# Patient Record
Sex: MALE | Race: WHITE | NOT HISPANIC OR LATINO | Employment: FULL TIME | URBAN - METROPOLITAN AREA
[De-identification: names, ages, dates, MRNs, and addresses within clinical notes are randomized per-mention and may not be internally consistent; named-entity substitution may affect disease eponyms.]

---

## 2018-06-13 ENCOUNTER — HOSPITAL ENCOUNTER (OUTPATIENT)
Dept: GENERAL RADIOLOGY | Facility: CLINIC | Age: 63
Discharge: HOME | End: 2018-06-13
Attending: FAMILY MEDICINE | Admitting: FAMILY MEDICINE
Payer: COMMERCIAL

## 2018-06-13 ENCOUNTER — TELEPHONE (OUTPATIENT)
Dept: FAMILY MEDICINE | Facility: CLINIC | Age: 63
End: 2018-06-13

## 2018-06-13 ENCOUNTER — OFFICE VISIT (OUTPATIENT)
Dept: FAMILY MEDICINE | Facility: CLINIC | Age: 63
End: 2018-06-13
Payer: COMMERCIAL

## 2018-06-13 DIAGNOSIS — R06.02 SHORTNESS OF BREATH: ICD-10-CM

## 2018-06-13 DIAGNOSIS — R06.02 SHORTNESS OF BREATH: Primary | ICD-10-CM

## 2018-06-13 PROCEDURE — 71046 X-RAY EXAM CHEST 2 VIEWS: CPT | Mod: TC

## 2018-06-13 PROCEDURE — 99214 OFFICE O/P EST MOD 30 MIN: CPT | Performed by: FAMILY MEDICINE

## 2018-06-13 RX ORDER — PREDNISONE 20 MG/1
20 TABLET ORAL DAILY
Qty: 7 TABLET | Refills: 0 | Status: SHIPPED | OUTPATIENT
Start: 2018-06-13 | End: 2018-07-31

## 2018-06-13 NOTE — LETTER
[address] [id number] [phone]        June 13, 2018    Regarding:  Keith Gonzalez  [address]  [facility] [address]              To Whom It May Concern;  Please excuse Jaison from work for the next 5-7 days due to a medical illness. If you have any questions or concerns please feel free to contact us at [phone].          Sincerely,        Chase East MD

## 2018-06-13 NOTE — PROGRESS NOTES
SUBJECTIVE:                                                      No chief complaint on file.           Keith is a 62 year old comes in with his wife today complaining of several weeks of worsening shortness of breath.  He has become quite limited in his day-to-day activities.  Short of breath with minimal exertion.  Wife is also recovering from something similar.  She he is a longtime smoker but recently quit.  No hemoptysis.  No cough.  No fever.  No chest pain.  History of DVT or PE.  Currently anticoagulated and at goal.  Per the patient he is checked outside our system.    ROS:  Constitutional, HEENT, cardiovascular, pulmonary, gi and gu systems are negative, except as otherwise noted.    OBJECTIVE:                                                    There were no vitals taken for this visit.  There is no height or weight on file to calculate BMI.    Thin.  Well-appearing.  Alert and oriented.  Heart regular.  Lungs are tight bilaterally with decreased air movement, but no wheezes or rales and distress    Diagnostic Test Results:  none      ASSESSMENT/PLAN:                                                        ICD-10-CM    1. Shortness of breath R06.02 XR Chest 2 Views     predniSONE (DELTASONE) 20 MG tablet     Ipratropium-Albuterol (COMBIVENT RESPIMAT)  MCG/ACT inhaler       Likely undiagnosed underlying exacerbation of COPD.  Congratulated him on quitting smoking.  Will check an x-ray and start him on prednisone and inhalers.  See how he does with that return a week.  If worsening come back sooner.    Chase East MD  [facility]

## 2018-06-13 NOTE — MR AVS SNAPSHOT
After Visit Summary   6/13/2018    Keith Gonzalez    MRN: [id number]           Patient Information     Date Of Birth          1955        Visit Information        Provider Department      6/13/2018 9:00 AM Chase East MD [facility]        Today's Diagnoses     Shortness of breath    -  1       Follow-ups after your visit        Your next 10 appointments already scheduled     Jun 20, 2018 10:00 AM CDT   SHORT with Chase East MD   [facility] ([facility])    [address] [place] [id number] [phone]              Future tests that were ordered for you today     Open Future Orders        Priority Expected Expires Ordered    XR Chest 2 Views Routine 6/13/2018 6/13/2019 6/13/2018            Who to contact     If you have questions or need follow up information about today's clinic visit or your schedule please contact [facility] directly at [phone].  Normal or non-critical lab and imaging results will be communicated to you by MyChart, letter or phone within 4 business days after the clinic has received the results. If you do not hear from us within 7 days, please contact the clinic through Opalityhart or phone. If you have a critical or abnormal lab result, we will notify you by phone as soon as possible.  Submit refill requests through [employer]fund or call your pharmacy and they will forward the refill request to us. Please allow 3 business days for your refill to be completed.          Additional Information About Your Visit        Care EveryWhere ID     This is your Care EveryWhere ID. This could be used by other organizations to access your [place] medical records  [id number]         Blood Pressure from Last 3 Encounters:   05/13/09 110/70   04/08/09 96/60   03/03/09 92/56    Weight from Last 3 Encounters:   05/13/09 136 lb (61.7 kg)   04/08/09 141 lb (64 kg)   03/03/09 138 lb (62.6 kg)                  Today's Medication Changes          These changes are accurate as of 6/13/18  3:57 PM.  If you have any questions, ask your nurse or doctor.               Start taking these medicines.        Dose/Directions    Ipratropium-Albuterol  MCG/ACT inhaler   Commonly known as:  COMBIVENT RESPIMAT   Used for:  Shortness of breath        Dose:  1 puff   Inhale 1 puff into the lungs 4 times daily Not to exceed 6 doses per day.   Quantity:  1 Inhaler   Refills:  1       predniSONE 20 MG tablet   Commonly known as:  DELTASONE   Used for:  Shortness of breath        Dose:  20 mg   Take 1 tablet (20 mg) by mouth daily   Quantity:  7 tablet   Refills:  0            Where to get your medicines      These medications were sent to [facility], [place] - [address] [facility]   [address] , [facility] [address]     Phone:  [phone]     Ipratropium-Albuterol  MCG/ACT inhaler    predniSONE 20 MG tablet                Primary Care Provider Fax #    Physician No Ref-Primary [phone]       No address on file        Equal Access to Services     [facility] : Hadii marie villanueva hadasho Soomaali, waaxda luqadaha, qaybta kaalmada adeegyakateryna, micheal mishra . So [facility] [phone].    ATENCIÓN: Si habla español, tiene a sierra disposición servicios gratuitos de asistencia lingüística. Llame al [phone].    We comply with applicable federal civil rights laws and [place] laws. We do not discriminate on the basis of race, color, national origin, age, disability, sex, sexual orientation, or gender identity.            Thank you!     Thank you for choosing [facility]  for your care. Our goal is always to provide you with excellent care. Hearing back from our patients is one way we can continue to improve our services. Please take a few minutes to complete the written survey that you may receive in the mail after your visit with us. Thank you!             Your Updated  Medication List - Protect others around you: Learn how to safely use, store and throw away your medicines at www.disposemymeds.org.          This list is accurate as of 6/13/18  3:57 PM.  Always use your most recent med list.                   Brand Name Dispense Instructions for use Diagnosis    Ipratropium-Albuterol  MCG/ACT inhaler    COMBIVENT RESPIMAT    1 Inhaler    Inhale 1 puff into the lungs 4 times daily Not to exceed 6 doses per day.    Shortness of breath       predniSONE 20 MG tablet    DELTASONE    7 tablet    Take 1 tablet (20 mg) by mouth daily    Shortness of breath       warfarin 5 MG tablet    COUMADIN    60 Tab    2 tablets (10 mg) daily or as directed    Long term (current) use of anticoagulants, Phlebitis and thrombophlebitis of other deep vessels of lower extremities

## 2018-06-13 NOTE — LETTER
[address] [id number] [phone]        June 13, 2018    Keith Gonzalez  [address]  [facility] [address]          Dear Keith,    LAB RESULTS:     The results of your recent Chest X-Ray were NORMAL no signs of pneumonia.  If you have any further questions or problems, please contact our office at [phone].        Sincerely,        Chase East MD

## 2018-06-13 NOTE — TELEPHONE ENCOUNTER
----- Message from Chase East MD sent at 6/13/2018  4:30 PM CDT -----  Please call the patient with the results. No sign of pneumonia     Chase East MD

## 2018-06-14 ENCOUNTER — TELEPHONE (OUTPATIENT)
Dept: FAMILY MEDICINE | Facility: CLINIC | Age: 63
End: 2018-06-14

## 2018-06-14 NOTE — TELEPHONE ENCOUNTER
Reason for Call:  Request for results:    Name of test or procedure: X-ray    Date of test of procedure: 06/13    Location of the test or procedure: [facility] to leave the result message on voice mail or with a family member? YES    Phone number Patient can be reached at:  Cell number on file:    Telephone Information:   Mobile [phone]       Additional comments: Please call with results    Call taken on 6/14/2018 at 11:39 AM by Belinda Menendez

## 2018-06-14 NOTE — TELEPHONE ENCOUNTER
Patient informed letter was sent yesterday but patient did not receive it yet. He has no further questions at this time.   Nasreen Brooks MA

## 2018-06-20 ENCOUNTER — OFFICE VISIT (OUTPATIENT)
Dept: FAMILY MEDICINE | Facility: CLINIC | Age: 63
End: 2018-06-20
Payer: COMMERCIAL

## 2018-06-20 VITALS
WEIGHT: 131.2 LBS | DIASTOLIC BLOOD PRESSURE: 72 MMHG | HEART RATE: 82 BPM | OXYGEN SATURATION: 93 % | SYSTOLIC BLOOD PRESSURE: 110 MMHG | TEMPERATURE: 98.3 F

## 2018-06-20 DIAGNOSIS — R06.02 SHORTNESS OF BREATH: ICD-10-CM

## 2018-06-20 DIAGNOSIS — J44.9 CHRONIC OBSTRUCTIVE PULMONARY DISEASE, UNSPECIFIED COPD TYPE (H): Primary | ICD-10-CM

## 2018-06-20 PROCEDURE — 99213 OFFICE O/P EST LOW 20 MIN: CPT | Performed by: FAMILY MEDICINE

## 2018-06-20 RX ORDER — BENZONATATE 200 MG/1
200 CAPSULE ORAL 2 TIMES DAILY PRN
Qty: 30 CAPSULE | Refills: 0 | Status: SHIPPED | OUTPATIENT
Start: 2018-06-20 | End: 2018-10-25

## 2018-06-20 ASSESSMENT — PAIN SCALES - GENERAL: PAINLEVEL: MILD PAIN (2)

## 2018-06-20 NOTE — LETTER
[address] [id number] [phone]        June 20, 2018    Regarding:  Keith Gonzalez  [address]  [facility] [address]              To Whom It May Concern;  He will return to work 6/25 no restrictions. He was out with COPD.           Sincerely,      Chase East MD

## 2018-06-20 NOTE — MR AVS SNAPSHOT
After Visit Summary   6/20/2018    Keith Gonzalez    MRN: [id number]           Patient Information     Date Of Birth          1955        Visit Information        Provider Department      6/20/2018 10:00 AM Chase East MD [facility]        Today's Diagnoses     Chronic obstructive pulmonary disease, unspecified COPD type (H)    -  1    Shortness of breath           Follow-ups after your visit        Your next 10 appointments already scheduled     Jul 25, 2018  9:40 AM CDT   SHORT with Chase East MD   [facility] ([facility])    [address] [place] [phone] [phone]              Who to contact     If you have questions or need follow up information about today's clinic visit or your schedule please contact [facility] directly at [phone].  Normal or non-critical lab and imaging results will be communicated to you by MyChart, letter or phone within 4 business days after the clinic has received the results. If you do not hear from us within 7 days, please contact the clinic through MyChart or phone. If you have a critical or abnormal lab result, we will notify you by phone as soon as possible.  Submit refill requests through GumGum or call your pharmacy and they will forward the refill request to us. Please allow 3 business days for your refill to be completed.          Additional Information About Your Visit        Care EveryWhere ID     This is your Care EveryWhere ID. This could be used by other organizations to access your [place] medical records  [id number]        Your Vitals Were     Pulse Temperature Pulse Oximetry             82 98.3  F (36.8  C) (Temporal) 93%          Blood Pressure from Last 3 Encounters:   06/20/18 110/72   05/13/09 110/70   04/08/09 96/60    Weight from Last 3 Encounters:   06/20/18 131 lb 3.2 oz (59.5 kg)   05/13/09 136 lb (61.7 kg)   04/08/09 141 lb (64  kg)              Today, you had the following     No orders found for display         Today's Medication Changes          These changes are accurate as of 6/20/18  2:33 PM.  If you have any questions, ask your nurse or doctor.               Start taking these medicines.        Dose/Directions    benzonatate 200 MG capsule   Commonly known as:  TESSALON   Used for:  Chronic obstructive pulmonary disease, unspecified COPD type (H)   Started by:  Chase East MD        Dose:  200 mg   Take 1 capsule (200 mg) by mouth 2 times daily as needed for cough   Quantity:  30 capsule   Refills:  0            Where to get your medicines      These medications were sent to [facility] [place] - [address] [facility]   [address] , [facility] [address]     Phone:  [phone]     benzonatate 200 MG capsule    Ipratropium-Albuterol  MCG/ACT inhaler                Primary Care Provider Fax #    Physician No Ref-Primary [phone]       No address on file        Equal Access to Services     [facility] : Hadii marie goncalveso Sobenjamin, waaxda luqadaha, qaybta kaalmada adeegyada, micheal mishra . So [facility] [phone].    ATENCIÓN: Si habla español, tiene a sierra disposición servicios gratuitos de asistencia lingüística. Llame al [phone].    We comply with applicable federal civil rights laws and [place] laws. We do not discriminate on the basis of race, color, national origin, age, disability, sex, sexual orientation, or gender identity.            Thank you!     Thank you for choosing [facility]  for your care. Our goal is always to provide you with excellent care. Hearing back from our patients is one way we can continue to improve our services. Please take a few minutes to complete the written survey that you may receive in the mail after your visit with us. Thank you!             Your Updated Medication List - Protect others around you: Learn how  to safely use, store and throw away your medicines at www.disposemymeds.org.          This list is accurate as of 6/20/18  2:33 PM.  Always use your most recent med list.                   Brand Name Dispense Instructions for use Diagnosis    benzonatate 200 MG capsule    TESSALON    30 capsule    Take 1 capsule (200 mg) by mouth 2 times daily as needed for cough    Chronic obstructive pulmonary disease, unspecified COPD type (H)       Ipratropium-Albuterol  MCG/ACT inhaler    COMBIVENT RESPIMAT    1 Inhaler    Inhale 1 puff into the lungs 4 times daily Not to exceed 6 doses per day.    Shortness of breath       predniSONE 20 MG tablet    DELTASONE    7 tablet    Take 1 tablet (20 mg) by mouth daily    Shortness of breath       warfarin 5 MG tablet    COUMADIN    60 Tab    2 tablets (10 mg) daily or as directed    Long term (current) use of anticoagulants, Phlebitis and thrombophlebitis of other deep vessels of lower extremities

## 2018-06-20 NOTE — NURSING NOTE
Health Maintenance Due   Topic Date Due     PHQ-2 Q1 YR  08/17/1968     TETANUS IMMUNIZATION (SYSTEM ASSIGNED)  08/17/1974     HIV SCREEN (SYSTEM ASSIGNED)  08/17/1974     HEPATITIS C SCREENING  08/17/1974     LIPID SCREEN Q5 YR MALE (SYSTEM ASSIGNED)  08/17/1991     COLON CANCER SCREEN (SYSTEM ASSIGNED)  08/17/2006     ADVANCE DIRECTIVE PLANNING Q5 YRS  08/17/2011     Health Maintenance reviewed at today's visit patient asked to schedule/complete:   Patient is aware.

## 2018-06-20 NOTE — PROGRESS NOTES
SUBJECTIVE:                                                      Chief Complaint   Patient presents with     RECHECK     SOB- states that he is tired, states that he didn't sleep well last night.  States that the cough comes and goes.  o2 was slightly low.  States that he pulled a muscle in the chest area so he is tender.             Keith is a 62 year old comes in for recheck.  Seen last week with COPD exacerbation.  No spirometry on file, but he has been a longtime smoker.  Still is off smokes.  Congratulated.  Cough is pretty bad at night, nonproductive but does keep him up.  Has been checking his oxygen at home when he runs about 90 or 92%    ROS:  Constitutional, HEENT, cardiovascular, pulmonary, gi and gu systems are negative, except as otherwise noted.    OBJECTIVE:                                                    /72 (BP Location: Right arm, Patient Position: Chair, Cuff Size: Adult Regular)  Pulse 82  Temp 98.3  F (36.8  C) (Temporal)  Wt 131 lb 3.2 oz (59.5 kg)  SpO2 93%  There is no height or weight on file to calculate BMI.    Thin, elderly.  No distress.  Lungs clear, but distant and faint.  Heart regular.    Diagnostic Test Results:  none      ASSESSMENT/PLAN:                                                        ICD-10-CM    1. Chronic obstructive pulmonary disease, unspecified COPD type (H) J44.9 benzonatate (TESSALON) 200 MG capsule   2. Shortness of breath R06.02 Ipratropium-Albuterol (COMBIVENT RESPIMAT)  MCG/ACT inhaler       Likely COPD.  Refill provided for Combivent.  Continue to use as needed.  Letter provided to return to work next week.  See him back in 1 month for spirometry    Chase East MD  [facility]

## 2018-07-31 ENCOUNTER — OFFICE VISIT (OUTPATIENT)
Dept: FAMILY MEDICINE | Facility: CLINIC | Age: 63
End: 2018-07-31
Payer: COMMERCIAL

## 2018-07-31 VITALS
OXYGEN SATURATION: 92 % | WEIGHT: 132.8 LBS | SYSTOLIC BLOOD PRESSURE: 124 MMHG | TEMPERATURE: 97.9 F | HEART RATE: 74 BPM | DIASTOLIC BLOOD PRESSURE: 74 MMHG

## 2018-07-31 DIAGNOSIS — R06.02 SOB (SHORTNESS OF BREATH): Primary | ICD-10-CM

## 2018-07-31 DIAGNOSIS — Z11.4 SCREENING FOR HIV (HUMAN IMMUNODEFICIENCY VIRUS): ICD-10-CM

## 2018-07-31 DIAGNOSIS — Z13.6 CARDIOVASCULAR SCREENING; LDL GOAL LESS THAN 100: ICD-10-CM

## 2018-07-31 DIAGNOSIS — J44.9 CHRONIC OBSTRUCTIVE PULMONARY DISEASE, UNSPECIFIED COPD TYPE (H): ICD-10-CM

## 2018-07-31 LAB
FEF 25/75: NORMAL
FEV-1: NORMAL
FEV1/FVC: NORMAL
FVC: NORMAL

## 2018-07-31 PROCEDURE — 99214 OFFICE O/P EST MOD 30 MIN: CPT | Mod: 25 | Performed by: FAMILY MEDICINE

## 2018-07-31 PROCEDURE — 94010 BREATHING CAPACITY TEST: CPT | Performed by: FAMILY MEDICINE

## 2018-07-31 RX ORDER — TIOTROPIUM BROMIDE 18 UG/1
CAPSULE ORAL; RESPIRATORY (INHALATION)
Qty: 90 CAPSULE | Refills: 1 | Status: SHIPPED | OUTPATIENT
Start: 2018-07-31 | End: 2018-10-25

## 2018-07-31 ASSESSMENT — PAIN SCALES - GENERAL: PAINLEVEL: NO PAIN (0)

## 2018-07-31 NOTE — NURSING NOTE
Health Maintenance Due   Topic Date Due     SPIROMETRY ONETIME  1955     COPD ACTION PLAN Q1 YR  1955     PNEUMOVAX 1X HI RISK PATIENT < 65 (NO IB MSG)  08/17/1958     PHQ-2 Q1 YR  08/17/1968     TETANUS IMMUNIZATION (SYSTEM ASSIGNED)  08/17/1974     HIV SCREEN (SYSTEM ASSIGNED)  08/17/1974     HEPATITIS C SCREENING  08/17/1974     LIPID SCREEN Q5 YR MALE (SYSTEM ASSIGNED)  08/17/1991     COLON CANCER SCREEN (SYSTEM ASSIGNED)  08/17/2006     ADVANCE DIRECTIVE PLANNING Q5 YRS  08/17/2011       Health Maintenance reviewed at today's visit patient asked to schedule/complete:   Patient is aware.    Marcy Lopez, CMA

## 2018-07-31 NOTE — MR AVS SNAPSHOT
After Visit Summary   7/31/2018    Keith Gonzalez    MRN: [id number]           Patient Information     Date Of Birth          1955        Visit Information        Provider Department      7/31/2018 9:20 AM Chase East MD [facility]        Today's Diagnoses     SOB (shortness of breath)    -  1    CARDIOVASCULAR SCREENING; LDL GOAL LESS THAN 100        Screening for HIV (human immunodeficiency virus)        Chronic obstructive pulmonary disease, unspecified COPD type (H)           Follow-ups after your visit        Your next 10 appointments already scheduled     Nov 01, 2018  9:00 AM CDT   SHORT with Chase East MD   [facility]    [address] [place] [id number] [phone]              Who to contact     If you have questions or need follow up information about today's clinic visit or your schedule please contact [facility] directly at [phone].  Normal or non-critical lab and imaging results will be communicated to you by MyChart, letter or phone within 4 business days after the clinic has received the results. If you do not hear from us within 7 days, please contact the clinic through MyChart or phone. If you have a critical or abnormal lab result, we will notify you by phone as soon as possible.  Submit refill requests through Seeb[employer] or call your pharmacy and they will forward the refill request to us. Please allow 3 business days for your refill to be completed.          Additional Information About Your Visit        Care EveryWhere ID     This is your Care EveryWhere ID. This could be used by other organizations to access your [place] medical records  [id number]        Your Vitals Were     Pulse Temperature Pulse Oximetry             74 97.9  F (36.6  C) (Temporal) 92%          Blood Pressure from Last 3 Encounters:   07/31/18 124/74   06/20/18 110/72   05/13/09 110/70     Weight from Last 3 Encounters:   07/31/18 132 lb 12.8 oz (60.2 kg)   06/20/18 131 lb 3.2 oz (59.5 kg)   05/13/09 136 lb (61.7 kg)              We Performed the Following     Spirometry, Breathing Capacity: Normal Order, Clinic Performed          Today's Medication Changes          These changes are accurate as of 7/31/18 11:59 PM.  If you have any questions, ask your nurse or doctor.               Start taking these medicines.        Dose/Directions    tiotropium 18 MCG capsule   Commonly known as:  SPIRIVA HANDIHALER   Used for:  Chronic obstructive pulmonary disease, unspecified COPD type (H)   Started by:  Chase East MD        Inhale contents of one capsule daily.   Quantity:  90 capsule   Refills:  1            Where to get your medicines      These medications were sent to [address]  [address], [facility] [address]     Phone:  [phone]     tiotropium 18 MCG capsule                Primary Care Provider Fax #    Physician No Ref-Primary [phone]       No address on file        Equal Access to Services     [facility]: Hadii marie villanueva hadasho Soomaali, waaxda luqadaha, qaybta kaalmada adeegyakateryna, micheal mishra . So [facility] [phone].    ATENCIÓN: Si habla español, tiene a sierra disposición servicios gratuitos de asistencia lingüística. Llame al [phone].    We comply with applicable federal civil rights laws and [place] laws. We do not discriminate on the basis of race, color, national origin, age, disability, sex, sexual orientation, or gender identity.            Thank you!     Thank you for choosing [facility]  for your care. Our goal is always to provide you with excellent care. Hearing back from our patients is one way we can continue to improve our services. Please take a few minutes to complete the written survey that you may receive in the mail after your visit with us. Thank you!             Your Updated  Medication List - Protect others around you: Learn how to safely use, store and throw away your medicines at www.disposemymeds.org.          This list is accurate as of 7/31/18 11:59 PM.  Always use your most recent med list.                   Brand Name Dispense Instructions for use Diagnosis    benzonatate 200 MG capsule    TESSALON    30 capsule    Take 1 capsule (200 mg) by mouth 2 times daily as needed for cough    Chronic obstructive pulmonary disease, unspecified COPD type (H)       Ipratropium-Albuterol  MCG/ACT inhaler    COMBIVENT RESPIMAT    1 Inhaler    Inhale 1 puff into the lungs 4 times daily Not to exceed 6 doses per day.    Shortness of breath       tiotropium 18 MCG capsule    SPIRIVA HANDIHALER    90 capsule    Inhale contents of one capsule daily.    Chronic obstructive pulmonary disease, unspecified COPD type (H)       warfarin 5 MG tablet    COUMADIN    60 Tab    2 tablets (10 mg) daily or as directed    Long term (current) use of anticoagulants, Phlebitis and thrombophlebitis of other deep vessels of lower extremities

## 2018-07-31 NOTE — PROGRESS NOTES
SUBJECTIVE:                                                      Chief Complaint   Patient presents with     Breathing Problem     recheck- states that when he excerts himself he gets short of breath.  walking up stairs fine. oxygen slightly low today.             Keith is a 62 year old who has COPD and follows up today in terms of his breathing.  We have been working on his smoking cessation.  He is free of smoking, I congratulated him.  He is on no inhalers which is tolerating well.  He feels good overall.  He has dyspnea with 1 flight of stairs, but not with level guarding ablation.  good      ROS:  Constitutional, HEENT, cardiovascular, pulmonary, gi and gu systems are negative, except as otherwise noted.    OBJECTIVE:                                                    /74  Pulse 74  Temp 97.9  F (36.6  C) (Temporal)  Wt 132 lb 12.8 oz (60.2 kg)  SpO2 92%  There is no height or weight on file to calculate BMI.    Very thin male who is in no distress.  Heart regular.  Lungs clear and unlabored.  Sternum is thin but well perfused no edema.    Diagnostic Test Results:  Spirometry done today see scanned image.  Basically moderate to severe COPD.     ASSESSMENT/PLAN:                                                        ICD-10-CM    1. SOB (shortness of breath) R06.02 Spirometry, Breathing Capacity: Normal Order, Clinic Performed   2. CARDIOVASCULAR SCREENING; LDL GOAL LESS THAN 100 Z13.6 Lipid panel reflex to direct LDL Fasting     CANCELED: Lipid panel reflex to direct LDL Non-fasting   3. Screening for HIV (human immunodeficiency virus) Z11.4 CANCELED: HIV Antigen Antibody Combo     CANCELED: **Hepatitis C Screen Reflex to RNA FUTURE anytime   4. Chronic obstructive pulmonary disease, unspecified COPD type (H) J44.9 tiotropium (SPIRIVA HANDIHALER) 18 MCG capsule       Increase his inhaler regimen to include a long-acting anticholinergic due to his very poor performance on the spirometry, and his  lack of endurance.  Continue a short acting as needed.  Continue smoking cessation.  See him back in a couple of months or sooner as needed    Chase East MD  [facility]

## 2018-08-02 DIAGNOSIS — J44.9 CHRONIC OBSTRUCTIVE PULMONARY DISEASE, UNSPECIFIED COPD TYPE (H): Primary | ICD-10-CM

## 2018-08-06 DIAGNOSIS — Z13.6 CARDIOVASCULAR SCREENING; LDL GOAL LESS THAN 100: ICD-10-CM

## 2018-08-06 LAB
CHOLEST SERPL-MCNC: 195 MG/DL
HDLC SERPL-MCNC: 69 MG/DL
LDLC SERPL CALC-MCNC: 115 MG/DL
NONHDLC SERPL-MCNC: 126 MG/DL
TRIGL SERPL-MCNC: 57 MG/DL

## 2018-08-06 PROCEDURE — 36415 COLL VENOUS BLD VENIPUNCTURE: CPT | Performed by: FAMILY MEDICINE

## 2018-08-06 PROCEDURE — 80061 LIPID PANEL: CPT | Performed by: FAMILY MEDICINE

## 2018-08-07 ENCOUNTER — TELEPHONE (OUTPATIENT)
Dept: FAMILY MEDICINE | Facility: CLINIC | Age: 63
End: 2018-08-07

## 2018-08-07 NOTE — TELEPHONE ENCOUNTER
----- Message from Chase East MD sent at 8/7/2018  8:45 AM CDT -----  Please send letter with results.  Thanks. Cholesterol panel looks great

## 2018-08-07 NOTE — LETTER
August 7, 2018      Keith Gonzalez  [address]  [address]  [facility] [address]        Dear ,    We are writing to inform you of your test results.    Your test results fall within the expected range(s) or remain unchanged from previous results.  Please continue with current treatment plan.    Resulted Orders   Lipid panel reflex to direct LDL Fasting   Result Value Ref Range    Cholesterol 195 <200 mg/dL    Triglycerides 57 <150 mg/dL      Comment:      Fasting specimen    HDL Cholesterol 69 >39 mg/dL    LDL Cholesterol Calculated 115 (H) <100 mg/dL      Comment:      Above desirable:  100-129 mg/dl  Borderline High:  130-159 mg/dL  High:             160-189 mg/dL  Very high:       >189 mg/dl      Non HDL Cholesterol 126 <130 mg/dL       If you have any questions or concerns, please call the clinic at the number listed above.       Sincerely,        Chase East MD

## 2018-08-09 ENCOUNTER — TELEPHONE (OUTPATIENT)
Dept: FAMILY MEDICINE | Facility: CLINIC | Age: 63
End: 2018-08-09

## 2018-08-09 NOTE — TELEPHONE ENCOUNTER
Panel Management Review      Patient has the following on his problem list:    COPD  Diagnosis date: 06/20/2018   Is this diagnosis new within the last year?   YES   Was spirometry completed?  YES      Composite cancer screening  Chart review shows that this patient is due/due soon for the following Fecal Colorectal (FIT)  Summary:    Patient is due/failing the following:   FIT    Action needed:   Patient needs office visit for annual physical.    Type of outreach:    Sent letter.    Questions for provider review:    None                                                                                                                                    Marcy Lopez CMA      Chart routed to Care Team .

## 2018-08-09 NOTE — LETTER
[address] [id number] [phone]        August 9, 2018    Keith Gonzalez  [address]  [address]  [facility] [address]          Dear Keith,    We were looking through you chart and noticed that you haven't been seen in a while, we would like to schedule an office visit for an annual physical when you have time. Please give the clinic a call at [phone] and schedule at your earliest convienence.  Thank you so much, and we look forward to seeing you soon.       From the Office of Chase East MD

## 2018-10-25 ENCOUNTER — OFFICE VISIT (OUTPATIENT)
Dept: FAMILY MEDICINE | Facility: CLINIC | Age: 63
End: 2018-10-25
Payer: COMMERCIAL

## 2018-10-25 VITALS
BODY MASS INDEX: 20.29 KG/M2 | OXYGEN SATURATION: 93 % | TEMPERATURE: 97.8 F | HEART RATE: 72 BPM | DIASTOLIC BLOOD PRESSURE: 72 MMHG | RESPIRATION RATE: 18 BRPM | WEIGHT: 137 LBS | SYSTOLIC BLOOD PRESSURE: 116 MMHG | HEIGHT: 69 IN

## 2018-10-25 DIAGNOSIS — R06.02 SHORTNESS OF BREATH: ICD-10-CM

## 2018-10-25 DIAGNOSIS — J44.9 CHRONIC OBSTRUCTIVE PULMONARY DISEASE, UNSPECIFIED COPD TYPE (H): ICD-10-CM

## 2018-10-25 PROCEDURE — 99213 OFFICE O/P EST LOW 20 MIN: CPT | Performed by: FAMILY MEDICINE

## 2018-10-25 RX ORDER — TIOTROPIUM BROMIDE 18 UG/1
CAPSULE ORAL; RESPIRATORY (INHALATION)
Qty: 90 CAPSULE | Refills: 1 | Status: SHIPPED | OUTPATIENT
Start: 2018-10-25 | End: 2019-04-23

## 2018-10-25 ASSESSMENT — PAIN SCALES - GENERAL: PAINLEVEL: NO PAIN (0)

## 2018-10-25 NOTE — MR AVS SNAPSHOT
"              After Visit Summary   10/25/2018    Keith Gonzalez    MRN: [id number]           Patient Information     Date Of Birth          1955        Visit Information        Provider Department      10/25/2018 9:00 AM Chase East MD [facility]        Today's Diagnoses     Shortness of breath        Chronic obstructive pulmonary disease, unspecified COPD type (H)           Follow-ups after your visit        Who to contact     If you have questions or need follow up information about today's clinic visit or your schedule please contact [facility] directly at [phone].  Normal or non-critical lab and imaging results will be communicated to you by ApeniMEDhart, letter or phone within 4 business days after the clinic has received the results. If you do not hear from us within 7 days, please contact the clinic through ApeniMEDhart or phone. If you have a critical or abnormal lab result, we will notify you by phone as soon as possible.  Submit refill requests through Feathr or call your pharmacy and they will forward the refill request to us. Please allow 3 business days for your refill to be completed.          Additional Information About Your Visit        MyChart Information     Feathr lets you send messages to your doctor, view your test results, renew your prescriptions, schedule appointments and more. To sign up, go to [email or web address] . Click on \"Log in\" on the left side of the screen, which will take you to the Welcome page. Then click on \"Sign up Now\" on the right side of the page.     You will be asked to enter the access code listed below, as well as some personal information. Please follow the directions to create your username and password.     Your access code is: [email or web address]  Expires: 2019  3:11 PM     Your access code will  in 90 days. If you need help or a new code, please call your [facility] or [phone].        Care EveryWhere " "ID     This is your Care EveryWhere ID. This could be used by other organizations to access your [facility] medical records  [id number]        Your Vitals Were     Pulse Temperature Respirations Height Pulse Oximetry BMI (Body Mass Index)    72 97.8  F (36.6  C) (Temporal) 18 5' 8.6\" (1.742 m) 93% 20.47 kg/m2       Blood Pressure from Last 3 Encounters:   10/25/18 116/72   07/31/18 124/74   06/20/18 110/72    Weight from Last 3 Encounters:   10/25/18 137 lb (62.1 kg)   07/31/18 132 lb 12.8 oz (60.2 kg)   06/20/18 131 lb 3.2 oz (59.5 kg)              Today, you had the following     No orders found for display         Where to get your medicines      These medications were sent to [facility], [place] - [address]   [address] , [facility] [address]     Phone:  [phone]     Ipratropium-Albuterol  MCG/ACT inhaler    tiotropium 18 MCG capsule          Primary Care Provider Fax #    Physician No Ref-Primary [phone]       No address on file        Equal Access to Services     DARI VILLALOBOS AH: Hadii marie goncalveso Sobenjamin, waaxda luqadaha, qaybta kaalmada adeegyada, micheal klein. So [facility] [phone].    ATENCIÓN: Si habla español, tiene a sierra disposición servicios gratuitos de asistencia lingüística. Ari al [phone].    We comply with applicable federal civil rights laws and [place] laws. We do not discriminate on the basis of race, color, national origin, age, disability, sex, sexual orientation, or gender identity.            Thank you!     Thank you for choosing [facility]  for your care. Our goal is always to provide you with excellent care. Hearing back from our patients is one way we can continue to improve our services. Please take a few minutes to complete the written survey that you may receive in the mail after your visit with us. Thank you!             Your Updated Medication List - Protect others around you: Learn " how to safely use, store and throw away your medicines at www.disposemymeds.org.          This list is accurate as of 10/25/18 11:59 PM.  Always use your most recent med list.                   Brand Name Dispense Instructions for use Diagnosis    Ipratropium-Albuterol  MCG/ACT inhaler    COMBIVENT RESPIMAT    1 Inhaler    Inhale 1 puff into the lungs 4 times daily Not to exceed 6 doses per day.    Shortness of breath       tiotropium 18 MCG capsule    SPIRIVA HANDIHALER    90 capsule    Inhale contents of one capsule daily.    Chronic obstructive pulmonary disease, unspecified COPD type (H)

## 2018-10-25 NOTE — NURSING NOTE
Pt was informed that he is due for a colonoscopy or FIT Test , [facility]  Soniya Harrison MA

## 2018-10-25 NOTE — PROGRESS NOTES
"  SUBJECTIVE:                                                      Chief Complaint   Patient presents with     Breathing Problem     recheck SOB         Recheck SOB     Keith is a 63 year old with COPD who comes in for recheck.  He remains off smokes.  Breathing is stable.  Compliant with his inhalers.  Feeling well.    ROS:  Constitutional, HEENT, cardiovascular, pulmonary, gi and gu systems are negative, except as otherwise noted.    OBJECTIVE:                                                    Pulse 72  Temp 97.8  F (36.6  C) (Temporal)  Resp 18  Ht 5' 8.6\" (1.742 m)  Wt 137 lb (62.1 kg)  SpO2 93%  BMI 20.47 kg/m2  Body mass index is 20.47 kg/(m^2).    GENERAL: healthy, alert and no distress  NECK: no adenopathy, no asymmetry, masses, or scars and thyroid normal to palpation  RESP: lungs clear to auscultation - no rales, rhonchi or wheezes  CV: regular rate and rhythm, normal S1 S2, no S3 or S4, no murmur, click or rub, no peripheral edema and peripheral pulses strong  ABDOMEN: soft, nontender, no hepatosplenomegaly, no masses and bowel sounds normal  MS: no gross musculoskeletal defects noted, no edema    Diagnostic Test Results:  none      ASSESSMENT/PLAN:                                                        ICD-10-CM    1. Shortness of breath R06.02    2. Chronic obstructive pulmonary disease, unspecified COPD type (H) J44.9        Stable.  Continue inhalers.  See him back in 6 months        Chase East MD  [facility]     "

## 2018-10-25 NOTE — NURSING NOTE
"Chief Complaint   Patient presents with     Breathing Problem     recheck SOB        Initial /72  Pulse 72  Temp 97.8  F (36.6  C) (Temporal)  Resp 18  Ht 5' 8.6\" (1.742 m)  Wt 137 lb (62.1 kg)  SpO2 93%  BMI 20.47 kg/m2 Estimated body mass index is 20.47 kg/(m^2) as calculated from the following:    Height as of this encounter: 5' 8.6\" (1.742 m).    Weight as of this encounter: 137 lb (62.1 kg).  BP completed using cuff size: candida Harrison MA      "

## 2019-01-02 ENCOUNTER — OFFICE VISIT (OUTPATIENT)
Dept: FAMILY MEDICINE | Facility: CLINIC | Age: 64
End: 2019-01-02
Payer: COMMERCIAL

## 2019-01-02 DIAGNOSIS — J44.9 CHRONIC OBSTRUCTIVE PULMONARY DISEASE, UNSPECIFIED COPD TYPE (H): Primary | ICD-10-CM

## 2019-01-02 PROCEDURE — 99213 OFFICE O/P EST LOW 20 MIN: CPT | Performed by: FAMILY MEDICINE

## 2019-01-02 ASSESSMENT — MIFFLIN-ST. JEOR: SCORE: 1416.57

## 2019-01-02 ASSESSMENT — PAIN SCALES - GENERAL: PAINLEVEL: NO PAIN (0)

## 2019-01-02 NOTE — PROGRESS NOTES
"  SUBJECTIVE:                                                      Chief Complaint   Patient presents with     COPD        Recheck COPD     Amount of exercise or physical activity: None    Problems taking medications regularly: No    Medication side effects: none    Diet: regular (no restrictions)        Keith is a 63 year old doing well.  Still smoke-free.  No complaints.  No limitations to his activities of daily living.  Still working and doing well with that.  No complaints.    ROS:  Constitutional, HEENT, cardiovascular, pulmonary, gi and gu systems are negative, except as otherwise noted.    OBJECTIVE:                                                    BP (P) 116/72   Pulse (P) 89   Temp (P) 96.8  F (36  C) (Temporal)   Resp (P) 16   Ht (P) 1.76 m (5' 9.3\")   Wt (P) 62.1 kg (137 lb)   SpO2 (P) 94%   BMI (P) 20.06 kg/m    Body mass index is 20.06 kg/m  (pended).    GENERAL: healthy, alert and no distress  NECK: no adenopathy, no asymmetry, masses, or scars and thyroid normal to palpation  RESP: lungs clear to auscultation - no rales, rhonchi or wheezes  CV: regular rate and rhythm, normal S1 S2, no S3 or S4, no murmur, click or rub, no peripheral edema and peripheral pulses strong  ABDOMEN: soft, nontender, no hepatosplenomegaly, no masses and bowel sounds normal  MS: no gross musculoskeletal defects noted, no edema         ASSESSMENT/PLAN:                                                        ICD-10-CM    1. Chronic obstructive pulmonary disease, unspecified COPD type (H) J44.9        Stable.  See him back in 6 months.  Continue same inhalers        Chase East MD  [facility]     "

## 2019-01-17 ENCOUNTER — TELEPHONE (OUTPATIENT)
Dept: FAMILY MEDICINE | Facility: CLINIC | Age: 64
End: 2019-01-17

## 2019-01-17 NOTE — TELEPHONE ENCOUNTER
Panel Management Review      Patient has the following on his problem list:    COPD  Diagnosis date: 6/20/2018   Is this diagnosis new within the last year?   NO   Was spirometry completed?  YES      Composite cancer screening  Chart review shows that this patient is due/due soon for the following Colonoscopy and Fecal Colorectal (FIT)  Summary:    Patient is due/failing the following:   COLONOSCOPY and FIT    Action needed:   Patient needs referral/order: fit test and colonoscopy     Type of outreach:    Phone, spoke to patient.  [facility]    Questions for provider review:    None                                                                                                                                    Soniya Harrison MA        Chart routed to Care Team .

## 2019-03-28 ENCOUNTER — TELEPHONE (OUTPATIENT)
Dept: FAMILY MEDICINE | Facility: CLINIC | Age: 64
End: 2019-03-28

## 2019-03-28 NOTE — TELEPHONE ENCOUNTER
Panel Management Review      Patient has the following on his problem list:    COPD  Diagnosis date: 1/2/2019   Is this diagnosis new within the last year?   NO   Was spirometry completed?  YES      Composite cancer screening  Chart review shows that this patient is due/due soon for the following Colonoscopy and Fecal Colorectal (FIT)  Summary:    Patient is due/failing the following:   COLONOSCOPY and FIT    Action needed:   Patient needs referral/order: fit test or colonoscopy     Type of outreach:    Phone, spoke to patient.  [facility].     Questions for provider review:    None                                                                                                                                    Soniya Harrison MA        Chart routed to Care Team .

## 2019-04-23 DIAGNOSIS — J44.9 CHRONIC OBSTRUCTIVE PULMONARY DISEASE, UNSPECIFIED COPD TYPE (H): ICD-10-CM

## 2019-04-24 RX ORDER — TIOTROPIUM BROMIDE 18 UG/1
CAPSULE ORAL; RESPIRATORY (INHALATION)
Qty: 90 CAPSULE | Refills: 1 | Status: SHIPPED | OUTPATIENT
Start: 2019-04-24 | End: 2019-10-31

## 2019-04-24 NOTE — TELEPHONE ENCOUNTER
"Spiriva  Last Written Prescription Date:  10/25/2018  Last Fill Quantity: 90,  # refills: 1   Last office visit: 1/2/2019 with prescribing provider:  Kita   Future Office Visit:   Next 5 appointments (look out 90 days)    Jun 20, 2019 11:00 AM CDT  Office Visit with Chase East MD  [facility] [address] [place] [id number] [phone]         Requested Prescriptions   Pending Prescriptions Disp Refills     tiotropium (SPIRIVA HANDIHALER) 18 MCG inhaled capsule [Pharmacy Med Name: SPIRIVA HANDIHALER 18MCG CAPS] 90 capsule 1     Sig: USING THE HANDIHALER, INHALE THE CONTENTS OF ONE CAPSULE BY MOUTH DAILY       Asthma Maintenance Inhalers - Anticholinergics Passed - 4/23/2019  1:20 PM        Passed - Patient is age 12 years or older        Passed - Recent (12 mo) or future (30 days) visit within the authorizing provider's specialty     Patient had office visit in the last 12 months or has a visit in the next 30 days with authorizing provider or within the authorizing provider's specialty.  See \"Patient Info\" tab in inbasket, or \"Choose Columns\" in Meds & Orders section of the refill encounter.          Passed - Medication is active on med list      Kya García RN   "

## 2019-06-20 ENCOUNTER — OFFICE VISIT (OUTPATIENT)
Dept: FAMILY MEDICINE | Facility: CLINIC | Age: 64
End: 2019-06-20
Payer: COMMERCIAL

## 2019-06-20 VITALS
RESPIRATION RATE: 18 BRPM | HEART RATE: 69 BPM | SYSTOLIC BLOOD PRESSURE: 116 MMHG | OXYGEN SATURATION: 98 % | TEMPERATURE: 98.7 F | BODY MASS INDEX: 20.59 KG/M2 | DIASTOLIC BLOOD PRESSURE: 70 MMHG | WEIGHT: 139 LBS | HEIGHT: 69 IN

## 2019-06-20 DIAGNOSIS — J44.9 CHRONIC OBSTRUCTIVE PULMONARY DISEASE, UNSPECIFIED COPD TYPE (H): Primary | ICD-10-CM

## 2019-06-20 PROCEDURE — 99213 OFFICE O/P EST LOW 20 MIN: CPT | Performed by: FAMILY MEDICINE

## 2019-06-20 ASSESSMENT — PAIN SCALES - GENERAL: PAINLEVEL: NO PAIN (0)

## 2019-06-20 ASSESSMENT — MIFFLIN-ST. JEOR: SCORE: 1420.88

## 2019-06-20 NOTE — PROGRESS NOTES
"Subjective     Keith is a 63 year old male who comes to clinic to discuss:    COPD Follow-Up    Overall, how are your COPD symptoms since your last clinic visit?  No change    How much fatigue or shortness of breath do you have when you are walking?  Same as usual    How much shortness of breath do you have when you are resting?  Same as usual    How often do you cough? Rarely    Have you noticed any change in your sputum/phlegm?  No    Have you experienced a recent fever? No    Please describe how far you can walk without stopping to rest:  2-5 blocks    How many flights of stairs are you able to walk up without stopping?  2-3    Have you had any Emergency Room Visits, Urgent Care Visits, or Hospital Admissions because of your COPD since your last office visit?  No    History   Smoking Status     Current Some Day Smoker     Packs/day: 1.00     Types: Cigarettes   Smokeless Tobacco     Never Used     No results found for: FEV1, BTH0UVR    Amount of exercise or physical activity: 1 day/week for an average of 15-30 minutes    Problems taking medications regularly: No    Medication side effects: none    Diet: regular (no restrictions)      Back to smoking occasionally      Review of Systems   ROS COMP: Constitutional, HEENT, cardiovascular, pulmonary, gi and gu systems are negative, except as otherwise noted.      Objective    /70   Pulse 69   Temp 98.7  F (37.1  C) (Temporal)   Resp 18   Ht 1.753 m (5' 9\")   Wt 63 kg (139 lb)   SpO2 98%   BMI 20.53 kg/m       Wt Readings from Last 2 Encounters:   06/20/19 63 kg (139 lb)   01/02/19 (P) 62.1 kg (137 lb)       Physical Exam   GENERAL: healthy, alert and no distress  NECK: no adenopathy, no asymmetry, masses, or scars and thyroid normal to palpation  RESP: lungs clear to auscultation - no rales, rhonchi or wheezes  CV: regular rate and rhythm, normal S1 S2, no S3 or S4, no murmur, click or rub, no peripheral edema and peripheral pulses strong  ABDOMEN: " soft, nontender, no hepatosplenomegaly, no masses and bowel sounds normal  MS: no gross musculoskeletal defects noted, no edema    Diagnostic Test Results:  Labs reviewed in Epic        Assessment & Plan       ICD-10-CM    1. Chronic obstructive pulmonary disease, unspecified COPD type (H) J44.9    Stable.  No changes.  See him back in 6 months      Chase East MD  [facility]

## 2019-10-31 DIAGNOSIS — R06.02 SHORTNESS OF BREATH: ICD-10-CM

## 2019-10-31 DIAGNOSIS — J44.9 CHRONIC OBSTRUCTIVE PULMONARY DISEASE, UNSPECIFIED COPD TYPE (H): ICD-10-CM

## 2019-10-31 RX ORDER — TIOTROPIUM BROMIDE 18 UG/1
CAPSULE ORAL; RESPIRATORY (INHALATION)
Qty: 90 CAPSULE | Refills: 1 | Status: SHIPPED | OUTPATIENT
Start: 2019-10-31 | End: 2020-03-03

## 2019-10-31 RX ORDER — IPRATROPIUM BROMIDE AND ALBUTEROL 20; 100 UG/1; UG/1
SPRAY, METERED RESPIRATORY (INHALATION)
Qty: 4 G | Refills: 1 | Status: SHIPPED | OUTPATIENT
Start: 2019-10-31 | End: 2020-05-29

## 2019-10-31 NOTE — TELEPHONE ENCOUNTER
"Combivent  Last Written Prescription Date:  10/25/2018  Last Fill Quantity: 1,  # refills: 1   Last office visit: 6/20/2019 with prescribing provider:  Kita   Prescription approved per [facility] Refill Protocol.    Spiriva  Last Written Prescription Date:  04/24/2019  Last Fill Quantity: 90,  # refills: 1   Last office visit: 6/20/2019 with prescribing provider:  Kita   Prescription approved per [facility] Refill Protocol.    Future Office Visit:  None    Requested Prescriptions   Pending Prescriptions Disp Refills     tiotropium (SPIRIVA HANDIHALER) 18 MCG inhaled capsule [Pharmacy Med Name: SPIRIVA HANDIHALER 18MCG CAPS] 90 capsule 1     Sig: USING THE HANDIHALER, INHALE THE CONTENTS OF ONE CAPSULE BY MOUTH DAILY       Asthma Maintenance Inhalers - Anticholinergics Passed - 10/31/2019  1:22 PM        Passed - Patient is age 12 years or older        Passed - Recent (12 mo) or future (30 days) visit within the authorizing provider's specialty     Patient has had an office visit with the authorizing provider or a provider within the authorizing providers department within the previous 12 mos or has a future within next 30 days. See \"Patient Info\" tab in [facility], or \"Choose Columns\" in Meds & Orders section of the refill encounter.          Passed - Medication is active on med list        COMBIVENT RESPIMAT  MCG/ACT inhaler [Pharmacy Med Name: COMBIVENT RESPIMAT  AERS] 4 g 1     Sig: INHALE ONE PUFF INTO THE LUNGS FOUR TIMES A DAY (NOT TO EXCEED 6 DOSES PER DAY)       Asthma Maintenance Inhalers - Anticholinergics Passed - 10/31/2019  1:22 PM        Passed - Patient is age 12 years or older        Passed - Recent (12 mo) or future (30 days) visit within the authorizing provider's specialty     Patient has had an office visit with the authorizing provider or a provider within the authorizing providers department within the previous 12 mos or has a future within next 30 days. See \"Patient Info\" tab in [facility], " "or \"Choose Columns\" in Meds & Orders section of the refill encounter.          Passed - Medication is active on med list      Kya Torres RN   "

## 2020-02-26 ENCOUNTER — TELEPHONE (OUTPATIENT)
Dept: FAMILY MEDICINE | Facility: CLINIC | Age: 65
End: 2020-02-26

## 2020-02-26 NOTE — TELEPHONE ENCOUNTER
Prior Authorization Retail Medication Request    Medication/Dose: spiriva  ICD code (if different than what is on RX):     Previously Tried and Failed:     Rationale:       Insurance Name:  [facility]  Insurance ID:  [id number]      Pharmacy Information (if different than what is on RX)  Name:     Phone:

## 2020-02-28 NOTE — TELEPHONE ENCOUNTER
Central Prior Authorization Team   Phone: [phone]      [place] Initiation    Medication: spiriva-Initiated  Insurance Company: [employer] - Phone [phone] Fax [phone]  Pharmacy Filling the Rx: [address]   Filling Pharmacy Phone: [phone]  Filling Pharmacy Fax:    Start Date: 2/28/2020

## 2020-03-01 DIAGNOSIS — J44.9 CHRONIC OBSTRUCTIVE PULMONARY DISEASE, UNSPECIFIED COPD TYPE (H): Primary | ICD-10-CM

## 2020-03-01 NOTE — TELEPHONE ENCOUNTER
Spiriva was over $200, Incruse will be $40.  Will you change to Incruse please?    Thank you,  Johanne Blanton [facility]  [phone]

## 2020-03-02 NOTE — TELEPHONE ENCOUNTER
Prior Authorization Approval    Authorization Effective Date: 1/29/2020  Authorization Expiration Date: 2/27/2021  Medication: spiriva-APPROVED  Approved Dose/Quantity:   Reference #:     Insurance Company: [employer] - Phone [phone] Fax [phone]  Expected CoPay:       CoPay Card Available:      Foundation Assistance Needed:    Which Pharmacy is filling the prescription (Not needed for infusion/clinic administered): [facility] [address]   Pharmacy Notified: Yes  Patient Notified: No    Looks like copay is expensive and pharmacy sent a request for a different medication.  Please see telephone encounter from 3/1/2020.

## 2020-05-29 DIAGNOSIS — R06.02 SHORTNESS OF BREATH: ICD-10-CM

## 2020-05-29 RX ORDER — IPRATROPIUM BROMIDE AND ALBUTEROL 20; 100 UG/1; UG/1
SPRAY, METERED RESPIRATORY (INHALATION)
Qty: 4 G | Refills: 1 | Status: SHIPPED | OUTPATIENT
Start: 2020-05-29 | End: 2020-07-14

## 2020-05-29 NOTE — TELEPHONE ENCOUNTER
"Combivent  Last Written Prescription Date:  10/31/2019  Last Fill Quantity: 4,  # refills: 1   Last office visit: 6/20/2019 with prescribing provider:  Kita   Future Office Visit:  None  Prescription approved per [facility] Refill Protocol.    Requested Prescriptions   Pending Prescriptions Disp Refills     COMBIVENT RESPIMAT  MCG/ACT inhaler [Pharmacy Med Name: COMBIVENT RESPIMAT  AERS] 4 g 1     Sig: INHALE ONE PUFF INTO THE LUNGS FOUR TIMES A DAY (NOT TO EXCEED 6 DOSES PER DAY)       Asthma Maintenance Inhalers - Anticholinergics Passed - 5/29/2020 11:10 AM        Passed - Patient is age 12 years or older        Passed - Recent (12 mo) or future (30 days) visit within the authorizing provider's specialty     Patient has had an office visit with the authorizing provider or a provider within the authorizing providers department within the previous 12 mos or has a future within next 30 days. See \"Patient Info\" tab in inbasket, or \"Choose Columns\" in Meds & Orders section of the refill encounter.          Passed - Medication is active on med list       Short-Acting Beta Agonist Inhalers Protocol  Passed - 5/29/2020 11:10 AM        Passed - Patient is age 12 or older        Passed - Recent (12 mo) or future (30 days) visit within the authorizing provider's specialty     Patient has had an office visit with the authorizing provider or a provider within the authorizing providers department within the previous 12 mos or has a future within next 30 days. See \"Patient Info\" tab in inbasket, or \"Choose Columns\" in Meds & Orders section of the refill encounter.          Passed - Medication is active on med list       Asthma Nebs Protocol Passed - 5/29/2020 11:10 AM        Passed - Patient is age 4 years or older        Passed - Recent (12 mo) or future (30 days) visit within the authorizing provider's specialty     Patient has had an office visit with the authorizing provider or a provider within the authorizing " "providers department within the previous 12 mos or has a future within next 30 days. See \"Patient Info\" tab in inbasket, or \"Choose Columns\" in Meds & Orders section of the refill encounter.          Passed - Medication is active on med list         Kya García RN   "

## 2020-06-29 DIAGNOSIS — J44.9 CHRONIC OBSTRUCTIVE PULMONARY DISEASE, UNSPECIFIED COPD TYPE (H): ICD-10-CM

## 2020-06-29 NOTE — TELEPHONE ENCOUNTER
"Routing to PCP for refill if appropriate.   Routing to schedulers for video visit with PCP for medication management.  Can also schedule for yearly physical for next available.      Incruse Ellipta  Last Written Prescription Date:  03/03/2020  Last Fill Quantity: 7,  # refills: 3   Last office visit: 6/20/2019 with prescribing provider:  Kita   Future Office Visit:  None  Routing refill request to provider for review/approval because:  Patient needs to be seen because it has been more than 1 year since last office visit.    Requested Prescriptions   Pending Prescriptions Disp Refills     INCRUSE ELLIPTA 62.5 MCG/INH Inhaler [Pharmacy Med Name: INCRUSE ELLIPTA 62.5MCG/INH AEPB] 30 each 3     Sig: INHALE ONE PUFF BY MOUTH ONCE DAILY       Asthma Maintenance Inhalers - Anticholinergics Failed - 6/29/2020 11:37 AM        Failed - Recent (12 mo) or future (30 days) visit within the authorizing provider's specialty     Patient has had an office visit with the authorizing provider or a provider within the authorizing providers department within the previous 12 mos or has a future within next 30 days. See \"Patient Info\" tab in inbasket, or \"Choose Columns\" in Meds & Orders section of the refill encounter.          Passed - Patient is age 12 years or older        Passed - Medication is active on med list         Kya García RN   "

## 2020-06-30 NOTE — TELEPHONE ENCOUNTER
Called Jaison and relayed message.  An appointment is made for him with Dr East 07/14.  Jaison states he has enough medication for another month.

## 2020-06-30 NOTE — TELEPHONE ENCOUNTER
Medication denied, patient has upcoming appointment and has enough medications until appointment.

## 2020-07-14 ENCOUNTER — VIRTUAL VISIT (OUTPATIENT)
Dept: FAMILY MEDICINE | Facility: CLINIC | Age: 65
End: 2020-07-14
Payer: COMMERCIAL

## 2020-07-14 DIAGNOSIS — J44.9 CHRONIC OBSTRUCTIVE PULMONARY DISEASE, UNSPECIFIED COPD TYPE (H): ICD-10-CM

## 2020-07-14 PROCEDURE — 99213 OFFICE O/P EST LOW 20 MIN: CPT | Mod: TEL | Performed by: FAMILY MEDICINE

## 2020-07-14 RX ORDER — IPRATROPIUM BROMIDE AND ALBUTEROL 20; 100 UG/1; UG/1
SPRAY, METERED RESPIRATORY (INHALATION)
Qty: 4 G | Refills: 4 | Status: SHIPPED | OUTPATIENT
Start: 2020-07-14 | End: 2021-05-13

## 2020-07-14 NOTE — PROGRESS NOTES
"Keith Gonzalez is a 64 year old male who is being evaluated via a billable telephone visit.      The patient has been notified of following:     \"This telephone visit will be conducted via a call between you and your physician/provider. We have found that certain health care needs can be provided without the need for a physical exam.  This service lets us provide the care you need with a short phone conversation.  If a prescription is necessary we can send it directly to your pharmacy.  If lab work is needed we can place an order for that and you can then stop by our lab to have the test done at a later time.    Telephone visits are billed at different rates depending on your insurance coverage. During this emergency period, for some insurers they may be billed the same as an in-person visit.  Please reach out to your insurance provider with any questions.    If during the course of the call the physician/provider feels a telephone visit is not appropriate, you will not be charged for this service.\"    Patient has given verbal consent for Telephone visit?  Yes    What phone number would you like to be contacted at?     How would you like to obtain your AVS? Mail a copy          Keith Gonzalez complains of    Chief Complaint   Patient presents with     RECHECK       I have reviewed and updated the patient's Past Medical History, Social History, Family History and Medication List.    ALLERGIES  No known drug allergies    Soniya Harrison (MA signature)    Additional provider notes: Recheck Meds   Doing well, no conmplaints, using inhalers, not smoking, can get up single flight of stairs with minimal issues  Ac went out when it was a 100, did fine    Assessment/Plan:    ICD-10-CM    1. Chronic obstructive pulmonary disease, unspecified COPD type (H)  J44.9 ipratropium-albuterol (COMBIVENT RESPIMAT)  MCG/ACT inhaler     umeclidinium (INCRUSE ELLIPTA) 62.5 MCG/INH inhaler       Stable. Refilled. rtc 6 mo    I have " reviewed the note as documented above.  This accurately captures the substance of my conversation with the patient.    Phone call contact time 12m    Tena ESPOSITO

## 2020-11-12 ENCOUNTER — VIRTUAL VISIT (OUTPATIENT)
Dept: URGENT CARE | Facility: CLINIC | Age: 65
End: 2020-11-12
Payer: COMMERCIAL

## 2020-11-12 DIAGNOSIS — Z20.822 EXPOSURE TO COVID-19 VIRUS: Primary | ICD-10-CM

## 2020-11-12 PROCEDURE — 99213 OFFICE O/P EST LOW 20 MIN: CPT | Mod: TEL | Performed by: FAMILY MEDICINE

## 2020-11-12 NOTE — PROGRESS NOTES
"The patient has been notified of following:     \"This telephone or video visit will be conducted via a call between you and your physician/provider. We have found that certain health care needs can be provided without the need for a physical exam.  This service lets us provide the care you need with a short phone conversation.  If a prescription is necessary we can send it directly to your pharmacy.  If lab work is needed we can place an order for that and you can then stop by our lab to have the test done at a later time.    Telephone or video visits are billed at different rates depending on your insurance coverage. During this emergency period, for some insurers they may be billed the same as an in-person visit.  Please reach out to your insurance provider with any questions.    Patient has given verbal consent for Telephone or video visit?  Yes  Subjective   HPI    Exposure to covid    Wife fell the other night - was taken by ambulance to [facility] - they did a covid test and was found to be positive  She is in critical care     Patient has no symptoms and is planning to self-isolate         Patient Active Problem List   Diagnosis     Embolism and thrombosis (H)     Chronic obstructive pulmonary disease (H)     No past surgical history on file.    Social History     Tobacco Use     Smoking status: Current Some Day Smoker     Packs/day: 1.00     Types: Cigarettes     Smokeless tobacco: Never Used   Substance Use Topics     Alcohol use: No     No family history on file.        Reviewed and updated as needed this visit by Provider   Allergies               Review of Systems   Constitutional, HEENT, cardiovascular, pulmonary, GI, , musculoskeletal, neuro, skin, endocrine and psych systems are negative, except as otherwise noted.       Objective   Reported vitals:  There were no vitals taken for this visit.   healthy, alert and no distress  PSYCH: Alert and oriented times 3; coherent speech, normal   rate " and volume, able to articulate logical thoughts, able   to abstract reason, no tangential thoughts, no hallucinations   or delusions  His affect is normal  RESP: No cough, no audible wheezing, able to talk in full sentences  Additional exam:  none  Remainder of exam unable to be completed due to telephone visits    Diagnostic Test Results:  Labs reviewed in Epic  none         Assessment/Plan:      ICD-10-CM    1. Exposure to COVID-19 virus  Z20.828 Asymptomatic COVID-19 Virus (Coronavirus) by PCR     If you know you have had close contact with someone who tested positive, you should be quarantined for 14 days after this exposure. You should stay in quarantine for the14 days even if the covid test is negative, the optimal time to test after exposure is 5-7 days from the exposure  Quarantine means   What should I do?  For safety, it's very important to follow these rules. Do this for 14 days after the date you were last exposed to the virus..  Stay home and away from others. Don't go to school or anywhere else. Generally quarantine means staying home for work but there are some exceptions to this. Please contact your workplace.   No hugging, kissing or shaking hands.  Don't let anyone visit.  Cover your mouth and nose with a mask, tissue or washcloth to avoid spreading germs.  Wash your hands and face often. Use soap and water.  What are the symptoms of COVID-19?  The most common symptoms are cough, fever and trouble breathing. Less common symptoms include headache, body aches, fatigue (feeling very tired), chills, sore throat, stuffy or runny nose, diarrhea (loose poop), loss of taste or smell, belly pain, and nausea or vomiting (feeling sick to your stomach or throwing up).  After 14 days, if you have still don't have symptoms, you likely don't have this virus.  If you develop symptoms, follow these guidelines.  If you're normally healthy: Please start another [facility] visit to report your symptoms. Go to  [email or web address].  If you have a serious health problem (like cancer, heart failure, an organ transplant or kidney disease): Call your specialty clinic. Let them know that you might have COVID-19.  2. When it's time for your COVID test:  Stay at least 6 feet away from others. (If someone will drive you to your test, stay in the backseat, as far away from the  as you can.)  Cover your mouth and nose with a mask, tissue or washcloth.  Go straight to the testing site. Don't make any stops on the way there or back.  Please note  Caregivers in these groups are at risk for severe illness due to COVID-19:  o People 65 years and older  o People who live in a nursing home or long-term care facility  o People with chronic disease (lung, heart, cancer, diabetes, kidney, liver, immunologic)  o People who have a weakened immune system, including those who:  Are in cancer treatment  Take medicine that weakens the immune system, such as corticosteroids  Had a bone marrow or organ transplant  Have an immune deficiency  Have poorly controlled HIV or AIDS  Are obese (body mass index of 40 or higher)  Smoke regularly  Where can I get more information?  [facility] - About COVID-19: [email or web address]  CDC - What to Do If You're Sick: www.cdc.gov/coronavirus/2019-ncov/about/steps-when-sick.html  CDC - Ending Home Isolation: www.cdc.gov/coronavirus/2019-ncov/hcp/disposition-in-home-patients.html  CDC - Caring for Someone: www.cdc.gov/coronavirus/2019-ncov/if-you-are-sick/care-for-someone.html  [facility] - Interim Guidance for Hospital Discharge to Home: www.health.[facility].[place].us/diseases/coronavirus/hcp/hospdischarge.pdf  [facility] clinical trials (COVID-19 research studies): clinicalaffairs.[facility]/n-clinical-trials  Below are the COVID-19 hotlines at the [place] Department of Health ([facility]). Interpreters are available.  For health questions: Call 263-974-8416 or 1-203.780.2143 (7 a.m. to 7 p.m.)   For  questions about schools and childcare: Call 091-933-0988 or 1-651.565.5513 (7 a.m. to 7 p.m.)      call duration:  8:26  minutes  Video: bentley Stein MD

## 2020-11-14 ENCOUNTER — OFFICE VISIT (OUTPATIENT)
Dept: LAB | Facility: CLINIC | Age: 65
End: 2020-11-14
Attending: FAMILY MEDICINE
Payer: COMMERCIAL

## 2020-11-14 DIAGNOSIS — Z20.822 EXPOSURE TO COVID-19 VIRUS: ICD-10-CM

## 2020-11-14 PROCEDURE — U0003 INFECTIOUS AGENT DETECTION BY NUCLEIC ACID (DNA OR RNA); SEVERE ACUTE RESPIRATORY SYNDROME CORONAVIRUS 2 (SARS-COV-2) (CORONAVIRUS DISEASE [COVID-19]), AMPLIFIED PROBE TECHNIQUE, MAKING USE OF HIGH THROUGHPUT TECHNOLOGIES AS DESCRIBED BY CMS-2020-01-R: HCPCS | Performed by: FAMILY MEDICINE

## 2020-11-15 LAB
SARS-COV-2 RNA SPEC QL NAA+PROBE: ABNORMAL
SPECIMEN SOURCE: ABNORMAL

## 2020-11-16 ENCOUNTER — TELEPHONE (OUTPATIENT)
Dept: FAMILY MEDICINE | Facility: CLINIC | Age: 65
End: 2020-11-16

## 2020-11-16 DIAGNOSIS — J44.9 CHRONIC OBSTRUCTIVE PULMONARY DISEASE, UNSPECIFIED COPD TYPE (H): ICD-10-CM

## 2020-11-16 NOTE — TELEPHONE ENCOUNTER
Please call him, he has question on returning to work after A POSITIVE COVID TEST LAST Saturday...[phone]

## 2020-11-16 NOTE — TELEPHONE ENCOUNTER
Spoke with patient and information was answered. Patient needed his lab results mailed to him. He was unable to print off his mychart. Lab test has been printed and mailed to patient.   Nasreen Brooks MA

## 2020-11-16 NOTE — TELEPHONE ENCOUNTER
Please see telephone encounter for note below. Routing this encounter to RN for the refill request part of it.     Nasreen Brooks MA

## 2020-11-16 NOTE — TELEPHONE ENCOUNTER
Madison Lamas 1 hour ago (3:35 PM)        Please call him, he has question on returning to work after A POSITIVE COVID TEST LAST Saturday...[phone]

## 2020-11-18 NOTE — TELEPHONE ENCOUNTER
Prescription approved per [facility] Refill Protocol.    Kya García RN

## 2021-01-09 ENCOUNTER — HEALTH MAINTENANCE LETTER (OUTPATIENT)
Age: 66
End: 2021-01-09

## 2021-04-13 DIAGNOSIS — J44.9 CHRONIC OBSTRUCTIVE PULMONARY DISEASE, UNSPECIFIED COPD TYPE (H): ICD-10-CM

## 2021-04-14 NOTE — TELEPHONE ENCOUNTER
"  Requested Prescriptions   Pending Prescriptions Disp Refills     INCRUSE ELLIPTA 62.5 MCG/INH Inhaler [Pharmacy Med Name: INCRUSE ELLIPTA 62.5MCG/INH AEPB] 30 each 3     Sig: INHALE ONE PUFF INTO THE LUNGS DAILY   7/14/2020    Asthma Maintenance Inhalers - Anticholinergics Passed - 4/13/2021  9:45 AM        Passed - Patient is age 12 years or older        Passed - Recent (12 mo) or future (30 days) visit within the authorizing provider's specialty     Patient has had an office visit with the authorizing provider or a provider within the authorizing providers department within the previous 12 mos or has a future within next 30 days. See \"Patient Info\" tab in inbasket, or \"Choose Columns\" in Meds & Orders section of the refill encounter.              Passed - Medication is active on med list         Prescription approved per [facility] Refill Protocol.  Nidia Humphrey RN    "

## 2021-05-13 DIAGNOSIS — J44.9 CHRONIC OBSTRUCTIVE PULMONARY DISEASE, UNSPECIFIED COPD TYPE (H): ICD-10-CM

## 2021-05-13 RX ORDER — IPRATROPIUM BROMIDE AND ALBUTEROL 20; 100 UG/1; UG/1
SPRAY, METERED RESPIRATORY (INHALATION)
Qty: 4 G | Refills: 1 | Status: SHIPPED | OUTPATIENT
Start: 2021-05-13 | End: 2021-08-11

## 2021-08-07 DIAGNOSIS — J44.9 CHRONIC OBSTRUCTIVE PULMONARY DISEASE, UNSPECIFIED COPD TYPE (H): ICD-10-CM

## 2021-08-09 NOTE — TELEPHONE ENCOUNTER
Routing refill request to provider for review/approval because:  Patient needs to be seen because it has been more than 1 year since last office visit.    Kya García RN

## 2021-08-11 RX ORDER — IPRATROPIUM BROMIDE AND ALBUTEROL 20; 100 UG/1; UG/1
SPRAY, METERED RESPIRATORY (INHALATION)
Qty: 4 G | Refills: 3 | Status: SHIPPED | OUTPATIENT
Start: 2021-08-11 | End: 2021-12-10

## 2021-09-17 ENCOUNTER — OFFICE VISIT (OUTPATIENT)
Dept: FAMILY MEDICINE | Facility: CLINIC | Age: 66
End: 2021-09-17
Payer: COMMERCIAL

## 2021-09-17 VITALS
HEART RATE: 77 BPM | OXYGEN SATURATION: 95 % | TEMPERATURE: 97.5 F | RESPIRATION RATE: 20 BRPM | WEIGHT: 129 LBS | BODY MASS INDEX: 19.05 KG/M2 | SYSTOLIC BLOOD PRESSURE: 138 MMHG | DIASTOLIC BLOOD PRESSURE: 76 MMHG

## 2021-09-17 DIAGNOSIS — M54.50 ACUTE BILATERAL LOW BACK PAIN WITHOUT SCIATICA: Primary | ICD-10-CM

## 2021-09-17 DIAGNOSIS — K40.90 DIRECT INGUINAL HERNIA: ICD-10-CM

## 2021-09-17 PROCEDURE — 99213 OFFICE O/P EST LOW 20 MIN: CPT | Performed by: PHYSICIAN ASSISTANT

## 2021-09-17 RX ORDER — METHOCARBAMOL 750 MG/1
750 TABLET, FILM COATED ORAL 4 TIMES DAILY PRN
Qty: 40 TABLET | Refills: 0 | Status: SHIPPED | OUTPATIENT
Start: 2021-09-17 | End: 2021-09-27

## 2021-09-17 RX ORDER — PREDNISONE 20 MG/1
TABLET ORAL
Qty: 20 TABLET | Refills: 0 | Status: SHIPPED | OUTPATIENT
Start: 2021-09-17 | End: 2021-09-27

## 2021-09-17 ASSESSMENT — PAIN SCALES - GENERAL: PAINLEVEL: SEVERE PAIN (6)

## 2021-09-17 NOTE — PATIENT INSTRUCTIONS
1. We discussed use of famotidine for indigestion   Acceptable/safe doses can include 20mg twice daily     2. Discussed treatments for back pain including imaging, physical therapy and injections   - Exam today did not find any alarming findings   - Will have you start prednisone to reduce inflammation and robaxin to help with muscles and pain.       Patient Education     Lifestyle Changes for Controlling GERD  When you have GERD, stomach acid feels as if it s backing up toward your mouth. Making lifestyle changes can often improve your symptoms. This is true if you take medicine to control your GERD or not. Talk with your healthcare provider about the following suggestions. They may help you get relief from your symptoms.  Raise your head    Reflux is more likely to happen when you re lying down flat. That's because stomach fluid can flow backward more easily. Raising the head of your bed 4 to 6 inches can help. To do this:    Slide blocks or books under the legs at the head of your bed. Or put a wedge under the mattress. Many [employer] stores can make a wedge for you. The wedge should go from your waist to the top of your head.    Don t just prop your head up on a few pillows. This increases pressure on your stomach. It can make GERD worse.  Watch your eating habits  Certain foods may increase the acid in your stomach. Or they may relax the lower esophageal sphincter. This makes GERD more likely. It s best to avoid the following if they cause you symptoms:    Coffee, tea, and carbonated drinks (with and without caffeine)    Fatty, fried, or spicy food    Mint, chocolate, onions, tomatoes, and citrus    Peppermint    Any other foods that seem to irritate your stomach or cause you pain  Relieve the pressure  Tips include the following:    Eat smaller meals, even if you have to eat more often.    Don t lie down right after you eat. Wait a few hours for your stomach to empty.    Don't wear tight belts or tight-fitting  clothes.    Lose any extra weight.  Tobacco and alcohol  Don't smoke tobacco or drink alcohol. They can make GERD symptoms worse.  [employer] last reviewed this educational content on 6/1/2019 2000-2021 [employer]. All rights reserved. This information is not intended as a substitute for professional medical care. Always follow your healthcare professional's instructions.           Patient Education     Back Care Tips     Caring for your back  These are things you can do to prevent a recurrence of acute back pain and to reduce symptoms from chronic back pain:    Stay at a healthy weight. If you are overweight, losing weight will help most types of back pain.    Exercise is an important part of recovery from most types of back pain. The muscles behind and in front of the spine support the back. This means strengthening both the back muscles and the abdominal muscles will provide better support for your spine.     Swimming and brisk walking are good overall exercises to improve your fitness level.    Practice safe lifting methods (see below).    Practice good posture when sitting, standing, and walking. Don't sit for a long time. This puts more stress on the lower back than standing or walking.    Wear quality shoes with good arch support. Foot and ankle alignment can affect back symptoms. Don't wear high heels.    Therapeutic massage can help relax the back muscles without stretching them.    During the first 24 to 72 hours after an acute injury or flare-up of chronic back pain, put an ice pack on the painful area for 20 minutes and then remove it for 20 minutes. Do thisover a period of 60 to 90 minutes, or several times a day. As a safety precaution, don't use a heating pad at bedtime. Sleeping on a heating pad can lead to skin burns or tissue damage.    You can alternate using ice and heat.  Medicines  Talk with your healthcare provider before using medicines, especially if you have other health  problems or are taking other medicines.    You may use over-the-counter medicines, such as acetaminophen, ibuprofen, or naprosyn to control pain, unless your healthcare provider prescribed other pain medicine. Talk with your healthcare provider before taking any medicines if you have a chronic condition such as diabetes, liver or kidney disease, stomach ulcers, or digestive bleeding, or are taking blood thinners.    Be careful if you are given prescription pain medicines, opioids, or medicine for muscle spasm. They can cause drowsiness, and affect your coordination, reflexes, and judgment. Don't drive or operate heavy machinery while taking these types of medicines. Take prescription pain medicine only as prescribed by your healthcare provider.  Lumbar stretch  This simple stretch will help relax muscle spasm and keep your back more limber. If exercise makes your back pain worse, don t do it.    Lie on your back with your knees bent and both feet on the ground.    Slowly raise your left knee to your chest as you flatten your lower back against the floor. Hold for 5 seconds.    Relax and repeat the exercise with your right knee.    Do 10 of these exercises for each leg.  Safe lifting method    Don t bend over at the waist to lift an object off the floor.  Instead, bend your knees and hips in a squat.     Keep your back and head upright    Hold the object close to your body, directly in front of you.    Straighten your legs to lift the object.     Lower the object to the floor in the reverse fashion.    If you must slide something across the floor, push it.    Posture tips  Sitting  Sit in chairs with straight backs or low-back support. Keep your knees lower than your hips, with your feet flat on the floor.  When driving, sit up straight. Adjust the seat forward so you are not leaning toward the steering wheel.  A small pillow or rolled towel behind your lower back may help if you are driving long  distances.   Standing  When standing for long periods, shift most of your weight to one leg at a time. Switch legs every few minutes.   Sleeping  The best way to sleep is on your side with your knees bent. Put a low pillow under your head to support your neck in a neutral spine position. Don't use thick pillows that bend your neck to one side. Put a pillow between your legs to further relax your lower back. If you sleep on your back, put pillows under your knees to support your legs in a slightly flexed position. Use a firm mattress. If your mattress sags, replace it, or use a 1/2-inch plywood board under the mattress to add support.  Follow-up care  Follow up with your healthcare provider, or as advised.  If X-rays, a CT scan or an MRI scan were taken, they may be reviewed by a radiologist. You will be told of any new findings that may affect your care.  Call 911  Call 911 if any of the following occur:    Trouble breathing    Confusion    Very drowsy    Fainting or loss of consciousness    Rapid or very slow heart rate    Loss of  bowel or bladder control  When to seek medical advice  Call your healthcare provider right away if any of the following occur:    Pain becomes worse or spreads to your arms or legs    Weakness or numbness in one or both arms or legs    Numbness in the groin area  [employer] last reviewed this educational content on 11/1/2019 2000-2021 [employer]. All rights reserved. This information is not intended as a substitute for professional medical care. Always follow your healthcare professional's instructions.

## 2021-09-17 NOTE — LETTER
September 17, 2021      Keith Gonzalez  [address]  [address]  [facility] [place] [facility] [address]        To Whom It May Concern,       Keith was seen in clinic today, September 17, 2021, and is going to begin treatments for back pain. I would like him to remain out of work until Monday Sept 20th, 2021 so that he can have an opportunity for the treatments to take effect.       Sincerely,        Don Sanchez PA-C

## 2021-09-17 NOTE — PROGRESS NOTES
Assessment & Plan     Acute bilateral low back pain without sciatica  No triggering activity or injury to cause pains. Gradually worsening over the past several weeks. Attributes the pain to having to perform long walks across his place of work. There is not any previous imaging. Exam was grossly normal without alarm findings. Discussed with patient use of conservative treatments including anti-inflammatory medication and muscle relaxant. If patient does not improve as expected will refer him to physical therapy.   - predniSONE (DELTASONE) 20 MG tablet; Take 3 tabs by mouth daily x 3 days, then 2 tabs daily x 3 days, then 1 tab daily x 3 days, then 1/2 tab daily x 3 days. (Take with food)  - methocarbamol (ROBAXIN) 750 MG tablet; Take 1 tablet (750 mg) by mouth 4 times daily as needed for muscle spasms    Direct inguinal hernia  Patient was found to have large left inguinal hernia which has been causing his discomfort. I did advise the patient to have this corrected. Patient said that he could not afford the time off. He was agreeable to meeting with the general surgeon.   - Adult General Surg Referral; Future             Tobacco Cessation:   reports that he has been smoking cigarettes. He has been smoking about 1.00 pack per day. He has never used smokeless tobacco.      Return in about 6 months (around 3/17/2022) for Return for scheduled annual checkup with PCP.    FUAD Wasserman [facility] DARIEL Blackwood is a 66 year old who presents for the following health issues     HPI     Back Pain  Onset/Duration: 3 weeks now  Description:   Location of pain: low back both and neck left  Character of pain: sharp and dull ache  Pain radiation: radiates into the right buttocks and radiates into the left buttocks  New numbness or weakness in legs, not attributed to pain:no  Intensity: Currently 6/10  Progression of Symptoms: worsening  History:   Specific cause: none  Pain interferes  with job: YES  History of back problems: recurrent self limited episodes of low back pain in the past 1 time  Any previous MRI or X-rays: None  Sees a specialist for back pain: No  Alleviating factors:   Improved by: NSAIDs    Precipitating factors:  Worsened by: Lifting, Bending, Standing, Sitting, Lying Flat and Walking  Therapies tried and outcome: NSAIDs and rest    Accompanying Signs & Symptoms:  Risk of Fracture: None  Risk of Cauda Equina: None  Risk of Infection: None  Risk of Cancer: None  Risk of Ankylosing Spondylitis: Onset at age <35, male, AND morning back stiffness  no     Patient is a 66 year old male who presents today with complaint of low back pain for the past 3 weeks. 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 He has been using advil to manage the pain, but does not feel that it fully takes the pain away. He has also been relying on topical patches. Denies numbness, weakness, radicular pains, changes to bladder or bowels.     Review of Systems   Constitutional, HEENT, cardiovascular, pulmonary, gi and gu systems are negative, except as otherwise noted.      Objective    Pulse 77   Temp 97.5  F (36.4  C) (Temporal)   Resp 20   Wt 58.5 kg (129 lb)   SpO2 95%   BMI 19.05 kg/m    Body mass index is 19.05 kg/m .  Physical Exam   GENERAL: healthy, alert and no distress  RESP: lungs clear to auscultation - no rales, rhonchi or wheezes  CV: regular rate and rhythm, normal S1 S2, no S3 or S4, no murmur, click or rub, no peripheral edema and peripheral pulses strong  ABDOMEN: soft, nontender, no hepatosplenomegaly, no masses and bowel sounds normal, moderate sized left direction inguinal hernia.   MS: no gross musculoskeletal defects noted, no edema  BACK: no CVA tenderness, no paralumbar tenderness  Comprehensive back pain exam:  No tenderness, Range of motion not limited by pain, Lower extremity strength  functional and equal on both sides, Lower extremity reflexes within normal limits bilaterally, Lower extremity sensation normal and equal on both sides and Straight leg raise negative bilaterally

## 2021-09-27 ENCOUNTER — OFFICE VISIT (OUTPATIENT)
Dept: FAMILY MEDICINE | Facility: CLINIC | Age: 66
End: 2021-09-27
Payer: COMMERCIAL

## 2021-09-27 ENCOUNTER — HOSPITAL ENCOUNTER (OUTPATIENT)
Dept: GENERAL RADIOLOGY | Facility: CLINIC | Age: 66
Discharge: HOME | End: 2021-09-27
Attending: NURSE PRACTITIONER | Admitting: NURSE PRACTITIONER
Payer: COMMERCIAL

## 2021-09-27 VITALS
OXYGEN SATURATION: 99 % | HEART RATE: 86 BPM | TEMPERATURE: 98 F | RESPIRATION RATE: 18 BRPM | BODY MASS INDEX: 19.25 KG/M2 | WEIGHT: 127 LBS | DIASTOLIC BLOOD PRESSURE: 70 MMHG | HEIGHT: 68 IN | SYSTOLIC BLOOD PRESSURE: 130 MMHG

## 2021-09-27 DIAGNOSIS — J44.9 CHRONIC OBSTRUCTIVE PULMONARY DISEASE, UNSPECIFIED COPD TYPE (H): ICD-10-CM

## 2021-09-27 DIAGNOSIS — M54.50 LUMBAR PAIN: Primary | ICD-10-CM

## 2021-09-27 DIAGNOSIS — M41.9 SCOLIOSIS OF THORACOLUMBAR SPINE, UNSPECIFIED SCOLIOSIS TYPE: ICD-10-CM

## 2021-09-27 DIAGNOSIS — M53.3 SI (SACROILIAC) JOINT DYSFUNCTION: ICD-10-CM

## 2021-09-27 PROCEDURE — 99214 OFFICE O/P EST MOD 30 MIN: CPT | Performed by: NURSE PRACTITIONER

## 2021-09-27 PROCEDURE — 72100 X-RAY EXAM L-S SPINE 2/3 VWS: CPT

## 2021-09-27 ASSESSMENT — MIFFLIN-ST. JEOR: SCORE: 1335.57

## 2021-09-27 ASSESSMENT — PAIN SCALES - GENERAL: PAINLEVEL: MODERATE PAIN (5)

## 2021-09-27 NOTE — PROGRESS NOTES
Assessment & Plan     Lumbar pain    - XR Lumbar Spine 2/3 Views  - Physical Therapy Referral; Future  - Chiropractic Referral; Future    SI (sacroiliac) joint dysfunction    - XR Lumbar Spine 2/3 Views  - Physical Therapy Referral; Future  - Chiropractic Referral; Future    Scoliosis of thoracolumbar spine, unspecified scoliosis type    - Physical Therapy Referral; Future  - Chiropractic Referral; Future    Chronic obstructive pulmonary disease, unspecified COPD type (H)    - COPD ACTION PLAN    Recommend x-ray today to evaluate disc space vertebrae of the lumbar spine.  Also will have patient follow-up with physical therapy and recommend chiropractic for further evaluation.  Discussed plan of care with patient recommending physical therapy at this point in time.  He will continue to take ibuprofen and Tylenol as needed for pain.  He does take this with food.  He works with a lot of equipment do not recommend anything that may cause him fatigue.  Oral glucocorticoid was not helpful.  Discussed his severe scoliosis.  Likely contributing to pain as well as his work environment where he is bending over for long hours causing excess strain on the lumbar spine.  We also discussed orthopedic spine specialty in relationship to severity of scoliosis and management of pain in the lumbar area may be beneficial to her have an assessment to see if he would qualify for a steroid injection.  He declines this at this time.  Therefore recommend conservative therapy with in addition of chiropractic and physical therapy care.  Continue use of ice and heat as needed as well.  Recommend follow-up in 4 to 6 weeks.             Tobacco Cessation:   reports that he has been smoking cigarettes. He has been smoking about 1.00 pack per day. He has never used smokeless tobacco.  Tobacco Cessation Action Plan: Information offered: Patient not interested at this time    Patient Instructions   I will call you with your xray please follow up  with physical therapy.                      Acute Back/Neck Pain: Self Care at Home Instructions  Conservative Treatment    Remaining active supports a quicker recovery, keep moving. (ex. Walking, increase time & speed as tolerated).    Bed rest is not recommended. Minimize sitting. Do not remain in one position for extended periods of time.    Maintain routine activity with attention to correct posture; stop aggravating activities.    Over-the-counter pain medications for short term symptom control. (See below)    Muscle relaxants are sometimes helpful for few days, but may cause drowsiness. (See below)    Cold packs or heat based upon your preference.    Most people improve within 2 weeks; most have significant improvement within 4 weeks.    If symptoms worsen or you experience numbness, contact your provider immediately.    Medications for Pain Relief  Recommended over-the-counter non-steroidal anti-inflammatories:     Ibuprofen (Advil, Motrin) 800 mg. three times a day as needed for pain      OR   Naproxen Sodium (Aleve) 220-440 mg. two times per day as needed for pain          HAILY Hollingsworth [facility]    Asiya Blackwood is a 66 year old who presents for the following health issues     HPI     Followup on low back pain.       Patient with a history of back pain.  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  He does walk back and forth on concrete floors at work as well.  He appears to have pretty severe scoliosis of the thoracic lumbar spine.    Review of Systems   Constitutional, HEENT, cardiovascular, pulmonary, GI, , musculoskeletal, neuro, skin, endocrine and psych systems are  "negative, except as otherwise noted.      Objective    /70   Pulse 86   Temp 98  F (36.7  C) (Temporal)   Resp 18   Ht 1.727 m (5' 8\")   Wt 57.6 kg (127 lb)   SpO2 99%   BMI 19.31 kg/m    Body mass index is 19.31 kg/m .  Physical Exam   GENERAL: healthy, alert and no distress  NECK: no adenopathy, no asymmetry, masses, or scars and thyroid normal to palpation  RESP: lungs clear to auscultation - no rales, rhonchi or wheezes  CV: regular rate and rhythm, normal S1 S2, no S3 or S4, no murmur, click or rub, no peripheral edema and peripheral pulses strong  MS: Bilateral hip Exam: Palpation non-tender:    Range of Motion:  Full ROM, both hips  Strength:  full strength  Special tests:  no crepitation/snapping over central inguinal region  Lumber/Thoracic Spine Exam: Tender:  lumbar spinous processes, left para lumbar muscles, right para lumbar muscles, left SI joint, right SI joint  Range of Motion:  lumbar flexion  full, lumbar extension  decreased, painful, left lateral lumbar bending  decreased, painful, right lateral lumbar bending  decreased, painful, left lateral lumbar rotation  full, right lateral lumbar rotation  full  Strength:  gastrocsoleus   5/5, hamstrings  5/5, quadriceps  5/5  Special tests:  positive SI joint compression  SKIN: no suspicious lesions or rashes  NEURO: Normal strength and tone, mentation intact and speech normal  PSYCH: mentation appears normal, affect normal/bright                "

## 2021-09-27 NOTE — PATIENT INSTRUCTIONS
I will call you with your xray please follow up with physical therapy.                      Acute Back/Neck Pain: Self Care at Home Instructions  Conservative Treatment    Remaining active supports a quicker recovery, keep moving. (ex. Walking, increase time & speed as tolerated).    Bed rest is not recommended. Minimize sitting. Do not remain in one position for extended periods of time.    Maintain routine activity with attention to correct posture; stop aggravating activities.    Over-the-counter pain medications for short term symptom control. (See below)    Muscle relaxants are sometimes helpful for few days, but may cause drowsiness. (See below)    Cold packs or heat based upon your preference.    Most people improve within 2 weeks; most have significant improvement within 4 weeks.    If symptoms worsen or you experience numbness, contact your provider immediately.    Medications for Pain Relief  Recommended over-the-counter non-steroidal anti-inflammatories:     Ibuprofen (Advil, Motrin) 800 mg. three times a day as needed for pain      OR   Naproxen Sodium (Aleve) 220-440 mg. two times per day as needed for pain

## 2021-09-28 ENCOUNTER — OFFICE VISIT (OUTPATIENT)
Dept: SURGERY | Facility: CLINIC | Age: 66
End: 2021-09-28
Payer: COMMERCIAL

## 2021-09-28 VITALS
HEIGHT: 68 IN | TEMPERATURE: 97.3 F | SYSTOLIC BLOOD PRESSURE: 132 MMHG | WEIGHT: 126.5 LBS | BODY MASS INDEX: 19.17 KG/M2 | DIASTOLIC BLOOD PRESSURE: 76 MMHG

## 2021-09-28 DIAGNOSIS — K40.90 DIRECT INGUINAL HERNIA OF LEFT SIDE: Primary | ICD-10-CM

## 2021-09-28 DIAGNOSIS — Z85.47 HISTORY OF TESTICULAR CANCER: ICD-10-CM

## 2021-09-28 DIAGNOSIS — I74.9 EMBOLISM AND THROMBOSIS (H): ICD-10-CM

## 2021-09-28 DIAGNOSIS — F17.200 SMOKER: ICD-10-CM

## 2021-09-28 PROCEDURE — 99204 OFFICE O/P NEW MOD 45 MIN: CPT | Performed by: SURGERY

## 2021-09-28 ASSESSMENT — MIFFLIN-ST. JEOR: SCORE: 1333.3

## 2021-09-28 NOTE — PROGRESS NOTES
Assessment & Plan   Problem List Items Addressed This Visit        Circulatory    Embolism and thrombosis (H)      Other Visit Diagnoses     Direct inguinal hernia of left side    -  Primary    History of testicular cancer        s/p orchiectomy 20+ years ago; had radiation    Smoker               The patient was thoroughly counseled regarding Direct inguinal hernia of left side [K40.90].     The patient was informed that the proposed procedure or medical intervention involves reduction and repair with or without mesh and does offer a very good likelihood of symptom relief. We also discussed the options of repairing the hernia laparoscopically, robotically, versus open. Patient opted for robotic repair.      Patient is not ready to schedule his surgery as of yet due to his work schedule.  He will notify me once he is ready and I will put in the order to schedule his surgery.    The patient was made aware of the risks of the procedure, including but not limited to:  nerve entrapment or injury, persistence of pain (10%), injury to the bowel/bladder, infertility, ischemic orchitis (rare), Injury to the vas deferens (rare), hematoma, mesh migration, mesh infection, cardiac or pulmonary complication and anesthesia related complications also that difficulties may be encountered during recovery to include: wound infection, recurrence (5-10%), seroma, hematoma and chronic pain.     In the course of the evaluation we did discuss other therapeutic options with the patient, including continued watchful waiting. The risks and benefits of these options were also discussed which include but are not limited to: incarceration and/or strangulation..     Also discussed were possible problems or difficulties the patient may encounter if treatment was not pursued at this time.     The patient was [facility] responsible for the procedure. Assistance during the procedure and during  hospitalization may also be provided by other physicians, nurses and technicians.     Patient is a smoker - we discussed post op risks related to smoking - including increased recurrence risk, increased mesh infection, wound infection, etc.  Pt understands and willing to undergo the above procedure.     The patient will be provided additional education resources by the support staff. If there are ever any questions regarding their diagnosis or the procedure, the patient is encouraged to ask.     All of the patient s or their legal representative s questions have been answered to their satisfaction and they have indicated a clear understanding of this discussion.   Keith expressed understanding of risks, benefits and alternatives and wished to proceed.     All findings, test results, and diagnosis were discussed with the patient. Keith  participated in the decision making process and agreed with the plan of care. Questions were sought and answered.     60 minutes spent on the date of the encounter doing chart review, history and exam, documentation and further activities per the note         No follow-ups on file.    RodrigoAnnemarie Whitehead MD  [facility] DARIEL Blackwood is a 66 year old who presents for the following health issues:    Inguinal hernia - been there for the past few years.  There is bulge.  Not increased in size.  No particular event that he can remembered with this hernia.  No painful; no discomfort.  It's just there.  There is some burning sensation after a long day of work.  Patient does endorses GI upset however no bloating, signs of obstruction, having normal bowel movement, no signs of urinary issues.  Patient works at the local [employer] company, he does lift heavy things and he is still working.  Told him that he will likely need to be on restriction when he returns to work approximately 10 to 14 days after the date of surgery and that he cannot lift more than 15 pounds for  "a good month from the date of surgery.    Hx of blood clots - in the bilateral legs - left in below knee and right one is above the knee. No longer taking any blood thinner.  No IVC filter.  No hx of MI; no CVA.  Does not remember whether or not he has been to a hematologist.    History of testicular cancer on the right side  20+ years ago.  Had radiation on that side also.   S/p orchiectomy.  No other abdominal surgery.         Review of Systems   Constitutional, HEENT, cardiovascular, pulmonary, GI, , musculoskeletal, neuro, skin, endocrine and psych systems are negative, except as otherwise noted.      Objective    /76   Temp 97.3  F (36.3  C) (Temporal)   Ht 1.727 m (5' 8\")   Wt 57.4 kg (126 lb 8 oz)   BMI 19.23 kg/m    Body mass index is 19.23 kg/m .  Physical Exam  Vitals reviewed.   Eyes:      Conjunctiva/sclera: Conjunctivae normal.   Cardiovascular:      Pulses: Normal pulses.   Pulmonary:      Effort: Pulmonary effort is normal.   Abdominal:      Palpations: Abdomen is soft.       Musculoskeletal:      Cervical back: Normal range of motion.   Neurological:      Mental Status: He is alert.            "

## 2021-09-28 NOTE — LETTER
9/28/2021         RE: Keith Gonzalez  [address]  [address]  [place] [address]        Dear Colleague,    Thank you for referring your patient, Keith Gonzalez, to the [facility]. Please see a copy of my visit note below.        Assessment & Plan   Problem List Items Addressed This Visit        Circulatory    Embolism and thrombosis (H)      Other Visit Diagnoses     Direct inguinal hernia of left side    -  Primary    History of testicular cancer        s/p orchiectomy 20+ years ago; had radiation    Smoker               The patient was thoroughly counseled regarding Direct inguinal hernia of left side [K40.90].     The patient was informed that the proposed procedure or medical intervention involves reduction and repair with or without mesh and does offer a very good likelihood of symptom relief. We also discussed the options of repairing the hernia laparoscopically, robotically, versus open. Patient opted for robotic repair.      Patient is not ready to schedule his surgery as of yet due to his work schedule.  He will notify me once he is ready and I will put in the order to schedule his surgery.    The patient was made aware of the risks of the procedure, including but not limited to:  nerve entrapment or injury, persistence of pain (10%), injury to the bowel/bladder, infertility, ischemic orchitis (rare), Injury to the vas deferens (rare), hematoma, mesh migration, mesh infection, cardiac or pulmonary complication and anesthesia related complications also that difficulties may be encountered during recovery to include: wound infection, recurrence (5-10%), seroma, hematoma and chronic pain.     In the course of the evaluation we did discuss other therapeutic options with the patient, including continued watchful waiting. The risks and benefits of these options were also discussed which include but are not limited to: incarceration and/or strangulation..     Also discussed were  possible problems or difficulties the patient may encounter if treatment was not pursued at this time.     The patient was informed that Gladys Whitehead MD will be primarily responsible for the procedure. Assistance during the procedure and during hospitalization may also be provided by other physicians, nurses and technicians.     Patient is a smoker - we discussed post op risks related to smoking - including increased recurrence risk, increased mesh infection, wound infection, etc.  Pt understands and willing to undergo the above procedure.     The patient will be provided additional education resources by the support staff. If there are ever any questions regarding their diagnosis or the procedure, the patient is encouraged to ask.     All of the patient s or their legal representative s questions have been answered to their satisfaction and they have indicated a clear understanding of this discussion.   eKith expressed understanding of risks, benefits and alternatives and wished to proceed.     All findings, test results, and diagnosis were discussed with the patient. Keith  participated in the decision making process and agreed with the plan of care. Questions were sought and answered.     60 minutes spent on the date of the encounter doing chart review, history and exam, documentation and further activities per the note         No follow-ups on file.    Gladys Whitehead MD  [facility]    Asiya Blackwood is a 66 year old who presents for the following health issues:    Inguinal hernia - been there for the past few years.  There is bulge.  Not increased in size.  No particular event that he can remembered with this hernia.  No painful; no discomfort.  It's just there.  There is some burning sensation after a long day of work.  Patient does endorses GI upset however no bloating, signs of obstruction, having normal bowel movement, no signs of urinary issues.  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  Told him that he will likely need to be on restriction when he returns to work approximately 10 to 14 days after the date of surgery and that he cannot lift more than 15 pounds for a good month from the date of surgery.    Hx of blood clots - in the bilateral legs - left in below knee and right one is above the knee. No longer taking any blood thinner.  No IVC filter.  No hx of MI; no CVA.  Does not remember whether or not he has been to a hematologist.    History of testicular cancer on the right side  20+ years ago.  Had radiation on that side also.   S/p orchiectomy.  No other abdominal surgery.         Review of Systems   Constitutional, HEENT, cardiovascular, pulmonary, GI, , musculoskeletal, neuro, skin, endocrine and psych systems are negative, except as otherwise noted.      Objective    /76   Temp 97.3  F (36.3  C) (Temporal)   Ht 1.727 m (5' 8\")   Wt 57.4 kg (126 lb 8 oz)   BMI 19.23 kg/m    Body mass index is 19.23 kg/m .  Physical Exam  Vitals reviewed.   Eyes:      Conjunctiva/sclera: Conjunctivae normal.   Cardiovascular:      Pulses: Normal pulses.   Pulmonary:      Effort: Pulmonary effort is normal.   Abdominal:      Palpations: Abdomen is soft.       Musculoskeletal:      Cervical back: Normal range of motion.   Neurological:      Mental Status: He is alert.                Again, thank you for allowing me to participate in the care of your patient.        Sincerely,        RodrigoAnnemarie Whitehead MD    "

## 2021-09-29 NOTE — RESULT ENCOUNTER NOTE
Please advise Keith Gonzalez,  1955, that his x-ray results per radiology read indicate curvature of the spine as we discussed in clinic termed scoliosis, there is some wearing of the L4-L5 and L5-S1 area.  There is some area of atherosclerosis with questionable dilation this would be better reviewed with an ultrasound of the the aorta to look for an aortic aneurysm.  Radiology is recommending further evaluation with MRI   This could be done through orthopedic spine specialty.  Recommend continuing with plan of care discussed in clinic with physical therapy.  And if symptoms not improving follow-up in clinic.  [phone] (home)   Thank you  Prachi Fink CNP

## 2021-09-30 ENCOUNTER — TELEPHONE (OUTPATIENT)
Dept: SURGERY | Facility: CLINIC | Age: 66
End: 2021-09-30

## 2021-09-30 NOTE — TELEPHONE ENCOUNTER
Pt. Needs to sign form. Writer called pt. To see if he could come and sign the form.     Pt. States that he will be here within the next hour. Have Pt. Fill out the hi lighted portions.     When done will fax to the number on the card: [phone]    tSefani Bonilla on 9/30/2021 at 11:58 AM

## 2021-09-30 NOTE — TELEPHONE ENCOUNTER
Called Pt. To let him know that we will not be able to provide dates as the surgery is not yet scheduled.     Pt. Asked that we fill out the form to the best of our capability without dates to give his employer a heads up on what is going on. Pt. Stated that when he hears from his employer he will call and have the order placed for surgery.     Pt. Was given the number for billing to see what the estimated cost of this procedure.    Stefani Bonilla on 9/30/2021 at 11:50 AM

## 2021-09-30 NOTE — TELEPHONE ENCOUNTER
Reason for Call:  Form, our goal is to have forms completed with 72 hours, however, some forms may require a visit or additional information.    Type of letter, form or note:  FMLA    Who is the form from?: Patient    Where did the form come from: Patient or family brought in       What clinic location was the form placed at?: [facility]    Where the form was placed: 1st floor  Box/Folder    What number is listed as a contact on the form?: please complete form and fax to fax on form [phone]       Additional comments: Call pt with any questions    Call taken on 9/30/2021 at 10:17 AM by Nettie Aparicio

## 2021-09-30 NOTE — TELEPHONE ENCOUNTER
"Patient's form was filled out, reviewed, and signed by provider.      Form was faxed to the designated fax # [phone]    A copy was sent to scanning.     A copy was placed in 2nd floor RN \"faxed\" file/drawer.      Patient was informed.       Stefani Bonilla on 9/30/2021 at 2:17 PM    " [Time Spent: ___ minutes] : I have spent [unfilled] minutes of time on the encounter.

## 2021-10-01 ENCOUNTER — TELEPHONE (OUTPATIENT)
Dept: FAMILY MEDICINE | Facility: CLINIC | Age: 66
End: 2021-10-01

## 2021-10-05 ENCOUNTER — TELEPHONE (OUTPATIENT)
Dept: SURGERY | Facility: CLINIC | Age: 66
End: 2021-10-05

## 2021-10-05 DIAGNOSIS — K40.90 LEFT INGUINAL HERNIA: Primary | ICD-10-CM

## 2021-10-05 DIAGNOSIS — I74.9 EMBOLISM AND THROMBOSIS (H): ICD-10-CM

## 2021-10-05 DIAGNOSIS — Z11.59 ENCOUNTER FOR SCREENING FOR OTHER VIRAL DISEASES: ICD-10-CM

## 2021-10-05 NOTE — TELEPHONE ENCOUNTER
Type of surgery: Robotic assisted left inguinal hernia repair, possible bilateral, possible open    Location of surgery: [facility]  Date and time of surgery: 10/28  Surgeon: Charley  Pre-Op Appt Date: 10/25  Post-Op Appt Date: 11/12   Packet sent out: Yes  Pre-cert/Authorization completed:  Not Applicable  Date: NA

## 2021-10-05 NOTE — TELEPHONE ENCOUNTER
Reason for Call:  Form, our goal is to have forms completed with 72 hours, however, some forms may require a visit or additional information.    Type of letter, form or note:  disability    Who is the form from?: Insurance comp    Where did the form come from: Patient or family brought in       What clinic location was the form placed at?: [facility]    Where the form was placed: Dr. Whitehead Box/Folder    What number is listed as a contact on the form?: [phone] pushpa patient to call us with fax #        Additional comments: none      Call taken on 10/5/2021 at 10:18 AM by Soniya Foote         Principal Discharge DX:	Bradycardia

## 2021-10-06 ENCOUNTER — OFFICE VISIT (OUTPATIENT)
Dept: FAMILY MEDICINE | Facility: CLINIC | Age: 66
End: 2021-10-06
Payer: COMMERCIAL

## 2021-10-06 VITALS
HEART RATE: 87 BPM | SYSTOLIC BLOOD PRESSURE: 110 MMHG | WEIGHT: 129 LBS | RESPIRATION RATE: 22 BRPM | OXYGEN SATURATION: 97 % | TEMPERATURE: 97.4 F | BODY MASS INDEX: 19.61 KG/M2 | DIASTOLIC BLOOD PRESSURE: 68 MMHG

## 2021-10-06 DIAGNOSIS — I70.0 AORTIC ATHEROSCLEROSIS (H): ICD-10-CM

## 2021-10-06 DIAGNOSIS — Z72.0 TOBACCO ABUSE DISORDER: ICD-10-CM

## 2021-10-06 DIAGNOSIS — M53.3 SI (SACROILIAC) JOINT DYSFUNCTION: ICD-10-CM

## 2021-10-06 DIAGNOSIS — M54.50 LUMBAR PAIN: Primary | ICD-10-CM

## 2021-10-06 PROCEDURE — 99214 OFFICE O/P EST MOD 30 MIN: CPT | Performed by: NURSE PRACTITIONER

## 2021-10-06 ASSESSMENT — PAIN SCALES - GENERAL: PAINLEVEL: MODERATE PAIN (4)

## 2021-10-06 NOTE — PATIENT INSTRUCTIONS
Please follow up with physical therapy and chiropractor. Orthopedic specialty will call you to schedule appointment with spine specialist.     For aortic aneurysm screening ultra sound

## 2021-10-06 NOTE — TELEPHONE ENCOUNTER
"Patient's form was filled out, reviewed, and signed by provider.      Form was faxed to the designated fax # [phone]    A copy was sent to scanning.     A copy was placed in 2nd floor RN \"faxed\" file/drawer.      Patient was informed.       Stefani Bonilla on 10/6/2021 at 2:17 PM    "

## 2021-10-06 NOTE — PROGRESS NOTES
Assessment & Plan     No diagnosis found.      Diagnosis Comments   1. Lumbar pain  Orthopedic  Referral    2. SI (sacroiliac) joint dysfunction  Orthopedic  Referral    3. Tobacco abuse disorder  US Abdominal Aorta Imaging    4. Aortic atherosclerosis (H)  US Abdominal Aorta Imaging      Patient Instructions   Please follow up with physical therapy and chiropractor. Orthopedic specialty will call you to schedule appointment with spine specialist.     For aortic aneurysm screening ultra sound   Letter given for work with restrictions patient will follow up in 6 weeks.    HAILY Hollingsworth [facility]    Asiya Parker is a 66 year old male who presents to clinic today for the following health issues     HPI       Needs letter for work      Review of Systems   Constitutional, HEENT, cardiovascular, pulmonary, GI, , musculoskeletal, neuro, skin, endocrine and psych systems are negative, except as otherwise noted.      Objective    /68   Pulse 87   Temp 97.4  F (36.3  C) (Temporal)   Resp 22   Wt 58.5 kg (129 lb)   SpO2 97%   BMI 19.61 kg/m     Physical Exam   GENERAL: healthy, alert and no distress  NECK: no adenopathy, no asymmetry, masses, or scars and thyroid normal to palpation  RESP: lungs clear to auscultation - no rales, rhonchi or wheezes  CV: regular rate and rhythm, normal S1 S2, no S3 or S4, no murmur, click or rub, no peripheral edema and peripheral pulses strong  MS: Bilateral hip Exam: Palpation non-tender:    Range of Motion:  Full ROM, both hips  Strength:  full strength  Special tests:  no crepitation/snapping over central inguinal region  Lumber/Thoracic Spine Exam: Tender:  lumbar spinous processes, left para lumbar muscles, right para lumbar muscles, left SI joint, right SI joint  Range of Motion:  lumbar flexion  full, lumbar extension  decreased, painful, left lateral lumbar bending  decreased, painful, right lateral lumbar  bending  decreased, painful, left lateral lumbar rotation  full, right lateral lumbar rotation  full  Strength:  gastrocsoleus   5/5, hamstrings  5/5, quadriceps  5/5  Special tests:  positive SI joint compression  SKIN: no suspicious lesions or rashes  NEURO: Normal strength and tone, mentation intact and speech normal  PSYCH: mentation appears normal, affect normal/bright

## 2021-10-06 NOTE — LETTER
[place] [id number] [facility] [address]  Phone: [phone]  Fax: [phone]    October 6, 2021        Keith Gonzalez  [address]  [facility] [address]          To whom it may concern:    RE: Keith Gonzalez    Patient may return to work with the following restriction; no bending forward repetitively, no twisting, carrying > 5 pounds or walking greater 75 ft.     Until cleared by provider.     Please contact me for questions or concerns.      Sincerely,        HAILY Hollingsworth CNP

## 2021-10-06 NOTE — TELEPHONE ENCOUNTER
Forms filled to writers best capability.     Form is waiting for providers signature.     Fax to [phone] when completed.    Stefani Bonilla on 10/6/2021 at 2:07 PM

## 2021-10-11 ENCOUNTER — TELEPHONE (OUTPATIENT)
Dept: FAMILY MEDICINE | Facility: CLINIC | Age: 66
End: 2021-10-11

## 2021-10-11 ENCOUNTER — HOSPITAL ENCOUNTER (OUTPATIENT)
Dept: PHYSICAL THERAPY | Facility: CLINIC | Age: 66
Setting detail: THERAPIES SERIES
End: 2021-10-11
Attending: NURSE PRACTITIONER
Payer: COMMERCIAL

## 2021-10-11 DIAGNOSIS — M54.50 LUMBAR PAIN: ICD-10-CM

## 2021-10-11 DIAGNOSIS — M41.9 SCOLIOSIS OF THORACOLUMBAR SPINE, UNSPECIFIED SCOLIOSIS TYPE: ICD-10-CM

## 2021-10-11 DIAGNOSIS — M53.3 SI (SACROILIAC) JOINT DYSFUNCTION: ICD-10-CM

## 2021-10-11 PROCEDURE — 97112 NEUROMUSCULAR REEDUCATION: CPT | Mod: GP | Performed by: PHYSICAL THERAPIST

## 2021-10-11 PROCEDURE — 97161 PT EVAL LOW COMPLEX 20 MIN: CPT | Mod: GP | Performed by: PHYSICAL THERAPIST

## 2021-10-11 NOTE — PROGRESS NOTES
"   10/11/21 0929   General Information   Type of Visit Initial OP Ortho PT Evaluation   Start of Care Date 10/11/21   Referring Physician Shannon JOHANSEN CNP   Patient/Family Goals Statement Get rid of back pain and do what he would normally do - work, drive to [place] to visi family (2-3 hours one way and back same), trips to [facility]   Orders Evaluate and Treat   Date of Order 09/27/21   Certification Required? No  (medica Choice)   Medical Diagnosis Lumbar pain M54.50, SI joint dysfunction M53.3, scoliosis of thoracolumbar spine, M41.9   Surgical/Medical history reviewed Yes   Precautions/Limitations other (see comments)  (see below for medical and work restrictions)   Weight-Bearing Status - LUE full weight-bearing   Weight-Bearing Status - RUE full weight-bearing   Weight-Bearing Status - LLE full weight-bearing   Weight-Bearing Status - RLE full weight-bearing   General Information Comments History: history of embolus and DVT of legs, COPD, SIJ dysfunction, TL scoiliosis, L inguinal Hernia with pending surgery 10-,        Present No   Body Part(s)   Body Part(s) Lumbar Spine/SI   Presentation and Etiology   Pertinent history of current problem (include personal factors and/or comorbidities that impact the POC) 65 yo male with low back pain. ONSET: Labor day (approximately) and saw Don PONCE for this on 9-. TREATMENT:  Attemtped one of the exercises he received from LUCILLE PONCE and quit after attempting bilateral knees to chest due to pain, over the counter medication including Ibuprofen, Tylenol; icy hot patches, lidocaine patches, NSAIDs, rest, Advil topical patches, ice/heat packs had been recommended but not tried. WORK RESTRICTIONS: usually works 40+ hours per week but not scheduled for the weekends at this time, 75 feet walking, no bending (not clear on this), lifting 5# or less. DIET: coffee in the morning, \"good meal\" around 11 am, sandwich at work, " chicken nugget/quick meal after work (3 pm to 11pm) - is taking medication for indigestions with everything he eats. RED FLAGS: negative for cough, sneeze, foot drop, saddle symptoms. SLEEP: normal, but can lay on R better than L side. He has tried pillows but does not feel this worked   Impairments A. Pain   Functional Limitations perform activities of daily living;perform required work activities   Symptom Location SIJs, across back, hip joints bilaterally (points to groin areas), up thoracic spine varies - sides. Feeks like a fist pushing into side of back, neck stiffness (old compression fracture C1, well healed- not sure of cause). Walking can cause quad symptoms. No symptoms reported past knees.    How/Where did it occur From insidious onset   Onset date of current episode/exacerbation 09/03/21  (approx Labor Day)   Chronicity Chronic   Pain rating (0-10 point scale) Other   Pain rating comment Now: 3/10, 3/10 increases to 10/10   Pain quality H. Other  (see above)   Frequency of pain/symptoms A. Constant   Pain/symptoms are:   (Activity related)   Pain/symptoms exacerbated by A. Sitting;B. Walking;C. Lifting;D. Carrying;J. ADL;L. Work tasks;M. Other   Pain exacerbation comment Standing, steps    Pain/symptoms eased by H. Cold;I. OTC medication(s)   Progression of symptoms since onset: Worsened   Current / Previous Interventions   Diagnostic Tests: X-ray  (9-)   X-ray Results Results   X-ray results L3 broad based curve, DDD L4-5 and L5-S1, L1 anterior wedge, limited SIJ view and questionable aortic atherosclerosis but not sure  - see EPIC report for details.    Prior Level of Function   Functional Level Prior Comment Able to work 40+ hours per week.    Current Level of Function   Current Community Support Other  (lives alone)   Patient role/employment history A. Employed   Employment Comments [employer]: job involves standing, lifting up to 40# based on the product, repeated forward bending.  Currently on restrictions and sitting<->standing and not working weekends, works on concrete floors   Living environment [facility]   Home/community accessibility Rents a home, 20 steps with 1 rail to his residence from side walk,    Current equipment-Gait/Locomotion None   Fall Risk Screen   Fall screen completed by PT   Have you fallen 2 or more times in the past year? No   Have you fallen and had an injury in the past year? No   Is patient a fall risk? No   Abuse Screen (yes response referral indicated)   Feels Unsafe at Home or Work/School no   Feels Threatened by Someone no   Does Anyone Try to Keep You From Having Contact with Others or Doing Things Outside Your Home? no   Physical Signs of Abuse Present no   System Outcome Measures   Outcome Measures Low Back Pain (see Oswestry and Farhan)   Lumbar Spine/SI Objective Findings   Observation sitting in chair with L shoulder shift due to scoliosis.    Integumentary 1/4 inch width to 5 inch-6 inch read dry area from approx L5  along spine to L side of vertebrae as if he sat against the chair and had an irritation. He thinks it might be from the patches. He is asked to observe this in mirror for any changes. PT outlined area with cleaned finger  to clarify the area.    Posture forwad head and shoulders with upper thoracic spine rotated to the L and R pelvic rotation.    Gait/Locomotion forward trunk, wider base of support and trunk to L   Flexion ROM able to touch floor with fingers without symptoms and deviation toward center.    Extension ROM discomfort in R low back, x 40%   Right Side Bending ROM Moderate decrease with symptoms L    Left Side Bending ROM symptoms noted in R low back   Pelvic Screen Positive R gaeslens, R distraction, R FABERs, and negative on the L. Tender over the R SIJ and not the L.    Hip Screen Significant tightness of hip rotators bilaterally with pain in hips.    Lumbar/Hip/Knee/Foot Strength Comments Very weak gluteal muscles with  "poor bulk   SLR Positive in SIJ R and negative L   Palpation Tender over the R SIJ, no change with sacral spring test, tightness along the R lower lumbar paraspinal muscles, significant decrease in gluteal bulk  - \"I don't have butt muscles\"   Planned Therapy Interventions   Planned Therapy Interventions manual therapy;neuromuscular re-education;strengthening;ROM;stretching   Planned Therapy Interventions Comment will be mindful of L inguinal hernia with activities and asked him to stop activities that increase symptoms   Planned Modality Interventions   Planned Modality Interventions Comments as needed   Clinical Impression   Criteria for Skilled Therapeutic Interventions Met yes, treatment indicated   PT Diagnosis Scoliosis thoracolumbar L3   Influenced by the following impairments severe scoliosis, work type (flexion with 40+ hours per week), loss of spouse within the last yr, L inguinal hernia with repair scheduled   Functional limitations due to impairments walking, lifting, standing, carrying, sitting, steps, work tasks   Clinical Presentation Stable/Uncomplicated   Clinical Presentation Rationale based on clinical judgement   Clinical Decision Making (Complexity) Low complexity   Therapy Frequency 1 time/week   Predicted Duration of Therapy Intervention (days/wks) x 2-3 weeks and then has surgery so will hold until ready to resume his PT activities per surgeon   Risk & Benefits of therapy have been explained Yes   Patient, Family & other staff in agreement with plan of care Yes   Clinical Impression Comments Jaison has tightness along the L side of the spine and overstretch along the R. He has siginifcant scoliosis but is able to move into a more upright position. Suspect the hernia on L is influencing posture and positioning in the upright position comfortably. We agreed to PT 1 x per week with home program before hernia surgery. He does not feel able to afford chiropractic care at this time but will look into. "    Education Assessment   Preferred Learning Style Listening;Reading;Demonstration;Pictures/video   Barriers to Learning No barriers   ORTHO GOALS   PT Ortho Eval Goals 1;2   Ortho Goal 1   Goal Identifier Positioning and posture   Goal Description Jaison will be able to use different positions and posture to calm symptoms and protect low back with home and work activities.    Target Date 11/29/21   Ortho Goal 2   Goal Identifier Home program   Goal Description Jaison will be able to complete a home program to improve gluteal strength allowing him to use pelvic positioning to bend forward x 20 reps with 2-3# weights to start demonstrating calming of symptoms.    Target Date 12/31/21  (based on surgeon recommendations post hernia repair. )   Total Evaluation Time   PT Eval, Low Complexity Minutes ([id number]) 96

## 2021-10-11 NOTE — TELEPHONE ENCOUNTER
Patient wanted to let you know that his employer states that they don't have a position for Jaison for restrictions. So was advised to do a short term disability claim so he wanted to let you know that [place] short term disability form will be faxing you this form.    Questions contact Jaison

## 2021-10-15 NOTE — TELEPHONE ENCOUNTER
Reason for Call:  Form, our goal is to have forms completed with 72 hours, however, some forms may require a visit or additional information.    Type of letter, form or note:  disability    Who is the form from?: Insurance comp    Where did the form come from: form was faxed in    What clinic location was the form placed at?: [facility]     Where the form was placed: Given to physician    What number is listed as a contact on the form?:        Additional comments: Fax completed form back to [phone]    Call taken on 10/15/2021 at 4:08 PM by Aurora Beebe LPN

## 2021-10-18 ENCOUNTER — HOSPITAL ENCOUNTER (OUTPATIENT)
Dept: PHYSICAL THERAPY | Facility: CLINIC | Age: 66
Setting detail: THERAPIES SERIES
End: 2021-10-18
Attending: NURSE PRACTITIONER
Payer: COMMERCIAL

## 2021-10-18 PROCEDURE — 97112 NEUROMUSCULAR REEDUCATION: CPT | Mod: GP | Performed by: PHYSICAL THERAPIST

## 2021-10-21 NOTE — TELEPHONE ENCOUNTER
Patient calling to follow up on his forms that had been faxed. Informed the provider has documented that she completed them and faxed them. Aurora Beebe LPN

## 2021-10-23 ENCOUNTER — HEALTH MAINTENANCE LETTER (OUTPATIENT)
Age: 66
End: 2021-10-23

## 2021-10-25 ENCOUNTER — OFFICE VISIT (OUTPATIENT)
Dept: INTERNAL MEDICINE | Facility: CLINIC | Age: 66
End: 2021-10-25
Payer: COMMERCIAL

## 2021-10-25 ENCOUNTER — LAB (OUTPATIENT)
Dept: LAB | Facility: CLINIC | Age: 66
End: 2021-10-25
Payer: COMMERCIAL

## 2021-10-25 VITALS
DIASTOLIC BLOOD PRESSURE: 62 MMHG | TEMPERATURE: 97.6 F | HEART RATE: 93 BPM | OXYGEN SATURATION: 97 % | WEIGHT: 126.5 LBS | RESPIRATION RATE: 18 BRPM | SYSTOLIC BLOOD PRESSURE: 98 MMHG | BODY MASS INDEX: 19.23 KG/M2

## 2021-10-25 DIAGNOSIS — Z86.718 HISTORY OF THROMBOEMBOLISM OF VEIN: ICD-10-CM

## 2021-10-25 DIAGNOSIS — Z11.59 ENCOUNTER FOR SCREENING FOR OTHER VIRAL DISEASES: ICD-10-CM

## 2021-10-25 DIAGNOSIS — J43.1 PANLOBULAR EMPHYSEMA (H): ICD-10-CM

## 2021-10-25 DIAGNOSIS — K40.20 NON-RECURRENT BILATERAL INGUINAL HERNIA WITHOUT OBSTRUCTION OR GANGRENE: ICD-10-CM

## 2021-10-25 DIAGNOSIS — Z01.818 PREOP GENERAL PHYSICAL EXAM: Primary | ICD-10-CM

## 2021-10-25 LAB
ALBUMIN SERPL-MCNC: 3.8 G/DL (ref 3.4–5)
ALP SERPL-CCNC: 98 U/L (ref 40–150)
ALT SERPL W P-5'-P-CCNC: 28 U/L (ref 0–70)
ANION GAP SERPL CALCULATED.3IONS-SCNC: 2 MMOL/L (ref 3–14)
AST SERPL W P-5'-P-CCNC: 17 U/L (ref 0–45)
BASOPHILS # BLD AUTO: 0.1 10E3/UL (ref 0–0.2)
BASOPHILS NFR BLD AUTO: 1 %
BILIRUB DIRECT SERPL-MCNC: <0.1 MG/DL (ref 0–0.2)
BILIRUB SERPL-MCNC: 0.4 MG/DL (ref 0.2–1.3)
BUN SERPL-MCNC: 19 MG/DL (ref 7–30)
CALCIUM SERPL-MCNC: 8.7 MG/DL (ref 8.5–10.1)
CHLORIDE BLD-SCNC: 105 MMOL/L (ref 94–109)
CO2 SERPL-SCNC: 28 MMOL/L (ref 20–32)
CREAT SERPL-MCNC: 0.71 MG/DL (ref 0.66–1.25)
EOSINOPHIL # BLD AUTO: 0.1 10E3/UL (ref 0–0.7)
EOSINOPHIL NFR BLD AUTO: 1 %
ERYTHROCYTE [DISTWIDTH] IN BLOOD BY AUTOMATED COUNT: 14.2 % (ref 10–15)
GFR SERPL CREATININE-BSD FRML MDRD: >90 ML/MIN/1.73M2
GLUCOSE BLD-MCNC: 85 MG/DL (ref 70–99)
HCT VFR BLD AUTO: 46.8 % (ref 40–53)
HGB BLD-MCNC: 15.4 G/DL (ref 13.3–17.7)
IMM GRANULOCYTES # BLD: 0 10E3/UL
IMM GRANULOCYTES NFR BLD: 0 %
LYMPHOCYTES # BLD AUTO: 2.3 10E3/UL (ref 0.8–5.3)
LYMPHOCYTES NFR BLD AUTO: 27 %
MCH RBC QN AUTO: 30.1 PG (ref 26.5–33)
MCHC RBC AUTO-ENTMCNC: 32.9 G/DL (ref 31.5–36.5)
MCV RBC AUTO: 92 FL (ref 78–100)
MONOCYTES # BLD AUTO: 0.6 10E3/UL (ref 0–1.3)
MONOCYTES NFR BLD AUTO: 7 %
NEUTROPHILS # BLD AUTO: 5.6 10E3/UL (ref 1.6–8.3)
NEUTROPHILS NFR BLD AUTO: 64 %
NRBC # BLD AUTO: 0 10E3/UL
NRBC BLD AUTO-RTO: 0 /100
PLATELET # BLD AUTO: 157 10E3/UL (ref 150–450)
POTASSIUM BLD-SCNC: 4.6 MMOL/L (ref 3.4–5.3)
PROT SERPL-MCNC: 6.8 G/DL (ref 6.8–8.8)
RBC # BLD AUTO: 5.11 10E6/UL (ref 4.4–5.9)
SARS-COV-2 RNA RESP QL NAA+PROBE: NEGATIVE
SODIUM SERPL-SCNC: 135 MMOL/L (ref 133–144)
WBC # BLD AUTO: 8.7 10E3/UL (ref 4–11)

## 2021-10-25 PROCEDURE — 36415 COLL VENOUS BLD VENIPUNCTURE: CPT | Performed by: INTERNAL MEDICINE

## 2021-10-25 PROCEDURE — 82248 BILIRUBIN DIRECT: CPT | Mod: 59 | Performed by: INTERNAL MEDICINE

## 2021-10-25 PROCEDURE — 85025 COMPLETE CBC W/AUTO DIFF WBC: CPT | Performed by: INTERNAL MEDICINE

## 2021-10-25 PROCEDURE — U0003 INFECTIOUS AGENT DETECTION BY NUCLEIC ACID (DNA OR RNA); SEVERE ACUTE RESPIRATORY SYNDROME CORONAVIRUS 2 (SARS-COV-2) (CORONAVIRUS DISEASE [COVID-19]), AMPLIFIED PROBE TECHNIQUE, MAKING USE OF HIGH THROUGHPUT TECHNOLOGIES AS DESCRIBED BY CMS-2020-01-R: HCPCS

## 2021-10-25 PROCEDURE — U0005 INFEC AGEN DETEC AMPLI PROBE: HCPCS

## 2021-10-25 PROCEDURE — 93000 ELECTROCARDIOGRAM COMPLETE: CPT | Performed by: INTERNAL MEDICINE

## 2021-10-25 PROCEDURE — 99214 OFFICE O/P EST MOD 30 MIN: CPT | Performed by: INTERNAL MEDICINE

## 2021-10-25 PROCEDURE — 80053 COMPREHEN METABOLIC PANEL: CPT | Performed by: INTERNAL MEDICINE

## 2021-10-25 RX ORDER — ASPIRIN 81 MG/1
81 TABLET ORAL DAILY
COMMUNITY

## 2021-10-25 ASSESSMENT — PAIN SCALES - GENERAL: PAINLEVEL: NO PAIN (0)

## 2021-10-25 NOTE — PROGRESS NOTES
[address] [address]  Phone: [phone]  Primary Provider: [facility]  Pre-op Performing Provider: DREA GREGORY      PREOPERATIVE EVALUATION:  Today's date: 10/25/2021    Keith Gonzalez is a 66 year old male who presents for a preoperative evaluation.    Surgical Information:  Surgery/Procedure: HERNIA REPAIR  brendan inguinal    Surgery Location: [facility]  Surgeon: DR. RODRIGUEZ   Surgery Date: 10/28/21   Time of Surgery: 7:30  Where patient plans to recover: At home alone  Fax number for surgical facility: Note does not need to be faxed, will be available electronically in Epic.    Type of Anesthesia Anticipated: General        Subjective     HPI related to upcoming procedure: Patient has history of bilateral inguinal hernias.  After discussion with his surgery, he would like to have these surgically corrected.  The risks and benefits of the procedure are well-known to the patient.    Preop Questions 10/25/2021   1. Have you ever had a heart attack or stroke? No   2. Have you ever had surgery on your heart or blood vessels, such as a stent placement, a coronary artery bypass, or surgery on an artery in your head, neck, heart, or legs? No   3. Do you have chest pain with activity? No   4. Do you have a history of  heart failure? No   5. Do you currently have a cold, bronchitis or symptoms of other infection? No   6. Do you have a cough, shortness of breath, or wheezing? YES -    7. Do you or anyone in your family have previous history of blood clots? YES -    8. Do you or does anyone in your family have a serious bleeding problem such as prolonged bleeding following surgeries or cuts? No   9. Have you ever had problems with anemia or been told to take iron pills? No   10. Have you had any abnormal blood loss such as black, tarry or bloody stools? No   11. Have you ever had a blood transfusion? No   12. Are you willing to  have a blood transfusion if it is medically needed before, during, or after your surgery? Yes   13. Have you or any of your relatives ever had problems with anesthesia? No   14. Do you have sleep apnea, excessive snoring or daytime drowsiness? No   15. Do you have any artifical heart valves or other implanted medical devices like a pacemaker, defibrillator, or continuous glucose monitor? No   16. Do you have artificial joints? No   17. Are you allergic to latex? No       Health Care Directive:  Patient does not have a Health Care Directive or Living Will: Discussed advance care planning with patient; however, patient declined at this time.    Preoperative Review of :   reviewed - no record of controlled substances prescribed.      Status of Chronic Conditions:  See problem list for active medical problems.  Problems all longstanding and stable, except as noted/documented.  See ROS for pertinent symptoms related to these conditions.      Review of Systems  CONSTITUTIONAL: NEGATIVE for fever, chills, change in weight  INTEGUMENTARY/SKIN: NEGATIVE for worrisome rashes, moles or lesions  EYES: NEGATIVE for vision changes or irritation  ENT/MOUTH: NEGATIVE for ear, mouth and throat problems  RESP: NEGATIVE for significant cough or SOB  CV: NEGATIVE for chest pain, palpitations or peripheral edema  GI: NEGATIVE for nausea, abdominal pain, heartburn, or change in bowel habits  : NEGATIVE for frequency, dysuria, or hematuria  MUSCULOSKELETAL: NEGATIVE for significant arthralgias or myalgia  NEURO: NEGATIVE for weakness, dizziness or paresthesias  ENDOCRINE: NEGATIVE for temperature intolerance, skin/hair changes  HEME: NEGATIVE for bleeding problems  PSYCHIATRIC: NEGATIVE for changes in mood or affect    Patient Active Problem List    Diagnosis Date Noted     SI (sacroiliac) joint dysfunction 09/27/2021     Priority: Medium     Lumbar pain 09/27/2021     Priority: Medium     Scoliosis of thoracolumbar spine,  unspecified scoliosis type 09/27/2021     Priority: Medium     History of thromboembolism of vein 09/17/2021     Priority: Medium     Nov 25, 2011 Entered By: JOSHUA STEWART Comment: had been on coumadin from [id number]       Chronic obstructive pulmonary disease (H) 06/20/2018     Priority: Medium     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 Provider to review        History reviewed. No pertinent past medical history.  History reviewed. No pertinent surgical history.  Current Outpatient Medications   Medication Sig Dispense Refill     aspirin 81 MG EC tablet Take 81 mg by mouth daily       COMBIVENT RESPIMAT  MCG/ACT inhaler INHALE ONE PUFF INTO THE LUNGS FOUR TIMES A DAY - NOT TO EXCEED 6 DOSES PER DAY 4 g 3     INCRUSE ELLIPTA 62.5 MCG/INH Inhaler INHALE ONE PUFF INTO THE LUNGS DAILY 30 each 3     NONFORMULARY ADVIL PLUS         Allergies   Allergen Reactions     No Known Drug Allergies         Social History     Tobacco Use     Smoking status: Current Some Day Smoker     Packs/day: 1.00     Types: Cigarettes     Smokeless tobacco: Never Used   Substance Use Topics     Alcohol use: No     History reviewed. No pertinent family history.  History   Drug Use No         Objective     BP 98/62   Pulse 93   Temp 97.6  F (36.4  C) (Temporal)   Resp 18   Wt 57.4 kg (126 lb 8 oz)   SpO2 97%   BMI 19.23 kg/m      Physical Exam    GENERAL APPEARANCE: healthy, alert and no distress     EYES: EOMI,  PERRL     HENT: ear canals and TM's normal and nose and mouth without ulcers or lesions     NECK: no adenopathy, no asymmetry, masses, or scars and thyroid normal to palpation     RESP: lungs clear to auscultation - no rales, rhonchi or wheezes     CV: regular rates and rhythm, normal S1 S2, no S3 or S4 and no murmur, click or rub     ABDOMEN: soft, nontender, without hepatosplenomegaly or masses, bowel sounds normal and inguinal hernias noted.   No incarceration or entrapment noted at this time.     MS: extremities normal- no gross deformities noted, no evidence of inflammation in joints, FROM in all extremities.     SKIN: no suspicious lesions or rashes     NEURO: Normal strength and tone, sensory exam grossly normal, mentation intact and speech normal     PSYCH: mentation appears normal. and affect normal/bright     LYMPHATICS: No cervical adenopathy    No results for input(s): HGB, PLT, INR, NA, POTASSIUM, CR, A1C in the last 53494 hours.     Diagnostics:  Labs pending at this time.  Results will be reviewed when available.   EKG: appears normal, NSR, normal axis, normal intervals, no acute ST/T changes c/w ischemia, no LVH by voltage criteria, unchanged from previous tracings    Revised Cardiac Risk Index (RCRI):  The patient has the following serious cardiovascular risks for perioperative complications:   - No serious cardiac risks = 0 points     RCRI Interpretation: 0 points: Class I (very low risk - 0.4% complication rate)           Signed Electronically by: Brandon Soto DO  Copy of this evaluation report is provided to requesting physician.

## 2021-10-25 NOTE — PATIENT INSTRUCTIONS

## 2021-10-25 NOTE — H&P (VIEW-ONLY)
[address] [address]  Phone: [phone]  Primary Provider: [facility]  Pre-op Performing Provider: DREA GREGORY      PREOPERATIVE EVALUATION:  Today's date: 10/25/2021    Keith Gonzalez is a 66 year old male who presents for a preoperative evaluation.    Surgical Information:  Surgery/Procedure: HERNIA REPAIR  brendan inguinal    Surgery Location: [facility]  Surgeon: DR. RODRIGUEZ   Surgery Date: 10/28/21   Time of Surgery: 7:30  Where patient plans to recover: At home alone  Fax number for surgical facility: Note does not need to be faxed, will be available electronically in Epic.    Type of Anesthesia Anticipated: General        Subjective     HPI related to upcoming procedure: Patient has history of bilateral inguinal hernias.  After discussion with his surgery, he would like to have these surgically corrected.  The risks and benefits of the procedure are well-known to the patient.    Preop Questions 10/25/2021   1. Have you ever had a heart attack or stroke? No   2. Have you ever had surgery on your heart or blood vessels, such as a stent placement, a coronary artery bypass, or surgery on an artery in your head, neck, heart, or legs? No   3. Do you have chest pain with activity? No   4. Do you have a history of  heart failure? No   5. Do you currently have a cold, bronchitis or symptoms of other infection? No   6. Do you have a cough, shortness of breath, or wheezing? YES -    7. Do you or anyone in your family have previous history of blood clots? YES -    8. Do you or does anyone in your family have a serious bleeding problem such as prolonged bleeding following surgeries or cuts? No   9. Have you ever had problems with anemia or been told to take iron pills? No   10. Have you had any abnormal blood loss such as black, tarry or bloody stools? No   11. Have you ever had a blood transfusion? No   12. Are you willing to  have a blood transfusion if it is medically needed before, during, or after your surgery? Yes   13. Have you or any of your relatives ever had problems with anesthesia? No   14. Do you have sleep apnea, excessive snoring or daytime drowsiness? No   15. Do you have any artifical heart valves or other implanted medical devices like a pacemaker, defibrillator, or continuous glucose monitor? No   16. Do you have artificial joints? No   17. Are you allergic to latex? No       Health Care Directive:  Patient does not have a Health Care Directive or Living Will: Discussed advance care planning with patient; however, patient declined at this time.    Preoperative Review of :   reviewed - no record of controlled substances prescribed.      Status of Chronic Conditions:  See problem list for active medical problems.  Problems all longstanding and stable, except as noted/documented.  See ROS for pertinent symptoms related to these conditions.      Review of Systems  CONSTITUTIONAL: NEGATIVE for fever, chills, change in weight  INTEGUMENTARY/SKIN: NEGATIVE for worrisome rashes, moles or lesions  EYES: NEGATIVE for vision changes or irritation  ENT/MOUTH: NEGATIVE for ear, mouth and throat problems  RESP: NEGATIVE for significant cough or SOB  CV: NEGATIVE for chest pain, palpitations or peripheral edema  GI: NEGATIVE for nausea, abdominal pain, heartburn, or change in bowel habits  : NEGATIVE for frequency, dysuria, or hematuria  MUSCULOSKELETAL: NEGATIVE for significant arthralgias or myalgia  NEURO: NEGATIVE for weakness, dizziness or paresthesias  ENDOCRINE: NEGATIVE for temperature intolerance, skin/hair changes  HEME: NEGATIVE for bleeding problems  PSYCHIATRIC: NEGATIVE for changes in mood or affect    Patient Active Problem List    Diagnosis Date Noted     SI (sacroiliac) joint dysfunction 09/27/2021     Priority: Medium     Lumbar pain 09/27/2021     Priority: Medium     Scoliosis of thoracolumbar spine,  unspecified scoliosis type 09/27/2021     Priority: Medium     History of thromboembolism of vein 09/17/2021     Priority: Medium     Nov 25, 2011 Entered By: JOSHUA STEWART Comment: had been on coumadin from [id number]       Chronic obstructive pulmonary disease (H) 06/20/2018     Priority: Medium     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 Provider to review        History reviewed. No pertinent past medical history.  History reviewed. No pertinent surgical history.  Current Outpatient Medications   Medication Sig Dispense Refill     aspirin 81 MG EC tablet Take 81 mg by mouth daily       COMBIVENT RESPIMAT  MCG/ACT inhaler INHALE ONE PUFF INTO THE LUNGS FOUR TIMES A DAY - NOT TO EXCEED 6 DOSES PER DAY 4 g 3     INCRUSE ELLIPTA 62.5 MCG/INH Inhaler INHALE ONE PUFF INTO THE LUNGS DAILY 30 each 3     NONFORMULARY ADVIL PLUS         Allergies   Allergen Reactions     No Known Drug Allergies         Social History     Tobacco Use     Smoking status: Current Some Day Smoker     Packs/day: 1.00     Types: Cigarettes     Smokeless tobacco: Never Used   Substance Use Topics     Alcohol use: No     History reviewed. No pertinent family history.  History   Drug Use No         Objective     BP 98/62   Pulse 93   Temp 97.6  F (36.4  C) (Temporal)   Resp 18   Wt 57.4 kg (126 lb 8 oz)   SpO2 97%   BMI 19.23 kg/m      Physical Exam    GENERAL APPEARANCE: healthy, alert and no distress     EYES: EOMI,  PERRL     HENT: ear canals and TM's normal and nose and mouth without ulcers or lesions     NECK: no adenopathy, no asymmetry, masses, or scars and thyroid normal to palpation     RESP: lungs clear to auscultation - no rales, rhonchi or wheezes     CV: regular rates and rhythm, normal S1 S2, no S3 or S4 and no murmur, click or rub     ABDOMEN: soft, nontender, without hepatosplenomegaly or masses, bowel sounds normal and inguinal hernias noted.   No incarceration or entrapment noted at this time.     MS: extremities normal- no gross deformities noted, no evidence of inflammation in joints, FROM in all extremities.     SKIN: no suspicious lesions or rashes     NEURO: Normal strength and tone, sensory exam grossly normal, mentation intact and speech normal     PSYCH: mentation appears normal. and affect normal/bright     LYMPHATICS: No cervical adenopathy    No results for input(s): HGB, PLT, INR, NA, POTASSIUM, CR, A1C in the last 79941 hours.     Diagnostics:  Labs pending at this time.  Results will be reviewed when available.   EKG: appears normal, NSR, normal axis, normal intervals, no acute ST/T changes c/w ischemia, no LVH by voltage criteria, unchanged from previous tracings    Revised Cardiac Risk Index (RCRI):  The patient has the following serious cardiovascular risks for perioperative complications:   - No serious cardiac risks = 0 points     RCRI Interpretation: 0 points: Class I (very low risk - 0.4% complication rate)           Signed Electronically by: Brandon Soto DO  Copy of this evaluation report is provided to requesting physician.

## 2021-10-27 ENCOUNTER — HOSPITAL ENCOUNTER (OUTPATIENT)
Dept: PHYSICAL THERAPY | Facility: CLINIC | Age: 66
Setting detail: THERAPIES SERIES
End: 2021-10-27
Attending: NURSE PRACTITIONER
Payer: COMMERCIAL

## 2021-10-27 PROCEDURE — 97110 THERAPEUTIC EXERCISES: CPT | Mod: GP | Performed by: PHYSICAL THERAPIST

## 2021-10-27 NOTE — PROGRESS NOTES
"Outpatient Physical Therapy Progress Note     Patient: Keith Gonzalez  : 1955    Beginning/End Dates of Reporting Period:  3 sessions between 10- and 10-    Referring Provider: Prachi JOHANSEN CNP    Therapy Diagnosis: Scoliosis thoracolumbar L3     Client Self Report: Stiffness possibly from sleeping \"funny\" on side. Stiffness in neck and R side of low back (now:  2/10, range: 0/10 at times to 5/10)    Objective Measurement:  Observation:   Sitting taller and correcting his posture frequently during the session.        Outcome Measures (most recent score):  Farhan STarT Sub-Score (Q5-9): 1  Farhan STarT Total Score (all 9): 3  Oswestry Score (%): 15 %    Goals:  Goal Identifier Positioning and posture   Goal Description Jaison will be able to use different positions and posture to calm symptoms and protect low back with home and work activities.    Target Date 21   Date Met      Progress (detail required for progress note):  Reviewed use of pelvic tilting with actvities and has started working on extension in standing to decrease symptoms     Goal Identifier Home program   Goal Description Jaison will be able to complete a home program to improve gluteal strength allowing him to use pelvic positioning to bend forward x 20 reps with 2-3# weights to start demonstrating calming of symptoms.    Target Date 21   Date Met      Progress (detail required for progress note):  Lifting laundry bag (3-4#) up and dopwn steps to get to laundry area.      Plan:  Changes to therapy plan of care: He is having surgery unrelated to PT referral and will have a follow up with surgeon [id number]. Hold chart open as he anticpates limited activity x 6 weeks.     Discharge:  No  "

## 2021-10-28 ENCOUNTER — HOSPITAL ENCOUNTER (OUTPATIENT)
Facility: CLINIC | Age: 66
Discharge: HOME | End: 2021-10-28
Attending: SURGERY | Admitting: SURGERY
Payer: COMMERCIAL

## 2021-10-28 ENCOUNTER — ANESTHESIA EVENT (OUTPATIENT)
Dept: SURGERY | Facility: CLINIC | Age: 66
End: 2021-10-28
Payer: COMMERCIAL

## 2021-10-28 ENCOUNTER — ANESTHESIA (OUTPATIENT)
Dept: SURGERY | Facility: CLINIC | Age: 66
End: 2021-10-28
Payer: COMMERCIAL

## 2021-10-28 VITALS
HEART RATE: 65 BPM | RESPIRATION RATE: 14 BRPM | TEMPERATURE: 98.3 F | OXYGEN SATURATION: 92 % | DIASTOLIC BLOOD PRESSURE: 77 MMHG | SYSTOLIC BLOOD PRESSURE: 114 MMHG

## 2021-10-28 DIAGNOSIS — Z86.718 HISTORY OF THROMBOEMBOLISM OF VEIN: Primary | ICD-10-CM

## 2021-10-28 DIAGNOSIS — K40.90 LEFT INGUINAL HERNIA: ICD-10-CM

## 2021-10-28 PROCEDURE — 258N000003 HC RX IP 258 OP 636: Performed by: NURSE ANESTHETIST, CERTIFIED REGISTERED

## 2021-10-28 PROCEDURE — C1781 MESH (IMPLANTABLE): HCPCS | Performed by: SURGERY

## 2021-10-28 PROCEDURE — 49650 LAP ING HERNIA REPAIR INIT: CPT | Mod: LT | Performed by: SURGERY

## 2021-10-28 PROCEDURE — 250N000011 HC RX IP 250 OP 636: Performed by: SURGERY

## 2021-10-28 PROCEDURE — S2900 ROBOTIC SURGICAL SYSTEM: HCPCS | Performed by: SURGERY

## 2021-10-28 PROCEDURE — 250N000013 HC RX MED GY IP 250 OP 250 PS 637: Performed by: SURGERY

## 2021-10-28 PROCEDURE — 250N000011 HC RX IP 250 OP 636: Performed by: NURSE ANESTHETIST, CERTIFIED REGISTERED

## 2021-10-28 PROCEDURE — 370N000017 HC ANESTHESIA TECHNICAL FEE, PER MIN: Performed by: SURGERY

## 2021-10-28 PROCEDURE — 250N000009 HC RX 250: Performed by: NURSE ANESTHETIST, CERTIFIED REGISTERED

## 2021-10-28 PROCEDURE — 250N000025 HC SEVOFLURANE, PER MIN: Performed by: SURGERY

## 2021-10-28 PROCEDURE — 250N000013 HC RX MED GY IP 250 OP 250 PS 637: Performed by: NURSE ANESTHETIST, CERTIFIED REGISTERED

## 2021-10-28 PROCEDURE — 999N000141 HC STATISTIC PRE-PROCEDURE NURSING ASSESSMENT: Performed by: SURGERY

## 2021-10-28 PROCEDURE — 710N000010 HC RECOVERY PHASE 1, LEVEL 2, PER MIN: Performed by: SURGERY

## 2021-10-28 PROCEDURE — 250N000009 HC RX 250: Performed by: SURGERY

## 2021-10-28 PROCEDURE — 272N000001 HC OR GENERAL SUPPLY STERILE: Performed by: SURGERY

## 2021-10-28 PROCEDURE — 360N000080 HC SURGERY LEVEL 7, PER MIN: Performed by: SURGERY

## 2021-10-28 PROCEDURE — 710N000012 HC RECOVERY PHASE 2, PER MINUTE: Performed by: SURGERY

## 2021-10-28 DEVICE — MESH DEXTILE ANATOMICAL 15CM X 10CM LG LEFT DXT1510AL: Type: IMPLANTABLE DEVICE | Site: ABDOMEN | Status: FUNCTIONAL

## 2021-10-28 RX ORDER — DEXAMETHASONE SODIUM PHOSPHATE 10 MG/ML
INJECTION, SOLUTION INTRAMUSCULAR; INTRAVENOUS PRN
Status: DISCONTINUED | OUTPATIENT
Start: 2021-10-28 | End: 2021-10-28

## 2021-10-28 RX ORDER — ONDANSETRON 4 MG/1
4 TABLET, ORALLY DISINTEGRATING ORAL EVERY 30 MIN PRN
Status: DISCONTINUED | OUTPATIENT
Start: 2021-10-28 | End: 2021-10-28 | Stop reason: HOSPADM

## 2021-10-28 RX ORDER — HEPARIN SODIUM 5000 [USP'U]/.5ML
5000 INJECTION, SOLUTION INTRAVENOUS; SUBCUTANEOUS
Status: DISCONTINUED | OUTPATIENT
Start: 2021-10-28 | End: 2021-10-28 | Stop reason: HOSPADM

## 2021-10-28 RX ORDER — HYDROMORPHONE HYDROCHLORIDE 1 MG/ML
0.5 INJECTION, SOLUTION INTRAMUSCULAR; INTRAVENOUS; SUBCUTANEOUS EVERY 5 MIN PRN
Status: DISCONTINUED | OUTPATIENT
Start: 2021-10-28 | End: 2021-10-28 | Stop reason: HOSPADM

## 2021-10-28 RX ORDER — OXYCODONE HYDROCHLORIDE 5 MG/1
5-10 TABLET ORAL EVERY 6 HOURS PRN
Qty: 12 TABLET | Refills: 0 | Status: SHIPPED | OUTPATIENT
Start: 2021-10-28 | End: 2021-11-09

## 2021-10-28 RX ORDER — BUPIVACAINE HYDROCHLORIDE AND EPINEPHRINE 2.5; 5 MG/ML; UG/ML
INJECTION, SOLUTION EPIDURAL; INFILTRATION; INTRACAUDAL; PERINEURAL PRN
Status: DISCONTINUED | OUTPATIENT
Start: 2021-10-28 | End: 2021-10-28 | Stop reason: HOSPADM

## 2021-10-28 RX ORDER — CELECOXIB 100 MG/1
400 CAPSULE ORAL ONCE
Status: COMPLETED | OUTPATIENT
Start: 2021-10-28 | End: 2021-10-28

## 2021-10-28 RX ORDER — OXYCODONE HYDROCHLORIDE 5 MG/1
5 TABLET ORAL EVERY 4 HOURS PRN
Status: DISCONTINUED | OUTPATIENT
Start: 2021-10-28 | End: 2021-10-28 | Stop reason: HOSPADM

## 2021-10-28 RX ORDER — FENTANYL CITRATE 50 UG/ML
50 INJECTION, SOLUTION INTRAMUSCULAR; INTRAVENOUS
Status: CANCELLED | OUTPATIENT
Start: 2021-10-28

## 2021-10-28 RX ORDER — ONDANSETRON 2 MG/ML
4 INJECTION INTRAMUSCULAR; INTRAVENOUS EVERY 30 MIN PRN
Status: DISCONTINUED | OUTPATIENT
Start: 2021-10-28 | End: 2021-10-28 | Stop reason: HOSPADM

## 2021-10-28 RX ORDER — SODIUM CHLORIDE, SODIUM LACTATE, POTASSIUM CHLORIDE, CALCIUM CHLORIDE 600; 310; 30; 20 MG/100ML; MG/100ML; MG/100ML; MG/100ML
INJECTION, SOLUTION INTRAVENOUS CONTINUOUS
Status: DISCONTINUED | OUTPATIENT
Start: 2021-10-28 | End: 2021-10-28 | Stop reason: HOSPADM

## 2021-10-28 RX ORDER — FENTANYL CITRATE 50 UG/ML
50 INJECTION, SOLUTION INTRAMUSCULAR; INTRAVENOUS EVERY 5 MIN PRN
Status: DISCONTINUED | OUTPATIENT
Start: 2021-10-28 | End: 2021-10-28 | Stop reason: HOSPADM

## 2021-10-28 RX ORDER — PROPOFOL 10 MG/ML
INJECTION, EMULSION INTRAVENOUS PRN
Status: DISCONTINUED | OUTPATIENT
Start: 2021-10-28 | End: 2021-10-28

## 2021-10-28 RX ORDER — FENTANYL CITRATE 50 UG/ML
INJECTION, SOLUTION INTRAMUSCULAR; INTRAVENOUS PRN
Status: DISCONTINUED | OUTPATIENT
Start: 2021-10-28 | End: 2021-10-28

## 2021-10-28 RX ORDER — KETOROLAC TROMETHAMINE 30 MG/ML
INJECTION, SOLUTION INTRAMUSCULAR; INTRAVENOUS PRN
Status: DISCONTINUED | OUTPATIENT
Start: 2021-10-28 | End: 2021-10-28

## 2021-10-28 RX ORDER — KETAMINE HYDROCHLORIDE 10 MG/ML
INJECTION INTRAMUSCULAR; INTRAVENOUS PRN
Status: DISCONTINUED | OUTPATIENT
Start: 2021-10-28 | End: 2021-10-28

## 2021-10-28 RX ORDER — FENTANYL CITRATE-0.9 % NACL/PF 10 MCG/ML
PLASTIC BAG, INJECTION (ML) INTRAVENOUS PRN
Status: DISCONTINUED | OUTPATIENT
Start: 2021-10-28 | End: 2021-10-28

## 2021-10-28 RX ORDER — CEFAZOLIN SODIUM 2 G/100ML
2 INJECTION, SOLUTION INTRAVENOUS
Status: DISCONTINUED | OUTPATIENT
Start: 2021-10-28 | End: 2021-10-28 | Stop reason: HOSPADM

## 2021-10-28 RX ORDER — LIDOCAINE HYDROCHLORIDE 20 MG/ML
INJECTION, SOLUTION INFILTRATION; PERINEURAL PRN
Status: DISCONTINUED | OUTPATIENT
Start: 2021-10-28 | End: 2021-10-28

## 2021-10-28 RX ORDER — LIDOCAINE 40 MG/G
CREAM TOPICAL
Status: DISCONTINUED | OUTPATIENT
Start: 2021-10-28 | End: 2021-10-28 | Stop reason: HOSPADM

## 2021-10-28 RX ORDER — ONDANSETRON 2 MG/ML
INJECTION INTRAMUSCULAR; INTRAVENOUS PRN
Status: DISCONTINUED | OUTPATIENT
Start: 2021-10-28 | End: 2021-10-28

## 2021-10-28 RX ORDER — EPHEDRINE SULFATE 50 MG/ML
INJECTION, SOLUTION INTRAMUSCULAR; INTRAVENOUS; SUBCUTANEOUS PRN
Status: DISCONTINUED | OUTPATIENT
Start: 2021-10-28 | End: 2021-10-28

## 2021-10-28 RX ORDER — DIMENHYDRINATE 50 MG/ML
25 INJECTION, SOLUTION INTRAMUSCULAR; INTRAVENOUS
Status: DISCONTINUED | OUTPATIENT
Start: 2021-10-28 | End: 2021-10-28 | Stop reason: HOSPADM

## 2021-10-28 RX ORDER — GABAPENTIN 300 MG/1
600 CAPSULE ORAL ONCE
Status: COMPLETED | OUTPATIENT
Start: 2021-10-28 | End: 2021-10-28

## 2021-10-28 RX ORDER — CEFAZOLIN SODIUM 2 G/100ML
2 INJECTION, SOLUTION INTRAVENOUS SEE ADMIN INSTRUCTIONS
Status: DISCONTINUED | OUTPATIENT
Start: 2021-10-28 | End: 2021-10-28 | Stop reason: HOSPADM

## 2021-10-28 RX ORDER — OXYCODONE HYDROCHLORIDE 5 MG/1
5 TABLET ORAL
Status: CANCELLED | OUTPATIENT
Start: 2021-10-28

## 2021-10-28 RX ADMIN — CELECOXIB 400 MG: 100 CAPSULE ORAL at 06:27

## 2021-10-28 RX ADMIN — CEFAZOLIN SODIUM 2 G: 2 INJECTION, SOLUTION INTRAVENOUS at 07:30

## 2021-10-28 RX ADMIN — OXYCODONE HYDROCHLORIDE 5 MG: 5 TABLET ORAL at 10:12

## 2021-10-28 RX ADMIN — MIDAZOLAM 2 MG: 1 INJECTION INTRAMUSCULAR; INTRAVENOUS at 07:29

## 2021-10-28 RX ADMIN — DEXAMETHASONE SODIUM PHOSPHATE 4 MG: 10 INJECTION, SOLUTION INTRAMUSCULAR; INTRAVENOUS at 07:48

## 2021-10-28 RX ADMIN — FENTANYL CITRATE 50 MCG: 50 INJECTION, SOLUTION INTRAMUSCULAR; INTRAVENOUS at 09:31

## 2021-10-28 RX ADMIN — ROCURONIUM BROMIDE 50 MG: 50 INJECTION, SOLUTION INTRAVENOUS at 07:37

## 2021-10-28 RX ADMIN — GABAPENTIN 600 MG: 300 CAPSULE ORAL at 06:28

## 2021-10-28 RX ADMIN — Medication 50 MCG: at 07:55

## 2021-10-28 RX ADMIN — KETOROLAC TROMETHAMINE 15 MG: 30 INJECTION, SOLUTION INTRAMUSCULAR at 09:25

## 2021-10-28 RX ADMIN — Medication 20 MG: at 08:00

## 2021-10-28 RX ADMIN — ONDANSETRON 4 MG: 2 INJECTION INTRAMUSCULAR; INTRAVENOUS at 09:05

## 2021-10-28 RX ADMIN — ROCURONIUM BROMIDE 10 MG: 50 INJECTION, SOLUTION INTRAVENOUS at 08:16

## 2021-10-28 RX ADMIN — ROCURONIUM BROMIDE 10 MG: 50 INJECTION, SOLUTION INTRAVENOUS at 08:43

## 2021-10-28 RX ADMIN — LIDOCAINE HYDROCHLORIDE 0.3 ML: 10 INJECTION, SOLUTION EPIDURAL; INFILTRATION; INTRACAUDAL; PERINEURAL at 07:30

## 2021-10-28 RX ADMIN — FENTANYL CITRATE 50 MCG: 50 INJECTION, SOLUTION INTRAMUSCULAR; INTRAVENOUS at 07:37

## 2021-10-28 RX ADMIN — ROCURONIUM BROMIDE 10 MG: 50 INJECTION, SOLUTION INTRAVENOUS at 08:30

## 2021-10-28 RX ADMIN — Medication 5 MG: at 08:05

## 2021-10-28 RX ADMIN — LIDOCAINE HYDROCHLORIDE 60 MG: 20 INJECTION, SOLUTION INFILTRATION; PERINEURAL at 07:37

## 2021-10-28 RX ADMIN — PROPOFOL 120 MG: 10 INJECTION, EMULSION INTRAVENOUS at 07:37

## 2021-10-28 RX ADMIN — SUGAMMADEX 200 MG: 100 INJECTION, SOLUTION INTRAVENOUS at 09:26

## 2021-10-28 RX ADMIN — Medication 50 MCG: at 07:51

## 2021-10-28 RX ADMIN — ROCURONIUM BROMIDE 10 MG: 50 INJECTION, SOLUTION INTRAVENOUS at 08:01

## 2021-10-28 RX ADMIN — Medication 100 MCG: at 09:23

## 2021-10-28 RX ADMIN — Medication 50 MCG: at 07:52

## 2021-10-28 RX ADMIN — SODIUM CHLORIDE, POTASSIUM CHLORIDE, SODIUM LACTATE AND CALCIUM CHLORIDE: 600; 310; 30; 20 INJECTION, SOLUTION INTRAVENOUS at 07:29

## 2021-10-28 ASSESSMENT — LIFESTYLE VARIABLES: TOBACCO_USE: 1

## 2021-10-28 ASSESSMENT — COPD QUESTIONNAIRES: COPD: 1

## 2021-10-28 NOTE — ANESTHESIA POSTPROCEDURE EVALUATION
Patient: Keith Gonzalez    Procedure: Procedure(s):  Robotic assisted left inguinal hernia repair with mesh       Diagnosis:Left inguinal hernia [K40.90]  Diagnosis Additional Information: No value filed.    Anesthesia Type:  General    Note:  Disposition: Outpatient   Postop Pain Control: Uneventful            Sign Out: Well controlled pain   PONV: No   Neuro/Psych: Uneventful            Sign Out: Acceptable/Baseline neuro status   Airway/Respiratory: Uneventful            Sign Out: Acceptable/Baseline resp. status   CV/Hemodynamics: Uneventful            Sign Out: Acceptable CV status   Other NRE: NONE   DID A NON-ROUTINE EVENT OCCUR? No    Event details/Postop Comments:  Pt was happy with anesthesia care.  No complications.  I will follow up with the pt if needed.           Last vitals:  Vitals Value Taken Time   /69 10/28/21 1015   Temp 96.8  F (36  C) 10/28/21 1018   Pulse 69 10/28/21 1018   Resp 16 10/28/21 1018   SpO2 95 % 10/28/21 1017   Vitals shown include unvalidated device data.    Electronically Signed By: HAILY Cohn CRNA  October 28, 2021  1:28 PM

## 2021-10-28 NOTE — BRIEF OP NOTE
[facility]    Brief Operative Note    Pre-operative diagnosis: Left inguinal hernia [K40.90]  Post-operative diagnosis left large direct inguinal hernia    Procedure: Procedure(s):  Robotic assisted left inguinal hernia repair with mesh  Surgeon: Surgeon(s) and Role:     * Gladys Whitehead MD - Primary  Anesthesia: General   Estimated Blood Loss: Minimal    Drains: None  Specimens: * No specimens in log *  Findings:   None.  Complications: large direct left inguinal hernia.  Implants:   Implant Name Type Inv. Item Serial No.  Lot No. LRB No. Used Action   MESH DEXTILE ANATOMICAL 15CM X 10CM LG LEFT [id number] - [id number] Mesh MESH DEXTILE ANATOMICAL 15CM X 10CM LG LEFT [id number]  [facility] [id number] Left 1 Implanted

## 2021-10-28 NOTE — OP NOTE
[facility]  Operative Note    Pre-operative diagnosis: Left inguinal hernia [K40.90]   Post-operative diagnosis Left direct inguinal hernia repair   Procedure: Procedure(s):  Robotic assisted left inguinal hernia repair with mesh   Surgeon: Gladys Whitehead MD   Assistants(s): Single scrub tech   Anesthesia: General    Estimated blood loss: Minimal                Drains: None   Specimens: None   Implants: [id number] left dextile mesh   Findings: Large direct left inguinal hernia - hernia defect ~3cm at its biggest diameter.   Complications: None.   Condition: Stable       Indications for the procedure:   This is a 66 M with large direct left inguinal hernia.  Patient is symptomatic and would like it to be repaired.  We discussed risks, benefits, and alternatives of the procedure during the office visit.  Patient agreed and wishes to proceed with the procedures above.    Description of procedure:  The patient was preoperatively identified. Consent was signed and placed on the chart. She/he was brought back to the operative suite where he was laid supine on the operating table. General endotracheal anesthesia was induced per anesthesia protocol. She/he was then prepped and draped in sterile fashion. We started by infiltrating 5 cc of local anesthetic in the left upper quadrant area and made small skin incision and accessing the abdominal cavity by using Optiview trocar and 5-mm trocar site visualizing all layers of the abdominal wall until we reached the peritoneal cavity. We then insufflated  with CO2 gas to create pneumoperitoneum.  Brief glance of the abdomen noted only the left direct hernia.  An 8mm robotic trocar was then placed at the RUQ.  An 8mm robotic camera port was also placed just lateral to the midline.  I then switched out the 5 mm trocar for an 8 mm robotic trocar on the left upper quadrant.  All these were placed under direct visualization and also after local anesthetic to  the skin.  Patient was then placed in Trendelenburg position until I was able to see the hernia adequately.  The SI da Jose Juan robot was then docked parallel to the table.      I start my dissection on the left.  The previously marked iliac crests was then palpated by my first assist.  Began taking down the peritoneum lateral to medial utilizing the monopolar scissors and Cadiere forceps; started approximately 6 cm above the area of the inguinal hernia.  Dissected this bluntly and also with monopolar until I saw the pubic symphysis and Sedrick's ligament medially.  I was cognizant of the bladder just below the pubic symphysis.  Once I was 1 to 2 cm inferior to the pubic symphysis, and continued the dissection more laterally.  Made my lateral space until the level of the iliac crest.  I then reduce the peritoneum from the hernia.  The peritoneum was reduced until I am able to see the iliac vessels and the psoas muscle. I checked for any cord lipoma which were there was none and no indirect hernia.  I then have a full view of my myopectineal space -noted there was only that 3cm left direct hernia.      I then place a [id number] left Dextile within the space made on the left inguinal area.  I sutured the medial aspect of the mesh onto Sedrick's ligament utilizing a 2-0 Vicryl.  I also tacked the lateral aspect of the mesh far away from the nerves utilizing a 2-0 Vicryl.      At this point I close the peritoneal flap utilizing a 2-0V lock to complete my procedure.  Any rents made in the peritoneum was closed with a 3-0 Vicryl in a figure-of-eight fashion.    At this point brief glance of the abdomen was noted.  Noted hemostasis was achieved.  Then removed all needles through the 8 mm robotic arm under direct visualization.  And the robot was then undocked.     All skin incisions were closed with 4-0 Monocryl in a simple interrupted buried fashion.  Area was then cleaned and dried.  Exofin then applied.  The Dunn was removed  at the end of the case.    Patient tolerated the procedure well.  All instruments, needles, lap counts were correct at the end of the case x2.      [facility], DO

## 2021-10-28 NOTE — INTERVAL H&P NOTE
I have reviewed the surgical (or preoperative) H&P that is linked to this encounter, and examined the patient. There are no significant changes    Replaced by [facility] Campuso, DO

## 2021-10-28 NOTE — DISCHARGE INSTRUCTIONS
[facility]    Home Care Following Hernia Repair    [facility] Whitehead, DO      Hernia Type:  Inguinal, left    Pain meds:     600mg ibuprofen every 6hrs prn     650mg of acetaminophen every 6hrs prn    You can alternate these meds so that you take one every 3 hrs.      Make sure you do not go over 4000mg of acetaminophen every 24hrs.  Care of the Incision:    If surgical glue was used, keep your incision dry for 24 hours.  Then you may shower, but don t submerge under water for at least 2 weeks.  Gently pat your incision dry with a freshly laundered towel.    Do not touch your incision with bare hands or pick at scabs.    Leave your incision open to air.  Cover it only if clothing rubs or irritates it.  Activity:    Gradually increase your activity.  Walk short distances several times each day and increase the distance as your strength allows.    To promote circulation, do not cross your legs while sitting.    No strenuous lifting or straining for 2-3 weeks.   Do not lift anything over 10-20 pounds until your doctor approves an increase.    Return to work will be determined by the type of work you do and should be discussed with your physician.    Do not drive or operate equipment while taking prescription pain medicines.  You may drive 1 week after surgery if you have stopped taking prescription pain medicines and are pain-free enough to react quickly and make an emergency stop if necessary.    Diet:    Return to the diet you were on before surgery.    Drink plenty of  water.    Avoid foods that cause constipation.      REMEMBER--most prescription pain pills cause constipation.  Walking, extra fluids, and increased fiber (fresh fruits and vegetables, etc.) are natural remedies for constipation.  You can also take mineral oil, 1-2 Tablespoons per day.  If still constipated you may try a stool softener such as Colace or Miralax.    Call Your Physician if You Have:    Redness, increased swelling or cloudy  drainage from your incision.    A temperature of more than 101 degrees F.    Worsening pain in your incision not relieved by your prescription pain pills and/or a short rest.    Any questions or concerns about your recovery, please call       Business hours [phone]  After hours [phone] Nurse Advice Line (24 hours a day)    Follow-up Care:    Make an appointment 2-3 weeks after your surgery.  Call [phone]    [facility] Same-Day [facility]   Adult Discharge Orders & Instructions after anesthesia     For 24 hours after surgery    1. Get plenty of rest.  A responsible adult must stay with you for at least 24 hours after you leave the hospital.   2. Do not drive or use heavy equipment.  If you have weakness or tingling, don't drive or use heavy equipment until this feeling goes away.  3. Do not drink alcohol.  4. Avoid strenuous or risky activities.  Ask for help when climbing stairs.   5. You may feel lightheaded.  If so, sit for a few minutes before standing.  Have someone help you get up.   6. You may have a slight fever. Call the doctor if your fever is over 100 F (37.7 C) (taken under the tongue) or lasts longer than 24 hours.  7. You may have a dry mouth, a sore throat, muscle aches or trouble sleeping.  These should go away after 24 hours.  8. Do not make important or legal decisions.  We don t expect you to have any problems from the surgery or treatment you had today. Just in case, here s what to do if you have pain, upset stomach (nausea), bleeding or infection:  Pain:  Take medicines your doctor has prescribed or over-the-counter medicine they have suggested. Resting and using ice packs can help, too. For surgery on an arm or leg, raise it on a pillow to ease swelling. Call your doctor if these methods don t work.  Copyright Gonzalo Manzanares, Licensed under [employer]  Upset stomach (nausea):  Take anti-nausea medicine approved by your doctor. Drink clear liquids like apple  juice, ginger ale, broth or 7-Up. Be sure to drink enough fluids. Rest can help, too. Move to normal foods when you re ready.   Bleeding:  In the first 24 hours, you may see a little blood on your dressing, about the size of a quarter. You don t need to worry about this much blood, but if the blood spot keeps getting bigger:    Put pressure on the wound if you can, AND    Call your doctor.  Copyright [email or web address], Licensed under [employer]  Fever/Infection: Please call your doctor if you have any of these signs:    Redness    Swelling    Wound feels warm    Pain gets worse    Bad-smelling fluid leaks from wound    Fever or chills  Call your doctor for any of the followin.  It has been over 8 to 10 hours since surgery and you are still not able to urinate (pass water).    2.  Headache for over 24 hours.    Nurse advice line: [phone]

## 2021-10-28 NOTE — ANESTHESIA PROCEDURE NOTES
Airway       Patient location during procedure: OR       Procedure Start/Stop Times: 10/28/2021 7:41 AM  Staff -        CRNA: Phyllis Banks APRN CRNA       Other Anesthesia Staff: Casie Lorenzo       Performed By: ANDREW and with CRNAs       Procedure performed by resident/fellow/CRNA in presence of a teaching physician.    Consent for Airway        Urgency: elective  Indications and Patient Condition       Indications for airway management: osbaldo-procedural       Induction type:intravenous       Mask difficulty assessment: 3 - difficult mask (inadequate, unstable, or two providers) +/- NMBA    Final Airway Details       Final airway type: endotracheal airway       Successful airway: ETT - single  Endotracheal Airway Details        ETT size (mm): 7.5       Cuffed: yes       Successful intubation technique: direct laryngoscopy       DL Blade Type: MAC 3       Grade View of Cords: 1       Adjucts: stylet       Position: Right       Measured from: lips       Secured at (cm): 23       Bite block used: None    Post intubation assessment        Placement verified by: capnometry, equal breath sounds and chest rise        Number of attempts at approach: 1       Number of other approaches attempted: 0       Secured with: silk tape       Ease of procedure: easy       Dentition: Intact and Unchanged

## 2021-10-28 NOTE — ANESTHESIA PREPROCEDURE EVALUATION
Anesthesia Pre-Procedure Evaluation    Patient: Keith Gonzalez   MRN: [id number] : 1955        Preoperative Diagnosis: Left inguinal hernia [K40.90]    Procedure : Procedure(s):  Robotic assisted left inguinal hernia repair, possible bilateral, possible open          History reviewed. No pertinent past medical history.   History reviewed. No pertinent surgical history.   Allergies   Allergen Reactions     No Known Drug Allergies       Social History     Tobacco Use     Smoking status: Current Some Day Smoker     Packs/day: 1.00     Types: Cigarettes     Smokeless tobacco: Never Used   Substance Use Topics     Alcohol use: No      Wt Readings from Last 1 Encounters:   10/25/21 57.4 kg (126 lb 8 oz)        Anesthesia Evaluation   Pt has not had prior anesthetic         ROS/MED HX  ENT/Pulmonary:     (+) tobacco use, Current use, COPD,     Neurologic:  - neg neurologic ROS     Cardiovascular:     (+) -----Previous cardiac testing   Echo: Date: Results:    Stress Test: Date: Results:    ECG Reviewed: Date: 10/25/21 Results:  EKG: appears normal, NSR, normal axis, normal intervals, no acute ST/T changes c/w ischemia, no LVH by voltage criteria, unchanged from previous tracings  Cath: Date: Results:      METS/Exercise Tolerance: >4 METS    Hematologic:     (+) History of blood clots, pt is not anticoagulated,     Musculoskeletal:  - neg musculoskeletal ROS     GI/Hepatic:  - neg GI/hepatic ROS     Renal/Genitourinary:  - neg Renal ROS     Endo:  - neg endo ROS     Psychiatric/Substance Use:  - neg psychiatric ROS     Infectious Disease:  - neg infectious disease ROS     Malignancy:  - neg malignancy ROS     Other:  - neg other ROS          Physical Exam    Airway  airway exam normal      Mallampati: II   TM distance: > 3 FB   Neck ROM: full   Mouth opening: > 3 cm    Respiratory Devices and Support         Dental  no notable dental history         Cardiovascular   cardiovascular exam normal       Rhythm and rate:  regular and normal     Pulmonary   pulmonary exam normal                OUTSIDE LABS:  CBC:   Lab Results   Component Value Date    WBC 8.7 10/25/2021    HGB 15.4 10/25/2021    HCT 46.8 10/25/2021     10/25/2021     BMP:   Lab Results   Component Value Date     10/25/2021    POTASSIUM 4.6 10/25/2021    CHLORIDE 105 10/25/2021    CO2 28 10/25/2021    BUN 19 10/25/2021    CR 0.71 10/25/2021    GLC 85 10/25/2021     COAGS:   Lab Results   Component Value Date    INR 4.6 05/07/2010     POC: No results found for: BGM, HCG, HCGS  HEPATIC:   Lab Results   Component Value Date    ALBUMIN 3.8 10/25/2021    PROTTOTAL 6.8 10/25/2021    ALT 28 10/25/2021    AST 17 10/25/2021    ALKPHOS 98 10/25/2021    BILITOTAL 0.4 10/25/2021     OTHER:   Lab Results   Component Value Date    DAT 8.7 10/25/2021       Anesthesia Plan    ASA Status:  2   NPO Status:  NPO Appropriate    Anesthesia Type: General.     - Airway: ETT   Induction: Intravenous, Propofol.   Maintenance: Balanced.        Consents    Anesthesia Plan(s) and associated risks, benefits, and realistic alternatives discussed. Questions answered and patient/representative(s) expressed understanding.     - Discussed with:  Patient      - Extended Intubation/Ventilatory Support Discussed: No.      - Patient is DNR/DNI Status: No    Use of blood products discussed: Yes.     - Discussed with: Patient.     - Consented: consented to blood products            Reason for refusal: other.     Postoperative Care    Pain management: IV analgesics, Oral pain medications, Multi-modal analgesia.   PONV prophylaxis: Ondansetron (or other 5HT-3), Dexamethasone or Solumedrol     Comments:    The risks and benefits of anesthesia, and the alternatives where applicable, have been discussed with the patient, and they wish to proceed.            Casie Lorenzo

## 2021-10-28 NOTE — ANESTHESIA CARE TRANSFER NOTE
Patient: Keith Gonzalez    Procedure: Procedure(s):  Robotic assisted left inguinal hernia repair with mesh       Diagnosis: Left inguinal hernia [K40.90]  Diagnosis Additional Information: No value filed.    Anesthesia Type:   General     Note:    Oropharynx: spontaneously breathing and oral airway in place  Level of Consciousness: drowsy  Oxygen Supplementation: face mask  Level of Supplemental Oxygen (L/min / FiO2): 8  Independent Airway: airway patency satisfactory and stable  Dentition: dentition unchanged  Vital Signs Stable: post-procedure vital signs reviewed and stable  Report to RN Given: handoff report given  Patient transferred to: PACU    Handoff Report: Identifed the Patient, Identified the Reponsible Provider, Reviewed the pertinent medical history, Discussed the surgical course, Reviewed Intra-OP anesthesia mangement and issues during anesthesia, Set expectations for post-procedure period and Allowed opportunity for questions and acknowledgement of understanding      Vitals:  Vitals Value Taken Time   BP 98/57 10/28/21 0942   Temp     Pulse 60 10/28/21 0943   Resp 12 10/28/21 0943   SpO2 96 % 10/28/21 0943   Vitals shown include unvalidated device data.    Electronically Signed By: HAILY Cohn CRNA  October 28, 2021  9:45 AM

## 2021-11-08 NOTE — PROGRESS NOTES
Type of surgery/procedure:  hernia   Date of surgery:  10/28  Are you experiencing pain in surgical area? No  Have you had a temperature since surgery? No  Incision closed with steri-strips.  Appearance: dry and intact  Drainage: No  Are there any other problems you would like to discuss?  No   Reviewed incision care with the patient?

## 2021-11-09 ENCOUNTER — OFFICE VISIT (OUTPATIENT)
Dept: SURGERY | Facility: CLINIC | Age: 66
End: 2021-11-09
Payer: COMMERCIAL

## 2021-11-09 VITALS
TEMPERATURE: 97.5 F | DIASTOLIC BLOOD PRESSURE: 82 MMHG | SYSTOLIC BLOOD PRESSURE: 122 MMHG | HEART RATE: 60 BPM | BODY MASS INDEX: 19.92 KG/M2 | RESPIRATION RATE: 20 BRPM | OXYGEN SATURATION: 95 % | WEIGHT: 131 LBS

## 2021-11-09 DIAGNOSIS — Z98.890 S/P HERNIA REPAIR: Primary | ICD-10-CM

## 2021-11-09 DIAGNOSIS — Z87.19 S/P HERNIA REPAIR: Primary | ICD-10-CM

## 2021-11-09 PROCEDURE — 99024 POSTOP FOLLOW-UP VISIT: CPT | Performed by: SURGERY

## 2021-11-09 ASSESSMENT — PAIN SCALES - GENERAL: PAINLEVEL: NO PAIN (1)

## 2021-11-09 NOTE — LETTER
11/9/2021         RE: Keith Gonzalez  [address]  [address]  [facility] [address]        Dear Colleague,    Thank you for referring your patient, Keith Gonzalez, to the [facility]. Please see a copy of my visit note below.    Type of surgery/procedure:  hernia   Date of surgery:  10/28  Are you experiencing pain in surgical area? No  Have you had a temperature since surgery? No  Incision closed with steri-strips.  Appearance: dry and intact  Drainage: No  Are there any other problems you would like to discuss?  No   Reviewed incision care with the patient?           Assessment & Plan   Problem List Items Addressed This Visit     None      Visit Diagnoses     S/P hernia repair    -  Primary    RA left direct inguinal hernia.         Doing well  Minimal pain  Incisions are CDI  No signs of recurrence; there is a small bulge especially on valsalva - but pt does have a large direct - thus this is expected.   Discussed restrictions of no lifting more than 15-20lbs for 4-6 weeks from DOS  All questions were answered.       No follow-ups on file.    Gladys Whitehead MD  [facility]    Subjective   Jaison is a 66 year old who presents for the following health issues:    RA left large direct inguinal hernia repair with mesh 10/28  Doing well  Small bulge  DIdn't take any narcotics  Incision CDI       Review of Systems   n/a      Objective    /82   Pulse 60   Temp 97.5  F (36.4  C) (Temporal)   Resp 20   Wt 59.4 kg (131 lb)   SpO2 95%   BMI 19.92 kg/m    Body mass index is 19.92 kg/m .  Physical Exam   Abdomen: Incisions CDI; left area of bulge only noted on valsalva - may be seroma.  No obvious recurrence.                 Again, thank you for allowing me to participate in the care of your patient.        Sincerely,        Gladys Whitehead MD

## 2021-11-09 NOTE — LETTER
[place] [address] [facility] [address]  Phone: [phone]    November 9, 2021        Keith Gonzalez  [address]  [facility] [address]  [facility] [address]          To whom it may concern:    RE: Keith Gonzalez    Patient was seen and treated today at our clinic. Patient may return to work 11/29/2021 with no restrictions.     Please contact me for questions or concerns.      Sincerely,        Gladys Whitehead MD

## 2021-11-09 NOTE — PROGRESS NOTES
Assessment & Plan   Problem List Items Addressed This Visit     None      Visit Diagnoses     S/P hernia repair    -  Primary    RA left direct inguinal hernia.         Doing well  Minimal pain  Incisions are CDI  No signs of recurrence; there is a small bulge especially on valsalva - but pt does have a large direct - thus this is expected.   Discussed restrictions of no lifting more than 15-20lbs for 4-6 weeks from DOS  All questions were answered.       No follow-ups on file.    [facility] MD Charley  [facility]    Asiya Blackwood is a 66 year old who presents for the following health issues:    RA left large direct inguinal hernia repair with mesh 10/28  Doing well  Small bulge  DIdn't take any narcotics  Incision CDI       Review of Systems   n/a      Objective    /82   Pulse 60   Temp 97.5  F (36.4  C) (Temporal)   Resp 20   Wt 59.4 kg (131 lb)   SpO2 95%   BMI 19.92 kg/m    Body mass index is 19.92 kg/m .  Physical Exam   Abdomen: Incisions CDI; left area of bulge only noted on valsalva - may be seroma.  No obvious recurrence.

## 2021-12-08 DIAGNOSIS — J44.9 CHRONIC OBSTRUCTIVE PULMONARY DISEASE, UNSPECIFIED COPD TYPE (H): ICD-10-CM

## 2021-12-09 ENCOUNTER — NURSE TRIAGE (OUTPATIENT)
Dept: NURSING | Facility: CLINIC | Age: 66
End: 2021-12-09

## 2021-12-09 ENCOUNTER — OFFICE VISIT (OUTPATIENT)
Dept: INTERNAL MEDICINE | Facility: CLINIC | Age: 66
End: 2021-12-09
Payer: COMMERCIAL

## 2021-12-09 VITALS
DIASTOLIC BLOOD PRESSURE: 78 MMHG | OXYGEN SATURATION: 95 % | WEIGHT: 134.6 LBS | HEART RATE: 87 BPM | BODY MASS INDEX: 20.47 KG/M2 | SYSTOLIC BLOOD PRESSURE: 126 MMHG | RESPIRATION RATE: 18 BRPM | TEMPERATURE: 97.2 F

## 2021-12-09 DIAGNOSIS — R10.32 ABDOMINAL PAIN, LEFT LOWER QUADRANT: Primary | ICD-10-CM

## 2021-12-09 LAB
ALBUMIN SERPL-MCNC: 3.3 G/DL (ref 3.4–5)
ALP SERPL-CCNC: 86 U/L (ref 40–150)
ALT SERPL W P-5'-P-CCNC: 22 U/L (ref 0–70)
ANION GAP SERPL CALCULATED.3IONS-SCNC: 4 MMOL/L (ref 3–14)
AST SERPL W P-5'-P-CCNC: 21 U/L (ref 0–45)
BASOPHILS # BLD AUTO: 0.1 10E3/UL (ref 0–0.2)
BASOPHILS NFR BLD AUTO: 1 %
BILIRUB SERPL-MCNC: 0.5 MG/DL (ref 0.2–1.3)
BUN SERPL-MCNC: 19 MG/DL (ref 7–30)
CALCIUM SERPL-MCNC: 8.5 MG/DL (ref 8.5–10.1)
CHLORIDE BLD-SCNC: 106 MMOL/L (ref 94–109)
CO2 SERPL-SCNC: 27 MMOL/L (ref 20–32)
CREAT SERPL-MCNC: 0.66 MG/DL (ref 0.66–1.25)
EOSINOPHIL # BLD AUTO: 0.1 10E3/UL (ref 0–0.7)
EOSINOPHIL NFR BLD AUTO: 1 %
ERYTHROCYTE [DISTWIDTH] IN BLOOD BY AUTOMATED COUNT: 14.3 % (ref 10–15)
GFR SERPL CREATININE-BSD FRML MDRD: >90 ML/MIN/1.73M2
GLUCOSE BLD-MCNC: 99 MG/DL (ref 70–99)
HCT VFR BLD AUTO: 42.4 % (ref 40–53)
HGB BLD-MCNC: 14 G/DL (ref 13.3–17.7)
IMM GRANULOCYTES # BLD: 0 10E3/UL
IMM GRANULOCYTES NFR BLD: 0 %
LYMPHOCYTES # BLD AUTO: 1.8 10E3/UL (ref 0.8–5.3)
LYMPHOCYTES NFR BLD AUTO: 22 %
MCH RBC QN AUTO: 30 PG (ref 26.5–33)
MCHC RBC AUTO-ENTMCNC: 33 G/DL (ref 31.5–36.5)
MCV RBC AUTO: 91 FL (ref 78–100)
MONOCYTES # BLD AUTO: 0.7 10E3/UL (ref 0–1.3)
MONOCYTES NFR BLD AUTO: 8 %
NEUTROPHILS # BLD AUTO: 5.6 10E3/UL (ref 1.6–8.3)
NEUTROPHILS NFR BLD AUTO: 68 %
NRBC # BLD AUTO: 0 10E3/UL
NRBC BLD AUTO-RTO: 0 /100
PLATELET # BLD AUTO: 159 10E3/UL (ref 150–450)
POTASSIUM BLD-SCNC: 4.1 MMOL/L (ref 3.4–5.3)
PROT SERPL-MCNC: 6.7 G/DL (ref 6.8–8.8)
RBC # BLD AUTO: 4.66 10E6/UL (ref 4.4–5.9)
SODIUM SERPL-SCNC: 137 MMOL/L (ref 133–144)
WBC # BLD AUTO: 8.2 10E3/UL (ref 4–11)

## 2021-12-09 PROCEDURE — 85025 COMPLETE CBC W/AUTO DIFF WBC: CPT | Performed by: INTERNAL MEDICINE

## 2021-12-09 PROCEDURE — 80053 COMPREHEN METABOLIC PANEL: CPT | Performed by: INTERNAL MEDICINE

## 2021-12-09 PROCEDURE — 36415 COLL VENOUS BLD VENIPUNCTURE: CPT | Performed by: INTERNAL MEDICINE

## 2021-12-09 PROCEDURE — 99214 OFFICE O/P EST MOD 30 MIN: CPT | Performed by: INTERNAL MEDICINE

## 2021-12-09 ASSESSMENT — PAIN SCALES - GENERAL: PAINLEVEL: EXTREME PAIN (8)

## 2021-12-09 NOTE — LETTER
[place] [id number] [facility] [address]  Phone: [phone]    December 9, 2021        Keith Gonzalez  [address]  [facility] [address]  [facility] [address]          To whom it may concern:    RE: Keith Gonzalez    Patient was seen and treated today at our clinic and missed work 12/9-10/21.    Please contact me for questions or concerns.      Sincerely,        Brandon Soto, DO

## 2021-12-09 NOTE — TELEPHONE ENCOUNTER
"Had hernia surgery done.  Last night had pain in rib area in the location of one of the incisions.  Pain is 8/10.  Located in lower ribs on left side right above one of his incisions from his past hernia surgery.    Patient doesn't feel he needs to be seen in ED, would like to get a appointment to be seen tomorrow.Patient denies chest pain, SOB, vomiting, diarrhea or fever.    Per protocol will need to be seen tomorrow in clinic.  Sent to scheduling to make a appointment.    Farzaneh Wharton RN   12/09/21 12:12 AM  [facility] Nurse Advisor    Reason for Disposition    Age > 60 years    Additional Information    Negative: Shock suspected (e.g., cold/pale/clammy skin, too weak to stand, low BP, rapid pulse)    Negative: Difficult to awaken or acting confused (e.g., disoriented, slurred speech)    Negative: Passed out (i.e., lost consciousness, collapsed and was not responding)    Negative: Sounds like a life-threatening emergency to the triager    Negative: Chest pain    Negative: Pain is mainly in upper abdomen  (if needed ask: \"is it mainly above the belly button?\")    Negative: Followed an abdomen (stomach) injury    Negative: [1] SEVERE pain (e.g., excruciating) AND [2] present > 1 hour    Negative: [1] SEVERE pain AND [2] age > 60    Negative: [1] Vomiting AND [2] contains red blood or black (\"coffee ground\") material  (Exception: few red streaks in vomit that only happened once)    Negative: Blood in bowel movements  (Exception: Blood on surface of BM with constipation)    Negative: Black or tarry bowel movements  (Exception: chronic-unchanged  black-grey bowel movements AND is taking iron pills or Pepto-bismol)    Negative: [1] Unable to urinate (or only a few drops) > 4 hours AND [2] bladder feels very full (e.g., palpable bladder or strong urge to urinate)    Negative: [1] Pain in the scrotum or testicle AND [2] present > 1 hour    Negative: Patient sounds very sick or weak to the triager    Negative: " [1] MILD-MODERATE pain AND [2] constant AND [3] present > 2 hours    Negative: [1] Vomiting AND [2] abdomen looks much more swollen than usual    Negative: [1] Vomiting AND [2] contains bile (green color)    Negative: White of the eyes have turned yellow (i.e., jaundice)    Negative: Fever > 103 F (39.4 C)    Negative: [1] Fever > 101 F (38.3 C) AND [2] age > 60    Negative: [1] Fever > 100.0 F (37.8 C) AND [2] bedridden (e.g., nursing home patient, CVA, chronic illness, recovering from surgery)    Negative: [1] Fever > 100.0 F (37.8 C) AND [2] diabetes mellitus or weak immune system (e.g., HIV positive, cancer chemo, splenectomy, organ transplant, chronic steroids)    Negative: [1] SEVERE pain AND [2] present < 1 hour    Negative: [1] MODERATE pain (e.g., interferes with normal activities) AND [2] pain comes and goes (cramps) AND [3] present > 24 hours  (Exception: pain with Vomiting or Diarrhea - see that Guideline)    Negative: [1] MILD pain (e.g., does not interfere with normal activities) AND [2] pain comes and goes (cramps) [3] present > 48 hours    Protocols used: ABDOMINAL PAIN - MALE-A-

## 2021-12-09 NOTE — PROGRESS NOTES
Subjective   Jaison is a 66 year old who presents for the following health issues     HPI     Chief Complaint   Patient presents with     Post-op Problem     hernia repair in Oct. Pain started Tuesday        EMR reviewed including:             Complaint, History of Chief Complaint, Corresponding Review of Systems, and Complaint Specific Physical Examination.    #1   Lower left abdominal pain  Centered near recent endoscopic incision/access point  No redness or inflammation  No evidence of hernia recurrence.  Patient had no pain following surgery and only began spearing seeing pain in the last 2-3 days  Pain now rated at 8-9 over 10.  Very consistent with abdominal wall pain.  No gastrointestinal symptoms including diarrhea, mucus, melena etc.  Denies fevers or chills.        Exam:   GI: Abdomen is soft, without rebound.  Localized guarding tenderness noted.  No rebound present. Bowel sounds are appropriate. No renal bruits are heard.  No signs of acute infection or inflammation at incision sites.   LUNGS: clear bilaterally, airflow is brisk, no intercostal retraction or stridor is noted. No coughing is noted during visit.   HEART:  regular without rubs, clicks, gallops, or murmurs. PMI is nondisplaced. Upstrokes are brisk. S1,S2 are heard.        Patient has been interviewed, applicable history and applied review of systems have been performed.    Vital Signs:   /78 (BP Location: Right arm, Patient Position: Sitting, Cuff Size: Adult Regular)   Pulse 87   Temp 97.2  F (36.2  C) (Temporal)   Resp 18   Wt 61.1 kg (134 lb 9.6 oz)   SpO2 95%   BMI 20.47 kg/m        Decision Making    Problem and Complexity     1. Abdominal pain, left lower quadrant  Unable to CAT scan till early next week.  Surgeon able to see patient tomorrow.  Obtain CBC for evaluation of white count.  Recommend moist heat, patient has some residual oxycodone at home.  Will use sparingly.  Recommend Tylenol in the interim.  Letter for  "\"off work for 2 days.\" given to patient.  - CT Abdomen Pelvis w/o Contrast; Future  - Adult General Surg Referral; Future  - CBC with platelets and differential; Future  - Comprehensive metabolic panel (BMP + Alb, Alk Phos, ALT, AST, Total. Bili, TP); Future               Time spent With/On Patient  Length of time   35 minutes      Chart reviewed  yes    Review of outside records yes    Review of test results yes    Interpretation of results yes     Patient visit  yes  Documentation  yes  Discussion with family no  Discussion with providers no                               FOLLOW UP   I have asked the patient to make an appointment for followup with me as needed.  He will follow-up with his primary care provider for his ongoing routine healthcare maintenance.        I have carefully explained the diagnosis and treatment options to the patient.  The patient has displayed an understanding of the above, and all subsequent questions were answered.      DO ADILSON Hernandez    Portions of this note were produced using [employer]  Although every attempt at real-time proof reading has been made, occasional grammar/syntax errors may have been missed.      "

## 2021-12-10 ENCOUNTER — OFFICE VISIT (OUTPATIENT)
Dept: SURGERY | Facility: CLINIC | Age: 66
End: 2021-12-10
Payer: COMMERCIAL

## 2021-12-10 VITALS
TEMPERATURE: 97.3 F | HEART RATE: 77 BPM | RESPIRATION RATE: 20 BRPM | OXYGEN SATURATION: 97 % | DIASTOLIC BLOOD PRESSURE: 68 MMHG | BODY MASS INDEX: 20.53 KG/M2 | SYSTOLIC BLOOD PRESSURE: 132 MMHG | WEIGHT: 135 LBS

## 2021-12-10 DIAGNOSIS — T14.8XXA MUSCLE STRAIN: ICD-10-CM

## 2021-12-10 DIAGNOSIS — R10.9 LEFT LATERAL ABDOMINAL PAIN: Primary | ICD-10-CM

## 2021-12-10 PROCEDURE — 99024 POSTOP FOLLOW-UP VISIT: CPT | Performed by: SURGERY

## 2021-12-10 RX ORDER — IPRATROPIUM BROMIDE AND ALBUTEROL 20; 100 UG/1; UG/1
SPRAY, METERED RESPIRATORY (INHALATION)
Qty: 4 G | Refills: 3 | Status: SHIPPED | OUTPATIENT
Start: 2021-12-10 | End: 2022-04-04

## 2021-12-10 RX ORDER — KETOROLAC TROMETHAMINE 10 MG/1
10 TABLET, FILM COATED ORAL EVERY 6 HOURS PRN
Qty: 20 TABLET | Refills: 0 | Status: SHIPPED | OUTPATIENT
Start: 2021-12-10 | End: 2023-05-11

## 2021-12-10 ASSESSMENT — PAIN SCALES - GENERAL: PAINLEVEL: EXTREME PAIN (8)

## 2021-12-10 NOTE — LETTER
12/10/2021         RE: Keith Gonzalez  [address]  [address]  [facility] [address]        Dear Colleague,    Thank you for referring your patient, Keith Gonzalez, to the [facility]. Please see a copy of my visit note below.      Assessment & Plan   Problem List Items Addressed This Visit     None           Patient has been lifting more than 20 to 30 pounds during work.  His pain is likely from a muscle strain.  He has no recurrent hernia.  There is a small seroma at the at the indirect defect.  I recommend that he stay off of work for the next 10 days.  He can go back on 12/20/2021.  I will give him Toradol for the next few days.  He can do warm pack to this area.  He does not need further imaging.  When he does go back to work, I recommend that he only lift less than 20 pounds for a good 3 weeks.  All of his questions were answered to his satisfaction.    No follow-ups on file.    Gladys Whitehead MD  [facility]    Subjective   Jaison is a 66 year old who presents for the following health issues:    RA left large direct inguinal hernia repair with mesh 10/28  Was doing well  Went back to work on 11/29 - lots of twisting and turning; lifting 40kg at work.  No pulled or pop.    Pain at LLQ right by the incision on the left especially with movement.          Review of Systems   n/a      Objective    /68   Pulse 77   Temp 97.3  F (36.3  C) (Temporal)   Resp 20   Wt 61.2 kg (135 lb)   SpO2 97%   BMI 20.53 kg/m    Body mass index is 20.53 kg/m .  Physical Exam   Abdomen: So signs of recurrent hernia; very small seroma at the direct left on the left groin..  No obvious abdominal wall abnormality.                 Again, thank you for allowing me to participate in the care of your patient.        Sincerely,        Gladys Whitehead MD

## 2021-12-10 NOTE — LETTER
[place] [id number] [facility] [address]  Phone: [phone]    December 10, 2021        Keith Gonzalez  [address]  [facility] [address]  [facility] [address]          To whom it may concern:    RE: Keith Gonzalez    Patient was seen and treated today at our clinic.  Patient may return to work 12/20/21 with the following:  No lifting more than 20 pounds for three weeks.     Please contact me for questions or concerns.      Sincerely,        RodrigoAnnemarie Whitehead MD   No

## 2021-12-10 NOTE — PROGRESS NOTES
Assessment & Plan   Problem List Items Addressed This Visit     None           Patient has been lifting more than 20 to 30 pounds during work.  His pain is likely from a muscle strain.  He has no recurrent hernia.  There is a small seroma at the at the indirect defect.  I recommend that he stay off of work for the next 10 days.  He can go back on 12/20/2021.  I will give him Toradol for the next few days.  He can do warm pack to this area.  He does not need further imaging.  When he does go back to work, I recommend that he only lift less than 20 pounds for a good 3 weeks.  All of his questions were answered to his satisfaction.    No follow-ups on file.    RodrigoAnnemarie Whitehead MD  [facility] DARIEL Blackwood is a 66 year old who presents for the following health issues:    RA left large direct inguinal hernia repair with mesh 10/28  Was doing well  Went back to work on 11/29 - lots of twisting and turning; lifting 40kg at work.  No pulled or pop.    Pain at LLQ right by the incision on the left especially with movement.          Review of Systems   n/a      Objective    /68   Pulse 77   Temp 97.3  F (36.3  C) (Temporal)   Resp 20   Wt 61.2 kg (135 lb)   SpO2 97%   BMI 20.53 kg/m    Body mass index is 20.53 kg/m .  Physical Exam   Abdomen: So signs of recurrent hernia; very small seroma at the direct left on the left groin..  No obvious abdominal wall abnormality.

## 2021-12-17 ENCOUNTER — TELEPHONE (OUTPATIENT)
Dept: PHYSICAL THERAPY | Facility: OTHER | Age: 66
End: 2021-12-17
Payer: COMMERCIAL

## 2022-01-18 ENCOUNTER — TELEPHONE (OUTPATIENT)
Dept: PHYSICAL THERAPY | Facility: OTHER | Age: 67
End: 2022-01-18
Payer: COMMERCIAL

## 2022-01-19 ENCOUNTER — OFFICE VISIT (OUTPATIENT)
Dept: FAMILY MEDICINE | Facility: CLINIC | Age: 67
End: 2022-01-19
Payer: COMMERCIAL

## 2022-01-19 VITALS
DIASTOLIC BLOOD PRESSURE: 78 MMHG | TEMPERATURE: 98.2 F | HEART RATE: 57 BPM | WEIGHT: 127 LBS | SYSTOLIC BLOOD PRESSURE: 130 MMHG | OXYGEN SATURATION: 100 % | BODY MASS INDEX: 19.31 KG/M2

## 2022-01-19 DIAGNOSIS — Z87.19 STATUS POST LEFT INGUINAL HERNIA REPAIR: ICD-10-CM

## 2022-01-19 DIAGNOSIS — R10.32 LLQ ABDOMINAL PAIN: Primary | ICD-10-CM

## 2022-01-19 DIAGNOSIS — S39.011A STRAIN OF ABDOMINAL MUSCLE, INITIAL ENCOUNTER: ICD-10-CM

## 2022-01-19 DIAGNOSIS — Z98.890 STATUS POST LEFT INGUINAL HERNIA REPAIR: ICD-10-CM

## 2022-01-19 PROCEDURE — 99214 OFFICE O/P EST MOD 30 MIN: CPT | Performed by: NURSE PRACTITIONER

## 2022-01-19 NOTE — LETTER
[place] [address] [facility] [address]  Phone: [phone]  Fax: [phone]    January 19, 2022        Keith Gonzalez  [address] [address]          To whom it may concern:    RE: Keith Gonzalez    Patient seen in clinic for acute left abdominal pain secondary to muscle tension and recent surgery. He will be off work for approximately 7 days for rest and rehab he will be seeing physical therapy for further treatment. Currently do not recommend lifting > 10 # twisting, pushing, or pulling     Please contact me for questions or concerns.      Sincerely,        HAILY Hollingsworth CNP

## 2022-01-19 NOTE — PROGRESS NOTES
Assessment & Plan     LLQ abdominal pain    - Physical Therapy Referral; Future    Status post left inguinal hernia repair    - Physical Therapy Referral; Future    Strain of abdominal muscle, initial encounter    - Physical Therapy Referral; Future    Recommend patient rest without lifting pushing pulling or twisting I did give him a letter for work to be off for approximately 6 to 7 days.  With the chronicity of his symptoms would recommend that he follow-up with physical therapy he is also seeing them currently for his back pain is well.  With his scoliosis he is bent forward and to the left this area is very tight I feel that if we were able to open this up and relax the muscle somewhat this would improve his pain.  There is no palpable bulge incisions everything are healed well do not recommend further imaging at this point in time.  We will have him follow-up in 6 or 7 days.       Patient Instructions   Please follow up in 6-7 days for review of symptoms.       No follow-ups on file.    HAILY Hollingsworth [facility] DARIEL Blackwood is a 66 year old who presents for the following health issues     HPI     Pain History:  When did you first notice your pain? - More than 6 weeks   Have you seen this provider for your pain in the past? No   Where in your body do your have pain? Left side abdomin, where surgery cuts were made  Are you seeing anyone else for your pain? Yes - has been seen a couple of time  What makes your pain better? advil  What makes your pain worse? Certain movemtns  How has pain affected your ability to work? Yes, had to leave work early yesterday due to pain  Who lives in your household? Himself    Chronic Pain - Initial Assessment:    How would you describe your pain? constant  Have you had any recent changes to the severity or character of your pain? aching  Is there an underlying cause that has been identified? Hernia surgery, stressing area.   Has your  ability to work or do daily activities changed recently because of your pain?  Has not been able to work due to pain.  Which of these pain treatments have you tried?  Ibuprofen and rest  Previous medication treatments:    Patient with history of right side pain postop hernia repair approximately 3 months ago.  He states that he was seen postoperatively approximately 1 month ago with similar symptoms.  He did start to heal with rest but every time he coughs or moves abruptly he will have pain in this area.  Pain symptoms wax and wane things that make it worse or coughing or sudden movements.  He has history of scoliosis and curved to the left with chronic tightness of the abdominal muscles.  Review of Systems   Constitutional, HEENT, cardiovascular, pulmonary, GI, , musculoskeletal, neuro, skin, endocrine and psych systems are negative, except as otherwise noted.      Objective    /78   Pulse 57   Temp 98.2  F (36.8  C) (Temporal)   Wt 57.6 kg (127 lb)   SpO2 100%   BMI 19.31 kg/m    Body mass index is 19.31 kg/m .  Physical Exam   GENERAL: alert and no distress  NECK: no adenopathy, no asymmetry, masses, or scars and thyroid normal to palpation  RESP: lungs clear to auscultation - no rales, rhonchi or wheezes  CV: regular rate and rhythm, normal S1 S2, no S3 or S4, no murmur, click or rub, no peripheral edema and peripheral pulses strong  ABDOMEN: tenderness left mid to lower quadrant tenderness with palpation negative for inflammation redness or warmth no palpable abnormality such as lumps area does feel very tight and he has a hard time stretching this area out without having significant increased pain and bowel sounds normal  MS: no gross musculoskeletal defects noted, no edema  SKIN: no suspicious lesions or rashes  NEURO: Normal strength and tone, mentation intact and speech normal  PSYCH: mentation appears normal, affect normal/bright

## 2022-01-20 ENCOUNTER — TELEPHONE (OUTPATIENT)
Dept: FAMILY MEDICINE | Facility: CLINIC | Age: 67
End: 2022-01-20
Payer: COMMERCIAL

## 2022-01-20 ENCOUNTER — HOSPITAL ENCOUNTER (OUTPATIENT)
Dept: PHYSICAL THERAPY | Facility: CLINIC | Age: 67
Setting detail: THERAPIES SERIES
End: 2022-01-20
Attending: NURSE PRACTITIONER
Payer: COMMERCIAL

## 2022-01-20 PROCEDURE — 97140 MANUAL THERAPY 1/> REGIONS: CPT | Mod: GP | Performed by: PHYSICAL THERAPIST

## 2022-01-20 PROCEDURE — 97014 ELECTRIC STIMULATION THERAPY: CPT | Mod: GP | Performed by: PHYSICAL THERAPIST

## 2022-01-20 NOTE — TELEPHONE ENCOUNTER
Pt has question regarding letter given yesterday for work release. How long,does she want him out of work and how long are restrictions. Ok to leave detailed message on voicemail.

## 2022-01-21 NOTE — PROGRESS NOTES
[facility]    Outpatient Physical Therapy Progress Note  Patient: Keith Gonzalez  : 1955    Beginning/End Dates of Reporting Period:  1 session 2022 for a total of 4 sessions    Referring Provider: Prachi JOHANSEN CNP    Therapy Diagnosis: Scoliosis thoracolumbar L3     Client Self Report: Jaison has not been in PT as he had a hernia surgery. He is currently off work until easly next week. He is concerned about missing so much work and losing his job. He has an increase in his symptoms across the low back and along the lateral ribs and anterior lower ribs L side. He reports symptoms occurred when swalllowing wrong while laughing and drinking.eating. He coughed very hard and had acute pain. He is hoping to have treatment to calm his symptoms.     Objective Measurements:  Objective Measure: AROM of low back in standing  Details: Flexion: 40% with pain, extension: 15 degrees from neutral  Objective Measure: Symptoms   Details: as noted above. 8/10 before treatment and 4-5/10 after the session     Goals:  Goal Identifier Positioning and posture   Goal Description Jaison will be able to use different positions and posture to calm symptoms and protect low back with home and work activities.  (Limited due to hernia surgery, incr symptoms with coughing)   Target Date 22   Date Met      Progress (detail required for progress note):  Limited due to hernia surgery, incr symptoms with coughing) - decreased symptoms x 50% after today's session     Goal Identifier Home program   Goal Description Jaison will be able to complete a home program to improve gluteal strength allowing him to use pelvic positioning to bend forward x 20 reps with 2-3# weights to start demonstrating calming of symptoms.  (unable to complete - hernia surgery, acute symptoms)   Target Date 22   Date Met      Progress (detail required  for progress note):  (unable to complete - hernia surgery, acute symptoms)     Plan:  Continue therapy per current plan of care.    Discharge:  No

## 2022-01-21 NOTE — TELEPHONE ENCOUNTER
"Please see letter from day of encounter below this was reviewed with patient and printed patient was given a copy.  Patient was also set up for my chart that day we reviewed this letter as well as other documentation through his MyChart all of his questions were answered that day.  He will need to follow-up in approximately 7 days he is aware he needs to make an appointment for this.      \"Patient seen in clinic for acute left abdominal pain secondary to muscle tension and recent surgery. He will be off work for approximately 7 days for rest and rehab he will be seeing physical therapy for further treatment. Currently do not recommend lifting > 10 # twisting, pushing, or pulling \"      Thank you  Prachi Fink CNP          "

## 2022-01-25 ENCOUNTER — VIRTUAL VISIT (OUTPATIENT)
Dept: FAMILY MEDICINE | Facility: CLINIC | Age: 67
End: 2022-01-25
Payer: COMMERCIAL

## 2022-01-25 ENCOUNTER — HOSPITAL ENCOUNTER (OUTPATIENT)
Dept: PHYSICAL THERAPY | Facility: CLINIC | Age: 67
Setting detail: THERAPIES SERIES
End: 2022-01-25
Attending: NURSE PRACTITIONER
Payer: COMMERCIAL

## 2022-01-25 DIAGNOSIS — S39.011A STRAIN OF ABDOMINAL MUSCLE, INITIAL ENCOUNTER: ICD-10-CM

## 2022-01-25 DIAGNOSIS — Z98.890 STATUS POST LEFT INGUINAL HERNIA REPAIR: ICD-10-CM

## 2022-01-25 DIAGNOSIS — R10.32 LLQ ABDOMINAL PAIN: Primary | ICD-10-CM

## 2022-01-25 DIAGNOSIS — R10.32 LLQ ABDOMINAL PAIN: ICD-10-CM

## 2022-01-25 DIAGNOSIS — M54.50 LUMBAR PAIN: ICD-10-CM

## 2022-01-25 DIAGNOSIS — Z87.19 STATUS POST LEFT INGUINAL HERNIA REPAIR: ICD-10-CM

## 2022-01-25 PROCEDURE — 97014 ELECTRIC STIMULATION THERAPY: CPT | Mod: GP | Performed by: PHYSICAL THERAPIST

## 2022-01-25 PROCEDURE — 97140 MANUAL THERAPY 1/> REGIONS: CPT | Mod: GP | Performed by: PHYSICAL THERAPIST

## 2022-01-25 PROCEDURE — 99213 OFFICE O/P EST LOW 20 MIN: CPT | Mod: TEL | Performed by: NURSE PRACTITIONER

## 2022-01-25 RX ORDER — CYCLOBENZAPRINE HCL 5 MG
5 TABLET ORAL 2 TIMES DAILY PRN
Qty: 10 TABLET | Refills: 0 | Status: SHIPPED | OUTPATIENT
Start: 2022-01-25 | End: 2022-02-01

## 2022-01-25 NOTE — LETTER
[address] [address]  Phone: [phone]  Fax: [phone]    January 25, 2022        Keith Gonzalez  [address] [address]          To whom it may concern:    RE: Keith Gonzalez    Patient seen virtually today for follow up acute left abdominal pain secondary to muscle tension and recent surgery. He will be off work for approximately 7 additional days for rest and rehab he will continue seeing physical therapy for further treatment. Currently do not recommend lifting > 10 # twisting, pushing, or pulling. He will need an office visit for follow up evaluation prior to returning to work.     Please contact me for questions or concerns.      Sincerely,        HAILY Hollingsworth CNP

## 2022-01-25 NOTE — PROGRESS NOTES
Jaison is a 66 year old who is being evaluated via a billable telephone visit.      What phone number would you like to be contacted at? [phone]  How would you like to obtain your AVS? Asael    Assessment & Plan     LLQ abdominal pain  Improving overall with physical therapy will need to continue prior to returning to work. Recommend 7-10 additional days prior to returning for rest, rehab. Flexeril discussed for rest as his pain is waking him up at night with twisting motion   Side effects of medications, proper use, the associated risk/benefits and other options were discussed. Patient understands these risks and agrees to take the medication as instructed.       Strain of abdominal muscle, initial encounter    - cyclobenzaprine (FLEXERIL) 5 MG tablet; Take 1 tablet (5 mg) by mouth 2 times daily as needed for muscle spasms    Lumbar pain    - cyclobenzaprine (FLEXERIL) 5 MG tablet; Take 1 tablet (5 mg) by mouth 2 times daily as needed for muscle spasms      Recommend patient rest without lifting pushing pulling or twisting I did give him a letter for work to be off for approximately 6 to 7 days.  With the chronicity of his symptoms would recommend that he follow-up with physical therapy he is also seeing them currently for his back pain is well.  With his scoliosis he is bent forward and to the left this area is very tight I feel that if we were able to open this up and relax the muscle somewhat this would improve his pain.  There is no palpable bulge incisions everything are healed well do not recommend further imaging at this point in time.  We will have him follow-up in 6 or 7 days.      HAILY Hollingsworth [facility]   Jaison is a 66 year old who presents for the following health issues     HPI     Follow up on abdominal pain. No changes. Still in a lot of pain, and having a hard time getting around.   Patient was seen through physical therapy states that this has  somewhat improved he has been off work for 7 days he is wanting to get back to work however when he does return symptoms that were improving return his job is physically taxing.     Constitutional, HEENT, cardiovascular, pulmonary, gi and gu systems are negative, except as otherwise noted.      Objective           Vitals:  No vitals were obtained today due to virtual visit.    Physical Exam   alert and no distress  PSYCH: Alert and oriented times 3; coherent speech, normal   rate and volume, able to articulate logical thoughts, able   to abstract reason, no tangential thoughts, no hallucinations   or delusions  His affect is normal  RESP: No cough, no audible wheezing, able to talk in full sentences  Remainder of exam unable to be completed due to telephone visits        Phone call duration: 15 minutes

## 2022-01-28 ENCOUNTER — HOSPITAL ENCOUNTER (OUTPATIENT)
Dept: PHYSICAL THERAPY | Facility: CLINIC | Age: 67
Setting detail: THERAPIES SERIES
End: 2022-01-28
Attending: NURSE PRACTITIONER
Payer: COMMERCIAL

## 2022-01-28 PROCEDURE — 97140 MANUAL THERAPY 1/> REGIONS: CPT | Mod: GP | Performed by: PHYSICAL THERAPIST

## 2022-01-31 ENCOUNTER — HOSPITAL ENCOUNTER (OUTPATIENT)
Dept: PHYSICAL THERAPY | Facility: CLINIC | Age: 67
Setting detail: THERAPIES SERIES
End: 2022-01-31
Attending: NURSE PRACTITIONER
Payer: COMMERCIAL

## 2022-01-31 PROCEDURE — 97140 MANUAL THERAPY 1/> REGIONS: CPT | Mod: GP | Performed by: PHYSICAL THERAPIST

## 2022-02-01 ENCOUNTER — OFFICE VISIT (OUTPATIENT)
Dept: FAMILY MEDICINE | Facility: CLINIC | Age: 67
End: 2022-02-01
Payer: COMMERCIAL

## 2022-02-01 VITALS
SYSTOLIC BLOOD PRESSURE: 110 MMHG | TEMPERATURE: 97.5 F | BODY MASS INDEX: 19.16 KG/M2 | HEART RATE: 57 BPM | WEIGHT: 126 LBS | OXYGEN SATURATION: 99 % | DIASTOLIC BLOOD PRESSURE: 80 MMHG | RESPIRATION RATE: 18 BRPM

## 2022-02-01 DIAGNOSIS — M54.50 LUMBAR PAIN: ICD-10-CM

## 2022-02-01 DIAGNOSIS — S39.011A STRAIN OF ABDOMINAL MUSCLE, INITIAL ENCOUNTER: ICD-10-CM

## 2022-02-01 PROCEDURE — 99214 OFFICE O/P EST MOD 30 MIN: CPT | Performed by: FAMILY MEDICINE

## 2022-02-01 RX ORDER — CYCLOBENZAPRINE HCL 5 MG
5 TABLET ORAL 3 TIMES DAILY PRN
Qty: 30 TABLET | Refills: 0 | Status: SHIPPED | OUTPATIENT
Start: 2022-02-01 | End: 2022-02-15

## 2022-02-01 RX ORDER — CYCLOBENZAPRINE HCL 5 MG
5 TABLET ORAL 2 TIMES DAILY PRN
Qty: 10 TABLET | Refills: 0 | Status: SHIPPED | OUTPATIENT
Start: 2022-02-01 | End: 2022-02-01

## 2022-02-01 ASSESSMENT — PAIN SCALES - GENERAL: PAINLEVEL: MODERATE PAIN (5)

## 2022-02-01 NOTE — PROGRESS NOTES
ICD-10-CM    1. Strain of abdominal muscle, initial encounter  S39.011A cyclobenzaprine (FLEXERIL) 5 MG tablet     CT Abdomen Pelvis w Contrast     DISCONTINUED: cyclobenzaprine (FLEXERIL) 5 MG tablet   2. Lumbar pain  M54.50 cyclobenzaprine (FLEXERIL) 5 MG tablet     DISCONTINUED: cyclobenzaprine (FLEXERIL) 5 MG tablet        He had a coughing fit and now has a left lower quadrant pain, and is 12 weeks out from a left-sided inguinal hernia repair.  Repair looks good.  No obvious mass or defect on exam.  Is tender in the left rectus.  Unusual that he is injured this longer.  Given his history of surgery, would like to pursue further with a CT which will be set up this week.  I will see him back in 1 to 2 weeks.  I sent restrictions for work.  He does improve with physical therapy and muscle relaxers, hopefully that will continue, and we can ease him back into work if his CT is unremarkable        Subjective   Jaison is a 66 year old who presents for the following health issues     HPI     Chief Complaint   Patient presents with     Pain     LLQ Pain recheck            Review of Systems   Constitutional, HEENT, cardiovascular, pulmonary, gi and gu systems are negative, except as otherwise noted.      Objective    /80   Pulse 57   Temp 97.5  F (36.4  C) (Temporal)   Resp 18   Wt 57.2 kg (126 lb)   SpO2 99%   BMI 19.16 kg/m    Body mass index is 19.16 kg/m .  Physical Exam   Well-appearing male no distress.  Heart regular lungs clear and unlabored.  His abdomen has some KT tape on the left rectus.  He is mildly tender in this area.  No overlying redness swelling or wound or drainage.  I am unable to find his inguinal hernia repair, certainly appears to be well-healed

## 2022-02-01 NOTE — LETTER
[address] [id number] [phone]        February 1, 2022    Regarding:  Keith Gonzalez  [address]  [facility] [address]              To Whom It May Concern;  He is having ongoing abdominal wall pain. He may return to work, light duty, 10lbs, seated work only. I see him back in 1-2 wks to re-evaluate and follow this through. I anticipate a full recovery and return work without restrictions in the next 4-6 wks.          Sincerely,        Chase East MD

## 2022-02-02 ENCOUNTER — HOSPITAL ENCOUNTER (OUTPATIENT)
Dept: CT IMAGING | Facility: CLINIC | Age: 67
Discharge: HOME | End: 2022-02-02
Attending: FAMILY MEDICINE | Admitting: FAMILY MEDICINE
Payer: COMMERCIAL

## 2022-02-02 DIAGNOSIS — S39.011A STRAIN OF ABDOMINAL MUSCLE, INITIAL ENCOUNTER: ICD-10-CM

## 2022-02-02 PROCEDURE — 250N000011 HC RX IP 250 OP 636: Performed by: RADIOLOGY

## 2022-02-02 PROCEDURE — 74177 CT ABD & PELVIS W/CONTRAST: CPT

## 2022-02-02 PROCEDURE — 250N000009 HC RX 250: Performed by: RADIOLOGY

## 2022-02-02 RX ORDER — IOPAMIDOL 755 MG/ML
500 INJECTION, SOLUTION INTRAVASCULAR ONCE
Status: COMPLETED | OUTPATIENT
Start: 2022-02-02 | End: 2022-02-02

## 2022-02-02 RX ADMIN — IOPAMIDOL 65 ML: 755 INJECTION, SOLUTION INTRAVENOUS at 13:21

## 2022-02-02 RX ADMIN — SODIUM CHLORIDE 60 ML: 9 INJECTION, SOLUTION INTRAVENOUS at 13:21

## 2022-02-08 ENCOUNTER — HOSPITAL ENCOUNTER (OUTPATIENT)
Dept: PHYSICAL THERAPY | Facility: CLINIC | Age: 67
Setting detail: THERAPIES SERIES
End: 2022-02-08
Attending: NURSE PRACTITIONER
Payer: COMMERCIAL

## 2022-02-08 PROCEDURE — 97110 THERAPEUTIC EXERCISES: CPT | Mod: GP | Performed by: PHYSICAL THERAPIST

## 2022-02-08 PROCEDURE — 97014 ELECTRIC STIMULATION THERAPY: CPT | Mod: GP | Performed by: PHYSICAL THERAPIST

## 2022-02-08 PROCEDURE — 97140 MANUAL THERAPY 1/> REGIONS: CPT | Mod: GP | Performed by: PHYSICAL THERAPIST

## 2022-02-11 ENCOUNTER — HOSPITAL ENCOUNTER (OUTPATIENT)
Dept: PHYSICAL THERAPY | Facility: CLINIC | Age: 67
Setting detail: THERAPIES SERIES
End: 2022-02-11
Attending: NURSE PRACTITIONER
Payer: COMMERCIAL

## 2022-02-11 PROCEDURE — 97140 MANUAL THERAPY 1/> REGIONS: CPT | Mod: GP | Performed by: PHYSICAL THERAPIST

## 2022-02-11 PROCEDURE — 97110 THERAPEUTIC EXERCISES: CPT | Mod: GP | Performed by: PHYSICAL THERAPIST

## 2022-02-15 ENCOUNTER — HOSPITAL ENCOUNTER (OUTPATIENT)
Dept: PHYSICAL THERAPY | Facility: CLINIC | Age: 67
Setting detail: THERAPIES SERIES
End: 2022-02-15
Attending: NURSE PRACTITIONER
Payer: COMMERCIAL

## 2022-02-15 ENCOUNTER — OFFICE VISIT (OUTPATIENT)
Dept: FAMILY MEDICINE | Facility: CLINIC | Age: 67
End: 2022-02-15
Payer: COMMERCIAL

## 2022-02-15 VITALS
WEIGHT: 127 LBS | TEMPERATURE: 97.7 F | HEART RATE: 56 BPM | SYSTOLIC BLOOD PRESSURE: 112 MMHG | DIASTOLIC BLOOD PRESSURE: 80 MMHG | BODY MASS INDEX: 19.31 KG/M2 | RESPIRATION RATE: 18 BRPM | OXYGEN SATURATION: 97 %

## 2022-02-15 DIAGNOSIS — M54.50 LUMBAR PAIN: ICD-10-CM

## 2022-02-15 DIAGNOSIS — S39.011A STRAIN OF ABDOMINAL MUSCLE, INITIAL ENCOUNTER: ICD-10-CM

## 2022-02-15 PROCEDURE — 97110 THERAPEUTIC EXERCISES: CPT | Mod: GP | Performed by: PHYSICAL THERAPIST

## 2022-02-15 PROCEDURE — 99213 OFFICE O/P EST LOW 20 MIN: CPT | Performed by: FAMILY MEDICINE

## 2022-02-15 PROCEDURE — 97140 MANUAL THERAPY 1/> REGIONS: CPT | Mod: GP | Performed by: PHYSICAL THERAPIST

## 2022-02-15 RX ORDER — CYCLOBENZAPRINE HCL 5 MG
5 TABLET ORAL 2 TIMES DAILY PRN
Qty: 60 TABLET | Refills: 0 | Status: SHIPPED | OUTPATIENT
Start: 2022-02-15 | End: 2022-05-13

## 2022-02-15 ASSESSMENT — PAIN SCALES - GENERAL: PAINLEVEL: NO PAIN (0)

## 2022-02-15 NOTE — LETTER
[address] [id number] [phone]        February 15, 2022    Regarding:  Keith Gonzalez  [address]  [facility] [address]              To Whom It May Concern;  Please continue same restriction until 2/21. At that time he may return for 4 hrs per day for 1 wk, then 4-6 hrs second week. Please keep lifting to less than 20lbs. Then I will see him back to completely clear of restriction.           Sincerely,        Chase East MD

## 2022-02-15 NOTE — PROGRESS NOTES
ICD-10-CM    1. Strain of abdominal muscle, initial encounter  S39.011A cyclobenzaprine (FLEXERIL) 5 MG tablet   2. Lumbar pain  M54.50 cyclobenzaprine (FLEXERIL) 5 MG tablet      Slowly improving.  Sent new restrictions for work.  Continue with muscle relaxers.  Reviewed CT scan.  Has a thickened esophagus, but also fairly symptomatic GERD.  Continue with H2 blocker, may increase to PPI.  I will see him back in 2 to 3 weeks    Subjective   Jaison is a 66 year old who presents for the following health issues     HPI   Follow up abdominal strain  Doing better  Discussed CT findings  Reflux is present, but finds H2 blocker helpful      Review of Systems         Objective    /80 (Cuff Size: Adult Regular)   Pulse 56   Temp 97.7  F (36.5  C) (Temporal)   Resp 18   Wt 57.6 kg (127 lb)   SpO2 97%   BMI 19.31 kg/m    Body mass index is 19.31 kg/m .  Physical Exam   Well-appearing.  Mild tenderness in the left anterior rectus

## 2022-02-18 ENCOUNTER — HOSPITAL ENCOUNTER (OUTPATIENT)
Dept: PHYSICAL THERAPY | Facility: CLINIC | Age: 67
Setting detail: THERAPIES SERIES
End: 2022-02-18
Attending: NURSE PRACTITIONER
Payer: COMMERCIAL

## 2022-02-18 PROCEDURE — 97110 THERAPEUTIC EXERCISES: CPT | Mod: GP | Performed by: PHYSICAL THERAPIST

## 2022-02-18 PROCEDURE — 97140 MANUAL THERAPY 1/> REGIONS: CPT | Mod: GP | Performed by: PHYSICAL THERAPIST

## 2022-02-24 ENCOUNTER — HOSPITAL ENCOUNTER (OUTPATIENT)
Dept: PHYSICAL THERAPY | Facility: CLINIC | Age: 67
Setting detail: THERAPIES SERIES
End: 2022-02-24
Attending: NURSE PRACTITIONER
Payer: COMMERCIAL

## 2022-02-24 PROCEDURE — 97110 THERAPEUTIC EXERCISES: CPT | Mod: GP | Performed by: PHYSICAL THERAPIST

## 2022-02-24 PROCEDURE — 97140 MANUAL THERAPY 1/> REGIONS: CPT | Mod: GP | Performed by: PHYSICAL THERAPIST

## 2022-02-28 ENCOUNTER — HOSPITAL ENCOUNTER (OUTPATIENT)
Dept: PHYSICAL THERAPY | Facility: CLINIC | Age: 67
Setting detail: THERAPIES SERIES
End: 2022-02-28
Attending: NURSE PRACTITIONER
Payer: COMMERCIAL

## 2022-02-28 PROCEDURE — 97140 MANUAL THERAPY 1/> REGIONS: CPT | Mod: GP | Performed by: PHYSICAL THERAPIST

## 2022-02-28 PROCEDURE — 97110 THERAPEUTIC EXERCISES: CPT | Mod: GP | Performed by: PHYSICAL THERAPIST

## 2022-03-01 ENCOUNTER — TELEPHONE (OUTPATIENT)
Dept: FAMILY MEDICINE | Facility: CLINIC | Age: 67
End: 2022-03-01
Payer: COMMERCIAL

## 2022-03-01 NOTE — TELEPHONE ENCOUNTER
Pt wanting to see if you can get him in for his appt on 3/9/22 later in the day. He has to work and would  Prefer to come after instead of having to go back to work after appt. Anytime after 1ish would work.

## 2022-03-02 ENCOUNTER — HOSPITAL ENCOUNTER (OUTPATIENT)
Dept: PHYSICAL THERAPY | Facility: CLINIC | Age: 67
Setting detail: THERAPIES SERIES
End: 2022-03-02
Attending: NURSE PRACTITIONER
Payer: COMMERCIAL

## 2022-03-02 PROCEDURE — 97110 THERAPEUTIC EXERCISES: CPT | Mod: GP | Performed by: PHYSICAL THERAPIST

## 2022-03-02 PROCEDURE — 97140 MANUAL THERAPY 1/> REGIONS: CPT | Mod: GP | Performed by: PHYSICAL THERAPIST

## 2022-03-02 NOTE — TELEPHONE ENCOUNTER
Patient calling to follow up on message. Patient stating with his work restrictions he is done at 1:00 pm on Wednesday, March 9th and it would work better if he could be seen after 1:00 that day. His appointment is scheduled at 10:20 that morning. Please call him if the appointment can be changed that day? Aurora Beebe LPN

## 2022-03-03 NOTE — TELEPHONE ENCOUNTER
Patient notified appointment time could not be changed due to schedule being full. Patient was fine with keeping appointment time at 10:20.

## 2022-03-08 ENCOUNTER — HOSPITAL ENCOUNTER (OUTPATIENT)
Dept: PHYSICAL THERAPY | Facility: CLINIC | Age: 67
Setting detail: THERAPIES SERIES
End: 2022-03-08
Attending: NURSE PRACTITIONER
Payer: COMMERCIAL

## 2022-03-08 PROCEDURE — 97110 THERAPEUTIC EXERCISES: CPT | Mod: GP | Performed by: PHYSICAL THERAPIST

## 2022-03-08 PROCEDURE — 97140 MANUAL THERAPY 1/> REGIONS: CPT | Mod: GP | Performed by: PHYSICAL THERAPIST

## 2022-03-09 ENCOUNTER — OFFICE VISIT (OUTPATIENT)
Dept: FAMILY MEDICINE | Facility: CLINIC | Age: 67
End: 2022-03-09
Payer: COMMERCIAL

## 2022-03-09 VITALS
OXYGEN SATURATION: 95 % | SYSTOLIC BLOOD PRESSURE: 110 MMHG | TEMPERATURE: 98.4 F | BODY MASS INDEX: 19.77 KG/M2 | HEART RATE: 82 BPM | WEIGHT: 130 LBS | DIASTOLIC BLOOD PRESSURE: 74 MMHG | RESPIRATION RATE: 22 BRPM

## 2022-03-09 DIAGNOSIS — S39.011A STRAIN OF ABDOMINAL MUSCLE, INITIAL ENCOUNTER: Primary | ICD-10-CM

## 2022-03-09 PROCEDURE — 99213 OFFICE O/P EST LOW 20 MIN: CPT | Performed by: FAMILY MEDICINE

## 2022-03-09 RX ORDER — FAMOTIDINE 20 MG/1
20 TABLET, FILM COATED ORAL 2 TIMES DAILY
COMMUNITY
Start: 2022-03-09 | End: 2022-07-21

## 2022-03-09 ASSESSMENT — PAIN SCALES - GENERAL: PAINLEVEL: NO PAIN (1)

## 2022-03-09 NOTE — LETTER
[address] [id number] [phone]        March 9, 2022    Regarding:  Keith Gonzalez  [address]  [facility] [address]              To Whom It May Concern;  I saw Jaison today. He may increase his work day 6-8hrs per day. Please also to gradually lift more weight over the next 2wks. I expect him to self-manage his lifting as he is able. He is released to unrestricted work in 2wks.           Sincerely,        Chase East MD

## 2022-03-09 NOTE — PROGRESS NOTES
Assessment & Plan       ICD-10-CM    1. Strain of abdominal muscle, initial encounter  S39.011A         Work letter to release without restriction.  Letter provided.  See him back as needed      No follow-ups on file.    Chase East MD  [facility]    Asiya Parker is a 66 year old male who presents to clinic today for the following health issues     HPI       Follow Up    Doing better.  Pain much less in his left lower quadrant.  Working with physical therapy.  Has advanced his work without any difficulty.      Review of Systems         Objective    /74   Pulse 82   Temp 98.4  F (36.9  C) (Temporal)   Resp 22   Wt 59 kg (130 lb)   SpO2 95%   BMI 19.77 kg/m       Physical Exam

## 2022-03-10 NOTE — PROGRESS NOTES
"[facility]    Outpatient Physical Therapy Progress Note  Patient: Keith Gonzalez  : 1955    Beginning/End Dates of Reporting Period:  11 sessions between 2022 and 3-8-2022 for a total of 15 sessions    Referring Provider: Prachi JOHANSEN CNP/Dr. Chase East\    Therapy Diagnosis: Scoliosis thoracolumbar L3     Client Self Report: Jaison notes there are times when his back does not hurt. He extended his work hours to 6 and is sitting most of the time. He reports the work chair is hard and it does increase his low back pain (no cushion). He noted that when he sits in a softer chair he does not have as much pain. He feels tighter in the morning and this eases fairly quick for his low back. The L upper abdominal area just below the ribs stays tight and it tends to be worse in the morning even if he completes the stretches.     Objective Measurements:  Objective Measure: AROM of low back in standing  Details: Significant loss of back extension (5 degrees) with poor ability to complete pelvic tilt with stretches in standing. Flexion: 1\" from floor with slight knee bend with tightness noted  Objective Measure: Symptoms   Details: continues to report tightness of low back (bilateral and sometimes unilatera) and anterior upper abdominal tightness L      Outcome Measures (most recent score):  AYDEE: 1/3 low, PITER: 10-8-25 incr   Goals:  Goal Identifier Positioning and posture   Goal Description Jaison will be able to use different positions and posture to calm symptoms and protect low back with home and work activities.    Target Date 22   Date Met      Progress (detail required for progress note): Will increase work hours from 4 to 6 hours this week     Goal Identifier Home program   Goal Description Jaison will be able to complete a home program to improve gluteal strength allowing him to use pelvic " positioning to bend forward x 20 reps with 2-3# weights to start demonstrating calming of symptoms.    Target Date 04/20/22   Date Met      Progress (detail required for progress note): He has been completing some of the exercises and tries to complete home program daily       Plan:  Changes to therapy plan of care: Plan to continue with PT based on his work schedule for advancing strength training and endurance for core     Discharge:  No

## 2022-04-04 DIAGNOSIS — J44.9 CHRONIC OBSTRUCTIVE PULMONARY DISEASE, UNSPECIFIED COPD TYPE (H): ICD-10-CM

## 2022-04-04 RX ORDER — IPRATROPIUM BROMIDE AND ALBUTEROL 20; 100 UG/1; UG/1
SPRAY, METERED RESPIRATORY (INHALATION)
Qty: 4 G | Refills: 3 | Status: SHIPPED | OUTPATIENT
Start: 2022-04-04 | End: 2022-07-07

## 2022-04-26 ENCOUNTER — TELEPHONE (OUTPATIENT)
Dept: FAMILY MEDICINE | Facility: CLINIC | Age: 67
End: 2022-04-26
Payer: COMMERCIAL

## 2022-04-26 NOTE — TELEPHONE ENCOUNTER
Patient calling to request same day appointment for tomorrow 05/27 with Dr East for issues with acid reflux.  Please call patient with availability ok to leave message.

## 2022-05-02 NOTE — TELEPHONE ENCOUNTER
Okay to add him next week.  He can also be advised to start cimetidine over-the-counter twice daily.  Should help.

## 2022-05-02 NOTE — TELEPHONE ENCOUNTER
Left detailed message for patient.    - medication to try per Dr East  - can be worked in to schedule next week, per Dr Kita Vega XRO/

## 2022-05-13 ENCOUNTER — OFFICE VISIT (OUTPATIENT)
Dept: FAMILY MEDICINE | Facility: CLINIC | Age: 67
End: 2022-05-13
Payer: COMMERCIAL

## 2022-05-13 VITALS
BODY MASS INDEX: 19.77 KG/M2 | DIASTOLIC BLOOD PRESSURE: 70 MMHG | TEMPERATURE: 97.8 F | SYSTOLIC BLOOD PRESSURE: 130 MMHG | OXYGEN SATURATION: 98 % | WEIGHT: 130 LBS | RESPIRATION RATE: 10 BRPM | HEART RATE: 76 BPM

## 2022-05-13 DIAGNOSIS — S39.011A STRAIN OF ABDOMINAL MUSCLE, INITIAL ENCOUNTER: ICD-10-CM

## 2022-05-13 DIAGNOSIS — Z11.4 SCREENING FOR HIV (HUMAN IMMUNODEFICIENCY VIRUS): ICD-10-CM

## 2022-05-13 DIAGNOSIS — Z11.59 NEED FOR HEPATITIS C SCREENING TEST: ICD-10-CM

## 2022-05-13 DIAGNOSIS — M54.50 LUMBAR PAIN: ICD-10-CM

## 2022-05-13 DIAGNOSIS — K21.9 GASTRIC REFLUX: Primary | ICD-10-CM

## 2022-05-13 DIAGNOSIS — Z12.11 SCREEN FOR COLON CANCER: ICD-10-CM

## 2022-05-13 PROCEDURE — 99214 OFFICE O/P EST MOD 30 MIN: CPT | Performed by: FAMILY MEDICINE

## 2022-05-13 RX ORDER — CYCLOBENZAPRINE HCL 5 MG
5 TABLET ORAL 2 TIMES DAILY PRN
Qty: 60 TABLET | Refills: 0 | Status: SHIPPED | OUTPATIENT
Start: 2022-05-13 | End: 2022-07-21

## 2022-05-13 RX ORDER — PANTOPRAZOLE SODIUM 20 MG/1
20 TABLET, DELAYED RELEASE ORAL DAILY
Qty: 30 TABLET | Refills: 1 | Status: SHIPPED | OUTPATIENT
Start: 2022-05-13 | End: 2022-07-21

## 2022-05-13 NOTE — PROGRESS NOTES
ICD-10-CM    1. Gastric reflux  K21.9 pantoprazole (PROTONIX) 20 MG EC tablet   2. Screen for colon cancer  Z12.11    3. Screening for HIV (human immunodeficiency virus)  Z11.4    4. Need for hepatitis C screening test  Z11.59    5. Strain of abdominal muscle, initial encounter  S39.011A cyclobenzaprine (FLEXERIL) 5 MG tablet   6. Lumbar pain  M54.50 cyclobenzaprine (FLEXERIL) 5 MG tablet     Reflux-uncontrolled.  Discussed diet.  Change from an H2 blocker to PPI over the next 2 weeks, then return to H2 blocker hopefully.  He continues to have anterior abdominal wall pain occasionally.  Uses a muscle relaxer and seems to help considerably.  See may continue to use as needed    Return in about 2 months (around 7/13/2022) for recheck symptoms; reflux, swallowing.      Asiya Blackwood is a 66 year old who presents for the following health issues     History of Present Illness       Reason for visit:  Acid reflux  Symptom onset:  More than a month  Symptom intensity:  Severe  Symptom progression:  Worsening  Had these symptoms before:  Yes  Has tried/received treatment for these symptoms:  Yes  Previous treatment was successful:  No  What makes it worse:  Unknown  What makes it better:  No    He eats 0-1 servings of fruits and vegetables daily.He consumes 0 sweetened beverage(s) daily.He exercises with enough effort to increase his heart rate 9 or less minutes per day.  He exercises with enough effort to increase his heart rate 3 or less days per week.   He is taking medications regularly.             Review of Systems   Constitutional, HEENT, cardiovascular, pulmonary, gi and gu systems are negative, except as otherwise noted.      Objective    /70   Pulse 76   Temp 97.8  F (36.6  C)   Resp 10   Wt 59 kg (130 lb)   SpO2 98%   BMI 19.77 kg/m    There is no height or weight on file to calculate BMI.  Physical Exam   GENERAL: healthy, alert and no distress  NECK: no adenopathy, no asymmetry, masses, or  scars and thyroid normal to palpation  RESP: lungs clear to auscultation - no rales, rhonchi or wheezes  CV: regular rate and rhythm, normal S1 S2, no S3 or S4, no murmur, click or rub, no peripheral edema and peripheral pulses strong  ABDOMEN: soft, nontender, no hepatosplenomegaly, no masses and bowel sounds normal  MS: no gross musculoskeletal defects noted, no edema

## 2022-05-31 ENCOUNTER — TELEPHONE (OUTPATIENT)
Dept: PHYSICAL THERAPY | Facility: OTHER | Age: 67
End: 2022-05-31
Payer: COMMERCIAL

## 2022-06-13 ENCOUNTER — HOSPITAL ENCOUNTER (OUTPATIENT)
Dept: PHYSICAL THERAPY | Facility: CLINIC | Age: 67
Setting detail: THERAPIES SERIES
Discharge: HOME | End: 2022-06-13
Attending: NURSE PRACTITIONER
Payer: COMMERCIAL

## 2022-06-13 PROCEDURE — 97110 THERAPEUTIC EXERCISES: CPT | Mod: GP | Performed by: PHYSICAL THERAPIST

## 2022-06-13 PROCEDURE — 97112 NEUROMUSCULAR REEDUCATION: CPT | Mod: GP | Performed by: PHYSICAL THERAPIST

## 2022-06-14 NOTE — PROGRESS NOTES
"[facility]    Outpatient Physical Therapy Progress Note  Patient: Keith Gonzalez  : 1955    Beginning/End Dates of Reporting Period:  2022 for 1 session, total of 16 session    Referring Provider: Prachi JOHANSEN, ANN-MARIE/Dr. Chase East    Therapy Diagnosis: Scoliosis thoracolumbar L3     Client Self Report: walking: limited due to pain and would like to walk dog, walk longer distances at work for meetings and sttand taller. Forward bent position has been present over a yr. Work tasks are all in bent over position. No injury or trauma and did have hernia surgery before forward bent position.  Continues to note pain in the L abdominal area    Objective Measurements:  Objective Measure: AROM of low back in standing     Objective Measure: Symptoms   Details: 0/10 now, bilateral low back and hips with R more than L. Notes bump lower R rib area. no leg symptoms but muscle cramps on occasion. -6/10  Objective Measure: Postural measurement          Outcome Measures (most recent score):  Farhan STarT Sub-Score (Q5-9): 1  Farhan STarT Total Score (all 9): 2  Oswestry Score (%): 20 %    Goals:  Goal Identifier Positioning and posture   Goal Description Jaison will be able to use different positions and posture to calm symptoms and protect low back with home and work activities.    Target Date 22   Date Met      Progress (detail required for progress note): Work is \"fine\" sore at 3/4 to end of shift and recovering the next day most of the time.     Goal Identifier Home program   Goal Description Ajison will be able to complete a home program to improve gluteal strength allowing him to use pelvic positioning to bend forward x 20 reps with 2-3# weights to start demonstrating calming of symptoms.    Target Date 22   Date Met      Progress (detail required for progress note): Getting confused about what " exercises to complete.       Plan:  Continue therapy per current plan of care.    Discharge:  No

## 2022-06-15 ENCOUNTER — HOSPITAL ENCOUNTER (OUTPATIENT)
Dept: PHYSICAL THERAPY | Facility: CLINIC | Age: 67
Setting detail: THERAPIES SERIES
Discharge: HOME | End: 2022-06-15
Attending: NURSE PRACTITIONER
Payer: COMMERCIAL

## 2022-06-15 PROCEDURE — 97140 MANUAL THERAPY 1/> REGIONS: CPT | Mod: GP | Performed by: PHYSICAL THERAPIST

## 2022-06-15 PROCEDURE — 97112 NEUROMUSCULAR REEDUCATION: CPT | Mod: GP | Performed by: PHYSICAL THERAPIST

## 2022-06-20 ENCOUNTER — HOSPITAL ENCOUNTER (OUTPATIENT)
Dept: PHYSICAL THERAPY | Facility: CLINIC | Age: 67
Setting detail: THERAPIES SERIES
Discharge: HOME | End: 2022-06-20
Attending: NURSE PRACTITIONER
Payer: COMMERCIAL

## 2022-06-20 PROCEDURE — 97140 MANUAL THERAPY 1/> REGIONS: CPT | Mod: GP | Performed by: PHYSICAL THERAPIST

## 2022-06-20 PROCEDURE — 97110 THERAPEUTIC EXERCISES: CPT | Mod: GP | Performed by: PHYSICAL THERAPIST

## 2022-06-22 ENCOUNTER — HOSPITAL ENCOUNTER (OUTPATIENT)
Dept: PHYSICAL THERAPY | Facility: CLINIC | Age: 67
Setting detail: THERAPIES SERIES
Discharge: HOME | End: 2022-06-22
Attending: NURSE PRACTITIONER
Payer: COMMERCIAL

## 2022-06-22 PROCEDURE — 97110 THERAPEUTIC EXERCISES: CPT | Mod: GP | Performed by: PHYSICAL THERAPIST

## 2022-07-05 ENCOUNTER — HOSPITAL ENCOUNTER (OUTPATIENT)
Dept: PHYSICAL THERAPY | Facility: CLINIC | Age: 67
Setting detail: THERAPIES SERIES
Discharge: HOME | End: 2022-07-05
Attending: NURSE PRACTITIONER
Payer: COMMERCIAL

## 2022-07-05 DIAGNOSIS — J44.9 CHRONIC OBSTRUCTIVE PULMONARY DISEASE, UNSPECIFIED COPD TYPE (H): ICD-10-CM

## 2022-07-05 PROCEDURE — 97140 MANUAL THERAPY 1/> REGIONS: CPT | Mod: GP | Performed by: PHYSICAL THERAPIST

## 2022-07-07 RX ORDER — IPRATROPIUM BROMIDE AND ALBUTEROL 20; 100 UG/1; UG/1
SPRAY, METERED RESPIRATORY (INHALATION)
Qty: 4 G | Refills: 3 | Status: SHIPPED | OUTPATIENT
Start: 2022-07-07 | End: 2022-11-03

## 2022-07-14 ENCOUNTER — HOSPITAL ENCOUNTER (OUTPATIENT)
Dept: PHYSICAL THERAPY | Facility: CLINIC | Age: 67
Setting detail: THERAPIES SERIES
Discharge: HOME | End: 2022-07-14
Attending: NURSE PRACTITIONER
Payer: COMMERCIAL

## 2022-07-14 PROCEDURE — 97112 NEUROMUSCULAR REEDUCATION: CPT | Mod: GP | Performed by: PHYSICAL THERAPIST

## 2022-07-14 PROCEDURE — 97140 MANUAL THERAPY 1/> REGIONS: CPT | Mod: GP | Performed by: PHYSICAL THERAPIST

## 2022-07-18 ENCOUNTER — HOSPITAL ENCOUNTER (OUTPATIENT)
Dept: PHYSICAL THERAPY | Facility: CLINIC | Age: 67
Setting detail: THERAPIES SERIES
Discharge: HOME | End: 2022-07-18
Attending: NURSE PRACTITIONER
Payer: COMMERCIAL

## 2022-07-18 PROCEDURE — 97140 MANUAL THERAPY 1/> REGIONS: CPT | Mod: GP | Performed by: PHYSICAL THERAPIST

## 2022-07-18 PROCEDURE — 97112 NEUROMUSCULAR REEDUCATION: CPT | Mod: GP | Performed by: PHYSICAL THERAPIST

## 2022-07-20 ENCOUNTER — HOSPITAL ENCOUNTER (OUTPATIENT)
Dept: PHYSICAL THERAPY | Facility: CLINIC | Age: 67
Setting detail: THERAPIES SERIES
Discharge: HOME | End: 2022-07-20
Attending: NURSE PRACTITIONER
Payer: COMMERCIAL

## 2022-07-20 PROCEDURE — 97110 THERAPEUTIC EXERCISES: CPT | Mod: GP | Performed by: PHYSICAL THERAPIST

## 2022-07-20 PROCEDURE — 97140 MANUAL THERAPY 1/> REGIONS: CPT | Mod: GP | Performed by: PHYSICAL THERAPIST

## 2022-07-21 ENCOUNTER — OFFICE VISIT (OUTPATIENT)
Dept: FAMILY MEDICINE | Facility: CLINIC | Age: 67
End: 2022-07-21
Payer: COMMERCIAL

## 2022-07-21 VITALS
OXYGEN SATURATION: 96 % | SYSTOLIC BLOOD PRESSURE: 118 MMHG | DIASTOLIC BLOOD PRESSURE: 86 MMHG | RESPIRATION RATE: 16 BRPM | HEART RATE: 72 BPM | TEMPERATURE: 97.5 F | WEIGHT: 126 LBS | BODY MASS INDEX: 19.16 KG/M2

## 2022-07-21 DIAGNOSIS — M54.50 LUMBAR PAIN: ICD-10-CM

## 2022-07-21 DIAGNOSIS — Z23 ENCOUNTER FOR IMMUNIZATION: ICD-10-CM

## 2022-07-21 DIAGNOSIS — R13.19 ESOPHAGEAL DYSPHAGIA: Primary | ICD-10-CM

## 2022-07-21 DIAGNOSIS — Z11.59 NEED FOR HEPATITIS C SCREENING TEST: ICD-10-CM

## 2022-07-21 DIAGNOSIS — Z12.11 SCREEN FOR COLON CANCER: ICD-10-CM

## 2022-07-21 DIAGNOSIS — K21.9 GASTRIC REFLUX: ICD-10-CM

## 2022-07-21 DIAGNOSIS — S39.011A STRAIN OF ABDOMINAL MUSCLE, INITIAL ENCOUNTER: ICD-10-CM

## 2022-07-21 DIAGNOSIS — Z11.4 SCREENING FOR HIV (HUMAN IMMUNODEFICIENCY VIRUS): ICD-10-CM

## 2022-07-21 PROCEDURE — 91306 COVID-19,PF,MODERNA (18+ YRS BOOSTER .25ML): CPT | Performed by: FAMILY MEDICINE

## 2022-07-21 PROCEDURE — 0064A COVID-19,PF,MODERNA (18+ YRS BOOSTER .25ML): CPT | Performed by: FAMILY MEDICINE

## 2022-07-21 PROCEDURE — 99214 OFFICE O/P EST MOD 30 MIN: CPT | Mod: 25 | Performed by: FAMILY MEDICINE

## 2022-07-21 RX ORDER — CYCLOBENZAPRINE HCL 5 MG
5 TABLET ORAL 2 TIMES DAILY PRN
Qty: 60 TABLET | Refills: 0 | Status: SHIPPED | OUTPATIENT
Start: 2022-07-21 | End: 2022-12-09

## 2022-07-21 RX ORDER — PANTOPRAZOLE SODIUM 20 MG/1
20 TABLET, DELAYED RELEASE ORAL DAILY
Qty: 90 TABLET | Refills: 1 | Status: SHIPPED | OUTPATIENT
Start: 2022-07-21 | End: 2022-11-16

## 2022-07-21 ASSESSMENT — PAIN SCALES - GENERAL: PAINLEVEL: NO PAIN (0)

## 2022-07-21 NOTE — PROGRESS NOTES
Patient scheduled for Thursday 07/28/2022 @ 10:00 am here in [place].    Called patient to let him know of his appointment and of his Prep.    I also sent patient a [email or web address] message.    Denisse Jackman     [facility]

## 2022-07-21 NOTE — Clinical Note
Denisse could you help schedule this patient's x-ray esophagram for me.  Forgot to do this while he was in the clinic, thanks

## 2022-07-21 NOTE — PROGRESS NOTES
Assessment & Plan       ICD-10-CM    1. Esophageal dysphagia  R13.19 XR Esophagram w Upper GI   2. Screen for colon cancer  Z12.11    3. Screening for HIV (human immunodeficiency virus)  Z11.4    4. Need for hepatitis C screening test  Z11.59    5. Gastric reflux  K21.9 pantoprazole (PROTONIX) 20 MG EC tablet     XR Esophagram w Upper GI   6. Strain of abdominal muscle, initial encounter  S39.011A cyclobenzaprine (FLEXERIL) 5 MG tablet   7. Lumbar pain  M54.50 cyclobenzaprine (FLEXERIL) 5 MG tablet   8. Encounter for immunization  Z23 COVID-19,PF,MODERNA (18+ YRS BOOSTER .25ML)        Declines egd, would like to give the PPI more time  I would like him to undergo an x-ray esophagram which she agrees to  Plan to continue the PPI for 3 months and then reevaluate at that time  Agrees to a booster for COVID  Ongoing use of muscle relaxer as needed for his lumbar low back pain  Deformity with significant stooped posture, does not sound like is responding to physical therapy, will try to maximize his independence and minimize his pain        No follow-ups on file.    Chase East MD  [facility]    Asiya Parker is a 66 year old male who presents to clinic today for the following health issues     HPI     Answers for HPI/ROS submitted by the patient on 7/21/2022  What is the reason for your visit today? : Follow up vist  How many servings of fruits and vegetables do you eat daily?: 0-1  On average, how many sweetened beverages do you drink each day (Examples: soda, juice, sweet tea, etc.  Do NOT count diet or artificially sweetened beverages)?: 0  How many minutes a day do you exercise enough to make your heart beat faster?: 9 or less  How many days a week do you exercise enough to make your heart beat faster?: 3 or less  How many days per week do you miss taking your medication?: 0        Still working with PT with his kypophosis, seeing minimal improvement  Still having lots of  reflux, really bad when PPI stopped  Still with on/off issues with food getting stuck, 3-4 mos now, no improvement      Review of Systems         Objective    /86 (BP Location: Right arm, Patient Position: Sitting, Cuff Size: Adult Small)   Pulse 72   Temp 97.5  F (36.4  C) (Temporal)   Resp 16   Wt 57.2 kg (126 lb)   SpO2 96%   BMI 19.16 kg/m       Physical Exam

## 2022-07-25 ENCOUNTER — HOSPITAL ENCOUNTER (OUTPATIENT)
Dept: PHYSICAL THERAPY | Facility: CLINIC | Age: 67
Setting detail: THERAPIES SERIES
Discharge: HOME | End: 2022-07-25
Attending: NURSE PRACTITIONER
Payer: COMMERCIAL

## 2022-07-25 PROCEDURE — 97140 MANUAL THERAPY 1/> REGIONS: CPT | Mod: GP | Performed by: PHYSICAL THERAPIST

## 2022-07-25 NOTE — PROGRESS NOTES
[facility]    Outpatient Physical Therapy Progress Note  Patient: Keith Gonzalez  : 1955    Beginning/End Dates of Reporting Period:  8 sessions between 6- and 2022 for a total of 24 sessions    Referring Provider: Chase East MD    Therapy Diagnosis: Scoliosis thoracolumbar      Client Self Report: Woke today with significant discomfort R low back. Not sure if progress at this time. Not sure if manual therapy helps. Feels he may need more PT as it took time to develop the scoliosis. Has been trying to focus more on extension exercises.     Objective Measurements:  Objective Measure: AROM of low back in standing  Details: 100% (finger tips touch floor without symptoms) flexion, extension: 10 degrees from neutral  Objective Measure: Symptoms   Details: Now: 2/10, range: 0/10 increasing to 5-6/10 and then gets real bad but cant measure. Goes into hips R>L. R low back and across at times with work, L lower quadrant.        Outcome Measures (most recent score):  Farhan STarT Sub-Score (Q5-9): 1  Farhan STarT Total Score (all 9): 2  Oswestry Score (%): 17.5 %    Goals:  Goal Identifier     Goal Description Jaison will be able to use different positions and posture to calm symptoms and protect low back with home and work activities.    Target Date 22   Date Met      Progress (detail required for progress note): More discomfort than pain and not sure if he can calm     Goal Identifier     Goal Description Jaison will be able to complete a home program to improve gluteal strength allowing him to use pelvic positioning to bend forward x 20 reps with 2-3# weights to start demonstrating calming of symptoms.    Target Date 22 (ongoing)   Date Met      Progress (detail required for progress note): trying exercises as able based on fatigue. More consistent with extension stretching        Plan:  Changes to therapy plan of care: Jaison would like to continue with PT for the one last scheduled session and then continue up to 3 times over 4 weeks prn and focus on home program.     Discharge:  No

## 2022-07-28 ENCOUNTER — HOSPITAL ENCOUNTER (OUTPATIENT)
Dept: PHYSICAL THERAPY | Facility: CLINIC | Age: 67
Setting detail: THERAPIES SERIES
Discharge: HOME | End: 2022-07-28
Attending: NURSE PRACTITIONER
Payer: COMMERCIAL

## 2022-07-28 PROCEDURE — 97140 MANUAL THERAPY 1/> REGIONS: CPT | Mod: GP | Performed by: PHYSICAL THERAPIST

## 2022-08-03 ENCOUNTER — HOSPITAL ENCOUNTER (OUTPATIENT)
Dept: GENERAL RADIOLOGY | Facility: CLINIC | Age: 67
Discharge: HOME | End: 2022-08-03
Attending: FAMILY MEDICINE | Admitting: FAMILY MEDICINE
Payer: COMMERCIAL

## 2022-08-03 DIAGNOSIS — K21.9 GASTRIC REFLUX: ICD-10-CM

## 2022-08-03 DIAGNOSIS — R13.19 ESOPHAGEAL DYSPHAGIA: ICD-10-CM

## 2022-08-03 PROCEDURE — 74220 X-RAY XM ESOPHAGUS 1CNTRST: CPT

## 2022-08-03 PROCEDURE — 255N000001 HC RX 255: Performed by: RADIOLOGY

## 2022-08-03 RX ADMIN — ANTACID/ANTIFLATULENT 4 G: 380; 550; 10; 10 GRANULE, EFFERVESCENT ORAL at 10:05

## 2022-08-04 ENCOUNTER — TELEPHONE (OUTPATIENT)
Dept: FAMILY MEDICINE | Facility: CLINIC | Age: 67
End: 2022-08-04

## 2022-08-04 DIAGNOSIS — R13.19 ESOPHAGEAL DYSPHAGIA: Primary | ICD-10-CM

## 2022-08-04 NOTE — TELEPHONE ENCOUNTER
Order/Referral Request    Who is requesting: patient per Dr. East     Orders being requested: EGD    Reason service is needed/diagnosis: this was requested per Dr. East     When are orders needed by: ASAP    Has this been discussed with Provider: Yes    Does patient have a preference on a Group/Provider/Facility? no    Does patient have an appointment scheduled?: No    Where to send orders: put in EPIC     Could we send this information to you in [employer] or would you prefer to receive a phone call?:   Patient would prefer a phone call   Okay to leave a detailed message?: Yes at Cell number on file:    Telephone Information:   Mobile [phone]

## 2022-08-05 ENCOUNTER — TELEPHONE (OUTPATIENT)
Dept: GASTROENTEROLOGY | Facility: CLINIC | Age: 67
End: 2022-08-05

## 2022-08-05 NOTE — TELEPHONE ENCOUNTER
Screening Questions    BlueKIND OF PREP RedLOCATION [review exclusion criteria] GreenSEDATION TYPE      1. Are you active on mychart? Yes    2. What insurance is in the chart? [employer]     3.   Ordering/Referring Provider: Kita    4. BMI   (If greater than 40 review exclusion criteria [PAC APPT IF [MAC] @ [facility])  18.1  [If yes, BMI OVER 40-EXTENDED PREP]      **(Sedation review/consideration needed)**  Do you have a legal guardian or Medical Power of    and/or are you able to give consent for your medical care?     Yes    5. Have you had a positive covid test in the last 90 days?   N - NA    6.  Are you currently on dialysis?   N [ If yes, G-PREP & HOSPITAL setting ONLY]     7.  Do you have chronic kidney disease?  N [ If yes, G-PREP ]    8.   Do you have a diagnosis of diabetes?   N   [ If yes, G-PREP ]    9.  On a regular basis do you go 3-5 days between bowel movements?   NA   [ If yes, EXTENDED PREP]    10.  Are you taking any prescription pain medications on a routine schedule?    N - NA [ If yes, EXTENDED PREP] [If yes, MAC]      11.   Do you have any chemical dependencies such as alcohol, street drugs, or methadone?    N [If yes, MAC]    12.   Do you have any history of post-traumatic stress syndrome, severe anxiety or history of psychosis?    N  [If yes, MAC]    13.  [FEMALES] Are you currently pregnant? NA    If yes, how many weeks?       Respiratory/Heart Screening:  [If yes to any of the following HOSPITAL setting only]     14. Do you have Pulmonary Hypertension [Lungs]?   N       15. Do you have UNCONTROLLED asthma?   N     16.  Do you use daily home oxygen?  N      17. Do you have mod to severe Obstructive Sleep Apnea?         (OKAY @ [facility]  [facility]  [facility]  [facility]  [facility]  [facility] - if pt is not on OXYGEN)  N      18.   Have you had a heart or lung transplant?   N      19.   Have you had a stroke or Transient ischemic attack (TIA - aka  mini stroke ) within 6 months?  (If yes, please review exclusion  criteria)  N     20.   In the past 6 months, have you had any heart related issues including cardiomyopathy or heart attack?   N           If yes, did it require cardiac stenting or other implantable device?   N      21.   Do you have any implantable devices in your body (pacemaker, defib, LVAD)? (If yes, please review exclusion criteria)  N   22.  Do you take the medication Phentermine?  NO        23. Do you take nitroglycerin?   N           If yes, how often? NA  (if yes, HOSPITAL setting ONLY)    24.  Are you currently taking any blood thinners?    [If yes, INFORM patient to follw up w/ ORDERING PROVIDER FOR BRIDGING INSTRUCTIONS]     N    25.   Do you transfer independently?                (If NO, please HOSPITAL setting ONLY)  Yes    26.   Preferred LOCAL Pharmacy for Pre Prescription:       [address]     (Please ask for phone number if not scheduled by patient)      Caller : Jaison  Date of Procedure: 9/13/22  Surgeon: Shaquille  Location:         Sedation Type: MAC-per order l   Conscious Sedation- Needs  for 6 hours after the procedure  MAC/General-Needs  for 24 hours after procedure    NA :[Pre-op Required] at [facility]  [facility]  MG and OR for MAC sedation   (advise patient they will need a pre-op WITH IN 30 DAYS of procedure date)     Type of Procedure Scheduled:   Upper Endoscopy [EGD]    Which Colonoscopy Prep was Sent?:   ANTHONY -     MALU CF PATIENTS & GROEN'S PATIENTS NEEDS EXTENDED PREP       Informed patient they will need an adult  Yes  Cannot take any type of public or medical transportation alone    Pre-Procedure Covid test to be completed at [facility] or Externally: 9/9/22 [facility]   **INFORMED OF HOME TESTING & LAB OPTION**        Confirmed Nurse will call to complete assessment Yes    Additional comments:

## 2022-08-09 DIAGNOSIS — J44.9 CHRONIC OBSTRUCTIVE PULMONARY DISEASE, UNSPECIFIED COPD TYPE (H): ICD-10-CM

## 2022-09-09 ENCOUNTER — LAB (OUTPATIENT)
Dept: LAB | Facility: CLINIC | Age: 67
End: 2022-09-09
Payer: COMMERCIAL

## 2022-09-09 DIAGNOSIS — Z11.52 ENCOUNTER FOR SCREENING FOR COVID-19: Primary | ICD-10-CM

## 2022-09-09 LAB — SARS-COV-2 RNA RESP QL NAA+PROBE: NEGATIVE

## 2022-09-09 PROCEDURE — U0003 INFECTIOUS AGENT DETECTION BY NUCLEIC ACID (DNA OR RNA); SEVERE ACUTE RESPIRATORY SYNDROME CORONAVIRUS 2 (SARS-COV-2) (CORONAVIRUS DISEASE [COVID-19]), AMPLIFIED PROBE TECHNIQUE, MAKING USE OF HIGH THROUGHPUT TECHNOLOGIES AS DESCRIBED BY CMS-2020-01-R: HCPCS

## 2022-09-09 PROCEDURE — U0005 INFEC AGEN DETEC AMPLI PROBE: HCPCS

## 2022-09-12 NOTE — H&P
[facility] Anesthesia Pre-op History and Physical    Keith Gonzalez MRN# [id number]   Age: 67 year old YOB: 1955      Date of Surgery: 9/13/2022 Location [facility]      Date of Exam 9/13/2022 Facility (Same day)       Home clinic: [facility]  Primary care provider: Chase East         Chief Complaint and/or Reason for Procedure:   No chief complaint on file.  EGD.  Dysphagia.  Abnormal esophagram.    Severe COPD.         Active problem list:     Patient Active Problem List    Diagnosis Date Noted     SI (sacroiliac) joint dysfunction 09/27/2021     Priority: Medium     Lumbar pain 09/27/2021     Priority: Medium     Scoliosis of thoracolumbar spine, unspecified scoliosis type 09/27/2021     Priority: Medium     History of thromboembolism of vein 09/17/2021     Priority: Medium     Nov 25, 2011 Entered By: JOSHUA STEWART Comment: had been on coumadin from [id number]       Chronic obstructive pulmonary disease (H) 06/20/2018     Priority: Medium     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 Provider to review              Medications (include herbals and vitamins):   Any Plavix use in the last 7 days? No     No current facility-administered medications for this encounter.     Current Outpatient Medications   Medication Sig     INCRUSE ELLIPTA 62.5 MCG/INH Inhaler INHALE ONE PUFF BY MOUTH ONCE DAILY     aspirin 81 MG EC tablet Take 81 mg by mouth daily (Patient not taking: Reported on 7/21/2022)     COMBIVENT RESPIMAT  MCG/ACT inhaler INHALE ONE PUFF INTO THE LUNGS FOUR TIMES A DAY - NOT TO EXCEED 6 DOSES PER DAY     cyclobenzaprine (FLEXERIL) 5 MG tablet Take 1 tablet (5 mg) by mouth 2 times daily as needed for muscle spasms     ketorolac (TORADOL) 10 MG tablet Take 1 tablet (10 mg) by mouth every 6 hours as needed for moderate pain (Patient not taking: No  sig reported)     NONFORMULARY ADVIL PLUS     pantoprazole (PROTONIX) 20 MG EC tablet Take 1 tablet (20 mg) by mouth daily             Allergies:      Allergies   Allergen Reactions     No Known Drug Allergies      Allergy to Latex? No  Allergy to tape?   No  Intolerances:             Physical Exam:   All vitals have been reviewed  No data found.  No intake/output data recorded.  Lungs:   No increased work of breathing, good air exchange, clear to auscultation bilaterally, no crackles or wheezing     Cardiovascular:   Normal apical impulse, regular rate and rhythm, normal S1 and S2, no S3 or S4, and no murmur noted             Lab / Radiology Results:            Anesthetic risk and/or ASA classification:       Doyle Corbin MD

## 2022-09-13 ENCOUNTER — HOSPITAL ENCOUNTER (OUTPATIENT)
Facility: CLINIC | Age: 67
Discharge: HOME | End: 2022-09-13
Attending: INTERNAL MEDICINE | Admitting: INTERNAL MEDICINE
Payer: COMMERCIAL

## 2022-09-13 ENCOUNTER — ANESTHESIA (OUTPATIENT)
Dept: GASTROENTEROLOGY | Facility: CLINIC | Age: 67
End: 2022-09-13
Payer: COMMERCIAL

## 2022-09-13 ENCOUNTER — ANESTHESIA EVENT (OUTPATIENT)
Dept: GASTROENTEROLOGY | Facility: CLINIC | Age: 67
End: 2022-09-13
Payer: COMMERCIAL

## 2022-09-13 VITALS
DIASTOLIC BLOOD PRESSURE: 62 MMHG | BODY MASS INDEX: 19.16 KG/M2 | SYSTOLIC BLOOD PRESSURE: 108 MMHG | TEMPERATURE: 97.9 F | WEIGHT: 126 LBS | RESPIRATION RATE: 18 BRPM | HEART RATE: 66 BPM | OXYGEN SATURATION: 99 %

## 2022-09-13 DIAGNOSIS — K21.00 GASTROESOPHAGEAL REFLUX DISEASE WITH ESOPHAGITIS WITHOUT HEMORRHAGE: Primary | ICD-10-CM

## 2022-09-13 LAB — UPPER GI ENDOSCOPY: NORMAL

## 2022-09-13 PROCEDURE — 88305 TISSUE EXAM BY PATHOLOGIST: CPT | Mod: TC | Performed by: INTERNAL MEDICINE

## 2022-09-13 PROCEDURE — 370N000017 HC ANESTHESIA TECHNICAL FEE, PER MIN: Performed by: INTERNAL MEDICINE

## 2022-09-13 PROCEDURE — 88305 TISSUE EXAM BY PATHOLOGIST: CPT | Mod: 26 | Performed by: PATHOLOGY

## 2022-09-13 PROCEDURE — 258N000003 HC RX IP 258 OP 636: Performed by: NURSE ANESTHETIST, CERTIFIED REGISTERED

## 2022-09-13 PROCEDURE — 250N000011 HC RX IP 250 OP 636: Performed by: NURSE ANESTHETIST, CERTIFIED REGISTERED

## 2022-09-13 PROCEDURE — 88312 SPECIAL STAINS GROUP 1: CPT | Mod: 26 | Performed by: PATHOLOGY

## 2022-09-13 PROCEDURE — 250N000009 HC RX 250: Performed by: NURSE ANESTHETIST, CERTIFIED REGISTERED

## 2022-09-13 PROCEDURE — 43239 EGD BIOPSY SINGLE/MULTIPLE: CPT | Performed by: INTERNAL MEDICINE

## 2022-09-13 RX ORDER — LIDOCAINE HYDROCHLORIDE 20 MG/ML
INJECTION, SOLUTION INFILTRATION; PERINEURAL PRN
Status: DISCONTINUED | OUTPATIENT
Start: 2022-09-13 | End: 2022-09-13

## 2022-09-13 RX ORDER — PROPOFOL 10 MG/ML
INJECTION, EMULSION INTRAVENOUS PRN
Status: DISCONTINUED | OUTPATIENT
Start: 2022-09-13 | End: 2022-09-13

## 2022-09-13 RX ORDER — LIDOCAINE 40 MG/G
CREAM TOPICAL
Status: DISCONTINUED | OUTPATIENT
Start: 2022-09-13 | End: 2022-09-13 | Stop reason: HOSPADM

## 2022-09-13 RX ORDER — PANTOPRAZOLE SODIUM 40 MG/1
40 TABLET, DELAYED RELEASE ORAL
Qty: 180 TABLET | Refills: 3 | Status: SHIPPED | OUTPATIENT
Start: 2022-09-13 | End: 2023-05-11

## 2022-09-13 RX ORDER — SODIUM CHLORIDE, SODIUM LACTATE, POTASSIUM CHLORIDE, CALCIUM CHLORIDE 600; 310; 30; 20 MG/100ML; MG/100ML; MG/100ML; MG/100ML
INJECTION, SOLUTION INTRAVENOUS CONTINUOUS
Status: DISCONTINUED | OUTPATIENT
Start: 2022-09-13 | End: 2022-09-13 | Stop reason: HOSPADM

## 2022-09-13 RX ORDER — PROPOFOL 10 MG/ML
INJECTION, EMULSION INTRAVENOUS CONTINUOUS PRN
Status: DISCONTINUED | OUTPATIENT
Start: 2022-09-13 | End: 2022-09-13

## 2022-09-13 RX ADMIN — SODIUM CHLORIDE, POTASSIUM CHLORIDE, SODIUM LACTATE AND CALCIUM CHLORIDE: 600; 310; 30; 20 INJECTION, SOLUTION INTRAVENOUS at 09:50

## 2022-09-13 RX ADMIN — LIDOCAINE HYDROCHLORIDE 100 MG: 20 INJECTION, SOLUTION INFILTRATION; PERINEURAL at 10:31

## 2022-09-13 RX ADMIN — PROPOFOL 40 MG: 10 INJECTION, EMULSION INTRAVENOUS at 10:32

## 2022-09-13 RX ADMIN — PROPOFOL 150 MCG/KG/MIN: 10 INJECTION, EMULSION INTRAVENOUS at 10:30

## 2022-09-13 RX ADMIN — PROPOFOL 60 MG: 10 INJECTION, EMULSION INTRAVENOUS at 10:31

## 2022-09-13 ASSESSMENT — ACTIVITIES OF DAILY LIVING (ADL): ADLS_ACUITY_SCORE: 35

## 2022-09-13 ASSESSMENT — LIFESTYLE VARIABLES: TOBACCO_USE: 1

## 2022-09-13 ASSESSMENT — COPD QUESTIONNAIRES
CAT_SEVERITY: MILD
COPD: 1

## 2022-09-13 NOTE — ANESTHESIA CARE TRANSFER NOTE
Patient: Keith Gonzalez    Procedure: Procedure(s):  ESOPHAGOGASTRODUODENOSCOPY, WITH BIOPSY       Diagnosis: Esophageal dysphagia [R13.19]  Diagnosis Additional Information: No value filed.    Anesthesia Type:   MAC     Note:    Oropharynx: oropharynx clear of all foreign objects and spontaneously breathing  Level of Consciousness: drowsy  Oxygen Supplementation: face mask    Independent Airway: airway patency satisfactory and stable  Dentition: dentition unchanged  Vital Signs Stable: post-procedure vital signs reviewed and stable  Report to RN Given: handoff report given  Patient transferred to: Phase II    Handoff Report: Identifed the Patient, Identified the Reponsible Provider, Reviewed the pertinent medical history, Discussed the surgical course, Reviewed Intra-OP anesthesia mangement and issues during anesthesia, Set expectations for post-procedure period and Allowed opportunity for questions and acknowledgement of understanding      Vitals:  Vitals Value Taken Time   /62 09/13/22 1043   Temp     Pulse 60 09/13/22 1043   Resp     SpO2 99 % 09/13/22 1044   Vitals shown include unvalidated device data.    Electronically Signed By: HAILY Venegas CRNA  September 13, 2022  10:46 AM

## 2022-09-13 NOTE — ANESTHESIA POSTPROCEDURE EVALUATION
Patient: Keith Gonzalez    Procedure: Procedure(s):  ESOPHAGOGASTRODUODENOSCOPY, WITH BIOPSY       Anesthesia Type:  MAC    Note:  Disposition: Outpatient   Postop Pain Control: Uneventful            Sign Out: Well controlled pain   PONV: No   Neuro/Psych: Uneventful            Sign Out: Acceptable/Baseline neuro status   Airway/Respiratory: Uneventful            Sign Out: Acceptable/Baseline resp. status   CV/Hemodynamics: Uneventful            Sign Out: Acceptable CV status   Other NRE: NONE   DID A NON-ROUTINE EVENT OCCUR? No    Event details/Postop Comments:  Pt was happy with anesthesia care.  No complications.  I will follow up with the pt if needed.           Last vitals:  Vitals Value Taken Time   /62 09/13/22 1043   Temp     Pulse 60 09/13/22 1043   Resp     SpO2 99 % 09/13/22 1045   Vitals shown include unvalidated device data.    Electronically Signed By: HAILY Venegas CRNA  September 13, 2022  10:46 AM

## 2022-09-13 NOTE — LETTER
September 20, 2022      Jaison Gonzalez  [address]  [place] [address]        Dear ,    We are writing to inform you of your test results.    Just changes of reflux and GERD.  Stay on the acid blocking medications.     Resulted Orders   Surgical Pathology Exam   Result Value Ref Range    Case Report       Surgical Pathology Report                         Case: [id number]                                  Authorizing Provider:  Doyle Corbin MD        Collected:           09/13/2022 10:34 AM          Ordering Location:     [facility]          Received:            09/13/2022 11:34 AM                                 [facility]                                                          Pathologist:           Jeremy Klein MD                                                           Specimen:    Gastric Esophageal Junction, Z-Line biopsy                                                 Addendum       PAS-D stain is negative for fungal organisms. The diagnosis is unchanged.    All special stains were performed with adequate controls.       Final Diagnosis       Gastroesophageal junction, Z-line: Biopsy:  - Active esophagitis with ulceration and granulation tissue  - Scant strips of columnar epithelium, negative for intestinal metaplasia and dysplasia  - PAS-D stain for fungal organisms is pending, with the results to be reported in an addendum       Clinical Information       Procedure:  ESOPHAGOGASTRODUODENOSCOPY, WITH BIOPSY  Pre-op Diagnosis: Esophageal dysphagia [R13.19]  Post-op Diagnosis: R13.19 - Esophageal dysphagia [ICD-10-CM]      Gross Description       A(1). Gastric Esophageal Junction, Z-Line biopsy:  The specimen is received in formalin, labeled with the patient's name, medical record number and other identifying information and designated  GE junction, Z-line . It consists of multiple tan soft tissue fragments ranging from 0.1-0.4 cm in greatest dimension. Entirely  submitted in one cassette.   (NIVIA Escalera)        Microscopic Description       The microscopic findings substantiates the final diagnosis.        Performing Labs       The technical component of this testing was completed at [facility]      Case Images         If you have any questions or concerns, please call the clinic at the number listed above.       Sincerely,      Doyle Corbin MD

## 2022-09-13 NOTE — LETTER
September 16, 2022      Jaison Gonzalez  [address]  [place] [address]        Dear ,    We are writing to inform you of your test results.    This is benign and shows the esophagus inflammation.  The acid blocking meds twice daily before food will help heal this and help the swallowing.      Resulted Orders   Surgical Pathology Exam   Result Value Ref Range    Case Report       Surgical Pathology Report                         Case: [id number]                                  Authorizing Provider:  Doyle Corbin MD        Collected:           09/13/2022 10:34 AM          Ordering Location:     [facility]          Received:            09/13/2022 11:34 AM                                 [facility]                                                          Pathologist:           Jeremy Klein MD                                                           Specimen:    Gastric Esophageal Junction, Z-Line biopsy                                                 Final Diagnosis       Gastroesophageal junction, Z-line: Biopsy:  - Active esophagitis with ulceration and granulation tissue  - Scant strips of columnar epithelium, negative for intestinal metaplasia and dysplasia  - PAS-D stain for fungal organisms is pending, with the results to be reported in an addendum       Clinical Information       Procedure:  ESOPHAGOGASTRODUODENOSCOPY, WITH BIOPSY  Pre-op Diagnosis: Esophageal dysphagia [R13.19]  Post-op Diagnosis: R13.19 - Esophageal dysphagia [ICD-10-CM]      Gross Description       A(1). Gastric Esophageal Junction, Z-Line biopsy:  The specimen is received in formalin, labeled with the patient's name, medical record number and other identifying information and designated  GE junction, Z-line . It consists of multiple tan soft tissue fragments ranging from 0.1-0.4 cm in greatest dimension. Entirely submitted in one cassette.   (NIVIA Escalera)        Microscopic Description        The microscopic findings substantiates the final diagnosis.        Performing Labs       The technical component of this testing was completed at [facility]      Case Images         If you have any questions or concerns, please call the clinic at the number listed above.       Sincerely,      Doyle Corbin MD

## 2022-09-13 NOTE — ANESTHESIA PREPROCEDURE EVALUATION
Anesthesia Pre-Procedure Evaluation    Patient: Keith Gonzalez   MRN: [id number] : 1955        Procedure : Procedure(s):  ESOPHAGOGASTRODUODENOSCOPY (EGD)          Past Medical History:   Diagnosis Date     Cancer (H)      COPD (chronic obstructive pulmonary disease) (H)       Past Surgical History:   Procedure Laterality Date     DAVINCI HERNIORRHAPHY INGUINAL Left 10/28/2021    Procedure: Robotic assisted left inguinal hernia repair with mesh;  Surgeon: Gladys Whitehead MD;  Location: [facility] OR     LAPAROSCOPIC LYSIS ADHESIONS N/A 10/28/2021    Procedure: Laparoscopic lysis adhesions;  Surgeon: Gladys Whitehead MD;  Location: [facility] OR      Allergies   Allergen Reactions     No Known Drug Allergies       Social History     Tobacco Use     Smoking status: Current Some Day Smoker     Packs/day: 1.00     Years: 0.00     Pack years: 0.00     Types: Cigarettes     Smokeless tobacco: Never Used   Substance Use Topics     Alcohol use: No      Wt Readings from Last 1 Encounters:   22 57.2 kg (126 lb)        Anesthesia Evaluation   Pt has had prior anesthetic.         ROS/MED HX  ENT/Pulmonary:     (+) tobacco use, Current use, mild,  COPD,     Neurologic:       Cardiovascular:  - neg cardiovascular ROS     METS/Exercise Tolerance:     Hematologic:     (+) History of blood clots,     Musculoskeletal:  - neg musculoskeletal ROS     GI/Hepatic:       Renal/Genitourinary:  - neg Renal ROS     Endo:  - neg endo ROS     Psychiatric/Substance Use:  - neg psychiatric ROS     Infectious Disease:  - neg infectious disease ROS     Malignancy:   (+) Malignancy,     Other:            Physical Exam    Airway        Mallampati: II   TM distance: > 3 FB   Neck ROM: full   Mouth opening: > 3 cm    Respiratory Devices and Support         Dental           Cardiovascular   cardiovascular exam normal          Pulmonary   pulmonary exam normal                OUTSIDE LABS:  CBC:   Lab Results   Component Value Date    WBC 8.2 2021     WBC 8.7 10/25/2021    HGB 14.0 12/09/2021    HGB 15.4 10/25/2021    HCT 42.4 12/09/2021    HCT 46.8 10/25/2021     12/09/2021     10/25/2021     BMP:   Lab Results   Component Value Date     12/09/2021     10/25/2021    POTASSIUM 4.1 12/09/2021    POTASSIUM 4.6 10/25/2021    CHLORIDE 106 12/09/2021    CHLORIDE 105 10/25/2021    CO2 27 12/09/2021    CO2 28 10/25/2021    BUN 19 12/09/2021    BUN 19 10/25/2021    CR 0.66 12/09/2021    CR 0.71 10/25/2021    GLC 99 12/09/2021    GLC 85 10/25/2021     COAGS:   Lab Results   Component Value Date    INR 4.6 05/07/2010     POC: No results found for: BGM, HCG, HCGS  HEPATIC:   Lab Results   Component Value Date    ALBUMIN 3.3 (L) 12/09/2021    PROTTOTAL 6.7 (L) 12/09/2021    ALT 22 12/09/2021    AST 21 12/09/2021    ALKPHOS 86 12/09/2021    BILITOTAL 0.5 12/09/2021     OTHER:   Lab Results   Component Value Date    DAT 8.5 12/09/2021       Anesthesia Plan    ASA Status:  3   NPO Status:  NPO Appropriate    Anesthesia Type: MAC.     - Reason for MAC: chronic cardiopulmonary disease, immobility needed, straight local not clinically adequate   Induction: Intravenous, Propofol.   Maintenance: Balanced.        Consents    Anesthesia Plan(s) and associated risks, benefits, and realistic alternatives discussed. Questions answered and patient/representative(s) expressed understanding.     - Discussed: Risks, Benefits and Alternatives for BOTH SEDATION and the PROCEDURE were discussed     - Discussed with:  Patient    Use of blood products discussed: No .     Postoperative Care            Comments:    Other Comments: The risks and benefits of anesthesia, and the alternatives where applicable, have been discussed with the patient, and they wish to proceed.            HAILY Venegas CRNA

## 2022-09-13 NOTE — DISCHARGE INSTRUCTIONS
[facility]    Home Care Following Endoscopy          Activity:  You have just undergone an endoscopic procedure usually performed with conscious sedation.  Do not work or operate machinery (including a car) for at least 12 hours.    I encourage you to walk and attempt to pass this air as soon as possible.    Diet:  Return to the diet you were on before your procedure but eat lightly for the first 12-24 hours.  Drink plenty of water.  Resume any regular medications unless otherwise advised by your physician.  Please begin any new medication prescribed as a result of your procedure as directed by your physician.   If you had any biopsy or polyp removed please refrain from aspirin or aspirin products for 2 days.  If on Coumadin please restart as instructed by your physician.   Pain:  You may take Tylenol as needed for pain.  Expected Recovery:  You can expect some mild abdominal fullness and/or discomfort due to the air used to inflate your intestinal tract. It is also normal to have a mild sore throat after upper endoscopy.    Call Your Physician if You Have:  After Upper Endoscopy:  Shoulder, back or chest pain.  Difficulty breathing or swallowing.  Vomiting blood.    Any questions or concerns about your recovery, please call [phone] or after hours [phone] Nurse Advice Line.    Follow-up Care:  IF You did have polyps/biopsy tissue sample(s) removed.  The polyps/biopsy tissue sample(s) will be sent to pathology.  You should receive letter in your My Chart with your results within 1-2 weeks. If you do not participate in My Chart a physical letter will come in the mail in 2-3 weeks.  Please call if you have not received a notification of your results.  If asked to return to clinic please make an appointment 1 week after your procedure.  Call [phone].

## 2022-09-19 LAB
PATH REPORT.ADDENDUM SPEC: NORMAL
PATH REPORT.COMMENTS IMP SPEC: NORMAL
PATH REPORT.COMMENTS IMP SPEC: NORMAL
PATH REPORT.FINAL DX SPEC: NORMAL
PATH REPORT.GROSS SPEC: NORMAL
PATH REPORT.MICROSCOPIC SPEC OTHER STN: NORMAL
PATH REPORT.RELEVANT HX SPEC: NORMAL
PHOTO IMAGE: NORMAL

## 2022-10-09 ENCOUNTER — HEALTH MAINTENANCE LETTER (OUTPATIENT)
Age: 67
End: 2022-10-09

## 2022-10-26 ENCOUNTER — TELEPHONE (OUTPATIENT)
Dept: PHYSICAL THERAPY | Facility: OTHER | Age: 67
End: 2022-10-26

## 2022-10-31 DIAGNOSIS — J44.9 CHRONIC OBSTRUCTIVE PULMONARY DISEASE, UNSPECIFIED COPD TYPE (H): ICD-10-CM

## 2022-11-03 RX ORDER — IPRATROPIUM BROMIDE AND ALBUTEROL 20; 100 UG/1; UG/1
SPRAY, METERED RESPIRATORY (INHALATION)
Qty: 4 G | Refills: 5 | Status: SHIPPED | OUTPATIENT
Start: 2022-11-03 | End: 2023-04-11

## 2022-11-08 NOTE — DISCHARGE SUMMARY
[facility]    Outpatient Physical Therapy Discharge Note  Patient: Keith Gonzalez  : 1955    Beginning/End Dates of Reporting Period:  1 session on 2022 for a total of 25 sessions    Referring Provider: Chase East MD    Therapy Diagnosis: Scoliosis thoracolumbar      Client Self Report: At the time of his last session, Jaison reported:   Not sure if any improved. Reports symptoms are back to baseline after work but no energy to do much after other than let dog out and eat.    Objective Measurements:  At the time of the last session:  Objective Measure: AROM of low back in standing  Details: 100% (finger tips touch floor without symptoms) flexion, extension: 10 degrees from neutral  Objective Measure: Symptoms   Details: Tightness L abdominal area 5-6/10       Goals:  Goal Identifier     Goal Description Jaison will be able to use different positions and posture to calm symptoms and protect low back with home and work activities.    Target Date 22   Date Met      Progress (detail required for progress note): More discomfort than pain and not sure if he can calm     Goal Identifier     Goal Description Jaison will be able to complete a home program to improve gluteal strength allowing him to use pelvic positioning to bend forward x 20 reps with 2-3# weights to start demonstrating calming of symptoms.    Target Date 22 (ongoing)   Date Met      Progress (detail required for progress note): trying exercises as able based on fatigue. More consistent with extension stretching       Plan:  Discharge from therapy.    Discharge:    Reason for Discharge: No further expectation of progress.  Patient chooses to discontinue therapy. Did not return follow up call.    Equipment Issued: band    Discharge Plan: Patient to continue home program.

## 2022-11-16 ENCOUNTER — OFFICE VISIT (OUTPATIENT)
Dept: FAMILY MEDICINE | Facility: CLINIC | Age: 67
End: 2022-11-16
Payer: COMMERCIAL

## 2022-11-16 VITALS
HEART RATE: 70 BPM | WEIGHT: 123.2 LBS | DIASTOLIC BLOOD PRESSURE: 70 MMHG | TEMPERATURE: 97.8 F | RESPIRATION RATE: 18 BRPM | OXYGEN SATURATION: 98 % | BODY MASS INDEX: 18.73 KG/M2 | SYSTOLIC BLOOD PRESSURE: 116 MMHG

## 2022-11-16 DIAGNOSIS — K21.9 GASTRIC REFLUX: ICD-10-CM

## 2022-11-16 DIAGNOSIS — R13.19 ESOPHAGEAL DYSPHAGIA: ICD-10-CM

## 2022-11-16 DIAGNOSIS — J43.1 PANLOBULAR EMPHYSEMA (H): Primary | ICD-10-CM

## 2022-11-16 PROCEDURE — 99214 OFFICE O/P EST MOD 30 MIN: CPT | Performed by: FAMILY MEDICINE

## 2022-11-16 ASSESSMENT — PAIN SCALES - GENERAL: PAINLEVEL: NO PAIN (0)

## 2022-11-16 NOTE — PROGRESS NOTES
ICD-10-CM    1. Panlobular emphysema (H)  J43.1 tiotropium (SPIRIVA RESPIMAT) 2.5 MCG/ACT inhaler      2. Esophageal dysphagia  R13.19       3. Gastric reflux  K21.9         He returns for recheck.  Had an abnormal swallow study, then referred to EGD.  There was found to have inflammation, leading to his swallowing difficulties.  He has been on maximal PPI therapy and may be some improvement over the last month.  He struggles with lack of appetite, and forgetting to eat meals.  His weight is down.  I asked him to look for ways to add supplement to his diet throughout the day.  I would like to see him back in a month    Subjective   Jaison is a 67 year old, presenting for the following health issues:  Gastrophageal Reflux (Follow up/)      History of Present Illness       Reason for visit:  Follow up    He eats 0-1 servings of fruits and vegetables daily.He consumes 0 sweetened beverage(s) daily.He exercises with enough effort to increase his heart rate 9 or less minutes per day.  He exercises with enough effort to increase his heart rate 3 or less days per week.   He is taking medications regularly.       GERD/Heartburn  Onset/Duration: within the last year  Description: burning in the stomach and radiates up into the chest  Intensity: severe; 8/10 at its worst  Progression of Symptoms: improving; patient states it is getting better but not good enough  Accompanying Signs & Symptoms:  Does it feel like food gets stuck or trouble swallowing: YES; on accasion  Nausea: No  Vomiting (bloody?): YES; no blood- usually just bile  Abdominal Pain: YES  Black-Tarry stools: No  Bloody stools: No  History:  Previous similar episodes: Yes  Previous ulcers: No  Precipitating factors:   Caffeine use: YES; 3-4 cups of coffee a day  Alcohol use: No  NSAID/Aspirin use: No  Tobacco use: YES; daily smoker  Worse with no particular food or drink.  Alleviating factors: Patient states it starts to feel better after he vomits. He also  "states that on occasion it will feel better for a little while if he eats.  Therapies tried and outcome:             Lifestyle changes: None            Medications: Protonix    Reviewed EGD, swallow study  On max PPI  Emesis once weekly  Feels 30% improved  Lost another 3 lbs  Feels a \"rock\" in his stomach after eating  Not sure if he's hungry  Timing his ppi before meals      Review of Systems   Constitutional, HEENT, cardiovascular, pulmonary, gi and gu systems are negative, except as otherwise noted.      Objective    /70   Pulse 70   Temp 97.8  F (36.6  C)   Resp 18   Wt 55.9 kg (123 lb 3.2 oz)   SpO2 98%   BMI 18.73 kg/m    Body mass index is 18.73 kg/m .  Physical Exam   Thin.  In good spirits.  Alert and oriented.  Heart regular.  Lungs clear.  Kyphotic.  Lower extremities with no edema                    "

## 2022-11-18 ENCOUNTER — TELEPHONE (OUTPATIENT)
Dept: FAMILY MEDICINE | Facility: CLINIC | Age: 67
End: 2022-11-18

## 2022-11-18 NOTE — TELEPHONE ENCOUNTER
Reason for Call:  Form, our goal is to have forms completed with 72 hours, however, some forms may require a visit or additional information.    Type of letter, form or note:  FMLA    Who is the form from?: Patient    Where did the form come from: Patient or family brought in       What clinic location was the form placed at?: [facility]     Where the form was placed: Dr. East Box/Folder    What number is listed as a contact on the form?: [phone]       Additional comments:     Call taken on 11/18/2022 at 10:04 AM by Isadora Banda CNA

## 2022-11-26 ENCOUNTER — HEALTH MAINTENANCE LETTER (OUTPATIENT)
Age: 67
End: 2022-11-26

## 2022-12-09 ENCOUNTER — OFFICE VISIT (OUTPATIENT)
Dept: FAMILY MEDICINE | Facility: CLINIC | Age: 67
End: 2022-12-09
Payer: COMMERCIAL

## 2022-12-09 VITALS
BODY MASS INDEX: 19.43 KG/M2 | SYSTOLIC BLOOD PRESSURE: 120 MMHG | DIASTOLIC BLOOD PRESSURE: 78 MMHG | WEIGHT: 127.8 LBS | TEMPERATURE: 97.8 F | RESPIRATION RATE: 18 BRPM | HEART RATE: 64 BPM | OXYGEN SATURATION: 98 %

## 2022-12-09 DIAGNOSIS — M41.9 SCOLIOSIS OF THORACOLUMBAR SPINE, UNSPECIFIED SCOLIOSIS TYPE: Primary | ICD-10-CM

## 2022-12-09 DIAGNOSIS — M54.50 LUMBAR PAIN: ICD-10-CM

## 2022-12-09 DIAGNOSIS — M40.00 KYPHOSIS (ACQUIRED) (POSTURAL): ICD-10-CM

## 2022-12-09 DIAGNOSIS — Z87.81 HISTORY OF COMPRESSION FRACTURE OF SPINE: ICD-10-CM

## 2022-12-09 PROCEDURE — 99213 OFFICE O/P EST LOW 20 MIN: CPT | Performed by: FAMILY MEDICINE

## 2022-12-09 RX ORDER — CYCLOBENZAPRINE HCL 5 MG
5-10 TABLET ORAL 2 TIMES DAILY PRN
Qty: 90 TABLET | Refills: 3 | Status: SHIPPED | OUTPATIENT
Start: 2022-12-09 | End: 2023-08-04

## 2022-12-09 ASSESSMENT — PAIN SCALES - GENERAL: PAINLEVEL: NO PAIN (0)

## 2022-12-09 NOTE — PROGRESS NOTES
"  Assessment & Plan       ICD-10-CM    1. Scoliosis of thoracolumbar spine, unspecified scoliosis type  M41.9 cyclobenzaprine (FLEXERIL) 5 MG tablet      2. Lumbar pain  M54.50 cyclobenzaprine (FLEXERIL) 5 MG tablet      3. Kyphosis (acquired) (postural)  M40.00       4. History of compression fracture of spine  Z87.81            67-year-old with COPD and severe kyphosis.  He had difficulty maintaining his weight due to decreased appetite and job stressors.  He has had good weight gain since we last met by adding supplements to his diet.  He blames his posture on \"tight abdominal muscles\", but I think it is more in relation to his multiple compression fractures.  Will need a DEXA scan in the future    Return in about 3 months (around 3/9/2023) for weight check.    Chase East MD  [facility] is a 67 year [facility], presenting for the following health issues:  Follow Up      History of Present Illness       Reason for visit:  Follow up weight and PPI    He eats 0-1 servings of fruits and vegetables daily.He consumes 0 sweetened beverage(s) daily.He exercises with enough effort to increase his heart rate 9 or less minutes per day.  He exercises with enough effort to increase his heart rate 3 or less days per week.   He is taking medications regularly.             Review of Systems         Objective    /78   Pulse 64   Temp 97.8  F (36.6  C) (Tympanic)   Resp 18   Wt 58 kg (127 lb 12.8 oz)   SpO2 98%   BMI 19.43 kg/m    Body mass index is 19.43 kg/m .  Physical Exam   GENERAL: healthy, alert and no distress  NECK: no adenopathy, no asymmetry, masses, or scars and thyroid normal to palpation  RESP: lungs clear to auscultation - no rales, rhonchi or wheezes  CV: regular rate and rhythm, normal S1 S2, no S3 or S4, no murmur, click or rub, no peripheral edema and peripheral pulses strong  ABDOMEN: soft, nontender, no hepatosplenomegaly, no masses and bowel sounds " normal  MS: Moderate to severe kyphosis, no edema

## 2022-12-30 ENCOUNTER — TELEPHONE (OUTPATIENT)
Dept: FAMILY MEDICINE | Facility: CLINIC | Age: 67
End: 2022-12-30

## 2022-12-30 NOTE — TELEPHONE ENCOUNTER
Called patient to clarify dates/times for FMLA paperwork. Patient stated he was confused on why Dr. East wanted him to fill it out and writer explained it will help with getting time off for appointments and for his health concerns. Patient stated he has no physical therapy scheduled and would like for Dr. East to call him to clear things up.

## 2023-01-25 DIAGNOSIS — J43.1 PANLOBULAR EMPHYSEMA (H): ICD-10-CM

## 2023-01-25 RX ORDER — TIOTROPIUM BROMIDE INHALATION SPRAY 3.12 UG/1
SPRAY, METERED RESPIRATORY (INHALATION)
Qty: 4 G | Refills: 1 | Status: SHIPPED | OUTPATIENT
Start: 2023-01-25 | End: 2023-04-11

## 2023-03-09 ENCOUNTER — OFFICE VISIT (OUTPATIENT)
Dept: FAMILY MEDICINE | Facility: CLINIC | Age: 68
End: 2023-03-09
Payer: COMMERCIAL

## 2023-03-09 VITALS
OXYGEN SATURATION: 97 % | RESPIRATION RATE: 16 BRPM | WEIGHT: 124 LBS | HEIGHT: 65 IN | HEART RATE: 70 BPM | BODY MASS INDEX: 20.66 KG/M2 | SYSTOLIC BLOOD PRESSURE: 128 MMHG | TEMPERATURE: 97.7 F | DIASTOLIC BLOOD PRESSURE: 88 MMHG

## 2023-03-09 DIAGNOSIS — Z87.81 HISTORY OF COMPRESSION FRACTURE OF SPINE: Primary | ICD-10-CM

## 2023-03-09 DIAGNOSIS — Z11.59 NEED FOR HEPATITIS C SCREENING TEST: ICD-10-CM

## 2023-03-09 DIAGNOSIS — R97.20 ELEVATED PROSTATE SPECIFIC ANTIGEN (PSA): ICD-10-CM

## 2023-03-09 PROCEDURE — 99214 OFFICE O/P EST MOD 30 MIN: CPT | Performed by: FAMILY MEDICINE

## 2023-03-09 ASSESSMENT — PAIN SCALES - GENERAL: PAINLEVEL: NO PAIN (0)

## 2023-03-09 NOTE — PROGRESS NOTES
"  Assessment & Plan       ICD-10-CM    1. History of compression fracture of spine  Z87.81 DX Hip/Pelvis/Spine      2. Need for hepatitis C screening test  Z11.59       3. Elevated prostate specific antigen (PSA)  R97.20 Adult Urology  Referral         History of multiple compression fractures.  Check a DEXA scan.  History of elevated PSA.  See urology.    Return in about 2 months (around 5/9/2023).    Chase East MD  [facility] DARIEL Blackwood is a 67 year old, presenting for the following health issues:  Weight Check      History of Present Illness       Reason for visit:  Follow up visit    He eats 0-1 servings of fruits and vegetables daily.He consumes 0 sweetened beverage(s) daily.He exercises with enough effort to increase his heart rate 9 or less minutes per day.  He exercises with enough effort to increase his heart rate 3 or less days per week.   He is taking medications regularly.         VA shows mild elevation 4.2 on psa  Weight down 3 lbs  Had GI bug few weeks ago, n/v  Still early satiety  Trouble with posture  Takes tums for burning, nausea, helps, can take 2 tums 3-4 times a day, on top of PPI  Spine compression fractures  Financially tight            Review of Systems   Constitutional, HEENT, cardiovascular, pulmonary, gi and gu systems are negative, except as otherwise noted.      Objective    /88 (Cuff Size: Adult Small)   Pulse 70   Temp 97.7  F (36.5  C) (Temporal)   Resp 16   Ht 1.651 m (5' 5\")   Wt 56.2 kg (124 lb)   SpO2 97%   BMI 20.63 kg/m    Body mass index is 20.63 kg/m .  Physical Exam     Well-appearing.  Good historian.  He is thin.  Extremely kyphotic.  Heart regular.  Lungs clear.                "

## 2023-03-10 ENCOUNTER — HOSPITAL ENCOUNTER (OUTPATIENT)
Dept: BONE DENSITY | Facility: CLINIC | Age: 68
Discharge: HOME | End: 2023-03-10
Attending: FAMILY MEDICINE | Admitting: FAMILY MEDICINE
Payer: COMMERCIAL

## 2023-03-10 DIAGNOSIS — Z87.81 HISTORY OF COMPRESSION FRACTURE OF SPINE: ICD-10-CM

## 2023-03-10 PROCEDURE — 77080 DXA BONE DENSITY AXIAL: CPT

## 2023-03-13 PROBLEM — M81.0 AGE-RELATED OSTEOPOROSIS WITHOUT CURRENT PATHOLOGICAL FRACTURE: Status: ACTIVE | Noted: 2023-03-13

## 2023-04-10 DIAGNOSIS — J43.1 PANLOBULAR EMPHYSEMA (H): ICD-10-CM

## 2023-04-10 DIAGNOSIS — J44.9 CHRONIC OBSTRUCTIVE PULMONARY DISEASE, UNSPECIFIED COPD TYPE (H): ICD-10-CM

## 2023-04-11 RX ORDER — TIOTROPIUM BROMIDE INHALATION SPRAY 3.12 UG/1
SPRAY, METERED RESPIRATORY (INHALATION)
Qty: 4 G | Refills: 5 | Status: SHIPPED | OUTPATIENT
Start: 2023-04-11 | End: 2023-05-12

## 2023-04-11 RX ORDER — IPRATROPIUM BROMIDE AND ALBUTEROL 20; 100 UG/1; UG/1
SPRAY, METERED RESPIRATORY (INHALATION)
Qty: 4 G | Refills: 5 | Status: SHIPPED | OUTPATIENT
Start: 2023-04-11 | End: 2023-05-11

## 2023-04-11 NOTE — TELEPHONE ENCOUNTER
Prescription approved per [facility] Refill Protocol.  Lilian Alvarez RN on 4/11/2023 at 3:07 PM

## 2023-05-10 ENCOUNTER — OFFICE VISIT (OUTPATIENT)
Dept: UROLOGY | Facility: CLINIC | Age: 68
End: 2023-05-10
Payer: COMMERCIAL

## 2023-05-10 VITALS
SYSTOLIC BLOOD PRESSURE: 137 MMHG | OXYGEN SATURATION: 98 % | DIASTOLIC BLOOD PRESSURE: 79 MMHG | HEART RATE: 75 BPM | RESPIRATION RATE: 16 BRPM

## 2023-05-10 DIAGNOSIS — R97.20 ELEVATED PROSTATE SPECIFIC ANTIGEN (PSA): ICD-10-CM

## 2023-05-10 DIAGNOSIS — M81.0 AGE-RELATED OSTEOPOROSIS WITHOUT CURRENT PATHOLOGICAL FRACTURE: ICD-10-CM

## 2023-05-10 DIAGNOSIS — N40.0 BENIGN PROSTATIC HYPERPLASIA WITHOUT LOWER URINARY TRACT SYMPTOMS: Primary | ICD-10-CM

## 2023-05-10 LAB
PSA FREE MFR SERPL: 25.75 %
PSA FREE SERPL-MCNC: 1.2 NG/ML
PSA SERPL DL<=0.01 NG/ML-MCNC: 4.66 NG/ML (ref 0–4.5)

## 2023-05-10 PROCEDURE — 36415 COLL VENOUS BLD VENIPUNCTURE: CPT | Performed by: UROLOGY

## 2023-05-10 PROCEDURE — 80048 BASIC METABOLIC PNL TOTAL CA: CPT | Performed by: UROLOGY

## 2023-05-10 PROCEDURE — 99203 OFFICE O/P NEW LOW 30 MIN: CPT | Performed by: UROLOGY

## 2023-05-10 PROCEDURE — 84154 ASSAY OF PSA FREE: CPT | Performed by: UROLOGY

## 2023-05-10 PROCEDURE — 84153 ASSAY OF PSA TOTAL: CPT | Performed by: UROLOGY

## 2023-05-10 PROCEDURE — 82306 VITAMIN D 25 HYDROXY: CPT | Performed by: UROLOGY

## 2023-05-10 ASSESSMENT — PAIN SCALES - GENERAL: PAINLEVEL: NO PAIN (0)

## 2023-05-10 NOTE — PROGRESS NOTES
S: Keith Gonzalez is a pleasant  67 year old male who was requested to be seen by  Chase East for a consult with regard to patient's elevated PSA.  His recent PSA was found to be 4.28.  His previous PSA was not known.  Patient complains of Nocturia x 1-2.  His AUA Symptom Score:  2.  Patient complains of weight loss for last 6 months also.  He sees Dr. East tomorrow.  Current Outpatient Medications   Medication Sig Dispense Refill     aspirin 81 MG EC tablet Take 81 mg by mouth daily (Patient not taking: Reported on 12/9/2022)       cyclobenzaprine (FLEXERIL) 5 MG tablet Take 1-2 tablets (5-10 mg) by mouth 2 times daily as needed for muscle spasms 90 tablet 3     ipratropium-albuterol (COMBIVENT RESPIMAT)  MCG/ACT inhaler INHALE ONE PUFF INTO THE LUNGS BY MOUTH FOUR TIMES A DAY - NOT TO EXCEED 6 DOSES PER DAY 4 g 5     ketorolac (TORADOL) 10 MG tablet Take 1 tablet (10 mg) by mouth every 6 hours as needed for moderate pain (Patient not taking: Reported on 5/13/2022) 20 tablet 0     pantoprazole (PROTONIX) 40 MG EC tablet Take 1 tablet (40 mg) by mouth 2 times daily (before meals) 180 tablet 3     SPIRIVA RESPIMAT 2.5 MCG/ACT inhaler INHALE 2 PUFFS INTO THE LUNGS DAILY 4 g 5      Allergies   Allergen Reactions     No Known Drug Allergy       Past Medical History:   Diagnosis Date     Cancer (H)      COPD (chronic obstructive pulmonary disease) (H)      Past Surgical History:   Procedure Laterality Date     DAVINCI HERNIORRHAPHY INGUINAL Left 10/28/2021    Procedure: Robotic assisted left inguinal hernia repair with mesh;  Surgeon: Gladys Whitehead MD;  Location:  OR     ESOPHAGOSCOPY, GASTROSCOPY, DUODENOSCOPY (EGD), COMBINED N/A 9/13/2022    Procedure: ESOPHAGOGASTRODUODENOSCOPY, WITH BIOPSY;  Surgeon: Doyle Corbin MD;  Location:  GI     LAPAROSCOPIC LYSIS ADHESIONS N/A 10/28/2021    Procedure: Laparoscopic lysis adhesions;  Surgeon: Gladys Whitehead MD;  Location:  OR      No family  history on file.  He does not have a family history of prostate cancer.  Social History     Socioeconomic History     Marital status:      Spouse name: Not on file     Number of children: Not on file     Years of education: Not on file     Highest education level: Not on file   Occupational History     Not on file   Tobacco Use     Smoking status: Every Day     Packs/day: 1.00     Years: 0.00     Pack years: 0.00     Types: Cigarettes     Smokeless tobacco: Never   Vaping Use     Vaping status: Never Used   Substance and Sexual Activity     Alcohol use: No     Drug use: No     Sexual activity: Not Currently   Other Topics Concern     Parent/sibling w/ CABG, MI or angioplasty before 65F 55M? Not Asked   Social History Narrative     Not on file     Social Determinants of Health     Financial Resource Strain: Not on file   Food Insecurity: Not on file   Transportation Needs: Not on file   Physical Activity: Not on file   Stress: Not on file   Social Connections: Not on file   Intimate Partner Violence: Not on file   Housing Stability: Not on file        REVIEW OF SYSTEMS  =================  C: NEGATIVE for fever, chills, change in weight  I: NEGATIVE for worrisome rashes, moles or lesions  E/M: NEGATIVE for ear, mouth and throat problems  R: NEGATIVE for significant cough or SHORTNESS OF BREATH  CV:  NEGATIVE for chest pain, palpitations or peripheral edema  GI: NEGATIVE for nausea, abdominal pain, heartburn, or change in bowel habits  NEURO: NEGATIVE  PSYCH: NEGATIVE    Physical Exam:  /79 (BP Location: Right arm, Patient Position: Chair, Cuff Size: Adult Regular)   Pulse 75   Resp 16   SpO2 98%    Patient is pleasant, in no acute distress, good general condition.  Lung: no evidence of respiratory distress    Abdomen: Soft, nondistended, non tender. No masses. No rebound or guarding.   Exam: penis no discharge.  No scrotal skin lesion.   One testis is missing.  Remaining testis normal. Prostate 50 gm  plus   Skin: Warm and dry.  No redness.  Musculaskeletal: moving all extremities.  No weakness.  Neuro non focal  Psych normal mood and affect    Assessment/Plan:   (N40.0) Benign prostatic hyperplasia without lower urinary tract symptoms  (primary encounter diagnosis)  Comment: low LUTS   Plan: prn    (R97.20) Elevated prostate specific antigen (PSA)  Comment:    Plan: PSA total and free [[id number]]           Today.

## 2023-05-10 NOTE — PROGRESS NOTES
Bladder Scan performed. 116 ml maximum residual urine detected after 3 scans. MD informed     Ophelia Almonte RN on 5/10/2023 at 9:27 AM

## 2023-05-11 ENCOUNTER — OFFICE VISIT (OUTPATIENT)
Dept: FAMILY MEDICINE | Facility: CLINIC | Age: 68
End: 2023-05-11
Payer: COMMERCIAL

## 2023-05-11 ENCOUNTER — TELEPHONE (OUTPATIENT)
Dept: FAMILY MEDICINE | Facility: CLINIC | Age: 68
End: 2023-05-11

## 2023-05-11 VITALS
HEART RATE: 80 BPM | DIASTOLIC BLOOD PRESSURE: 62 MMHG | OXYGEN SATURATION: 96 % | TEMPERATURE: 97.8 F | BODY MASS INDEX: 20.47 KG/M2 | WEIGHT: 123 LBS | SYSTOLIC BLOOD PRESSURE: 112 MMHG

## 2023-05-11 DIAGNOSIS — Z11.59 NEED FOR HEPATITIS C SCREENING TEST: ICD-10-CM

## 2023-05-11 DIAGNOSIS — K21.00 GASTROESOPHAGEAL REFLUX DISEASE WITH ESOPHAGITIS WITHOUT HEMORRHAGE: ICD-10-CM

## 2023-05-11 DIAGNOSIS — J44.9 CHRONIC OBSTRUCTIVE PULMONARY DISEASE, UNSPECIFIED COPD TYPE (H): ICD-10-CM

## 2023-05-11 DIAGNOSIS — R97.20 ELEVATED PROSTATE SPECIFIC ANTIGEN (PSA): Primary | ICD-10-CM

## 2023-05-11 DIAGNOSIS — J44.9 CHRONIC OBSTRUCTIVE PULMONARY DISEASE, UNSPECIFIED COPD TYPE (H): Primary | ICD-10-CM

## 2023-05-11 DIAGNOSIS — M81.0 AGE-RELATED OSTEOPOROSIS WITHOUT CURRENT PATHOLOGICAL FRACTURE: Primary | ICD-10-CM

## 2023-05-11 LAB
ANION GAP SERPL CALCULATED.3IONS-SCNC: 16 MMOL/L (ref 7–15)
BUN SERPL-MCNC: 20.9 MG/DL (ref 8–23)
CALCIUM SERPL-MCNC: 9.5 MG/DL (ref 8.8–10.2)
CHLORIDE SERPL-SCNC: 99 MMOL/L (ref 98–107)
CREAT SERPL-MCNC: 0.72 MG/DL (ref 0.67–1.17)
DEPRECATED CALCIDIOL+CALCIFEROL SERPL-MC: 34 UG/L (ref 20–75)
DEPRECATED HCO3 PLAS-SCNC: 22 MMOL/L (ref 22–29)
GFR SERPL CREATININE-BSD FRML MDRD: >90 ML/MIN/1.73M2
GLUCOSE SERPL-MCNC: 76 MG/DL (ref 70–99)
POTASSIUM SERPL-SCNC: 4.8 MMOL/L (ref 3.4–5.3)
SODIUM SERPL-SCNC: 137 MMOL/L (ref 136–145)

## 2023-05-11 PROCEDURE — 99214 OFFICE O/P EST MOD 30 MIN: CPT | Performed by: FAMILY MEDICINE

## 2023-05-11 RX ORDER — IPRATROPIUM BROMIDE AND ALBUTEROL 20; 100 UG/1; UG/1
SPRAY, METERED RESPIRATORY (INHALATION)
Qty: 4 G | Refills: 5 | Status: SHIPPED | OUTPATIENT
Start: 2023-05-11 | End: 2023-07-31

## 2023-05-11 RX ORDER — PANTOPRAZOLE SODIUM 40 MG/1
40 TABLET, DELAYED RELEASE ORAL DAILY
Qty: 90 TABLET | Refills: 3 | Status: SHIPPED | OUTPATIENT
Start: 2023-05-11 | End: 2024-07-08

## 2023-05-11 RX ORDER — ALENDRONATE SODIUM 70 MG/1
70 TABLET ORAL
Qty: 12 TABLET | Refills: 3 | Status: SHIPPED | OUTPATIENT
Start: 2023-05-11 | End: 2024-06-12

## 2023-05-11 NOTE — PROGRESS NOTES
Assessment & Plan       ICD-10-CM    1. Age-related osteoporosis without current pathological fracture  M81.0 calcium carbonate-vitamin D (OSCAL) 500-5 MG-MCG tablet     alendronate (FOSAMAX) 70 MG tablet     Vitamin D Deficiency     Basic metabolic panel  (Ca, Cl, CO2, Creat, Gluc, K, Na, BUN)      2. Need for hepatitis C screening test  Z11.59       3. Chronic obstructive pulmonary disease, unspecified COPD type (H)  J44.9 ipratropium-albuterol (COMBIVENT RESPIMAT)  MCG/ACT inhaler      4. Gastroesophageal reflux disease with esophagitis without hemorrhage  K21.00 pantoprazole (PROTONIX) 40 MG EC tablet           Acute process.  Reviewed his DEXA scan with him.  We will assess his calcium and vitamin D levels.  Then place him on Fosamax with adequate calcium and vitamin D supplementation.  He agrees.    Continues to struggle with poor weight gain.  Prior notes.  Again addressed ways we could increase his calorie intake even despite his work schedule.  He agrees.  If he does not gain by her next appointment, would have him see GI.    COPD.  Stable.  Quit smoking.  Continue inhalers.    Reflux disease.  Decrease pantoprazole to once daily.    Return in about 2 months (around 7/11/2023).    Chase East MD  [facility] DARIEL Blackwood is a 67 year old, presenting for the following health issues:  Follow Up         View : No data to display.              History of Present Illness       Reason for visit:  Follow up visit    He eats 0-1 servings of fruits and vegetables daily.He consumes 0 sweetened beverage(s) daily.He exercises with enough effort to increase his heart rate 9 or less minutes per day.  He exercises with enough effort to increase his heart rate 4 days per week.   He is taking medications regularly.     Patient saw urology yesterday and is here to follow up on that visit. Also has lost about 4 lbs since December- would like to find out why he is not keeping  weight on. Patient states that he eats a normal diet.     Reviewed uro notes, low     Eats like a bird through the day    Feels nauseaous at time      Review of Systems   Constitutional, HEENT, cardiovascular, pulmonary, gi and gu systems are negative, except as otherwise noted.      Objective    /62   Pulse 80   Temp 97.8  F (36.6  C) (Temporal)   Wt 55.8 kg (123 lb)   SpO2 96%   BMI 20.47 kg/m    Body mass index is 20.47 kg/m .  Physical Exam   Thin, cachectic male in no distress.  Neck supple lymphadenopathy.  Heart regular.  Lungs clear.  Extremities thin.  Kyphotic.  Walks independently

## 2023-05-11 NOTE — TELEPHONE ENCOUNTER
Tried several ICS, everything seems to be $40 no matter which one. Not a bad copay compared to some.   Margy Chavira, [facility]  [phone]

## 2023-05-11 NOTE — TELEPHONE ENCOUNTER
Id like to switch to ICS, but couldn't find one that was affordable? Can you try a few test prescriptions?

## 2023-05-11 NOTE — TELEPHONE ENCOUNTER
Is patient continuing with both Spiriva and Combivent?  Margy Chavira, [facility]  [phone]

## 2023-05-12 ENCOUNTER — TELEPHONE (OUTPATIENT)
Dept: FAMILY MEDICINE | Facility: CLINIC | Age: 68
End: 2023-05-12
Payer: COMMERCIAL

## 2023-05-12 DIAGNOSIS — J44.9 CHRONIC OBSTRUCTIVE PULMONARY DISEASE, UNSPECIFIED COPD TYPE (H): Primary | ICD-10-CM

## 2023-05-12 RX ORDER — BUDESONIDE AND FORMOTEROL FUMARATE DIHYDRATE 160; 4.5 UG/1; UG/1
2 AEROSOL RESPIRATORY (INHALATION) 2 TIMES DAILY
Qty: 18 G | Refills: 3 | Status: SHIPPED | OUTPATIENT
Start: 2023-05-12 | End: 2023-05-12

## 2023-05-12 RX ORDER — FLUTICASONE PROPIONATE AND SALMETEROL 500; 50 UG/1; UG/1
1 POWDER RESPIRATORY (INHALATION) EVERY 12 HOURS
Qty: 60 EACH | Refills: 1 | Status: SHIPPED | OUTPATIENT
Start: 2023-05-12 | End: 2023-07-20

## 2023-06-22 ENCOUNTER — TELEPHONE (OUTPATIENT)
Dept: FAMILY MEDICINE | Facility: CLINIC | Age: 68
End: 2023-06-22

## 2023-06-22 NOTE — TELEPHONE ENCOUNTER
Forms/Letter Request    Type of form/letter: Short-term disability    Have you been seen for this request: Yes, Patient needs new FMLA form to be filled out and to stat leave as needed and remove pt.    Do we have the form/letter: No, company is faxing FMLA FORM TO US.    Who is the form from? Patient    Where did/will the form come from? form was faxed in    When is form/letter needed by: ASAP    How would you like the form/letter returned: Fax : [phone]    Patient Notified form requests are processed in 3-5 business days:Yes    Could we send this information to you in [employer] or would you prefer to receive a phone call?:   Patient would prefer a phone call   Okay to leave a detailed message?: Yes at Cell number on file:    Telephone Information:   Mobile [phone]

## 2023-07-12 ENCOUNTER — OFFICE VISIT (OUTPATIENT)
Dept: FAMILY MEDICINE | Facility: CLINIC | Age: 68
End: 2023-07-12
Payer: COMMERCIAL

## 2023-07-12 VITALS
RESPIRATION RATE: 18 BRPM | TEMPERATURE: 98 F | WEIGHT: 123.8 LBS | HEART RATE: 86 BPM | OXYGEN SATURATION: 78 % | SYSTOLIC BLOOD PRESSURE: 167 MMHG | BODY MASS INDEX: 20.62 KG/M2 | HEIGHT: 65 IN | DIASTOLIC BLOOD PRESSURE: 94 MMHG

## 2023-07-12 DIAGNOSIS — Z23 NEED FOR SHINGLES VACCINE: ICD-10-CM

## 2023-07-12 DIAGNOSIS — K21.00 GASTROESOPHAGEAL REFLUX DISEASE WITH ESOPHAGITIS WITHOUT HEMORRHAGE: Primary | ICD-10-CM

## 2023-07-12 DIAGNOSIS — Z11.59 NEED FOR HEPATITIS C SCREENING TEST: ICD-10-CM

## 2023-07-12 DIAGNOSIS — M81.0 AGE-RELATED OSTEOPOROSIS WITHOUT CURRENT PATHOLOGICAL FRACTURE: ICD-10-CM

## 2023-07-12 DIAGNOSIS — Z12.11 SCREEN FOR COLON CANCER: ICD-10-CM

## 2023-07-12 DIAGNOSIS — J43.1 PANLOBULAR EMPHYSEMA (H): ICD-10-CM

## 2023-07-12 PROCEDURE — 99214 OFFICE O/P EST MOD 30 MIN: CPT | Performed by: FAMILY MEDICINE

## 2023-07-12 ASSESSMENT — PAIN SCALES - GENERAL: PAINLEVEL: NO PAIN (0)

## 2023-07-12 NOTE — PROGRESS NOTES
Assessment & Plan       ICD-10-CM    1. Gastroesophageal reflux disease with esophagitis without hemorrhage  K21.00 Nutrition Referral      2. Need for shingles vaccine  Z23       3. Screen for colon cancer  Z12.11       4. Need for hepatitis C screening test  Z11.59       5. Panlobular emphysema (H)  J43.1       6. Age-related osteoporosis without current pathological fracture  M81.0 Nutrition Referral             Weight loss.  Underweight.  Has had difficulty gaining weight.  Has a lack of income.  Does not have time scheduled throughout the day in order to get adequate nutrition.  I gave him a letter to have work extend his lunch break in order for him to get an adequate meal.  He should also be taking protein supplementation.  I want him to meet with nutrition    Poorly controlled reflux.  Discussed his options with nutrition.  Cannot continue to be  PPI due to his history of osteoporosis.    Emphysema.  Stable.  No changes today.    Osteoporosis.  As above    Return in about 2 months (around 9/12/2023).    Chase East MD  [facility]      Asiya Blackwood is a 67 year old, presenting for the following health issues:  Gastrophageal Reflux        7/12/2023     9:47 AM   Additional Questions   Roomed by Nidia BROOKS     History of Present Illness       COPD:  He presents for follow up of COPD.  Overall, COPD symptoms are stable since last visit. He has same as usual fatigue or shortness of breath with exertion and no shortness of breath at rest.He sometimes coughs and does not have change in sputum. No recent fever. He can walk less than 1 block without stopping to rest. He can walk 2 flights of stairs without resting.The patient has had no ED, urgent care, or hospital admissions because of COPD since the last visit.     Reason for visit:  Follow up visit    He eats 0-1 servings of fruits and vegetables daily.He consumes 0 sweetened beverage(s) daily.He exercises with enough effort to  "increase his heart rate 9 or less minutes per day.  He exercises with enough effort to increase his heart rate 7 days per week.   He is taking medications regularly.         He returns for routine recheck  Is not gaining much weight, continues to be underweight  Complains of not having a time at work to finish a meal  But also feels badly if he takes too long to disrupt others break at work  He is having take more days off of work due to persistent reflux symptoms, likely worsened by his Fosamax  He has yet to gain ground on poorly controlled reflux as well as weight gain      Review of Systems   Constitutional, HEENT, cardiovascular, pulmonary, gi and gu systems are negative, except as otherwise noted.      Objective    BP (!) 167/94   Pulse 86   Temp 98  F (36.7  C) (Temporal)   Resp 18   Ht 1.651 m (5' 5\")   Wt 56.2 kg (123 lb 12.8 oz)   SpO2 (!) 78%   BMI 20.60 kg/m    Body mass index is 20.6 kg/m .  Physical Exam                       "

## 2023-07-12 NOTE — LETTER
July 12, 2023      Keith Gonzalez  [address]  [facility] [facility] [address]        To Whom It May Concern,     It is medically necessary for ROBER to be allowed sufficient time to have his at least 2 meals during his shift. This needs to be 20-30 min.       Sincerely,        Chase East MD

## 2023-07-19 DIAGNOSIS — J44.9 CHRONIC OBSTRUCTIVE PULMONARY DISEASE, UNSPECIFIED COPD TYPE (H): ICD-10-CM

## 2023-07-20 RX ORDER — FLUTICASONE PROPIONATE AND SALMETEROL 500; 50 UG/1; UG/1
1 POWDER RESPIRATORY (INHALATION) EVERY 12 HOURS
Qty: 60 EACH | Refills: 11 | Status: SHIPPED | OUTPATIENT
Start: 2023-07-20 | End: 2024-08-12

## 2023-07-20 NOTE — TELEPHONE ENCOUNTER
Prescription approved per [facility] Refill Protocol.    Lilian Alvarez RN on 7/20/2023 at 3:50 PM

## 2023-07-31 ENCOUNTER — APPOINTMENT (OUTPATIENT)
Dept: GENERAL RADIOLOGY | Facility: CLINIC | Age: 68
End: 2023-07-31
Attending: STUDENT IN AN ORGANIZED HEALTH CARE EDUCATION/TRAINING PROGRAM
Payer: COMMERCIAL

## 2023-07-31 ENCOUNTER — NURSE TRIAGE (OUTPATIENT)
Dept: FAMILY MEDICINE | Facility: CLINIC | Age: 68
End: 2023-07-31
Payer: COMMERCIAL

## 2023-07-31 ENCOUNTER — HOSPITAL ENCOUNTER (EMERGENCY)
Facility: CLINIC | Age: 68
Discharge: HOME | End: 2023-07-31
Attending: STUDENT IN AN ORGANIZED HEALTH CARE EDUCATION/TRAINING PROGRAM | Admitting: STUDENT IN AN ORGANIZED HEALTH CARE EDUCATION/TRAINING PROGRAM
Payer: COMMERCIAL

## 2023-07-31 VITALS
RESPIRATION RATE: 16 BRPM | TEMPERATURE: 97.6 F | BODY MASS INDEX: 17.71 KG/M2 | WEIGHT: 123.7 LBS | HEIGHT: 70 IN | HEART RATE: 80 BPM | OXYGEN SATURATION: 98 % | SYSTOLIC BLOOD PRESSURE: 148 MMHG | DIASTOLIC BLOOD PRESSURE: 94 MMHG

## 2023-07-31 DIAGNOSIS — R06.02 SHORTNESS OF BREATH: ICD-10-CM

## 2023-07-31 DIAGNOSIS — R79.89 ELEVATED TROPONIN: ICD-10-CM

## 2023-07-31 DIAGNOSIS — J44.1 COPD EXACERBATION (H): ICD-10-CM

## 2023-07-31 LAB
ALBUMIN SERPL BCG-MCNC: 4 G/DL (ref 3.5–5.2)
ALP SERPL-CCNC: 85 U/L (ref 40–129)
ALT SERPL W P-5'-P-CCNC: 20 U/L (ref 0–70)
ANION GAP SERPL CALCULATED.3IONS-SCNC: 8 MMOL/L (ref 7–15)
AST SERPL W P-5'-P-CCNC: 22 U/L (ref 0–45)
BASOPHILS # BLD AUTO: 0 10E3/UL (ref 0–0.2)
BASOPHILS NFR BLD AUTO: 0 %
BILIRUB SERPL-MCNC: 0.3 MG/DL
BUN SERPL-MCNC: 13.5 MG/DL (ref 8–23)
CALCIUM SERPL-MCNC: 9.2 MG/DL (ref 8.8–10.2)
CHLORIDE SERPL-SCNC: 96 MMOL/L (ref 98–107)
CREAT SERPL-MCNC: 0.62 MG/DL (ref 0.67–1.17)
DEPRECATED HCO3 PLAS-SCNC: 30 MMOL/L (ref 22–29)
EOSINOPHIL # BLD AUTO: 0 10E3/UL (ref 0–0.7)
EOSINOPHIL NFR BLD AUTO: 0 %
ERYTHROCYTE [DISTWIDTH] IN BLOOD BY AUTOMATED COUNT: 14.6 % (ref 10–15)
GFR SERPL CREATININE-BSD FRML MDRD: >90 ML/MIN/1.73M2
GLUCOSE SERPL-MCNC: 88 MG/DL (ref 70–99)
HCT VFR BLD AUTO: 44 % (ref 40–53)
HGB BLD-MCNC: 14.4 G/DL (ref 13.3–17.7)
IMM GRANULOCYTES # BLD: 0 10E3/UL
IMM GRANULOCYTES NFR BLD: 0 %
INR PPP: 1.06 (ref 0.85–1.15)
LYMPHOCYTES # BLD AUTO: 1.6 10E3/UL (ref 0.8–5.3)
LYMPHOCYTES NFR BLD AUTO: 15 %
MCH RBC QN AUTO: 28.8 PG (ref 26.5–33)
MCHC RBC AUTO-ENTMCNC: 32.7 G/DL (ref 31.5–36.5)
MCV RBC AUTO: 88 FL (ref 78–100)
MONOCYTES # BLD AUTO: 0.6 10E3/UL (ref 0–1.3)
MONOCYTES NFR BLD AUTO: 6 %
NEUTROPHILS # BLD AUTO: 8.4 10E3/UL (ref 1.6–8.3)
NEUTROPHILS NFR BLD AUTO: 79 %
NRBC # BLD AUTO: 0 10E3/UL
NRBC BLD AUTO-RTO: 0 /100
NT-PROBNP SERPL-MCNC: 194 PG/ML (ref 0–900)
PLATELET # BLD AUTO: 168 10E3/UL (ref 150–450)
POTASSIUM SERPL-SCNC: 4.1 MMOL/L (ref 3.4–5.3)
PROT SERPL-MCNC: 6.6 G/DL (ref 6.4–8.3)
RBC # BLD AUTO: 5 10E6/UL (ref 4.4–5.9)
SODIUM SERPL-SCNC: 134 MMOL/L (ref 136–145)
TROPONIN T SERPL HS-MCNC: 23 NG/L
TROPONIN T SERPL HS-MCNC: 25 NG/L
WBC # BLD AUTO: 10.7 10E3/UL (ref 4–11)

## 2023-07-31 PROCEDURE — 36415 COLL VENOUS BLD VENIPUNCTURE: CPT | Performed by: STUDENT IN AN ORGANIZED HEALTH CARE EDUCATION/TRAINING PROGRAM

## 2023-07-31 PROCEDURE — 71046 X-RAY EXAM CHEST 2 VIEWS: CPT

## 2023-07-31 PROCEDURE — 83880 ASSAY OF NATRIURETIC PEPTIDE: CPT | Performed by: STUDENT IN AN ORGANIZED HEALTH CARE EDUCATION/TRAINING PROGRAM

## 2023-07-31 PROCEDURE — 250N000012 HC RX MED GY IP 250 OP 636 PS 637: Performed by: STUDENT IN AN ORGANIZED HEALTH CARE EDUCATION/TRAINING PROGRAM

## 2023-07-31 PROCEDURE — 93005 ELECTROCARDIOGRAM TRACING: CPT | Performed by: STUDENT IN AN ORGANIZED HEALTH CARE EDUCATION/TRAINING PROGRAM

## 2023-07-31 PROCEDURE — 99285 EMERGENCY DEPT VISIT HI MDM: CPT | Mod: 25 | Performed by: STUDENT IN AN ORGANIZED HEALTH CARE EDUCATION/TRAINING PROGRAM

## 2023-07-31 PROCEDURE — 93010 ELECTROCARDIOGRAM REPORT: CPT | Performed by: STUDENT IN AN ORGANIZED HEALTH CARE EDUCATION/TRAINING PROGRAM

## 2023-07-31 PROCEDURE — 85025 COMPLETE CBC W/AUTO DIFF WBC: CPT | Performed by: STUDENT IN AN ORGANIZED HEALTH CARE EDUCATION/TRAINING PROGRAM

## 2023-07-31 PROCEDURE — 85610 PROTHROMBIN TIME: CPT | Performed by: STUDENT IN AN ORGANIZED HEALTH CARE EDUCATION/TRAINING PROGRAM

## 2023-07-31 PROCEDURE — 99284 EMERGENCY DEPT VISIT MOD MDM: CPT | Mod: 25 | Performed by: STUDENT IN AN ORGANIZED HEALTH CARE EDUCATION/TRAINING PROGRAM

## 2023-07-31 PROCEDURE — 84484 ASSAY OF TROPONIN QUANT: CPT | Performed by: STUDENT IN AN ORGANIZED HEALTH CARE EDUCATION/TRAINING PROGRAM

## 2023-07-31 PROCEDURE — 250N000013 HC RX MED GY IP 250 OP 250 PS 637: Performed by: STUDENT IN AN ORGANIZED HEALTH CARE EDUCATION/TRAINING PROGRAM

## 2023-07-31 PROCEDURE — 80053 COMPREHEN METABOLIC PANEL: CPT | Performed by: STUDENT IN AN ORGANIZED HEALTH CARE EDUCATION/TRAINING PROGRAM

## 2023-07-31 RX ORDER — PREDNISONE 20 MG/1
60 TABLET ORAL ONCE
Status: COMPLETED | OUTPATIENT
Start: 2023-07-31 | End: 2023-07-31

## 2023-07-31 RX ORDER — DOXYCYCLINE 100 MG/1
100 CAPSULE ORAL ONCE
Status: COMPLETED | OUTPATIENT
Start: 2023-07-31 | End: 2023-07-31

## 2023-07-31 RX ORDER — ASPIRIN 325 MG
325 TABLET, DELAYED RELEASE (ENTERIC COATED) ORAL ONCE
Status: COMPLETED | OUTPATIENT
Start: 2023-07-31 | End: 2023-07-31

## 2023-07-31 RX ORDER — PREDNISONE 20 MG/1
TABLET ORAL
Qty: 10 TABLET | Refills: 0 | Status: SHIPPED | OUTPATIENT
Start: 2023-07-31 | End: 2024-07-08

## 2023-07-31 RX ORDER — DOXYCYCLINE 100 MG/1
100 CAPSULE ORAL 2 TIMES DAILY
Qty: 20 CAPSULE | Refills: 0 | Status: SHIPPED | OUTPATIENT
Start: 2023-07-31 | End: 2023-08-10

## 2023-07-31 RX ADMIN — PREDNISONE 60 MG: 20 TABLET ORAL at 15:05

## 2023-07-31 RX ADMIN — ASPIRIN 325 MG: 325 TABLET, COATED ORAL at 15:51

## 2023-07-31 RX ADMIN — DOXYCYCLINE HYCLATE 100 MG: 100 CAPSULE ORAL at 15:05

## 2023-07-31 ASSESSMENT — ACTIVITIES OF DAILY LIVING (ADL)
ADLS_ACUITY_SCORE: 33
ADLS_ACUITY_SCORE: 35

## 2023-07-31 NOTE — ED PROVIDER NOTES
History     Chief Complaint   Patient presents with    Shortness of Breath     HPI  Keith Gonzalez is a 67 year old male with history of COPD, chronic pain presents for evaluation of shortness of breath since last night.  Patient describes a nonproductive cough and some increased shortness of breath compared to baseline since last night.  Rescue inhaler has not provided significant relief.  Symptoms are similar to prior exacerbations of COPD.  Patient also endorses having chronic acid reflux which is present today as well.  He otherwise denies fever, chills, chest pain or pleuritic pain, abdominal discomfort, vomiting, pain or swelling of the legs, other complaints today.    Allergies:  Allergies   Allergen Reactions    No Known Drug Allergy        Problem List:    Patient Active Problem List    Diagnosis Date Noted    Age-related osteoporosis without current pathological fracture 03/13/2023     Priority: Medium    SI (sacroiliac) joint dysfunction 09/27/2021     Priority: Medium    Lumbar pain 09/27/2021     Priority: Medium    Scoliosis of thoracolumbar spine, unspecified scoliosis type 09/27/2021     Priority: Medium    History of thromboembolism of vein 09/17/2021     Priority: Medium     Nov 25, 2011 Entered By: JOSHUA STEWART Comment: had been on coumadin from [id number]      Chronic obstructive pulmonary disease (H) 06/20/2018     Priority: Medium    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 Provider to review          Past Medical History:    Past Medical History:   Diagnosis Date    Cancer (H)     COPD (chronic obstructive pulmonary disease) (H)        Past Surgical History:    Past Surgical History:   Procedure Laterality Date    DAVINCI HERNIORRHAPHY INGUINAL Left 10/28/2021    Procedure: Robotic assisted left inguinal hernia repair with mesh;  Surgeon: Gladys Whitehead MD;  Location:  OR    ESOPHAGOSCOPY, GASTROSCOPY,  "DUODENOSCOPY (EGD), COMBINED N/A 9/13/2022    Procedure: ESOPHAGOGASTRODUODENOSCOPY, WITH BIOPSY;  Surgeon: Doyle Corbin MD;  Location: [facility]    LAPAROSCOPIC LYSIS ADHESIONS N/A 10/28/2021    Procedure: Laparoscopic lysis adhesions;  Surgeon: Gladys Whitehead MD;  Location: [facility]       Family History:    No family history on file.    Social History:  Marital Status:   [5]  Social History     Tobacco Use    Smoking status: Every Day     Packs/day: 1.00     Years: 0.00     Pack years: 0.00     Types: Cigarettes    Smokeless tobacco: Never   Vaping Use    Vaping Use: Never used   Substance Use Topics    Alcohol use: No    Drug use: No        Medications:    doxycycline hyclate (VIBRAMYCIN) 100 MG capsule  ipratropium-albuterol (COMBIVENT RESPIMAT)  MCG/ACT inhaler  predniSONE (DELTASONE) 20 MG tablet  alendronate (FOSAMAX) 70 MG tablet  aspirin 81 MG EC tablet  calcium carbonate-vitamin D (OSCAL) 500-5 MG-MCG tablet  cyclobenzaprine (FLEXERIL) 5 MG tablet  fluticasone-salmeterol (ADVAIR) 500-50 MCG/ACT inhaler  pantoprazole (PROTONIX) 40 MG EC tablet      Review of Systems   All other systems reviewed and are negative.  See HPI.    Physical Exam   BP: (!) 179/105  Pulse: 88  Temp: 97.6  F (36.4  C)  Resp: 24  Height: 177.8 cm (5' 10\")  Weight: 56.1 kg (123 lb 11.2 oz)  SpO2: 98 %      Physical Exam  Vitals and nursing note reviewed.   Constitutional:       General: He is not in acute distress.     Appearance: Normal appearance. He is not ill-appearing or toxic-appearing.      Comments: Thin.  Sitting comfortably in bed.  Despite initial recording of tachypnea he does not appear to be in respiratory distress.   HENT:      Head: Atraumatic.      Mouth/Throat:      Mouth: Mucous membranes are moist.      Pharynx: Oropharynx is clear. No oropharyngeal exudate.   Eyes:      General: No scleral icterus.     Extraocular Movements: Extraocular movements intact.      Conjunctiva/sclera: Conjunctivae normal.      " Pupils: Pupils are equal, round, and reactive to light.   Cardiovascular:      Rate and Rhythm: Normal rate and regular rhythm.      Pulses: Normal pulses.      Heart sounds: Normal heart sounds. No murmur heard.  Pulmonary:      Effort: Pulmonary effort is normal. No tachypnea or respiratory distress.      Breath sounds: Decreased breath sounds and wheezing present.      Comments: Mildly diminished air movement throughout all lung fields.  No obvious crackles.  Very faint expiratory wheezing present.  Chest:      Chest wall: No tenderness.   Abdominal:      Palpations: Abdomen is soft.      Tenderness: There is no abdominal tenderness.   Musculoskeletal:         General: No deformity. Normal range of motion.      Cervical back: Normal range of motion and neck supple.      Right lower leg: No tenderness. No edema.      Left lower leg: No tenderness. No edema.   Skin:     General: Skin is warm.      Capillary Refill: Capillary refill takes less than 2 seconds.      Findings: No rash.   Neurological:      General: No focal deficit present.      Mental Status: He is alert.   Psychiatric:         Mood and Affect: Mood normal.         Behavior: Behavior normal.         ED Course             Procedures         EKG performed at 1456.  Sinus rhythm with frequent PACs, rate 77.  Normal axis.  Otherwise normal intervals.  Nonspecific T wave changes.  Exam independently interpreted by me.       Results for orders placed or performed during the hospital encounter of 07/31/23 (from the past 24 hour(s))   CBC with platelets differential    Narrative    The following orders were created for panel order CBC with platelets differential.  Procedure                               Abnormality         Status                     ---------                               -----------         ------                     CBC with platelets and d...[[id number]]  Abnormal            Final result                 Please view results for these tests  on the individual orders.   Comprehensive metabolic panel   Result Value Ref Range    Sodium 134 (L) 136 - 145 mmol/L    Potassium 4.1 3.4 - 5.3 mmol/L    Chloride 96 (L) 98 - 107 mmol/L    Carbon Dioxide (CO2) 30 (H) 22 - 29 mmol/L    Anion Gap 8 7 - 15 mmol/L    Urea Nitrogen 13.5 8.0 - 23.0 mg/dL    Creatinine 0.62 (L) 0.67 - 1.17 mg/dL    Calcium 9.2 8.8 - 10.2 mg/dL    Glucose 88 70 - 99 mg/dL    Alkaline Phosphatase 85 40 - 129 U/L    AST 22 0 - 45 U/L    ALT 20 0 - 70 U/L    Protein Total 6.6 6.4 - 8.3 g/dL    Albumin 4.0 3.5 - 5.2 g/dL    Bilirubin Total 0.3 <=1.2 mg/dL    GFR Estimate >90 >60 mL/min/1.73m2   INR   Result Value Ref Range    INR 1.06 0.85 - 1.15   Troponin T, High Sensitivity   Result Value Ref Range    Troponin T, High Sensitivity 25 (H) <=22 ng/L   Nt probnp inpatient   Result Value Ref Range    N terminal Pro BNP Inpatient 194 0 - 900 pg/mL   CBC with platelets and differential   Result Value Ref Range    WBC Count 10.7 4.0 - 11.0 10e3/uL    RBC Count 5.00 4.40 - 5.90 10e6/uL    Hemoglobin 14.4 13.3 - 17.7 g/dL    Hematocrit 44.0 40.0 - 53.0 %    MCV 88 78 - 100 fL    MCH 28.8 26.5 - 33.0 pg    MCHC 32.7 31.5 - 36.5 g/dL    RDW 14.6 10.0 - 15.0 %    Platelet Count 168 150 - 450 10e3/uL    % Neutrophils 79 %    % Lymphocytes 15 %    % Monocytes 6 %    % Eosinophils 0 %    % Basophils 0 %    % Immature Granulocytes 0 %    NRBCs per 100 WBC 0 <1 /100    Absolute Neutrophils 8.4 (H) 1.6 - 8.3 10e3/uL    Absolute Lymphocytes 1.6 0.8 - 5.3 10e3/uL    Absolute Monocytes 0.6 0.0 - 1.3 10e3/uL    Absolute Eosinophils 0.0 0.0 - 0.7 10e3/uL    Absolute Basophils 0.0 0.0 - 0.2 10e3/uL    Absolute Immature Granulocytes 0.0 <=0.4 10e3/uL    Absolute NRBCs 0.0 10e3/uL   XR Chest 2 Views    Narrative    XR CHEST 2 VIEWS 7/31/2023 3:42 PM    HISTORY: Shortness of breath    COMPARISON: X-ray 6/13/2018      Impression    IMPRESSION: Mild interstitial edema. No focal pneumonic consolidation  or pleural  effusion. Normal heart size. No overt vascular congestion.    VERN STALLWORTH MD         SYSTEM ID:  [id number]   Troponin T, High Sensitivity (now)   Result Value Ref Range    Troponin T, High Sensitivity 23 (H) <=22 ng/L       Medications   predniSONE (DELTASONE) tablet 60 mg (60 mg Oral $Given 7/31/23 1505)   doxycycline hyclate (VIBRAMYCIN) capsule 100 mg (100 mg Oral $Given 7/31/23 1505)   aspirin (ASA) EC tablet 325 mg (325 mg Oral $Given 7/31/23 1551)       Assessments & Plan (with Medical Decision Making)     I have reviewed the nursing notes.    I have reviewed the findings, diagnosis, plan and need for follow up with the patient.    Medical Decision Making  Keith Gonzalez is a 67 year old male with history of COPD, chronic pain presents for evaluation of shortness of breath since last night.  Hypertensive on arrival.  Tachypnea was also documented.  Vitals otherwise normal.  At the time of my exam he did not appear to be in respiratory distress.  He did have some decreased air movement and mild expiratory wheezing, but was speaking comfortably in full sentences.  Pulses are equal in all extremities.  He has no lower extremity edema or calf tenderness.  Presentation seems most consistent with a COPD exacerbation.  However, he did describe some heartburn as well.  For this reason lab work was obtained along with x-ray.  He was given steroids and antibiotics here in the emergency department.  EKG showed PACs, no acute ischemic changes.  Blood work showed very mildly elevated troponin which was improved on repeat study.  BNP was within normal limits.  He had no leukocytosis or anemia.  Patient had resolution of symptoms with rest in the emergency department, repeat examination again reassuring.  With completely normal O2 saturations, heart rate in the 70s, and lack of chest/lower extremity edema/pain I believe ACS and acute PE seem unlikely.  I discussed that his work-up was not entirely normal today and  requires very close monitoring of symptoms at home over the next several days.  Will prescribe doxycycline/prednisone to help with symptoms over the next several days.  We will also prescribe his inhaler which is running low.  Patient agrees to follow-up with his primary care doctor in the next several days for recheck.  Referral to cardiology was also provided.  Patient agrees to return to the emergency department immediately in the meantime for any new or acutely worsened symptoms.    Update 8/1: I contacted the patient this morning.  He still has occasional shortness of breath but feels well overall.  In reviewing his history while documenting last night I was reminded that he does have a remote history of DVT.  He has not been on anticoagulation for many years.  We discussed this very briefly yesterday during his encounter.  However, with no leg swelling/pain, chest pain, and normal vitals, DVT/PE seemed unlikely and this was not formally evaluated yesterday with D-dimer or CT.  His work-up did reveal very mildly elevated troponins that were stable.  His EKG also had PACs.  These are probably due to known COPD/lung disease.  I still feel PE is very unlikely for the reasons described above, but I contacted him this morning to discuss this possibility and evaluation options.  Patient also feels that this is very unlikely because he recalls DVT symptoms very clearly and he does not have any.  Offered to reevaluate the patient in the emergency department today with blood work and additional testing is indicated, but he declined and instead will continue to monitor symptoms, follow-up with his primary care doctor for recheck and additional testing as indicated.  Again recommended that he return to the emergency department in the meantime for any new or acutely worsening symptoms.    Discharge Medication List as of 7/31/2023  5:23 PM        START taking these medications    Details   doxycycline hyclate (VIBRAMYCIN)  100 MG capsule Take 1 capsule (100 mg) by mouth 2 times daily for 10 days, Disp-20 capsule, R-0, E-Prescribe      predniSONE (DELTASONE) 20 MG tablet Take two tablets (= 40mg) each day for 5 (five) days, Disp-10 tablet, R-0, E-Prescribe             Final diagnoses:   Shortness of breath   COPD exacerbation (H)   Elevated troponin       7/31/2023   [facility] EMERGENCY DEPT       José Davis MD  07/31/23 6186       José Davis MD  08/01/23 [id number]

## 2023-07-31 NOTE — Clinical Note
Keith Gonzalez was seen and treated in our emergency department on 7/31/2023.  He may return to work on 08/02/2023.       If you have any questions or concerns, please don't hesitate to call.      José Davis MD

## 2023-07-31 NOTE — TELEPHONE ENCOUNTER
"Advised patient to seek care today per protocol.  No available appointments today at this time.  Advised patient to seek urgent care per protocol or ED per protocol.  Patient stated understanding.  Patient stated he did not want to go to Urgent care, he stated he would go to ED today or call later tonight to see if there are any cancelled visits available for tomorrow.      Reason for Disposition   Patient wants to be seen   Longstanding difficulty breathing (e.g., CHF, COPD, emphysema) and worse than normal    Additional Information   Negative: SEVERE difficulty breathing (e.g., struggling for each breath, speaks in single words, pulse > 120)   Negative: Breathing stopped and hasn't returned   Negative: Choking on something   Negative: Bluish (or gray) lips or face   Negative: Difficult to awaken or acting confused (e.g., disoriented, slurred speech)   Negative: Passed out (i.e., fainted, collapsed and was not responding)   Negative: Wheezing started suddenly after medicine, an allergic food, or bee sting   Negative: Stridor   Negative: Slow, shallow and weak breathing   Negative: Sounds like a life-threatening emergency to the triager   Negative: Chest pain   Negative: Wheezing (high pitched whistling sound) and previous asthma attacks or use of asthma medicines   Negative: Difficulty breathing and within 14 days of COVID-19 Exposure   Negative: Difficulty breathing and only present when coughing   Negative: Difficulty breathing and only from stuffy nose   Negative: Difficulty breathing and only from stuffy nose or runny nose from common cold   Negative: MODERATE difficulty breathing (e.g., speaks in phrases, SOB even at rest, pulse 100-120) of new-onset or worse than normal   Negative: Oxygen level (e.g., pulse oximetry) 90 percent or lower   Negative: Wheezing can be heard across the room   Negative: Drooling or spitting out saliva (because can't swallow)   Negative: Any history of prior \"blood clot\" in leg or " "lungs   Negative: Illness requiring prolonged bedrest in past month (e.g., immobilization, long hospital stay)   Negative: Hip or leg fracture (broken bone) in past month (or had cast on leg or ankle in past month)   Negative: Major surgery in the past month   Negative: Long-distance travel in past month (e.g., car, bus, train, plane; with trip lasting 6 or more hours)   Negative: Cancer treatment in past six months (or has cancer now)   Negative: Extra heart beats OR irregular heart beating (i.e., \"palpitations\")   Negative: Fever > 103 F (39.4 C)   Negative: Fever > 101 F (38.3 C) and over 60 years of age   Negative: Fever > 100.0 F (37.8 C) and bedridden (e.g., nursing home patient, stroke, chronic illness, recovering from surgery)   Negative: Fever > 100.0 F (37.8 C) and diabetes mellitus or weak immune system (e.g., HIV positive, cancer chemo, splenectomy, organ transplant, chronic steroids)   Negative: Periods where breathing stops and then resumes normally and bedridden (e.g., nursing home patient, CVA)   Negative: Pregnant or postpartum (from 0 to 6 weeks after delivery)   Negative: Patient sounds very sick or weak to the triager   Negative: MILD difficulty breathing (e.g., minimal/no SOB at rest, SOB with walking, pulse < 100) of new-onset or worse than normal   Negative: Longstanding difficulty breathing and not responding to usual therapy   Negative: Continuous (nonstop) coughing    Answer Assessment - Initial Assessment Questions  1. RESPIRATORY STATUS: \"Describe your breathing?\" (e.g., wheezing, shortness of breath, unable to speak, severe coughing)       If not doing a lot of movement, breathing is better.  Yesterday was bad shortness of breath - history of COPD some wheezing    2. ONSET: \"When did this breathing problem begin?\"       Yesterday afternoon     3. PATTERN \"Does the difficult breathing come and go, or has it been constant since it started?\"       Intermittent    4. SEVERITY: \"How bad is " "your breathing?\" (e.g., mild, moderate, severe)     - MILD: No SOB at rest, mild SOB with walking, speaks normally in sentences, can lie down, no retractions, pulse < 100.     - MODERATE: SOB at rest, SOB with minimal exertion and prefers to sit, cannot lie down flat, speaks in phrases, mild retractions, audible wheezing, pulse 100-120.     - SEVERE: Very SOB at rest, speaks in single words, struggling to breathe, sitting hunched forward, retractions, pulse > 120       If moving around, walking up and downstairs more difficult.  Patient stated yesterday was worse.      5. RECURRENT SYMPTOM: \"Have you had difficulty breathing before?\" If Yes, ask: \"When was the last time?\" and \"What happened that time?\"       Did not go into the clinic    6. CARDIAC HISTORY: \"Do you have any history of heart disease?\" (e.g., heart attack, angina, bypass surgery, angioplasty)       No    7. LUNG HISTORY: \"Do you have any history of lung disease?\"  (e.g., pulmonary embolus, asthma, emphysema)      COPD, chronic bronchitis    8. CAUSE: \"What do you think is causing the breathing problem?\"       COPD - weather    9. OTHER SYMPTOMS: \"Do you have any other symptoms? (e.g., dizziness, runny nose, cough, chest pain, fever)      No    10. O2 SATURATION MONITOR:  \"Do you use an oxygen saturation monitor (pulse oximeter) at home?\" If Yes, \"What is your reading (oxygen level) today?\" \"What is your usual oxygen saturation reading?\" (e.g., 95%)        91-95 %    11. PREGNANCY: \"Is there any chance you are pregnant?\" \"When was your last menstrual period?\"        NA    12. TRAVEL: \"Have you traveled out of the country in the last month?\" (e.g., travel history, exposures)        Unknown    Protocols used: Breathing Difficulty-A-[place]  Nidia Humphrey RN    "

## 2023-07-31 NOTE — ED TRIAGE NOTES
"Pt c/o worsening SOA; onset 1 day ago. Hx COPD.   Has used his rescuer inhaler a couple times today without relief.   Denies fevers, CP.     BP (!) 179/105   Pulse 88   Temp 97.6  F (36.4  C)   Resp 24   Ht 1.778 m (5' 10\")   Wt 56.1 kg (123 lb 11.2 oz)   SpO2 98%   BMI 17.75 kg/m         Triage Assessment       Row Name 07/31/23 0548       Triage Assessment (Adult)    Airway WDL WDL       Respiratory WDL    Respiratory WDL X;rhythm/pattern    Rhythm/Pattern, Respiratory shortness of breath       Skin Circulation/Temperature WDL    Skin Circulation/Temperature WDL WDL                    "

## 2023-07-31 NOTE — DISCHARGE INSTRUCTIONS
Your testing today was reassuring overall.  X-ray did not show an obvious pneumonia.  Your lab work did show some very mild heart distress but this was stable today.  I think you are most likely experiencing a COPD exacerbation.  Prescriptions for an antibiotic and steroid were provided today.  Please fill and take as instructed.  Refill of your inhaler was also provided.  Please follow-up with your primary care doctor as soon as possible for recheck.  Referral to cardiology was also provided today to further evaluate your abnormal lab/heart results.  Return to the emergency department immediately in the meantime for any new or acutely worsened symptoms.

## 2023-08-01 DIAGNOSIS — M54.50 LUMBAR PAIN: ICD-10-CM

## 2023-08-01 DIAGNOSIS — M41.9 SCOLIOSIS OF THORACOLUMBAR SPINE, UNSPECIFIED SCOLIOSIS TYPE: ICD-10-CM

## 2023-08-04 RX ORDER — CYCLOBENZAPRINE HCL 5 MG
TABLET ORAL
Qty: 90 TABLET | Refills: 3 | Status: SHIPPED | OUTPATIENT
Start: 2023-08-04 | End: 2024-02-23

## 2023-08-28 ENCOUNTER — NURSE TRIAGE (OUTPATIENT)
Dept: CARDIOLOGY | Facility: CLINIC | Age: 68
End: 2023-08-28
Payer: COMMERCIAL

## 2023-08-28 DIAGNOSIS — J43.2 CENTRILOBULAR EMPHYSEMA (H): Primary | ICD-10-CM

## 2023-08-28 NOTE — TELEPHONE ENCOUNTER
Patient requesting refill on combivent, he's been using more than prescribed and ran out.    Steffanie Boss, Pharmacy Technician  [facility]  [phone]

## 2023-08-29 RX ORDER — IPRATROPIUM BROMIDE AND ALBUTEROL 20; 100 UG/1; UG/1
1 SPRAY, METERED RESPIRATORY (INHALATION) 4 TIMES DAILY
Qty: 4 G | Refills: 0 | Status: SHIPPED | OUTPATIENT
Start: 2023-08-29 | End: 2023-09-13

## 2023-08-29 NOTE — TELEPHONE ENCOUNTER
Patient calling with increased shortness of breath and needing to use his Combivent more frequently. Writer called patient back to triage. He reports due to the poor air quality, he is using his Combivent up to 8 times on bad days. He has only a few puffs left. He continues to use his daily Advair as ordered. Discussed that if he is struggling, he should always update his PCP.     He also reports he has more issues with his reflux and that Protonix is not helping. He vomits more often and always has heartburn despite watching what he eats. He is wondering if he could increase the Protonix of try something else? He does have an appointment scheduled with you on 9/13.    Please fill Combivent today.     NATHAN ShipmanN, RN            Reason for Disposition   Longstanding difficulty breathing and not responding to usual therapy   Vomiting is a chronic symptom (recurrent or ongoing AND lasting > 4 weeks)    Additional Information   Negative: SEVERE difficulty breathing (e.g., struggling for each breath, speaks in single words, pulse > 120)   Negative: Breathing stopped and hasn't returned   Negative: Choking on something   Negative: Bluish (or gray) lips or face   Negative: Difficult to awaken or acting confused (e.g., disoriented, slurred speech)   Negative: Passed out (i.e., fainted, collapsed and was not responding)   Negative: Wheezing started suddenly after medicine, an allergic food, or bee sting   Negative: Stridor   Negative: Slow, shallow and weak breathing   Negative: Sounds like a life-threatening emergency to the triager   Negative: Chest pain   Negative: Wheezing (high pitched whistling sound) and previous asthma attacks or use of asthma medicines   Negative: Difficulty breathing and within 14 days of COVID-19 Exposure   Negative: Difficulty breathing and only present when coughing   Negative: Difficulty breathing and only from stuffy nose   Negative: Difficulty breathing and only from stuffy nose or  "runny nose from common cold   Negative: MODERATE difficulty breathing (e.g., speaks in phrases, SOB even at rest, pulse 100-120) of new-onset or worse than normal   Negative: Oxygen level (e.g., pulse oximetry) 90 percent or lower   Negative: Wheezing can be heard across the room   Negative: Drooling or spitting out saliva (because can't swallow)   Negative: Any history of prior \"blood clot\" in leg or lungs   Negative: Illness requiring prolonged bedrest in past month (e.g., immobilization, long hospital stay)   Negative: Hip or leg fracture (broken bone) in past month (or had cast on leg or ankle in past month)   Negative: Major surgery in the past month   Negative: Long-distance travel in past month (e.g., car, bus, train, plane; with trip lasting 6 or more hours)   Negative: Cancer treatment in past six months (or has cancer now)   Negative: Extra heart beats OR irregular heart beating (i.e., \"palpitations\")   Negative: Fever > 103 F (39.4 C)   Negative: Fever > 101 F (38.3 C) and over 60 years of age   Negative: Fever > 100.0 F (37.8 C) and bedridden (e.g., nursing home patient, stroke, chronic illness, recovering from surgery)   Negative: Fever > 100.0 F (37.8 C) and diabetes mellitus or weak immune system (e.g., HIV positive, cancer chemo, splenectomy, organ transplant, chronic steroids)   Negative: Periods where breathing stops and then resumes normally and bedridden (e.g., nursing home patient, CVA)   Negative: Pregnant or postpartum (from 0 to 6 weeks after delivery)   Negative: Patient sounds very sick or weak to the triager   Negative: MILD difficulty breathing (e.g., minimal/no SOB at rest, SOB with walking, pulse < 100) of new-onset or worse than normal   Negative: Longstanding difficulty breathing (e.g., CHF, COPD, emphysema) and worse than normal   Negative: Shock suspected (e.g., cold/pale/clammy skin, too weak to stand, low BP, rapid pulse)   Negative: Difficult to awaken or acting confused (e.g., " disoriented, slurred speech)   Negative: Sounds like a life-threatening emergency to the triager   Negative: Vomiting occurs only while coughing   Negative: Pregnant < 20 Weeks and nausea/vomiting began in early pregnancy (i.e., 4-8 weeks pregnant)   Negative: Chest pain   Negative: Headache is main symptom   Negative: Vomiting red blood or black (coffee ground) material   Negative: Vomiting and hernia is more painful or swollen than usual   Negative: Recent head injury (within 3 days)   Negative: Recent abdominal injury (within 7 days)   Negative: Insulin-dependent diabetes and glucose > 240 mg/dL (13 mmol/L)   Negative: Severe pain in one eye   Negative: SEVERE vomiting (e.g., 6 or more times/day)  (Exception: Patient sounds well, is drinking liquids, does not sound dehydrated, and vomiting has lasted less than 24 hours.)   Negative: MODERATE vomiting (e.g., 3 - 5 times/day) and age > 60 years   Negative: Constant abdominal pain lasting > 2 hours   Negative: High-risk adult (e.g., brain tumor, V-P shunt, hernia)   Negative: Drinking very little and has signs of dehydration (e.g., no urine > 12 hours, very dry mouth, very lightheaded)   Negative: Patient sounds very sick or weak to the triager   Negative: Vomiting and abdomen looks much more swollen than usual   Negative: Vomiting contains bile (green color)   Negative: Fever > 103 F (39.4 C)   Negative: Fever > 101 F (38.3 C) and over 60 years of age   Negative: Fever > 100.0 F (37.8 C) and has a weak immune system (e.g., HIV positive, cancer chemo, organ transplant, splenectomy, chronic steroids)   Negative: Fever > 100.0 F (37.8 C) and bedridden (e.g., nursing home patient, stroke, chronic illness, recovering from surgery)   Negative: Taking any of the following medications: digoxin (Lanoxin), lithium, theophylline, phenytoin (Dilantin)   Negative: SEVERE headache and vomiting   Negative: MILD to MODERATE vomiting (e.g., 1-5 times/day) and lasts > 48 hours (2  "days)   Negative: Fever present > 3 days (72 hours)   Negative: Patient wants to be seen   Negative: Vomiting a prescription medication   Negative: Substance use (drug use) or unhealthy alcohol use, known or suspected    Answer Assessment - Initial Assessment Questions  1. RESPIRATORY STATUS: \"Describe your breathing?\" (e.g., wheezing, shortness of breath, unable to speak, severe coughing)       More SOB, wheezing  2. ONSET: \"When did this breathing problem begin?\"       This summer with poor air quality  3. PATTERN \"Does the difficult breathing come and go, or has it been constant since it started?\"       Intermittent with poor air quality and humidity  4. SEVERITY: \"How bad is your breathing?\" (e.g., mild, moderate, severe)     - MILD: No SOB at rest, mild SOB with walking, speaks normally in sentences, can lie down, no retractions, pulse < 100.     - MODERATE: SOB at rest, SOB with minimal exertion and prefers to sit, cannot lie down flat, speaks in phrases, mild retractions, audible wheezing, pulse 100-120.     - SEVERE: Very SOB at rest, speaks in single words, struggling to breathe, sitting hunched forward, retractions, pulse > 120       Mild  5. RECURRENT SYMPTOM: \"Have you had difficulty breathing before?\" If Yes, ask: \"When was the last time?\" and \"What happened that time?\"       Yes  6. CARDIAC HISTORY: \"Do you have any history of heart disease?\" (e.g., heart attack, angina, bypass surgery, angioplasty)       NO  7. LUNG HISTORY: \"Do you have any history of lung disease?\"  (e.g., pulmonary embolus, asthma, emphysema)      Yes, COPD  8. CAUSE: \"What do you think is causing the breathing problem?\"       Poor air quality  9. OTHER SYMPTOMS: \"Do you have any other symptoms? (e.g., dizziness, runny nose, cough, chest pain, fever)      No  10. O2 SATURATION MONITOR:  \"Do you use an oxygen saturation monitor (pulse oximeter) at home?\" If Yes, \"What is your reading (oxygen level) today?\" \"What is your usual oxygen " "saturation reading?\" (e.g., 95%)        No  11. PREGNANCY: \"Is there any chance you are pregnant?\" \"When was your last menstrual period?\"        No  12. TRAVEL: \"Have you traveled out of the country in the last month?\" (e.g., travel history, exposures)        No    Answer Assessment - Initial Assessment Questions  1. VOMITING SEVERITY: \"How many times have you vomited in the past 24 hours?\"      - MILD:  1 - 2 times/day     - MODERATE: 3 - 5 times/day, decreased oral intake without significant weight loss or symptoms of dehydration     - SEVERE: 6 or more times/day, vomits everything or nearly everything, with significant weight loss, symptoms of dehydration       Mild  2. ONSET: \"When did the vomiting begin?\"       The last few months it  has been worsening  3. FLUIDS: \"What fluids or food have you vomited up today?\" \"Have you been able to keep any fluids down?\"      Yes, he said it is more like reflux and heartburn  4. ABDOMINAL PAIN: \"Are your having any abdominal pain?\" If yes : \"How bad is it and what does it feel like?\" (e.g., crampy, dull, intermittent, constant)       Yes, get  abdominal pain frequently  5. DIARRHEA: \"Is there any diarrhea?\" If Yes, ask: \"How many times today?\"       No  6. CONTACTS: \"Is there anyone else in the family with the same symptoms?\"       N/A  7. CAUSE: \"What do you think is causing your vomiting?\"      Reflux  8. HYDRATION STATUS: \"Any signs of dehydration?\" (e.g., dry mouth [not only dry lips], too weak to stand) \"When did you last urinate?\"      No  9. OTHER SYMPTOMS: \"Do you have any other symptoms?\" (e.g., fever, headache, vertigo, vomiting blood or coffee grounds, recent head injury)      No  10. PREGNANCY: \"Is there any chance you are pregnant?\" \"When was your last menstrual period?\"        N/A    Protocols used: Breathing Difficulty-A-OH, Vomiting-A-OH    "

## 2023-08-30 NOTE — TELEPHONE ENCOUNTER
Patient requesting callback from Dr. East's RN for any further recommendations from provider as well.

## 2023-09-01 NOTE — TELEPHONE ENCOUNTER
Patient calling to follow up on previous message sent. Patient is having worsening acid reflux, affecting him to the point that it is even hard for him to work. See documentation in triage note below. Patient is taking protonix 40 mg 1x daily and this is not working.     Patient would like a response from provider today. Aurora Beebe LPN

## 2023-09-11 ENCOUNTER — HOSPITAL ENCOUNTER (OUTPATIENT)
Dept: NUTRITION | Facility: CLINIC | Age: 68
Discharge: HOME | End: 2023-09-11
Attending: FAMILY MEDICINE | Admitting: FAMILY MEDICINE
Payer: COMMERCIAL

## 2023-09-11 PROCEDURE — 97802 MEDICAL NUTRITION INDIV IN: CPT

## 2023-09-11 NOTE — PROGRESS NOTES
"OUTPATIENT CLINICAL NUTRITION SERVICES ASSESSMENT     REASON FOR ASSESSMENT  Keith Gonzalez referred by Chase East MD for MNT related to  Gastroesophageal reflux disease with esophagitis without hemorrhage [K21.00]  - Primary  Age-related osteoporosis without current pathological fracture [M81.0]     Weight Loss - Overweight/Obesity *per pt and chart, concern for weight loss and needing to gain weight due to underweight status     Patient accompanied by: N/A       ASSESSMENT   -PMH: embolism and thrombosis, COPD, hx of thromboembolism of vein, sacroiliac joint dysfunction, scoliosis of thoracolumbar spine, unspecified scoliosis type, age-related osteoporosis without current pathological fracture     What brings you to our session today? Have you met with an RD in the past?  Pt has never met with an RD in the past. Is here because doctor is concerned about weight loss. Is having a lot of issues with acid reflux. Seems to happen no matter what he eats. Sometimes happens before he eats. Occasionally food will help, sometimes it makes it worse. Notes that it depends on the day and there is no consistent pattern. Sometimes the food just \"wants out.\" Vomiting happens almost daily.     Is on an acid reducer once per day right now but does not feel that it is helping - has an appointment with PCP on Wednesday to discuss.      Nutritional Details:   -Food allergies: none  -Food sensitivities: none  -GI concerns: none besides GERD  -Appetite: hard to tell if he has an appetite recently. Difficult to discern acid from hunger cues.  -Pace of eating: slow - was instructed by PCP to have small bites, eat slowly, chew well. Does eat faster at work because of time constraint - only brings half a sandwich   -Role of cooking: self   -Role of food shopping: self      Diet Recall:  Breakfast: cups of coffee - used to drink about a pot before work, now drinks 2 cups per day  Lunch: 10 am (starts cooking), eats when finished " - meatloaf, chili, meat, rice 12:30-1 pm (?)  Dinner: tries to eat while on break but doesn't have a lot of time, doesn't like to take time away from other workers that need to start their break  Something to eat after work 10-11 pm (?)     Beverages: milk, water, soda occasionally   Dining out: several times per week - depends ex. [employer], [employer]       Pt takes calcium supplements daily, was at 2     Ensure (regular ensure plus) TID   Muscle milk (higher in calories)     3 day recall per pt:     Friday -   Belvita Biscuit  Beef cube steak  Rice with veg     Deli sandwich  Cheerios - 2 cups  Yogurt - 6 oz     Saturday -   Cheerios - 2 cups  Cheeseburger with fries  Pizza  2 cheeseburgers with strawberry shake  Yogurt 6 oz     Sunday -   Egg and sausage muffin  Ham and cheese sandwich  Chicken, bourne mac and cheese  Cheerios 2 cups     Monday -   Cheerios - 2 cups (so far during day of issac?)     Physical Activity:  Days per week: Mon thru Friday?   Duration: 8 hrs basically   Activity type: every day at work   Limitations: pain      NUTRITION FOCUSED PHYSICAL ASSESSMENT (NFPA) FOR DIAGNOSING MALNUTRITION  No: unable to adequately assess due to lack of in-depth NFPE         Observed: unable to adequately assess due to lack of in-depth NFPE     Obtained from Chart/Interdisciplinary Team: none noted (?)     LABS  Labs reviewed    Latest Reference Range & Units Most Recent   Creatinine 0.67 - 1.17 mg/dL 0.62 (L)  7/31/23 14:49   (L): Data is abnormally low       Latest Reference Range & Units Most Recent   Sodium 136 - 145 mmol/L 134 (L)  7/31/23 14:49   (L): Data is abnormally low       Latest Reference Range & Units Most Recent   PSA Tumor Marker 0.00 - 4.50 ng/mL 4.66 (H)  5/10/23 09:54   (H): Data is abnormally high     MEDICATIONS  Medications reviewed - fosamax, aspirin, oscal, flexeril, advair, inhaler, protonix, deltasone     ANTHROPOMETRICS *prior to issac  Height: 56.2 kg (123 lbs) *updated as 135  "lbs  Weight: 1.651 (5' 5\") *updated as 5' 10\" so will use 5' 10\" for IBW  BMI (kg/m2): 20.60 *per most recent weight at appointment  Weight Status: Normal BMI  IBW: 166  %IBW: 81  Weight History:      Wt Readings from Last 15 Encounters:   10/12/23 61.2 kg (135 lb)   09/13/23 59.4 kg (131 lb)   07/31/23 56.1 kg (123 lb 11.2 oz)   07/12/23 56.2 kg (123 lb 12.8 oz)   05/11/23 55.8 kg (123 lb)   03/09/23 56.2 kg (124 lb)   12/09/22 58 kg (127 lb 12.8 oz)   11/16/22 55.9 kg (123 lb 3.2 oz)   09/13/22 57.2 kg (126 lb)   07/21/22 57.2 kg (126 lb)   05/13/22 59 kg (130 lb)   03/09/22 59 kg (130 lb)   02/15/22 57.6 kg (127 lb)   02/01/22 57.2 kg (126 lb)   01/19/22 57.6 kg (127 lb)   Overall gain recently *updated     Latest weight at appointment time was 123 lbs - 5.4% weight loss in approx. 1 year and 4.5 - 5 months *does not meet criteria for malnutrition     UBW? Changes to your weight recently?  130 lbs when acid reflux started up (UBW)   123 lbs currently (per pt?)     Dosing weight: 61.2 kg using actual BW updated     ASSESSED NUTRITION NEEDS  Estimated Energy Needs: 1,836-2,142 kcals/day (30-35 Kcal/Kg)  Justification: (repletion)  Estimated Protein Needs: 49-61 grams protein/day (0.8-1 g pro/Kg)  Justification: (preservation of lean body mass)  Estimated Fluid Needs: 1,836 mL/day (30 mL/kg)     ASSESSED MALNUTRITION STATUS  % Weight Loss: Weight loss does not meet criteria for malnutrition - gain noted since issac, no recent weight at issac  % Intake: Decreased intake does not meet criteria for malnutrition - difficult to assess (need more info)  Subcutaneous Fat Loss: Unable to adequately assess  Loss of Muscle Mass: Unable to adequately assess  Fluid Retention: None noted (?)     Malnutrition Diagnosis: Unable to determine due to recent weight gain noted, lack of updated weight at issac time, difficult to assess nutrition hx (need more info), lack of in-depth NFPE      DIAGNOSIS   Nutrition Diagnosis: Inadequate oral " intake related to GERD, GI upset, time constraints as evidenced by dietary recall and BMI of 20.60      INTERVENTIONS   Nutrition Prescription: general, healthful nutrition therapy following the MyPlate method. Encouraging small, frequent meals that meet daily calorie and protein goals. 3 meals per day with smaller snacks 2-3 times per day as able. Encourage pt to eat mindfully and practice noticing and responding to hunger/fullness cues. Intake of plenty of fruits and vegetables of a variety of colors and types to promote antioxidant, vitamin/mineral intake. Focusing on lean proteins, WGs, limiting intake of saturated fat and trans fat. Importance of protein intake to rebuild and maintain LBM with recent weight loss. Reducing rules around food intake as applicable, focusing on flexible intake of a variety of types of food. Reducing foods that cause acid reflux symptoms.     IMPLEMENTATION   Assessed learning needs and learning preference: N/A  Teaching Method(s) used: Booklet / Handout  Explanation     Nutrition Education (Content):              a)  Discussed: went over pt's usual dietary intake, overall dietary patterns. Discussed increasing intake and/or calories. Pt mentioned changes made to help with GERD (? unsure if discussed strategies and if so, extent discussed). Etc.               b)  Provided high calorie, high protein (?) and GERD (?) handout(s) - unsure of exact, provided in-person *unsure if provided contact info with handouts or not     Nutrition Education (Application):              a)  Discussed current eating plans and/or recommended alternative food choices              b)  Patient verbalizes understanding of diet by creating goals that reflect diet recommendations and offering no additional questions or concerns at the end of the meeting      Anticipate good compliance   Stage of Change: action with potential contemplation and/or preparation (now or in future)  Additional: pt is open to  information, follow strategies to help with GERD. Likely will contemplate and/or prepare based on any info if provided and handout(s)     GOALS  N/A     FOLLOW UP/MONITORING   Progress towards goals will be monitored and evaluated per protocol and Practice Guidelines     Time Spent with Patient  Approx. 45 minutes (?) unsure of exact     Brigida Grimes RD, LD  Clinical Dietitian  Office: [phone]  Weekend pager: [phone]

## 2023-09-13 ENCOUNTER — OFFICE VISIT (OUTPATIENT)
Dept: FAMILY MEDICINE | Facility: CLINIC | Age: 68
End: 2023-09-13
Payer: COMMERCIAL

## 2023-09-13 VITALS
HEART RATE: 75 BPM | SYSTOLIC BLOOD PRESSURE: 138 MMHG | BODY MASS INDEX: 18.8 KG/M2 | DIASTOLIC BLOOD PRESSURE: 90 MMHG | TEMPERATURE: 97.5 F | RESPIRATION RATE: 16 BRPM | WEIGHT: 131 LBS | OXYGEN SATURATION: 97 %

## 2023-09-13 DIAGNOSIS — K21.00 GASTROESOPHAGEAL REFLUX DISEASE WITH ESOPHAGITIS WITHOUT HEMORRHAGE: Primary | ICD-10-CM

## 2023-09-13 DIAGNOSIS — J43.2 CENTRILOBULAR EMPHYSEMA (H): ICD-10-CM

## 2023-09-13 DIAGNOSIS — M81.0 AGE-RELATED OSTEOPOROSIS WITHOUT CURRENT PATHOLOGICAL FRACTURE: ICD-10-CM

## 2023-09-13 PROCEDURE — 90677 PCV20 VACCINE IM: CPT | Performed by: FAMILY MEDICINE

## 2023-09-13 PROCEDURE — 99214 OFFICE O/P EST MOD 30 MIN: CPT | Mod: 25 | Performed by: FAMILY MEDICINE

## 2023-09-13 PROCEDURE — 90471 IMMUNIZATION ADMIN: CPT | Performed by: FAMILY MEDICINE

## 2023-09-13 RX ORDER — IPRATROPIUM BROMIDE AND ALBUTEROL 20; 100 UG/1; UG/1
1-2 SPRAY, METERED RESPIRATORY (INHALATION) EVERY 4 HOURS PRN
Qty: 4 G | Refills: 11 | Status: SHIPPED | OUTPATIENT
Start: 2023-09-13 | End: 2024-03-18

## 2023-09-13 RX ORDER — AZITHROMYCIN 250 MG/1
TABLET, FILM COATED ORAL
Qty: 6 TABLET | Refills: 0 | Status: SHIPPED | OUTPATIENT
Start: 2023-09-13 | End: 2023-09-18

## 2023-09-13 RX ORDER — PREDNISONE 20 MG/1
40 TABLET ORAL DAILY
Qty: 10 TABLET | Refills: 0 | Status: SHIPPED | OUTPATIENT
Start: 2023-09-13 | End: 2023-09-18

## 2023-09-13 ASSESSMENT — PAIN SCALES - GENERAL: PAINLEVEL: NO PAIN (0)

## 2023-09-13 NOTE — PROGRESS NOTES
ICD-10-CM    1. Gastroesophageal reflux disease with esophagitis without hemorrhage  K21.00       2. Centrilobular emphysema (H)  J43.2 ipratropium-albuterol (COMBIVENT RESPIMAT)  MCG/ACT inhaler     predniSONE (DELTASONE) 20 MG tablet     azithromycin (ZITHROMAX) 250 MG tablet     PNEUMOCOCCAL 20 VALENT CONJUGATE (PREVNAR 20)      3. Age-related osteoporosis without current pathological fracture  M81.0          Returns for recheck.  He has had worsening reflux disease as demonstrated by early satiety, nausea, vomiting.  Had an EGD last year that was remarkable only for evidence of gastritis, but no mass etc.  This started after we tried to cut back on his PPI.  So he will go back on 40 mg twice a day.  I was doing this because its potential impact on his bone health, he already has established osteoporosis.  Fortunately, he is gaining weight.  He has met with nutrition, seems to be helping.  Recovered nicely from a COPD flare.  I sent prednisone and azithromycin to start if he develops profound shortness of breath again.  He agrees.  Refills for his rescue inhaler.  Continue with his maintenance.  Unfortunately has started smoking a small amount again.  Encouraged to quit.  Immunizations updated today.  I will see him back in a month    Subjective   Jaison is a 68 year old, presenting for the following health issues:  RECHECK (From last visit)        9/13/2023     9:41 AM   Additional Questions   Roomed by Abdiel Ferro CMA       History of Present Illness       COPD:  He presents for follow up of COPD.  Overall, COPD symptoms are stable since last visit.  He has same as usual fatigue or shortness of breath with exertion and same as usual shortness of breath at rest.  He sometimes coughs and does not have change in sputum. No recent fever. He can walk less than 1 block without stopping to rest. He can walk 2 flights of stairs without resting. The patient has had no ED, urgent care, or hospital admissions  because of COPD since the last visit.     Reason for visit:  Follow up visit    He eats 0-1 servings of fruits and vegetables daily.He consumes 0 sweetened beverage(s) daily.He exercises with enough effort to increase his heart rate 10 to 19 minutes per day.  He exercises with enough effort to increase his heart rate 5 days per week.   He is taking medications regularly.     N/v 2-3 times weekly, new issue  No blood  Wt up nicely 8 lb, not sure why  I had reduced his PPI in light of his bone health    Wt Readings from Last 4 Encounters:   09/13/23 59.4 kg (131 lb)   07/31/23 56.1 kg (123 lb 11.2 oz)   07/12/23 56.2 kg (123 lb 12.8 oz)   05/11/23 55.8 kg (123 lb)        Review of Systems         Objective    BP (!) 138/90 (BP Location: Right arm, Patient Position: Chair, Cuff Size: Adult Small)   Pulse 75   Temp 97.5  F (36.4  C) (Temporal)   Resp 16   Wt 59.4 kg (131 lb)   SpO2 97%   BMI 18.80 kg/m    Body mass index is 18.8 kg/m .  Physical Exam

## 2023-09-13 NOTE — NURSING NOTE
Prior to immunization administration, verified patients identity using patient s name and date of birth. Please see Immunization Activity for additional information.     Screening Questionnaire for Adult Immunization    Are you sick today?   No   Do you have allergies to medications, food, a vaccine component or latex?   No   Have you ever had a serious reaction after receiving a vaccination?   No   Do you have a long-term health problem with heart, lung, kidney, or metabolic disease (e.g., diabetes), asthma, a blood disorder, no spleen, complement component deficiency, a cochlear implant, or a spinal fluid leak?  Are you on long-term aspirin therapy?   No   Do you have cancer, leukemia, HIV/AIDS, or any other immune system problem?   No   Do you have a parent, brother, or sister with an immune system problem?   No   In the past 3 months, have you taken medications that affect  your immune system, such as prednisone, other steroids, or anticancer drugs; drugs for the treatment of rheumatoid arthritis, Crohn s disease, or psoriasis; or have you had radiation treatments?   No   Have you had a seizure, or a brain or other nervous system problem?   No   During the past year, have you received a transfusion of blood or blood    products, or been given immune (gamma) globulin or antiviral drug?   No   For women: Are you pregnant or is there a chance you could become       pregnant during the next month?   No   Have you received any vaccinations in the past 4 weeks?   No     Immunization questionnaire answers were all negative.      Patient instructed to remain in clinic for 15 minutes afterwards, and to report any adverse reactions.     Screening performed by Jocelin Ferro MA on 9/13/2023 at 10:37 AM.

## 2023-09-29 ENCOUNTER — TELEPHONE (OUTPATIENT)
Dept: FAMILY MEDICINE | Facility: CLINIC | Age: 68
End: 2023-09-29

## 2023-10-12 ENCOUNTER — OFFICE VISIT (OUTPATIENT)
Dept: FAMILY MEDICINE | Facility: CLINIC | Age: 68
End: 2023-10-12
Payer: COMMERCIAL

## 2023-10-12 VITALS
TEMPERATURE: 97.4 F | HEART RATE: 90 BPM | SYSTOLIC BLOOD PRESSURE: 138 MMHG | DIASTOLIC BLOOD PRESSURE: 92 MMHG | WEIGHT: 135 LBS | OXYGEN SATURATION: 98 % | BODY MASS INDEX: 19.37 KG/M2 | RESPIRATION RATE: 16 BRPM

## 2023-10-12 DIAGNOSIS — Z11.59 NEED FOR HEPATITIS C SCREENING TEST: ICD-10-CM

## 2023-10-12 DIAGNOSIS — Z12.11 SCREEN FOR COLON CANCER: Primary | ICD-10-CM

## 2023-10-12 DIAGNOSIS — R11.2 NAUSEA AND VOMITING, UNSPECIFIED VOMITING TYPE: ICD-10-CM

## 2023-10-12 DIAGNOSIS — K21.00 GASTROESOPHAGEAL REFLUX DISEASE WITH ESOPHAGITIS WITHOUT HEMORRHAGE: ICD-10-CM

## 2023-10-12 PROCEDURE — 99214 OFFICE O/P EST MOD 30 MIN: CPT | Performed by: FAMILY MEDICINE

## 2023-10-12 RX ORDER — ONDANSETRON 4 MG/1
4 TABLET, ORALLY DISINTEGRATING ORAL EVERY 4 HOURS PRN
Qty: 27 TABLET | Refills: 3 | Status: SHIPPED | OUTPATIENT
Start: 2023-10-12 | End: 2024-01-05

## 2023-10-12 ASSESSMENT — PAIN SCALES - GENERAL: PAINLEVEL: NO PAIN (0)

## 2023-10-12 NOTE — PROGRESS NOTES
ICD-10-CM    1. Screen for colon cancer  Z12.11       2. Need for hepatitis C screening test  Z11.59       3. Nausea and vomiting, unspecified vomiting type  R11.2 ondansetron (ZOFRAN ODT) 4 MG ODT tab     Adult GI  Referral - Consult Only      4. Gastroesophageal reflux disease with esophagitis without hemorrhage  K21.00 ondansetron (ZOFRAN ODT) 4 MG ODT tab     Adult GI  Referral - Consult Only         Gerd. Nl esophogram and EGD with inflammation. Nl gastric motility. Still with persistant reflux, nasuea, vomiting, and resulting in missed work. Weight increasing, that's good. Complicated by his severe kyphosis. See GI. Add anti emetics. Update me by hong in 1-2 wks,    Asiya   Jaison is a 68 year old, presenting for the following health issues:  Follow Up      History of Present Illness       COPD:  He presents for follow up of COPD.  Overall, COPD symptoms are stable since last visit.  He has same as usual fatigue or shortness of breath with exertion and same as usual shortness of breath at rest.  He sometimes coughs and does not have change in sputum. No recent fever. He can walk the length of 3-5 rooms without stopping to rest. He can walk 3 or more flights of stairs without resting. The patient has had no ED, urgent care, or hospital admissions because of COPD since the last visit.     Reason for visit:  Follow up  visit    He eats 0-1 servings of fruits and vegetables daily.He consumes 0 sweetened beverage(s) daily.He exercises with enough effort to increase his heart rate 60 or more minutes per day.  He exercises with enough effort to increase his heart rate 5 days per week.   He is taking medications regularly.           Still with regular nausea, vomiting, has to go home from work (twice this week)  Takes first in the am, second   Inflammation on EGD    Wt Readings from Last 4 Encounters:   10/12/23 61.2 kg (135 lb)   09/13/23 59.4 kg (131 lb)   07/31/23 56.1 kg (123 lb 11.2 oz)    07/12/23 56.2 kg (123 lb 12.8 oz)      Appetite good, eating better, more regularily, weight up 4 lbs  No blood in stool or emesis  Did better while at home Sunday, mild nausea            Review of Systems   Constitutional, HEENT, cardiovascular, pulmonary, gi and gu systems are negative, except as otherwise noted.      Objective    There were no vitals taken for this visit.  There is no height or weight on file to calculate BMI.  Physical Exam   GENERAL: thin, kyphotic, alert and no distress  NECK: no adenopathy, no asymmetry, masses, or scars and thyroid normal to palpation  RESP: lungs clear to auscultation - no rales, rhonchi or wheezes  CV: regular rate and rhythm, normal S1 S2, no S3 or S4, no murmur, click or rub, no peripheral edema and peripheral pulses strong  ABDOMEN: soft, nontender, no hepatosplenomegaly, no masses and bowel sounds normal  MS: no gross musculoskeletal defects noted, no edema

## 2023-10-12 NOTE — Clinical Note
Please call in 1 wk for update on his nausea since starting the zofran. If it doesn't work we'll try compazine

## 2023-10-24 ENCOUNTER — TELEPHONE (OUTPATIENT)
Dept: FAMILY MEDICINE | Facility: CLINIC | Age: 68
End: 2023-10-24
Payer: COMMERCIAL

## 2023-10-24 NOTE — TELEPHONE ENCOUNTER
Patient was notified of why per note from visit on 10/12      Gerd. Nl esophogram and EGD with inflammation. Nl gastric motility. Still with persistant reflux, nasuea, vomiting, and resulting in missed work. Weight increasing, that's good. Complicated by his severe kyphosis. See GI. Add anti emetics. Update me by hong in 1-2 wks,     No other questions.   Nessa Waldron MA 10/24/2023

## 2023-10-24 NOTE — TELEPHONE ENCOUNTER
General Call    Contacts         Type Contact Phone/Fax    10/24/2023 09:43 AM CDT Phone (Incoming) Jaison Gonzalez (Self) [phone] ()          Reason for Call: Patient called stating that Dr. East would like him to follow up with his GI Doctor. The patient is unsure of why he needs to be seen by the GI Doctor again.    What are your questions or concerns:  Why he needs to see the GI Doctor    Date of last appointment with provider: 10/12/23    Could we send this information to you in [email or web address] or would you prefer to receive a phone call?:   Patient would prefer a phone call   Okay to leave a detailed message?: Yes at Home number on file [phone] (home)

## 2023-11-08 NOTE — PROGRESS NOTES
"OUTPATIENT CLINICAL NUTRITION SERVICES ASSESSMENT    REASON FOR ASSESSMENT  Keith Gonzalez referred by Chase East MD for MNT related to  Gastroesophageal reflux disease with esophagitis without hemorrhage [K21.00]  - Primary  Age-related osteoporosis without current pathological fracture [M81.0]    Weight Loss - Overweight/Obesity *per pt and chart, concern for weight loss and needing to gain weight due to underweight status    Patient accompanied by: N/A      ASSESSMENT   -PMH: embolism and thrombosis, COPD, hx of thromboembolism of vein, sacroiliac joint dysfunction, scoliosis of thoracolumbar spine, unspecified scoliosis type, age-related osteoporosis without current pathological fracture    What brings you to our session today? Have you met with an RD in the past?  Pt has never met with an RD in the past. Is here because doctor is concerned about weight loss. Is having a lot of issues with acid reflux. Seems to happen no matter what he eats. Sometimes happens before he eats. Occasionally food will help, sometimes it makes it worse. Notes that it depends on the day and there is no consistent pattern. Sometimes the food just \"wants out.\" Vomiting happens almost daily.    Is on an acid reducer once per day right now but does not feel that it is helping - has an appointment with PCP on Wednesday to discuss.      Nutritional Details:   -Food allergies: none  -Food sensitivities: none  -GI concerns: none besides GERD  -Appetite: hard to tell if he has an appetite recently. Difficult to discern acid from hunger cues.  -Pace of eating: slow - was instructed by PCP to have small bites, eat slowly, chew well. Does eat faster at work because of time constraint - only brings half a sandwich   -Role of cooking: self   -Role of food shopping: self     Diet Recall:  Breakfast: cups of coffee - used to drink about a pot before work, now drinks 2 cups per day  Lunch: 10 am (starts cooking), eats when finished - " "meatloaf, chili, meat, rice 12:30-1 pm (?)  Dinner: tries to eat while on break but doesn't have a lot of time, doesn't like to take time away from other workers that need to start their break  Something to eat after work 10-11 pm (?)    Beverages: milk, water, soda occasionally   Dining out: several times per week - depends ex. [employer], [employer]      Pt takes calcium supplements daily, was at 2    Ensure (regular ensure plus) TID   Muscle milk (higher in calories)    3 day recall per pt:    Friday -   Belvita Biscuit  Beef cube steak  Rice with veg    Deli sandwich  Cheerios - 2 cups  Yogurt - 6 oz    Saturday -   Cheerios - 2 cups  Cheeseburger with fries  Pizza  2 cheeseburgers with strawberry shake  Yogurt 6 oz    Sunday -   Egg and sausage muffin  Ham and cheese sandwich  Chicken, bourne mac and cheese  Cheerios 2 cups    Monday -   Cheerios - 2 cups (so far during day of issac?)    Physical Activity:  Days per week: Mon thru Friday?   Duration: 8 hrs basically   Activity type: every day at work   Limitations: pain     NUTRITION FOCUSED PHYSICAL ASSESSMENT (NFPA) FOR DIAGNOSING MALNUTRITION  No: unable to adequately assess due to lack of in-depth NFPE         Observed: unable to adequately assess due to lack of in-depth NFPE    Obtained from Chart/Interdisciplinary Team: none noted (?)    LABS  Labs reviewed   Latest Reference Range & Units Most Recent   Creatinine 0.67 - 1.17 mg/dL 0.62 (L)  7/31/23 14:49   (L): Data is abnormally low     Latest Reference Range & Units Most Recent   Sodium 136 - 145 mmol/L 134 (L)  7/31/23 14:49   (L): Data is abnormally low     Latest Reference Range & Units Most Recent   PSA Tumor Marker 0.00 - 4.50 ng/mL 4.66 (H)  5/10/23 09:54   (H): Data is abnormally high    MEDICATIONS  Medications reviewed - fosamax, aspirin, oscal, flexeril, advair, inhaler, protonix, deltasone    ANTHROPOMETRICS *prior to issac  Height: 56.2 kg (123 lbs) *updated as 135 lbs  Weight: 1.651 (5' 5\") " "*updated as 5' 10\" so will use 5' 10\" for IBW  BMI (kg/m2): 20.60 *per most recent weight at appointment  Weight Status: Normal BMI  IBW: 166  %IBW: 81  Weight History:  Wt Readings from Last 15 Encounters:   10/12/23 61.2 kg (135 lb)   09/13/23 59.4 kg (131 lb)   07/31/23 56.1 kg (123 lb 11.2 oz)   07/12/23 56.2 kg (123 lb 12.8 oz)   05/11/23 55.8 kg (123 lb)   03/09/23 56.2 kg (124 lb)   12/09/22 58 kg (127 lb 12.8 oz)   11/16/22 55.9 kg (123 lb 3.2 oz)   09/13/22 57.2 kg (126 lb)   07/21/22 57.2 kg (126 lb)   05/13/22 59 kg (130 lb)   03/09/22 59 kg (130 lb)   02/15/22 57.6 kg (127 lb)   02/01/22 57.2 kg (126 lb)   01/19/22 57.6 kg (127 lb)   Overall gain recently *updated    Latest weight at appointment time was 123 lbs - 5.4% weight loss in approx. 1 year and 4.5 - 5 months *does not meet criteria for malnutrition    UBW? Changes to your weight recently?  130 lbs when acid reflux started up (UBW)   123 lbs currently (per pt?)    Dosing weight: 61.2 kg using actual BW updated    ASSESSED NUTRITION NEEDS  Estimated Energy Needs: 1,836-2,142 kcals/day (30-35 Kcal/Kg)  Justification: (repletion)  Estimated Protein Needs: 49-61 grams protein/day (0.8-1 g pro/Kg)  Justification: (preservation of lean body mass)  Estimated Fluid Needs: 1,836 mL/day (30 mL/kg)    ASSESSED MALNUTRITION STATUS  % Weight Loss: Weight loss does not meet criteria for malnutrition - gain noted since issac, no recent weight at issac  % Intake: Decreased intake does not meet criteria for malnutrition - difficult to assess (need more info)  Subcutaneous Fat Loss: Unable to adequately assess  Loss of Muscle Mass: Unable to adequately assess  Fluid Retention: None noted (?)    Malnutrition Diagnosis: Unable to determine due to recent weight gain noted, lack of updated weight at issac time, difficult to assess nutrition hx (need more info), lack of in-depth NFPE     DIAGNOSIS   Nutrition Diagnosis: Inadequate oral intake related to GERD, GI upset, time " constraints as evidenced by dietary recall and BMI of 20.60     INTERVENTIONS   Nutrition Prescription: general, healthful nutrition therapy following the MyPlate method. Encouraging small, frequent meals that meet daily calorie and protein goals. 3 meals per day with smaller snacks 2-3 times per day as able. Encourage pt to eat mindfully and practice noticing and responding to hunger/fullness cues. Intake of plenty of fruits and vegetables of a variety of colors and types to promote antioxidant, vitamin/mineral intake. Focusing on lean proteins, WGs, limiting intake of saturated fat and trans fat. Importance of protein intake to rebuild and maintain LBM with recent weight loss. Reducing rules around food intake as applicable, focusing on flexible intake of a variety of types of food. Reducing foods that cause acid reflux symptoms.    IMPLEMENTATION   Assessed learning needs and learning preference: N/A  Teaching Method(s) used: Booklet / Handout  Explanation    Nutrition Education (Content):              a)  Discussed: went over pt's usual dietary intake, overall dietary patterns. Discussed increasing intake and/or calories. Pt mentioned changes made to help with GERD (? unsure if discussed strategies and if so, extent discussed). Etc.               b)  Provided high calorie, high protein (?) and GERD (?) handout(s) - unsure of exact, provided in-person     Nutrition Education (Application):              a)  Discussed current eating plans and/or recommended alternative food choices              b)  Patient verbalizes understanding of diet by creating goals that reflect diet recommendations and offering no additional questions or concerns at the end of the meeting     Anticipate good compliance   Stage of Change: action with potential contemplation and/or preparation (now or in future)  Additional: pt is open to information, follow strategies to help with GERD. Likely will contemplate and/or prepare based on any  info if provided and handout(s)    GOALS  N/A    FOLLOW UP/MONITORING   Progress towards goals will be monitored and evaluated per protocol and Practice Guidelines    Time Spent with Patient  Approx. 45 minutes (?) unsure of exact    Brigida Grimes RD, LD  Clinical Dietitian  Office: [phone]  Weekend pager: [phone]

## 2023-11-09 ENCOUNTER — TELEPHONE (OUTPATIENT)
Dept: FAMILY MEDICINE | Facility: CLINIC | Age: 68
End: 2023-11-09
Payer: COMMERCIAL

## 2023-11-09 DIAGNOSIS — K21.00 GASTROESOPHAGEAL REFLUX DISEASE WITH ESOPHAGITIS WITHOUT HEMORRHAGE: Primary | ICD-10-CM

## 2023-11-09 NOTE — TELEPHONE ENCOUNTER
Medication Question or Refill    Contacts         Type Contact Phone/Fax    11/09/2023 12:08 PM CST Phone (Incoming) Jaison Gonzalez (Self) [phone] (H)            What medication are you calling about (include dose and sig)?: pantoprazole (PROTONIX) 40 MG EC tablet     Preferred Pharmacy:     [facility] [address]   [place] [address]  Phone: [phone] Fax: [phone]      Controlled Substance Agreement on file:   CSA -- Patient Level:    CSA: None found at the patient level.       Who prescribed the medication?: Dr. East    When did you use the medication last? Last night    Patient offered an appointment? No    Do you have any questions or concerns?  Yes: The pantoprazole (PROTONIX) 40 MG EC tablet  is not working for the patients acid reflux. He is throwing up and would like to try a different medication.      Could we send this information to you in [place] or would you prefer to receive a phone call?:   Patient would prefer a phone call   Okay to leave a detailed message?: Yes at Home number on file [phone] (home)

## 2023-11-10 RX ORDER — RABEPRAZOLE SODIUM 20 MG/1
40 TABLET, DELAYED RELEASE ORAL DAILY
Qty: 180 TABLET | Refills: 0 | Status: SHIPPED | OUTPATIENT
Start: 2023-11-10 | End: 2024-07-08

## 2023-11-13 NOTE — TELEPHONE ENCOUNTER
Patient insurance will not cover medication. Would you like to start a PA for this or send alternate medication?    Is taking OTC prilosec 20mg in the mean time.

## 2023-11-14 NOTE — TELEPHONE ENCOUNTER
Jaison needs to call his insurance and find out what medicine similar to omeprazole is covered, or we can maybe have care coordination help?

## 2023-11-15 ENCOUNTER — TELEPHONE (OUTPATIENT)
Dept: FAMILY MEDICINE | Facility: CLINIC | Age: 68
End: 2023-11-15
Payer: COMMERCIAL

## 2023-11-15 NOTE — TELEPHONE ENCOUNTER
FYI - Status Update    Who is Calling: patient    Update: Patient stating he was unable to  the new prescription, Aciphex as insurance will not cover, and it is $250.00 dollars.  Patient stating in place of that prescription pharmacy instructed to  Prilosec 20 mg. Patient stating he is taking 1 in the morning on an empty stomach past 4 days.  Patient stating the last two days within 10 minutes of finishing his meal/lunch he throws it up.    Patient would like a call from his provider on plan of treatment.    Does caller want a call/response back: Yes     Could we send this information to you in [email or web address] or would you prefer to receive a phone call?:   Patient would prefer a phone call   Okay to leave a detailed message?: Yes at Cell number on file:    Telephone Information:   Mobile [phone]

## 2023-11-16 NOTE — TELEPHONE ENCOUNTER
Patient calling and requesting to talk to the nurse that he had just spoke with. Declined to give writer any information, stating he wants the nurse to call him back that he spoke with this morning. He is asking that Lilian call him back at [phone] as soon as possible. Aurora Beebe LPN

## 2023-11-16 NOTE — TELEPHONE ENCOUNTER
Patient calling again and is wanting to speak with Lilian again as he has not heard anything back from the doctor. Aurora Beebe LPN

## 2023-11-16 NOTE — TELEPHONE ENCOUNTER
Message handled by Nurse Triage with [facility] - provider name: Kita .    Provider does not think it is the prilosec. He should keep trying to eat small amount and drink fluids.    If he keeps vomiting and he is unable to keep food he should return to the ER for fluids.    Patient notified of providers advice.    Lilian Alvarez RN on 11/16/2023 at 1:52 PM

## 2023-11-16 NOTE — TELEPHONE ENCOUNTER
Message handled by Nurse Triage with [facility] - provider name: Kita .    Patient should take 2 Prilosec 20 minutes before his biggest meal.    Patient notified.    Lilian Alvarez RN on 11/16/2023 at 10:10 AM

## 2023-11-16 NOTE — TELEPHONE ENCOUNTER
He took the second prilosec at 10:10 and at 10:30 and he was vomiting straight yellowish for 30 minutes on and off.    He took zofran and that helped a little bit.    He would like to know if this is normal and what he should do?    Lilian Alvarez RN on 11/16/2023 at 11:11 AM

## 2023-12-13 ENCOUNTER — OFFICE VISIT (OUTPATIENT)
Dept: FAMILY MEDICINE | Facility: CLINIC | Age: 68
End: 2023-12-13
Payer: COMMERCIAL

## 2023-12-13 VITALS
WEIGHT: 134.5 LBS | BODY MASS INDEX: 19.3 KG/M2 | HEART RATE: 62 BPM | RESPIRATION RATE: 18 BRPM | TEMPERATURE: 99.5 F | SYSTOLIC BLOOD PRESSURE: 122 MMHG | OXYGEN SATURATION: 100 % | DIASTOLIC BLOOD PRESSURE: 82 MMHG

## 2023-12-13 DIAGNOSIS — Z11.59 NEED FOR HEPATITIS C SCREENING TEST: ICD-10-CM

## 2023-12-13 DIAGNOSIS — J43.1 PANLOBULAR EMPHYSEMA (H): ICD-10-CM

## 2023-12-13 DIAGNOSIS — Z85.47 HISTORY OF TESTICULAR CANCER: ICD-10-CM

## 2023-12-13 DIAGNOSIS — Z12.11 SCREEN FOR COLON CANCER: Primary | ICD-10-CM

## 2023-12-13 DIAGNOSIS — K21.00 GASTROESOPHAGEAL REFLUX DISEASE WITH ESOPHAGITIS WITHOUT HEMORRHAGE: ICD-10-CM

## 2023-12-13 DIAGNOSIS — M40.00 KYPHOSIS (ACQUIRED) (POSTURAL): ICD-10-CM

## 2023-12-13 DIAGNOSIS — Z86.718 HISTORY OF THROMBOEMBOLISM OF VEIN: ICD-10-CM

## 2023-12-13 DIAGNOSIS — M81.0 AGE-RELATED OSTEOPOROSIS WITHOUT CURRENT PATHOLOGICAL FRACTURE: ICD-10-CM

## 2023-12-13 PROCEDURE — 90480 ADMN SARSCOV2 VAC 1/ONLY CMP: CPT | Performed by: FAMILY MEDICINE

## 2023-12-13 PROCEDURE — 91320 SARSCV2 VAC 30MCG TRS-SUC IM: CPT | Performed by: FAMILY MEDICINE

## 2023-12-13 PROCEDURE — 99213 OFFICE O/P EST LOW 20 MIN: CPT | Performed by: FAMILY MEDICINE

## 2023-12-13 ASSESSMENT — PAIN SCALES - GENERAL: PAINLEVEL: NO PAIN (1)

## 2023-12-13 NOTE — PROGRESS NOTES
Assessment & Plan       ICD-10-CM    1. Screen for colon cancer  Z12.11       2. Need for hepatitis C screening test  Z11.59       3. Panlobular emphysema (H)  J43.1       4. Age-related osteoporosis without current pathological fracture  M81.0       5. Gastroesophageal reflux disease with esophagitis without hemorrhage  K21.00       6. Kyphosis (acquired) (postural)  M40.00          Routine recheck.  Emphysema, osteoporosis, reflux disease stable.  As long as he continues to eat small meals, keep up on his PPI, and use Zofran as needed.  Again declines a GI consult.  Fortunately weight stable.    Chase East MD       Follow up in regard to COPD and Acid Reflux. Answers submitted by the patient for this visit:Answers submitted by the patient for this visit:      COPD (Chronic Obstructive Pulmonary Disease) Visit Questionnaire (Submitted on 12/11/2023)  Chief Complaint: Chronic problems general questions HPI Form  Current status of COPD symptom:: no change  Status of fatigue and dyspnea with ambulation:: same as usual  Status of dyspnea:: same as usual  Increase or change in cough or sputum:: sometimes  Have you noticed a change in your sputum/phlegm?: No  Have you experienced a recent fever?: No  Baseline ambulation without stopping to rest:: the length of 1-2 rooms  Number of flights of stairs without resting:: 2  Have you had any Emergency Room, Urgent Care visits or a Hospital admission because of your COPD since your last office visit?: No  General Questionnaire (Submitted on 12/11/2023)  Chief Complaint: Chronic problems general questions HPI Form  What is the reason for your visit today? : Follo2 visit  How many servings of fruits and vegetables do you eat daily?: 0-1  On average, how many sweetened beverages do you drink each day (Examples: soda, juice, sweet tea, etc.  Do NOT count diet or artificially sweetened beverages)?: 1  How many minutes a day do you exercise enough to make your heart beat faster?:  9 or less  How many days a week do you exercise enough to make your heart beat faster?: 7  How many days per week do you miss taking your medication?: 0            No follow-ups on file.    Chase East MD  [facility]    Asiya Parker is a 68 year old male who presents to clinic today for the following health issues     HPI     Leaving work 1-2 times per week with nausea and vomiting  Has not tried the zofran lately  Wt up nicely and stable        Review of Systems   Constitutional, HEENT, cardiovascular, pulmonary, gi and gu systems are negative, except as otherwise noted.      Objective    /82 (BP Location: Left arm, Patient Position: Sitting, Cuff Size: Adult Regular)   Pulse 62   Temp 99.5  F (37.5  C) (Temporal)   Resp 18   Wt 61 kg (134 lb 8 oz)   SpO2 100%   BMI 19.30 kg/m       Physical Exam   Thin man is in good spirits.  Heart regular rate lungs clear.  Extremities thin but well-perfused.  No edema.  Extremely kyphotic.

## 2023-12-22 ENCOUNTER — TELEPHONE (OUTPATIENT)
Dept: FAMILY MEDICINE | Facility: CLINIC | Age: 68
End: 2023-12-22

## 2023-12-22 NOTE — TELEPHONE ENCOUNTER
Reason for Call:  Appointment Request    Patient requesting this type of appt: Chronic Diease Management/Medication/Follow-Up    Requested provider: Chase East    Reason patient unable to be scheduled: Not within requested timeframe    When does patient want to be seen/preferred time:  in beginning of January , but would like to keep the 1/5/24 appointment for now. He has jury duty but may not have to go.        Could we send this information to you in [facility] or would you prefer to receive a phone call?:   Patient would prefer a phone call   Okay to leave a detailed message?: Yes at Cell number on file:    Telephone Information:   Mobile [phone]       Call taken on 12/22/2023 at 11:14 AM by Susie Francisco LPN

## 2024-01-05 ENCOUNTER — OFFICE VISIT (OUTPATIENT)
Dept: FAMILY MEDICINE | Facility: CLINIC | Age: 69
End: 2024-01-05
Payer: COMMERCIAL

## 2024-01-05 VITALS
DIASTOLIC BLOOD PRESSURE: 90 MMHG | BODY MASS INDEX: 19.41 KG/M2 | HEART RATE: 84 BPM | RESPIRATION RATE: 18 BRPM | SYSTOLIC BLOOD PRESSURE: 120 MMHG | HEIGHT: 70 IN | WEIGHT: 135.6 LBS | OXYGEN SATURATION: 96 % | TEMPERATURE: 97.6 F

## 2024-01-05 DIAGNOSIS — R11.2 NAUSEA AND VOMITING, UNSPECIFIED VOMITING TYPE: ICD-10-CM

## 2024-01-05 DIAGNOSIS — Z12.11 SCREEN FOR COLON CANCER: Primary | ICD-10-CM

## 2024-01-05 DIAGNOSIS — Z11.59 NEED FOR HEPATITIS C SCREENING TEST: ICD-10-CM

## 2024-01-05 DIAGNOSIS — K21.00 GASTROESOPHAGEAL REFLUX DISEASE WITH ESOPHAGITIS WITHOUT HEMORRHAGE: ICD-10-CM

## 2024-01-05 PROCEDURE — 90471 IMMUNIZATION ADMIN: CPT | Performed by: FAMILY MEDICINE

## 2024-01-05 PROCEDURE — 99213 OFFICE O/P EST LOW 20 MIN: CPT | Mod: 25 | Performed by: FAMILY MEDICINE

## 2024-01-05 PROCEDURE — 90678 RSV VACC PREF BIVALENT IM: CPT | Performed by: FAMILY MEDICINE

## 2024-01-05 RX ORDER — ONDANSETRON 4 MG/1
4 TABLET, ORALLY DISINTEGRATING ORAL EVERY 4 HOURS PRN
Qty: 27 TABLET | Refills: 3 | Status: SHIPPED | OUTPATIENT
Start: 2024-01-05 | End: 2024-07-08

## 2024-01-05 RX ORDER — OMEPRAZOLE 40 MG/1
40 CAPSULE, DELAYED RELEASE ORAL DAILY
COMMUNITY

## 2024-01-05 ASSESSMENT — PAIN SCALES - GENERAL: PAINLEVEL: NO PAIN (0)

## 2024-01-05 NOTE — PROGRESS NOTES
Assessment & Plan       ICD-10-CM    1. Screen for colon cancer  Z12.11       2. Need for hepatitis C screening test  Z11.59       3. Nausea and vomiting, unspecified vomiting type  R11.2 ondansetron (ZOFRAN ODT) 4 MG ODT tab      4. Gastroesophageal reflux disease with esophagitis without hemorrhage  K21.00 ondansetron (ZOFRAN ODT) 4 MG ODT tab           Routine med check.  Stable.  No changes today.    Return in about 2 months (around 3/5/2024).    Chase East MD  [facility] DARIEL Blackwood is a 68 year old, presenting for the following health issues:  COPD (Acid reflux /Follow up )      1/5/2024     8:53 AM   Additional Questions   Roomed by Rocío       History of Present Illness       COPD:  He presents for follow up of COPD.  Overall, COPD symptoms are stable since last visit.  He has same as usual fatigue or shortness of breath with exertion and same as usual shortness of breath at rest.  He sometimes coughs and does not have change in sputum. No recent fever. He can walk the length of 1-2 rooms without stopping to rest. He can walk 2 flights of stairs without resting. The patient has had no ED, urgent care, or hospital admissions because of COPD since the last visit.     Reason for visit:  Follow up visit    He eats 0-1 servings of fruits and vegetables daily.He consumes 0 sweetened beverage(s) daily.He exercises with enough effort to increase his heart rate 10 to 19 minutes per day.  He exercises with enough effort to increase his heart rate 5 days per week.   He is taking medications regularly.     Back on prilosec, more affordable, working ok  Vomiting maybe once weekly  Missing 1 day work per wk  Wt stable 130s                Review of Systems   Constitutional, HEENT, cardiovascular, pulmonary, gi and gu systems are negative, except as otherwise noted.      Objective    BP (!) 120/90 (Cuff Size: Adult Regular)   Pulse 84   Temp 97.6  F (36.4  C) (Temporal)   Resp  "18   Ht 1.778 m (5' 10\")   Wt 61.5 kg (135 lb 9.6 oz)   SpO2 96%   BMI 19.46 kg/m    Body mass index is 19.46 kg/m .  Physical Exam   Well-appearing.  Good historian.  Alert and oriented.  Heart regular.  Lungs clear bilaterally.                    "

## 2024-01-06 ENCOUNTER — HEALTH MAINTENANCE LETTER (OUTPATIENT)
Age: 69
End: 2024-01-06

## 2024-01-08 PROBLEM — Z86.718 HISTORY OF THROMBOEMBOLISM OF VEIN: Status: ACTIVE | Noted: 2021-09-17

## 2024-01-08 PROBLEM — Z85.47 HISTORY OF TESTICULAR CANCER: Status: ACTIVE | Noted: 2024-01-08

## 2024-02-22 DIAGNOSIS — M54.50 LUMBAR PAIN: ICD-10-CM

## 2024-02-22 DIAGNOSIS — M41.9 SCOLIOSIS OF THORACOLUMBAR SPINE, UNSPECIFIED SCOLIOSIS TYPE: ICD-10-CM

## 2024-02-23 RX ORDER — CYCLOBENZAPRINE HCL 5 MG
TABLET ORAL
Qty: 90 TABLET | Refills: 3 | Status: SHIPPED | OUTPATIENT
Start: 2024-02-23 | End: 2024-08-30

## 2024-03-07 ENCOUNTER — OFFICE VISIT (OUTPATIENT)
Dept: FAMILY MEDICINE | Facility: CLINIC | Age: 69
End: 2024-03-07
Payer: COMMERCIAL

## 2024-03-07 VITALS
WEIGHT: 136 LBS | DIASTOLIC BLOOD PRESSURE: 82 MMHG | OXYGEN SATURATION: 95 % | SYSTOLIC BLOOD PRESSURE: 138 MMHG | HEART RATE: 82 BPM | BODY MASS INDEX: 19.51 KG/M2 | TEMPERATURE: 97.1 F | RESPIRATION RATE: 16 BRPM

## 2024-03-07 DIAGNOSIS — J43.2 CENTRILOBULAR EMPHYSEMA (H): ICD-10-CM

## 2024-03-07 DIAGNOSIS — R11.2 NAUSEA AND VOMITING, UNSPECIFIED VOMITING TYPE: ICD-10-CM

## 2024-03-07 DIAGNOSIS — K21.00 GASTROESOPHAGEAL REFLUX DISEASE WITH ESOPHAGITIS WITHOUT HEMORRHAGE: ICD-10-CM

## 2024-03-07 DIAGNOSIS — Z11.59 NEED FOR HEPATITIS C SCREENING TEST: ICD-10-CM

## 2024-03-07 DIAGNOSIS — Z12.11 SCREEN FOR COLON CANCER: Primary | ICD-10-CM

## 2024-03-07 DIAGNOSIS — D64.9 ANEMIA, UNSPECIFIED TYPE: ICD-10-CM

## 2024-03-07 PROCEDURE — 99214 OFFICE O/P EST MOD 30 MIN: CPT | Performed by: FAMILY MEDICINE

## 2024-03-07 ASSESSMENT — PAIN SCALES - GENERAL: PAINLEVEL: NO PAIN (0)

## 2024-03-07 NOTE — Clinical Note
Could someone see if we can switch his upcoming GI visit to someone local here? He'd like to be seen in person

## 2024-03-07 NOTE — PROGRESS NOTES
Assessment & Plan       ICD-10-CM    1. Screen for colon cancer  Z12.11       2. Need for hepatitis C screening test  Z11.59       3. Centrilobular emphysema (H)  J43.2       4. Anemia, unspecified type  D64.9 Hemoglobin     Iron and iron binding capacity     Ferritin     Vitamin B12     Folate      5. Nausea and vomiting, unspecified vomiting type  R11.2       6. Gastroesophageal reflux disease with esophagitis without hemorrhage  K21.00          Routine recheck  Emphysema.  Stable.  Encouraged to quit smoking.  Continue on his inhalers.  Anemia.  Unspecified type.  [facility]  I will do more investigation here with above labs.  Reflux disease with nausea and occasional vomiting.  Resulting in being off work.  PPI somewhat controls his symptoms.  Normal endoscopy after an abnormal barium swallow.  He will meet with GI soon to discuss of his any more we can do in terms of his reflux.      No follow-ups on file.    Chase East MD  [facility] DARIEL Blackwood is a 68 year old, presenting for the following health issues:  Follow Up and COPD    History of Present Illness       COPD:  He presents for follow up of COPD.  Overall, COPD symptoms are stable since last visit.  He has same as usual fatigue or shortness of breath with exertion and same as usual shortness of breath at rest.  He sometimes coughs and does not have change in sputum. No recent fever. He can walk less than 1 block without stopping to rest. He can walk 2 flights of stairs without resting. The patient has had no ED, urgent care, or hospital admissions because of COPD since the last visit.     Reason for visit:  Follow up visit    He eats 0-1 servings of fruits and vegetables daily.He consumes 0 sweetened beverage(s) daily.He exercises with enough effort to increase his heart rate 30 to 60 minutes per day.  He exercises with enough effort to increase his heart rate 7 days per  week.   He is taking medications regularly.         Still missing 1-2 days of work per week  Considerable nausea and occ vomiting  Wt stable          Review of Systems  Constitutional, HEENT, cardiovascular, pulmonary, gi and gu systems are negative, except as otherwise noted.      Objective    /82 (Cuff Size: Adult Regular)   Pulse 82   Temp 97.1  F (36.2  C) (Temporal)   Resp 16   Wt 61.7 kg (136 lb)   SpO2 95%   BMI 19.51 kg/m    Body mass index is 19.51 kg/m .  Physical Exam   Well-appearing.  Heart regular.  Kyphotic.  Abdomen soft nontender nondistended.  Thin extremities.            Signed Electronically by: Chase East MD

## 2024-03-18 DIAGNOSIS — J43.2 CENTRILOBULAR EMPHYSEMA (H): ICD-10-CM

## 2024-03-18 RX ORDER — IPRATROPIUM BROMIDE AND ALBUTEROL 20; 100 UG/1; UG/1
SPRAY, METERED RESPIRATORY (INHALATION)
Qty: 4 G | Refills: 11 | Status: SHIPPED | OUTPATIENT
Start: 2024-03-18

## 2024-03-21 ENCOUNTER — LAB (OUTPATIENT)
Dept: LAB | Facility: CLINIC | Age: 69
End: 2024-03-21
Payer: COMMERCIAL

## 2024-03-21 DIAGNOSIS — D64.9 ANEMIA, UNSPECIFIED TYPE: ICD-10-CM

## 2024-03-21 LAB
FERRITIN SERPL-MCNC: 20 NG/ML (ref 31–409)
FOLATE SERPL-MCNC: 12 NG/ML (ref 4.6–34.8)
HGB BLD-MCNC: 14.4 G/DL (ref 13.3–17.7)
IRON BINDING CAPACITY (ROCHE): 410 UG/DL (ref 240–430)
IRON SATN MFR SERPL: 8 % (ref 15–46)
IRON SERPL-MCNC: 34 UG/DL (ref 61–157)

## 2024-03-21 PROCEDURE — 36415 COLL VENOUS BLD VENIPUNCTURE: CPT

## 2024-03-21 PROCEDURE — 83540 ASSAY OF IRON: CPT

## 2024-03-21 PROCEDURE — 83550 IRON BINDING TEST: CPT

## 2024-03-21 PROCEDURE — 82728 ASSAY OF FERRITIN: CPT

## 2024-03-21 PROCEDURE — 82607 VITAMIN B-12: CPT

## 2024-03-21 PROCEDURE — 85018 HEMOGLOBIN: CPT

## 2024-03-21 PROCEDURE — 82746 ASSAY OF FOLIC ACID SERUM: CPT

## 2024-03-22 LAB — VIT B12 SERPL-MCNC: 758 PG/ML (ref 232–1245)

## 2024-04-01 ENCOUNTER — TELEPHONE (OUTPATIENT)
Dept: FAMILY MEDICINE | Facility: CLINIC | Age: 69
End: 2024-04-01
Payer: COMMERCIAL

## 2024-04-01 NOTE — TELEPHONE ENCOUNTER
Patient states he was told to take prilosec 4 per day.    He is wondering if he should take his prilosec at 7:30am and then again at 11?    Would this be too close.    After 3 pm leave a detailed message per patient.    Lilian Alvarez RN on 4/1/2024 at 10:15 AM

## 2024-04-05 NOTE — TELEPHONE ENCOUNTER
RN TRIAGE CALL:    Patient Contact    Attempt # 1    Was call answered?  No.  Left message on voicemail with information to call me back. Please relay below response from PCP if patient returns call.     SHERWIN Toro, AMMY East, Chase Muhammad MD  [facility]11 hours ago (8:18 PM)     RH  Needs to be taken at least 10 hours apart.  Ideally also taken 15 to 20 minutes prior to meal.

## 2024-05-03 ENCOUNTER — TELEPHONE (OUTPATIENT)
Dept: FAMILY MEDICINE | Facility: CLINIC | Age: 69
End: 2024-05-03
Payer: COMMERCIAL

## 2024-05-03 NOTE — TELEPHONE ENCOUNTER
Medication Question or Refill    Contacts         Type Contact Phone/Fax    05/03/2024 11:35 AM CDT Phone (Incoming) Jaison Gonzalez (Self) [phone] (H)            What medication are you calling about (include dose and sig)?:   COMBIVENT RESPIMAT  MCG/ACT inhaler 4 g       Preferred Pharmacy:     [facility], [place] [address] [facility]   [place] [address]  Phone: [phone] Fax: [phone]      Controlled Substance Agreement on file:   CSA -- Patient Level:    CSA: None found at the patient level.       Who prescribed the medication?: Dr. East    Do you need a refill? Yes    When did you use the medication last? Today 5/3/24    Do you have any questions or concerns?  Yes:, the patient will be out of the RX   COMBIVENT RESPIMAT  MCG/ACT inhaler 4 g   By Sunday, the pharmacy will not fill it until Tuesday 5/7/24, but he needs it before then.      Could we send this information to you in [place] or would you prefer to receive a phone call?:   Patient would prefer a phone call   Okay to leave a detailed message?: Yes at Home number on file [phone] (home)

## 2024-05-03 NOTE — TELEPHONE ENCOUNTER
Called pharmacy. They state they do have the refills on file but they are not able to fill it until 5/7/24 due to insurance.     Attempted to call patient to update. Left message to return call. Please notify him of the above info from pharmacy if he returns call.     Chelo Finch, NATHANN, RN

## 2024-05-06 NOTE — TELEPHONE ENCOUNTER
Phoned patient and informed him of documentation as stated below.  Patient stated understanding.    Nidia Humphrey RN

## 2024-06-11 ENCOUNTER — OFFICE VISIT (OUTPATIENT)
Dept: FAMILY MEDICINE | Facility: CLINIC | Age: 69
End: 2024-06-11
Payer: COMMERCIAL

## 2024-06-11 VITALS
TEMPERATURE: 97.5 F | SYSTOLIC BLOOD PRESSURE: 130 MMHG | BODY MASS INDEX: 19.47 KG/M2 | HEIGHT: 70 IN | HEART RATE: 71 BPM | OXYGEN SATURATION: 97 % | RESPIRATION RATE: 17 BRPM | DIASTOLIC BLOOD PRESSURE: 78 MMHG | WEIGHT: 136 LBS

## 2024-06-11 DIAGNOSIS — M40.00 KYPHOSIS (ACQUIRED) (POSTURAL): ICD-10-CM

## 2024-06-11 DIAGNOSIS — I70.0 AORTIC ATHEROSCLEROSIS (H): ICD-10-CM

## 2024-06-11 DIAGNOSIS — M81.0 AGE-RELATED OSTEOPOROSIS WITHOUT CURRENT PATHOLOGICAL FRACTURE: ICD-10-CM

## 2024-06-11 DIAGNOSIS — M54.50 LUMBAR PAIN: ICD-10-CM

## 2024-06-11 DIAGNOSIS — R79.0 LOW IRON STORES: ICD-10-CM

## 2024-06-11 DIAGNOSIS — Z11.59 NEED FOR HEPATITIS C SCREENING TEST: ICD-10-CM

## 2024-06-11 DIAGNOSIS — Z12.11 SCREEN FOR COLON CANCER: Primary | ICD-10-CM

## 2024-06-11 DIAGNOSIS — J43.1 PANLOBULAR EMPHYSEMA (H): ICD-10-CM

## 2024-06-11 DIAGNOSIS — K21.00 GASTROESOPHAGEAL REFLUX DISEASE WITH ESOPHAGITIS WITHOUT HEMORRHAGE: ICD-10-CM

## 2024-06-11 PROCEDURE — G2211 COMPLEX E/M VISIT ADD ON: HCPCS | Performed by: FAMILY MEDICINE

## 2024-06-11 PROCEDURE — 99214 OFFICE O/P EST MOD 30 MIN: CPT | Performed by: FAMILY MEDICINE

## 2024-06-11 RX ORDER — FERROUS SULFATE 325(65) MG
325 TABLET ORAL WEEKLY
Qty: 90 TABLET | Refills: 0 | Status: SHIPPED | OUTPATIENT
Start: 2024-06-11

## 2024-06-11 ASSESSMENT — PAIN SCALES - GENERAL: PAINLEVEL: NO PAIN (0)

## 2024-06-11 NOTE — TELEPHONE ENCOUNTER
REFERRAL INFORMATION:  Referring Provider:  Chase East MD   Referring Clinic:    Reason for Visit/Diagnosis:   Nausea and vomiting, unspecified vomiting type   Gastroesophageal reflux disease with esophagitis without hemorrhage        FUTURE VISIT INFORMATION:  Appointment Date:   Appointment Time:      NOTES STATUS DETAILS   OFFICE NOTE from Referring Provider Internal 6/11/2024, 3/7/2024, 1/5/2024, 12/13/2023, 10/12/2023 OV with JENN East   OFFICE NOTE from Other Specialist N/A    HOSPITAL DISCHARGE SUMMARY/  ED VISITS N/A    OPERATIVE REPORT N/A    MEDICATION LIST Internal         ENDOSCOPY  Internal 9/13/2022   COLONOSCOPY N/A    IMAGING (CT, MRI, EGD, MRCP, Small Bowel Follow Through/SBT, MR/CT Enterography) Internal 2/2/2022 CT ABD PEL

## 2024-06-11 NOTE — PROGRESS NOTES
ICD-10-CM    1. Screen for colon cancer  Z12.11       2. Need for hepatitis C screening test  Z11.59       3. Age-related osteoporosis without current pathological fracture  M81.0 REVIEW OF HEALTH MAINTENANCE PROTOCOL ORDERS     Adult Neurosurgery  Referral     DX Bone Density     calcium carbonate-vitamin D (OSCAL) 500-5 MG-MCG tablet      4. Kyphosis (acquired) (postural)  M40.00       5. Gastroesophageal reflux disease with esophagitis without hemorrhage  K21.00       6. Lumbar pain  M54.50       7. Low iron stores  R79.0 ferrous sulfate (FEROSUL) 325 (65 Fe) MG tablet      8. Panlobular emphysema (H)  J43.1            Assessment & Plan     Reflux disease:  - Assessment: Despite maximum medication for reflux disease, patient still experiences discomfort and nausea after eating. Normal endoscopy results.  - Plan: Referral to gastroenterologist for further evaluation.    Back and hip pain:  - Assessment: Pain likely due to decompression fractures in the spine and muscle strain from trying to maintain upright posture.  - Plan: Consider referral to neurosurgery team for consultation on how to maximize function and limit worsening of spine condition. Advise use of Advil and hot/cold packs for pain management. Consider physical therapy if pain becomes significant.    Low iron levels:  - Assessment: Iron levels are low but hemoglobin is normal.  - Plan: Recommend iron supplement once a week to prevent anemia.    COPD:  - Assessment: Stable condition, sometimes requires more frequent use of inhaler due to environmental factors (neighbor's bonfires).  - Plan: Continue current management strategy.         Chase East MD     The longitudinal plan of care for the diagnosis(es)/condition(s) as documented were addressed during this visit. Due to the added complexity in care, I will continue to support Jaison in the subsequent management and with ongoing continuity of care.     Consent was obtained from the patient  "to use an AI documentation tool in the creation of this note.        History of Present Illness    - Patient, Jaison Gonzalez, male, 68 years old, reports discomfort in the stomach after eating, describes it as a \"big old rock sitting in there\"  - Reports feeling nauseous after eating, but no actual vomiting  - Reports missing work due to these symptoms  - Reports back and hip pain, especially after walking long distances at work           Physical Exam    MUSCULOSKELETAL: Decompression fractures in the spine causing stooped posture.         Results    Results  - Normal endoscopy (EGD)  - Low iron levels but normal hemoglobin                           "

## 2024-06-12 DIAGNOSIS — M81.0 AGE-RELATED OSTEOPOROSIS WITHOUT CURRENT PATHOLOGICAL FRACTURE: ICD-10-CM

## 2024-06-12 RX ORDER — ALENDRONATE SODIUM 70 MG/1
TABLET ORAL
Qty: 12 TABLET | Refills: 2 | Status: SHIPPED | OUTPATIENT
Start: 2024-06-12

## 2024-07-03 ENCOUNTER — TELEPHONE (OUTPATIENT)
Dept: NEUROSURGERY | Facility: CLINIC | Age: 69
End: 2024-07-03
Payer: COMMERCIAL

## 2024-07-03 NOTE — TELEPHONE ENCOUNTER
Left voice message to inform patient that we cancelled appointment on 8/1 with Evi Garcia NP as provider doesn't see this diagnosis( Age-related osteoporosis without current pathological fracture). Patient should see someone in Med spine. Provided NS scheduling team number.

## 2024-07-08 ENCOUNTER — PRE VISIT (OUTPATIENT)
Dept: GASTROENTEROLOGY | Facility: CLINIC | Age: 69
End: 2024-07-08

## 2024-07-08 ENCOUNTER — OFFICE VISIT (OUTPATIENT)
Dept: GASTROENTEROLOGY | Facility: CLINIC | Age: 69
End: 2024-07-08
Payer: COMMERCIAL

## 2024-07-08 VITALS
SYSTOLIC BLOOD PRESSURE: 128 MMHG | BODY MASS INDEX: 19.47 KG/M2 | HEIGHT: 70 IN | DIASTOLIC BLOOD PRESSURE: 84 MMHG | WEIGHT: 136 LBS | HEART RATE: 88 BPM

## 2024-07-08 DIAGNOSIS — R10.13 ABDOMINAL PAIN, EPIGASTRIC: ICD-10-CM

## 2024-07-08 DIAGNOSIS — K21.00 GASTROESOPHAGEAL REFLUX DISEASE WITH ESOPHAGITIS WITHOUT HEMORRHAGE: Primary | ICD-10-CM

## 2024-07-08 DIAGNOSIS — R11.0 NAUSEA: ICD-10-CM

## 2024-07-08 PROCEDURE — 99213 OFFICE O/P EST LOW 20 MIN: CPT | Performed by: INTERNAL MEDICINE

## 2024-07-08 RX ORDER — PROCHLORPERAZINE MALEATE 5 MG
5 TABLET ORAL EVERY 6 HOURS PRN
Qty: 60 TABLET | Refills: 1 | Status: SHIPPED | OUTPATIENT
Start: 2024-07-08

## 2024-07-08 ASSESSMENT — PAIN SCALES - GENERAL: PAINLEVEL: NO PAIN (0)

## 2024-07-08 NOTE — PATIENT INSTRUCTIONS
Several of the medications may contribute to nausea.  Suggest the followin.  Hold or stop daily aspirin    2.  Minimize the use of Advil.  Tylenol easier on the stomach.  Woods 2 inhibitor may be a future consideration in place of Advil.  This is easier on the stomach    3.  Hold iron.  2 weeks or so    4 Hold Fosamax.  2 weeks or so.  There are alternatives with fewer GI side effect available if needed.    5.  If symptoms are improved, the add back necessary medications and observe for symptoms.  Stay on Prilosec twice a day.  This works best 30 to 60 minutes before meals.    6.  As needed anti-nausea medicine prescription use if needed to help at work. Rx sent Compazine    As needed return

## 2024-07-08 NOTE — PROGRESS NOTES
Gastroenterology return    68-year-old male to review upper abdominal symptoms.  Predominantly this is episodic nausea with a sense of needing to vomit but he does not vomit.  This is a concern at work because he is concerned about throwing up at work and does not want others to clean up his mess.  Other times after meal he will have a sense of a rock in the upper abdomen.  Prior EGD in September 2022 revealed LA grade D esophagitis he notes this may have been complicated by vomiting at the time before hand.  He will experience heartburn in the morning with his work shift he was advised to take proton pump inhibitor 10 to 20 minutes before meals.  The standard interval is felt to be 30 to 60 minutes before meals.  Use of Advil as a episodic as needed for back pain.  No THC.  No alarm symptoms.  No real benefit from Zofran.  Daily aspirin was advised in the past for cardioprotective benefit.  Recent addition of iron that he takes at bedtime.  Fosamax is taken on Saturdays.  Calcium is taken on Sunday.    Prior CT February 2022 FOS condition.  AAA 3.6 cm.  Distal esophageal changes.    Past medical history reviewed on epic    Medications reviewed on epic    Allergies reviewed on epic    Review of systems otherwise negative noncontributory.  Unknown prior colon cancer screening.    No acute distress.  Blood pressure 1/28/1984, pulse 88, BMI 19.5, head and neck unremarkable, cardiac S1-S2 without murmur, lungs prolonged exhalation phase otherwise clear.  Abdomen soft without organomegaly no masses.  No lower extremity edema.    Impression: Nausea.  DDx includes nonulcer dyspepsia, atypical GERD, postinfectious etiology. Favor the consideration of medication effect with aspirin and nonsteroidals, iron and Fosamax.  All these can contribute to symptoms.  This can be a multifactorial it is hard to implicate 1 medication over another.  No alarm features.    1.  Stop or hold aspirin.  Patient notes [facility] and considers this okay to stop.  Check with primary about future need for this further studies suggest the benefit of prophylactic aspirin may be limited in some individuals, 2.  Hold Fosamax minimize nonsteroidals hold iron.,  3.  Continue PPI is doing twice daily (2-20 mg pre-meal twice daily).  Patient no longer on Aciphex or Protonix.  There are other medications for severe esophagitis that are new but may or may not be covered by insurance (vonazapram), 4.  Consider future Woods 2 inhibitor if needed for back pain if he is no longer aspirin.  For example Celebrex.,  5.  As needed Compazine for work, prescription sent, 6.  No need for repeat EGD.  There are non-oral options to bisphosphonates if this is playing a role with nausea.    As needed return

## 2024-07-08 NOTE — LETTER
7/8/2024      Keith Gonzalez  [address]  [facility] [address]      Dear Colleague,    Thank you for referring your patient, Keith Gonzalez, to the [facility]. Please see a copy of my visit note below.    Gastroenterology return    68-year-old male to review upper abdominal symptoms.  Predominantly this is episodic nausea with a sense of needing to vomit but he does not vomit.  This is a concern at work because he is concerned about throwing up at work and does not want others to clean up his mess.  Other times after meal he will have a sense of a rock in the upper abdomen.  Prior EGD in September 2022 revealed LA grade D esophagitis he notes this may have been complicated by vomiting at the time before hand.  He will experience heartburn in the morning with his work shift he was advised to take proton pump inhibitor 10 to 20 minutes before meals.  The standard interval is felt to be 30 to 60 minutes before meals.  Use of Advil as a episodic as needed for back pain.  No THC.  No alarm symptoms.  No real benefit from Zofran.  Daily aspirin was advised in the past for cardioprotective benefit.  Recent addition of iron that he takes at bedtime.  Fosamax is taken on Saturdays.  Calcium is taken on Sunday.    Prior CT February 2022 FOS condition.  AAA 3.6 cm.  Distal esophageal changes.    Past medical history reviewed on epic    Medications reviewed on epic    Allergies reviewed on epic    Review of systems otherwise negative noncontributory.  Unknown prior colon cancer screening.    No acute distress.  Blood pressure 1/28/1984, pulse 88, BMI 19.5, head and neck unremarkable, cardiac S1-S2 without murmur, lungs prolonged exhalation phase otherwise clear.  Abdomen soft without organomegaly no masses.  No lower extremity edema.    Impression: Nausea.  DDx includes nonulcer dyspepsia, atypical GERD, postinfectious etiology. Favor the consideration of medication effect with aspirin and  nonsteroidals, iron and Fosamax.  All these can contribute to symptoms.  This can be a multifactorial it is hard to implicate 1 medication over another.  No alarm features.    1.  Stop or hold aspirin.  Patient notes [facility] and considers this okay to stop.  Check with primary about future need for this further studies suggest the benefit of prophylactic aspirin may be limited in some individuals, 2.  Hold Fosamax minimize nonsteroidals hold iron.,  3.  Continue PPI is doing twice daily (2-20 mg pre-meal twice daily).  Patient no longer on Aciphex or Protonix.  There are other medications for severe esophagitis that are new but may or may not be covered by insurance (vonazapram), 4.  Consider future Woods 2 inhibitor if needed for back pain if he is no longer aspirin.  For example Celebrex.,  5.  As needed Compazine for work, prescription sent, 6.  No need for repeat EGD.  There are non-oral options to bisphosphonates if this is playing a role with nausea.    As needed return        Again, thank you for allowing me to participate in the care of your patient.        Sincerely,        Doyle Corbin MD

## 2024-08-12 DIAGNOSIS — J44.9 CHRONIC OBSTRUCTIVE PULMONARY DISEASE, UNSPECIFIED COPD TYPE (H): ICD-10-CM

## 2024-08-12 RX ORDER — FLUTICASONE PROPIONATE AND SALMETEROL 500; 50 UG/1; UG/1
1 POWDER RESPIRATORY (INHALATION) EVERY 12 HOURS
Qty: 60 EACH | Refills: 5 | Status: SHIPPED | OUTPATIENT
Start: 2024-08-12

## 2024-08-30 DIAGNOSIS — M41.9 SCOLIOSIS OF THORACOLUMBAR SPINE, UNSPECIFIED SCOLIOSIS TYPE: ICD-10-CM

## 2024-08-30 DIAGNOSIS — M54.50 LUMBAR PAIN: ICD-10-CM

## 2024-08-30 RX ORDER — CYCLOBENZAPRINE HCL 5 MG
TABLET ORAL
Qty: 90 TABLET | Refills: 3 | Status: SHIPPED | OUTPATIENT
Start: 2024-08-30

## 2024-09-09 ENCOUNTER — HOSPITAL ENCOUNTER (OUTPATIENT)
Dept: BONE DENSITY | Facility: CLINIC | Age: 69
Discharge: HOME | End: 2024-09-09
Attending: FAMILY MEDICINE | Admitting: FAMILY MEDICINE
Payer: COMMERCIAL

## 2024-09-09 DIAGNOSIS — M81.0 AGE-RELATED OSTEOPOROSIS WITHOUT CURRENT PATHOLOGICAL FRACTURE: ICD-10-CM

## 2024-09-09 PROCEDURE — 77080 DXA BONE DENSITY AXIAL: CPT

## 2024-09-11 ENCOUNTER — OFFICE VISIT (OUTPATIENT)
Dept: FAMILY MEDICINE | Facility: CLINIC | Age: 69
End: 2024-09-11
Payer: COMMERCIAL

## 2024-09-11 VITALS
RESPIRATION RATE: 26 BRPM | TEMPERATURE: 97.6 F | DIASTOLIC BLOOD PRESSURE: 85 MMHG | SYSTOLIC BLOOD PRESSURE: 135 MMHG | OXYGEN SATURATION: 94 % | HEART RATE: 85 BPM | BODY MASS INDEX: 19.43 KG/M2 | WEIGHT: 135.4 LBS

## 2024-09-11 DIAGNOSIS — M40.00 KYPHOSIS (ACQUIRED) (POSTURAL): ICD-10-CM

## 2024-09-11 DIAGNOSIS — M81.0 AGE-RELATED OSTEOPOROSIS WITHOUT CURRENT PATHOLOGICAL FRACTURE: ICD-10-CM

## 2024-09-11 DIAGNOSIS — Z87.81 HISTORY OF COMPRESSION FRACTURE OF SPINE: ICD-10-CM

## 2024-09-11 DIAGNOSIS — K21.00 GASTROESOPHAGEAL REFLUX DISEASE WITH ESOPHAGITIS WITHOUT HEMORRHAGE: ICD-10-CM

## 2024-09-11 DIAGNOSIS — J43.1 PANLOBULAR EMPHYSEMA (H): Primary | ICD-10-CM

## 2024-09-11 PROCEDURE — 99214 OFFICE O/P EST MOD 30 MIN: CPT | Performed by: FAMILY MEDICINE

## 2024-09-11 ASSESSMENT — PAIN SCALES - GENERAL: PAINLEVEL: NO PAIN (0)

## 2024-09-11 ASSESSMENT — ENCOUNTER SYMPTOMS: BACK PAIN: 1

## 2024-09-11 NOTE — PROGRESS NOTES
ICD-10-CM    1. Panlobular emphysema (H)  J43.1       2. History of compression fracture of spine  Z87.81       3. Age-related osteoporosis without current pathological fracture  M81.0       4. Gastroesophageal reflux disease with esophagitis without hemorrhage  K21.00          Above items addressed and stable.  Slight increase in his chronic pain in the right lumbar paraspinous musculature.  Seems to be improved with more seated work.  Continue treatment for above issues.  See him back in 2 to 3 months.  Again offered him a referral to spine care, but he declines.    Chase East MD     Asiya Blackwood is a 69 year old, presenting for the following health issues:  Recheck Medication (Follow up ), Back Pain, and COPD        9/11/2024     9:05 AM   Additional Questions   Roomed by Kelly     History of Present Illness       Back Pain:  He presents for follow up of back pain. Patient's back pain is a recurring problem.  Location of back pain:  Right lower back and right hip  Description of back pain: burning, cramping and dull ache  Back pain spreads: nowhere    Since patient first noticed back pain, pain is: always present, but gets better and worse  Does back pain interfere with his job:  No       COPD:  He presents for follow up of COPD.  Overall, COPD symptoms are stable since last visit.  He has same as usual fatigue or shortness of breath with exertion and same as usual shortness of breath at rest.  He sometimes coughs and does not have change in sputum. No recent fever. He can walk the length of 1-2 rooms without stopping to rest. He can walk 2 flights of stairs without resting. The patient has had no ED, urgent care, or hospital admissions because of COPD since the last visit.     Reason for visit:  Follow up visit    He eats 0-1 servings of fruits and vegetables daily.He consumes 0 sweetened beverage(s) daily.He exercises with enough effort to increase his heart rate 10 to 19 minutes per day.  He  exercises with enough effort to increase his heart rate 7 days per week.   He is taking medications regularly.     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      Objective    /85   Pulse 85   Temp 97.6  F (36.4  C) (Temporal)   Resp 26   Wt 61.4 kg (135 lb 6.4 oz)   SpO2 94%   BMI 19.43 kg/m    Body mass index is 19.43 kg/m .  Physical Exam   GENERAL: alert and no distress  NECK: no adenopathy, no asymmetry, masses, or scars  RESP: lungs clear to auscultation - no rales, rhonchi or wheezes  CV: regular rate and rhythm, normal S1 S2, no S3 or S4, no murmur, click or rub, no peripheral edema  ABDOMEN: soft, nontender, no hepatosplenomegaly, no masses and bowel sounds normal  MS: no gross musculoskeletal defects noted, no edema. Stooped posture with kyphosis            Signed Electronically by: Chase East MD

## 2024-11-07 DIAGNOSIS — J43.2 CENTRILOBULAR EMPHYSEMA (H): ICD-10-CM

## 2024-11-07 RX ORDER — IPRATROPIUM BROMIDE AND ALBUTEROL 20; 100 UG/1; UG/1
SPRAY, METERED RESPIRATORY (INHALATION)
Qty: 4 G | Refills: 11 | Status: SHIPPED | OUTPATIENT
Start: 2024-11-07

## 2024-11-13 ENCOUNTER — OFFICE VISIT (OUTPATIENT)
Dept: FAMILY MEDICINE | Facility: CLINIC | Age: 69
End: 2024-11-13
Payer: COMMERCIAL

## 2024-11-13 VITALS
DIASTOLIC BLOOD PRESSURE: 70 MMHG | WEIGHT: 139 LBS | TEMPERATURE: 98.2 F | RESPIRATION RATE: 20 BRPM | BODY MASS INDEX: 19.94 KG/M2 | HEART RATE: 75 BPM | OXYGEN SATURATION: 96 % | SYSTOLIC BLOOD PRESSURE: 110 MMHG

## 2024-11-13 DIAGNOSIS — J43.2 CENTRILOBULAR EMPHYSEMA (H): ICD-10-CM

## 2024-11-13 DIAGNOSIS — K21.00 GASTROESOPHAGEAL REFLUX DISEASE WITH ESOPHAGITIS WITHOUT HEMORRHAGE: ICD-10-CM

## 2024-11-13 DIAGNOSIS — J43.1 PANLOBULAR EMPHYSEMA (H): ICD-10-CM

## 2024-11-13 DIAGNOSIS — R63.6 UNDERWEIGHT: ICD-10-CM

## 2024-11-13 DIAGNOSIS — Z12.11 SCREEN FOR COLON CANCER: Primary | ICD-10-CM

## 2024-11-13 DIAGNOSIS — Z11.59 NEED FOR HEPATITIS C SCREENING TEST: ICD-10-CM

## 2024-11-13 PROCEDURE — 99214 OFFICE O/P EST MOD 30 MIN: CPT | Mod: 25 | Performed by: FAMILY MEDICINE

## 2024-11-13 PROCEDURE — 90480 ADMN SARSCOV2 VAC 1/ONLY CMP: CPT | Performed by: FAMILY MEDICINE

## 2024-11-13 PROCEDURE — 90471 IMMUNIZATION ADMIN: CPT | Performed by: FAMILY MEDICINE

## 2024-11-13 PROCEDURE — 90662 IIV NO PRSV INCREASED AG IM: CPT | Performed by: FAMILY MEDICINE

## 2024-11-13 PROCEDURE — 91320 SARSCV2 VAC 30MCG TRS-SUC IM: CPT | Performed by: FAMILY MEDICINE

## 2024-11-13 ASSESSMENT — PAIN SCALES - GENERAL: PAINLEVEL_OUTOF10: NO PAIN (0)

## 2024-11-13 NOTE — PROGRESS NOTES
Assessment & Plan       ICD-10-CM    1. Screen for colon cancer  Z12.11       2. Need for hepatitis C screening test  Z11.59       3. Panlobular emphysema (H)  J43.1       4. Gastroesophageal reflux disease with esophagitis without hemorrhage  K21.00       5. Centrilobular emphysema (H)  J43.2       6. Underweight  R63.6            Routine recheck.  Doing well.  Actually made some nice strides in weight gain.  Reflux better.  Nausea better.  Emphysema stable.  Non-smoker.  See him back in a few months    Return in about 4 months (around 3/13/2025).    Chase East MD  [facility]     Asiya Blackwood is a 69 year old, presenting for the following health issues:  Recheck Medication      11/13/2024     8:55 AM   Additional Questions   Roomed by Jenny     History of Present Illness       COPD:  He presents for follow up of COPD.  Overall, COPD symptoms are stable since last visit.  He has same as usual fatigue or shortness of breath with exertion and same as usual shortness of breath at rest.  He sometimes coughs and does not have change in sputum. No recent fever. He can walk the length of 1-2 rooms without stopping to rest. He can walk 2 flights of stairs without resting. The patient has had no ED, urgent care, or hospital admissions because of COPD since the last visit.     He eats 0-1 servings of fruits and vegetables daily.He consumes 0 sweetened beverage(s) daily.He exercises with enough effort to increase his heart rate 10 to 19 minutes per day.  He exercises with enough effort to increase his heart rate 7 days per week.   He is taking medications regularly.                 Review of Systems  Constitutional, HEENT, cardiovascular, pulmonary, gi and gu systems are negative, except as otherwise noted.      Objective    /70   Pulse 75   Temp 98.2  F (36.8  C) (Temporal)   Resp 20   Wt 63 kg (139 lb)   SpO2 96%   BMI 19.94 kg/m    Body mass index is 19.94 kg/m .  Physical  Exam   GENERAL: alert and no distress  NECK: no adenopathy, no asymmetry, masses, or scars  RESP: lungs clear to auscultation - no rales, rhonchi or wheezes  CV: regular rate and rhythm, normal S1 S2, no S3 or S4, no murmur, click or rub, no peripheral edema  ABDOMEN: soft, nontender, no hepatosplenomegaly, no masses and bowel sounds normal  MS: no gross musculoskeletal defects noted, no edema            Signed Electronically by: Chase East MD

## 2025-01-08 ENCOUNTER — NURSE TRIAGE (OUTPATIENT)
Dept: FAMILY MEDICINE | Facility: CLINIC | Age: 70
End: 2025-01-08
Payer: COMMERCIAL

## 2025-01-08 NOTE — TELEPHONE ENCOUNTER
Patient returned call, appointment scheduled.     Appointments in Next Year      Jan 09, 2025 10:00 AM  (Arrive by 9:40 AM)  Provider Visit with Bobbi Cordero PA-C  [facility] ([facility] ) [phone]     Maggy EVANSN, RN

## 2025-01-08 NOTE — TELEPHONE ENCOUNTER
"Nurse Triage SBAR    Is this a 2nd Level Triage? YES, LICENSED PRACTITIONER REVIEW IS REQUIRED    Situation: \"Swelling from toes to hips\"    Background: Patient reports he has a disability and works sitting down because of this. On Friday he needed to stand for work and woke up Saturday with swelling bilaterally. This has progressively gotten worse and extends from \"toes to hips\". Patient also reports that his legs are a little red. No pain, no weeping.     Assessment: See Triage    Protocol Recommended Disposition:   Go To Office Now    Recommendation: No appointments available with PCP or other providers in clinic today, patient declined urgent care as he \"cannot afford a $600 bill\".    Routed to provider    Does the patient meet one of the following criteria for ADS visit consideration? No       Reason for Disposition   SEVERE swelling (e.g., swelling extends above knee, entire leg is swollen, weeping fluid)    Additional Information   Negative: Chest pain   Negative: Followed an insect bite and has localized swelling (e.g., small area of puffy or swollen skin)   Negative: Followed a knee injury   Negative: Ankle injury   Negative: Pregnant with leg swelling or edema   Negative: Sounds like a life-threatening emergency to the triager   Negative: Difficulty breathing at rest   Negative: Entire foot is cool or blue in comparison to other side   Negative: Cast on leg or ankle and has increasing pain   Negative: Can't walk or can barely stand (new-onset)   Negative: Fever and red area (or area very tender to touch)   Negative: Patient sounds very sick or weak to the triager    Answer Assessment - Initial Assessment Questions  1. ONSET: \"When did the swelling start?\" (e.g., minutes, hours, days)        Saturday morning    2. LOCATION: \"What part of the leg is swollen?\"  \"Are both legs swollen or just one leg?\"        From feet to hips, bilaterally.     3. SEVERITY: \"How bad is the swelling?\" (e.g., localized; mild, " "moderate, severe)        Moderate    4. REDNESS: \"Does the swelling look red or infected?\"        A little red    5. PAIN: \"Is the swelling painful to touch?\" If Yes, ask: \"How painful is it?\"   (Scale 1-10; mild, moderate or severe)        No    6. FEVER: \"Do you have a fever?\" If Yes, ask: \"What is it, how was it measured, and when did it start?\"         No    7. CAUSE: \"What do you think is causing the leg swelling?\"        Patient reports he has a sit down job due to disability and needed to stand at work on Friday, woke up swollen on Saturday    8. MEDICAL HISTORY: \"Do you have a history of blood clots (e.g., DVT), cancer, heart failure, kidney disease, or liver failure?\"        Blood clots    9. RECURRENT SYMPTOM: \"Have you had leg swelling before?\" If Yes, ask: \"When was the last time?\" \"What happened that time?\"        \"A long time ago when I had a blood clot in one leg\"    10. OTHER SYMPTOMS: \"Do you have any other symptoms?\" (e.g., chest pain, difficulty breathing)          Moderate difficulty breathing    11. PREGNANCY: \"Is there any chance you are pregnant?\" \"When was your last menstrual period?\"          N/A    Protocols used: Leg Swelling and Edema-A-OH    "

## 2025-01-08 NOTE — TELEPHONE ENCOUNTER
We do have same day openings for 01/09, would you like patient to utilize one of those slots?     José Miller RN on 1/8/2025 at 2:36 PM

## 2025-01-08 NOTE — TELEPHONE ENCOUNTER
RN TRIAGE CALL:    Patient Contact    Attempt # 1    Was call answered?  No.  Left message on voicemail with information to call me back.    NATHAN ToroN, RN

## 2025-01-09 ENCOUNTER — APPOINTMENT (OUTPATIENT)
Dept: ULTRASOUND IMAGING | Facility: CLINIC | Age: 70
End: 2025-01-09
Attending: STUDENT IN AN ORGANIZED HEALTH CARE EDUCATION/TRAINING PROGRAM
Payer: COMMERCIAL

## 2025-01-09 ENCOUNTER — APPOINTMENT (OUTPATIENT)
Dept: CT IMAGING | Facility: CLINIC | Age: 70
End: 2025-01-09
Attending: STUDENT IN AN ORGANIZED HEALTH CARE EDUCATION/TRAINING PROGRAM
Payer: COMMERCIAL

## 2025-01-09 ENCOUNTER — VIRTUAL VISIT (OUTPATIENT)
Dept: FAMILY MEDICINE | Facility: CLINIC | Age: 70
End: 2025-01-09
Payer: COMMERCIAL

## 2025-01-09 ENCOUNTER — TELEPHONE (OUTPATIENT)
Dept: FAMILY MEDICINE | Facility: CLINIC | Age: 70
End: 2025-01-09

## 2025-01-09 ENCOUNTER — HOSPITAL ENCOUNTER (EMERGENCY)
Facility: CLINIC | Age: 70
Discharge: ANOTHER HOSPITAL | End: 2025-01-11
Attending: STUDENT IN AN ORGANIZED HEALTH CARE EDUCATION/TRAINING PROGRAM | Admitting: STUDENT IN AN ORGANIZED HEALTH CARE EDUCATION/TRAINING PROGRAM
Payer: COMMERCIAL

## 2025-01-09 DIAGNOSIS — M79.89 LEG SWELLING: Primary | ICD-10-CM

## 2025-01-09 DIAGNOSIS — I26.99 ACUTE PULMONARY EMBOLISM, UNSPECIFIED PULMONARY EMBOLISM TYPE, UNSPECIFIED WHETHER ACUTE COR PULMONALE PRESENT (H): ICD-10-CM

## 2025-01-09 DIAGNOSIS — R00.0 SINUS TACHYCARDIA: ICD-10-CM

## 2025-01-09 DIAGNOSIS — J90 BILATERAL PLEURAL EFFUSION: ICD-10-CM

## 2025-01-09 DIAGNOSIS — J96.01 ACUTE RESPIRATORY FAILURE WITH HYPOXIA (H): ICD-10-CM

## 2025-01-09 DIAGNOSIS — J43.1 PANLOBULAR EMPHYSEMA (H): ICD-10-CM

## 2025-01-09 DIAGNOSIS — R06.02 SOB (SHORTNESS OF BREATH): ICD-10-CM

## 2025-01-09 LAB
ALBUMIN SERPL BCG-MCNC: 3.7 G/DL (ref 3.5–5.2)
ALBUMIN UR-MCNC: NEGATIVE MG/DL
ALP SERPL-CCNC: 127 U/L (ref 40–150)
ALT SERPL W P-5'-P-CCNC: 194 U/L (ref 0–70)
ANION GAP SERPL CALCULATED.3IONS-SCNC: 10 MMOL/L (ref 7–15)
ANION GAP SERPL CALCULATED.3IONS-SCNC: 14 MMOL/L (ref 7–15)
APPEARANCE UR: CLEAR
AST SERPL W P-5'-P-CCNC: 98 U/L (ref 0–45)
ATRIAL RATE - MUSE: 336 BPM
BASE EXCESS BLDV CALC-SCNC: 7.3 MMOL/L (ref -3–3)
BASOPHILS # BLD AUTO: 0 10E3/UL (ref 0–0.2)
BASOPHILS NFR BLD AUTO: 0 %
BILIRUB SERPL-MCNC: 0.3 MG/DL
BILIRUB UR QL STRIP: NEGATIVE
BUN SERPL-MCNC: 51.8 MG/DL (ref 8–23)
BUN SERPL-MCNC: 53.2 MG/DL (ref 8–23)
CALCIUM SERPL-MCNC: 9.2 MG/DL (ref 8.8–10.4)
CALCIUM SERPL-MCNC: 9.2 MG/DL (ref 8.8–10.4)
CHLORIDE SERPL-SCNC: 98 MMOL/L (ref 98–107)
CHLORIDE SERPL-SCNC: 99 MMOL/L (ref 98–107)
COLOR UR AUTO: ABNORMAL
CREAT SERPL-MCNC: 1.17 MG/DL (ref 0.67–1.17)
CREAT SERPL-MCNC: 1.19 MG/DL (ref 0.67–1.17)
D DIMER PPP FEU-MCNC: 9.32 UG/ML FEU (ref 0–0.5)
DIASTOLIC BLOOD PRESSURE - MUSE: NORMAL MMHG
EGFRCR SERPLBLD CKD-EPI 2021: 67 ML/MIN/1.73M2
EGFRCR SERPLBLD CKD-EPI 2021: 68 ML/MIN/1.73M2
EOSINOPHIL # BLD AUTO: 0 10E3/UL (ref 0–0.7)
EOSINOPHIL NFR BLD AUTO: 0 %
ERYTHROCYTE [DISTWIDTH] IN BLOOD BY AUTOMATED COUNT: 15.3 % (ref 10–15)
FLUAV RNA SPEC QL NAA+PROBE: NEGATIVE
FLUBV RNA RESP QL NAA+PROBE: NEGATIVE
GLUCOSE SERPL-MCNC: 93 MG/DL (ref 70–99)
GLUCOSE SERPL-MCNC: 99 MG/DL (ref 70–99)
GLUCOSE UR STRIP-MCNC: NEGATIVE MG/DL
HCO3 BLDV-SCNC: 35 MMOL/L (ref 21–28)
HCO3 SERPL-SCNC: 28 MMOL/L (ref 22–29)
HCO3 SERPL-SCNC: 30 MMOL/L (ref 22–29)
HCT VFR BLD AUTO: 41.4 % (ref 40–53)
HGB BLD-MCNC: 13.1 G/DL (ref 13.3–17.7)
HGB UR QL STRIP: NEGATIVE
IMM GRANULOCYTES # BLD: 0.1 10E3/UL
IMM GRANULOCYTES NFR BLD: 1 %
INTERPRETATION ECG - MUSE: NORMAL
KETONES UR STRIP-MCNC: NEGATIVE MG/DL
LACTATE SERPL-SCNC: 1.6 MMOL/L (ref 0.7–2)
LEUKOCYTE ESTERASE UR QL STRIP: NEGATIVE
LYMPHOCYTES # BLD AUTO: 1.2 10E3/UL (ref 0.8–5.3)
LYMPHOCYTES NFR BLD AUTO: 7 %
MAGNESIUM SERPL-MCNC: 1.7 MG/DL (ref 1.7–2.3)
MCH RBC QN AUTO: 27.3 PG (ref 26.5–33)
MCHC RBC AUTO-ENTMCNC: 31.6 G/DL (ref 31.5–36.5)
MCV RBC AUTO: 86 FL (ref 78–100)
MONOCYTES # BLD AUTO: 0.8 10E3/UL (ref 0–1.3)
MONOCYTES NFR BLD AUTO: 5 %
MUCOUS THREADS #/AREA URNS LPF: PRESENT /LPF
NEUTROPHILS # BLD AUTO: 13.6 10E3/UL (ref 1.6–8.3)
NEUTROPHILS NFR BLD AUTO: 87 %
NITRATE UR QL: NEGATIVE
NRBC # BLD AUTO: 0 10E3/UL
NRBC BLD AUTO-RTO: 0 /100
NT-PROBNP SERPL-MCNC: 9477 PG/ML (ref 0–900)
O2/TOTAL GAS SETTING VFR VENT: 32 %
OXYHGB MFR BLDV: 17 % (ref 70–75)
P AXIS - MUSE: 265 DEGREES
PCO2 BLDV: 66 MM HG (ref 40–50)
PH BLDV: 7.34 [PH] (ref 7.32–7.43)
PH UR STRIP: 5 [PH] (ref 5–7)
PLATELET # BLD AUTO: 215 10E3/UL (ref 150–450)
PO2 BLDV: 16 MM HG (ref 25–47)
POTASSIUM SERPL-SCNC: 4.4 MMOL/L (ref 3.4–5.3)
POTASSIUM SERPL-SCNC: 5 MMOL/L (ref 3.4–5.3)
PR INTERVAL - MUSE: NORMAL MS
PROT SERPL-MCNC: 6.5 G/DL (ref 6.4–8.3)
QRS DURATION - MUSE: 100 MS
QT - MUSE: 248 MS
QTC - MUSE: 414 MS
R AXIS - MUSE: 135 DEGREES
RBC # BLD AUTO: 4.8 10E6/UL (ref 4.4–5.9)
RBC URINE: 0 /HPF
RSV RNA SPEC NAA+PROBE: NEGATIVE
SAO2 % BLDV: 17.3 % (ref 70–75)
SARS-COV-2 RNA RESP QL NAA+PROBE: NEGATIVE
SODIUM SERPL-SCNC: 138 MMOL/L (ref 135–145)
SODIUM SERPL-SCNC: 141 MMOL/L (ref 135–145)
SP GR UR STRIP: 1.01 (ref 1–1.03)
SYSTOLIC BLOOD PRESSURE - MUSE: NORMAL MMHG
T AXIS - MUSE: 48 DEGREES
TROPONIN T SERPL HS-MCNC: 80 NG/L
TROPONIN T SERPL HS-MCNC: 81 NG/L
TSH SERPL DL<=0.005 MIU/L-ACNC: 2.74 UIU/ML (ref 0.3–4.2)
UROBILINOGEN UR STRIP-MCNC: NORMAL MG/DL
VENTRICULAR RATE- MUSE: 168 BPM
WBC # BLD AUTO: 15.7 10E3/UL (ref 4–11)
WBC URINE: 0 /HPF

## 2025-01-09 PROCEDURE — 36415 COLL VENOUS BLD VENIPUNCTURE: CPT | Performed by: STUDENT IN AN ORGANIZED HEALTH CARE EDUCATION/TRAINING PROGRAM

## 2025-01-09 PROCEDURE — 93005 ELECTROCARDIOGRAM TRACING: CPT | Performed by: STUDENT IN AN ORGANIZED HEALTH CARE EDUCATION/TRAINING PROGRAM

## 2025-01-09 PROCEDURE — 71275 CT ANGIOGRAPHY CHEST: CPT

## 2025-01-09 PROCEDURE — 85004 AUTOMATED DIFF WBC COUNT: CPT | Performed by: STUDENT IN AN ORGANIZED HEALTH CARE EDUCATION/TRAINING PROGRAM

## 2025-01-09 PROCEDURE — 96365 THER/PROPH/DIAG IV INF INIT: CPT | Performed by: STUDENT IN AN ORGANIZED HEALTH CARE EDUCATION/TRAINING PROGRAM

## 2025-01-09 PROCEDURE — 93970 EXTREMITY STUDY: CPT

## 2025-01-09 PROCEDURE — 87040 BLOOD CULTURE FOR BACTERIA: CPT | Performed by: STUDENT IN AN ORGANIZED HEALTH CARE EDUCATION/TRAINING PROGRAM

## 2025-01-09 PROCEDURE — 258N000003 HC RX IP 258 OP 636: Performed by: STUDENT IN AN ORGANIZED HEALTH CARE EDUCATION/TRAINING PROGRAM

## 2025-01-09 PROCEDURE — 83735 ASSAY OF MAGNESIUM: CPT | Performed by: STUDENT IN AN ORGANIZED HEALTH CARE EDUCATION/TRAINING PROGRAM

## 2025-01-09 PROCEDURE — 96368 THER/DIAG CONCURRENT INF: CPT | Performed by: STUDENT IN AN ORGANIZED HEALTH CARE EDUCATION/TRAINING PROGRAM

## 2025-01-09 PROCEDURE — 999N000157 HC STATISTIC RCP TIME EA 10 MIN

## 2025-01-09 PROCEDURE — 82040 ASSAY OF SERUM ALBUMIN: CPT | Performed by: STUDENT IN AN ORGANIZED HEALTH CARE EDUCATION/TRAINING PROGRAM

## 2025-01-09 PROCEDURE — 81003 URINALYSIS AUTO W/O SCOPE: CPT | Performed by: STUDENT IN AN ORGANIZED HEALTH CARE EDUCATION/TRAINING PROGRAM

## 2025-01-09 PROCEDURE — 84484 ASSAY OF TROPONIN QUANT: CPT | Performed by: STUDENT IN AN ORGANIZED HEALTH CARE EDUCATION/TRAINING PROGRAM

## 2025-01-09 PROCEDURE — 92960 CARDIOVERSION ELECTRIC EXT: CPT | Performed by: STUDENT IN AN ORGANIZED HEALTH CARE EDUCATION/TRAINING PROGRAM

## 2025-01-09 PROCEDURE — 96366 THER/PROPH/DIAG IV INF ADDON: CPT | Performed by: STUDENT IN AN ORGANIZED HEALTH CARE EDUCATION/TRAINING PROGRAM

## 2025-01-09 PROCEDURE — 96376 TX/PRO/DX INJ SAME DRUG ADON: CPT | Performed by: STUDENT IN AN ORGANIZED HEALTH CARE EDUCATION/TRAINING PROGRAM

## 2025-01-09 PROCEDURE — 250N000011 HC RX IP 250 OP 636: Performed by: STUDENT IN AN ORGANIZED HEALTH CARE EDUCATION/TRAINING PROGRAM

## 2025-01-09 PROCEDURE — 96375 TX/PRO/DX INJ NEW DRUG ADDON: CPT | Performed by: STUDENT IN AN ORGANIZED HEALTH CARE EDUCATION/TRAINING PROGRAM

## 2025-01-09 PROCEDURE — 83605 ASSAY OF LACTIC ACID: CPT | Performed by: STUDENT IN AN ORGANIZED HEALTH CARE EDUCATION/TRAINING PROGRAM

## 2025-01-09 PROCEDURE — 99291 CRITICAL CARE FIRST HOUR: CPT | Mod: 25 | Performed by: STUDENT IN AN ORGANIZED HEALTH CARE EDUCATION/TRAINING PROGRAM

## 2025-01-09 PROCEDURE — 250N000009 HC RX 250: Performed by: STUDENT IN AN ORGANIZED HEALTH CARE EDUCATION/TRAINING PROGRAM

## 2025-01-09 PROCEDURE — 87637 SARSCOV2&INF A&B&RSV AMP PRB: CPT | Performed by: STUDENT IN AN ORGANIZED HEALTH CARE EDUCATION/TRAINING PROGRAM

## 2025-01-09 PROCEDURE — 82805 BLOOD GASES W/O2 SATURATION: CPT | Performed by: STUDENT IN AN ORGANIZED HEALTH CARE EDUCATION/TRAINING PROGRAM

## 2025-01-09 PROCEDURE — 96367 TX/PROPH/DG ADDL SEQ IV INF: CPT | Performed by: STUDENT IN AN ORGANIZED HEALTH CARE EDUCATION/TRAINING PROGRAM

## 2025-01-09 PROCEDURE — 84443 ASSAY THYROID STIM HORMONE: CPT | Performed by: STUDENT IN AN ORGANIZED HEALTH CARE EDUCATION/TRAINING PROGRAM

## 2025-01-09 RX ORDER — IOPAMIDOL 755 MG/ML
500 INJECTION, SOLUTION INTRAVASCULAR ONCE
Status: COMPLETED | OUTPATIENT
Start: 2025-01-09 | End: 2025-01-09

## 2025-01-09 RX ORDER — CEFTRIAXONE 2 G/1
2 INJECTION, POWDER, FOR SOLUTION INTRAMUSCULAR; INTRAVENOUS ONCE
Status: COMPLETED | OUTPATIENT
Start: 2025-01-09 | End: 2025-01-09

## 2025-01-09 RX ORDER — FUROSEMIDE 20 MG/1
20 TABLET ORAL DAILY
Qty: 5 TABLET | Refills: 0 | Status: SHIPPED | OUTPATIENT
Start: 2025-01-09 | End: 2025-01-14

## 2025-01-09 RX ORDER — SODIUM CHLORIDE 9 MG/ML
1000 INJECTION, SOLUTION INTRAVENOUS CONTINUOUS
Status: DISCONTINUED | OUTPATIENT
Start: 2025-01-09 | End: 2025-01-09

## 2025-01-09 RX ORDER — FUROSEMIDE 10 MG/ML
40 INJECTION INTRAMUSCULAR; INTRAVENOUS ONCE
Status: COMPLETED | OUTPATIENT
Start: 2025-01-09 | End: 2025-01-09

## 2025-01-09 RX ORDER — AZITHROMYCIN MONOHYDRATE 500 MG/5ML
500 INJECTION, POWDER, LYOPHILIZED, FOR SOLUTION INTRAVENOUS EVERY 24 HOURS
Status: DISCONTINUED | OUTPATIENT
Start: 2025-01-09 | End: 2025-01-11 | Stop reason: HOSPADM

## 2025-01-09 RX ORDER — MAGNESIUM SULFATE 1 G/100ML
1 INJECTION INTRAVENOUS ONCE
Status: COMPLETED | OUTPATIENT
Start: 2025-01-09 | End: 2025-01-10

## 2025-01-09 RX ORDER — HEPARIN SODIUM 10000 [USP'U]/100ML
0-5000 INJECTION, SOLUTION INTRAVENOUS CONTINUOUS
Status: DISCONTINUED | OUTPATIENT
Start: 2025-01-09 | End: 2025-01-11 | Stop reason: HOSPADM

## 2025-01-09 RX ORDER — DILTIAZEM HYDROCHLORIDE 5 MG/ML
20 INJECTION INTRAVENOUS ONCE
Status: COMPLETED | OUTPATIENT
Start: 2025-01-09 | End: 2025-01-09

## 2025-01-09 RX ADMIN — SODIUM CHLORIDE 70 ML: 9 INJECTION, SOLUTION INTRAVENOUS at 21:55

## 2025-01-09 RX ADMIN — SODIUM CHLORIDE 1000 ML: 9 INJECTION, SOLUTION INTRAVENOUS at 22:47

## 2025-01-09 RX ADMIN — FUROSEMIDE 40 MG: 10 INJECTION, SOLUTION INTRAVENOUS at 20:17

## 2025-01-09 RX ADMIN — HEPARIN SODIUM 1200 UNITS/HR: 10000 INJECTION, SOLUTION INTRAVENOUS at 22:19

## 2025-01-09 RX ADMIN — AMIODARONE HYDROCHLORIDE 1 MG/MIN: 1.8 INJECTION, SOLUTION INTRAVENOUS at 23:28

## 2025-01-09 RX ADMIN — CEFTRIAXONE SODIUM 2 G: 2 INJECTION, POWDER, FOR SOLUTION INTRAMUSCULAR; INTRAVENOUS at 20:21

## 2025-01-09 RX ADMIN — AZITHROMYCIN MONOHYDRATE 500 MG: 500 INJECTION, POWDER, LYOPHILIZED, FOR SOLUTION INTRAVENOUS at 21:32

## 2025-01-09 RX ADMIN — IOPAMIDOL 70 ML: 755 INJECTION, SOLUTION INTRAVENOUS at 21:54

## 2025-01-09 RX ADMIN — MAGNESIUM SULFATE IN DEXTROSE 1 G: 10 INJECTION, SOLUTION INTRAVENOUS at 23:41

## 2025-01-09 RX ADMIN — DILTIAZEM HYDROCHLORIDE 20 MG: 5 INJECTION, SOLUTION INTRAVENOUS at 20:33

## 2025-01-09 RX ADMIN — AMIODARONE HYDROCHLORIDE 150 MG: 1.5 INJECTION, SOLUTION INTRAVENOUS at 23:17

## 2025-01-09 ASSESSMENT — COLUMBIA-SUICIDE SEVERITY RATING SCALE - C-SSRS
6. HAVE YOU EVER DONE ANYTHING, STARTED TO DO ANYTHING, OR PREPARED TO DO ANYTHING TO END YOUR LIFE?: NO
1. IN THE PAST MONTH, HAVE YOU WISHED YOU WERE DEAD OR WISHED YOU COULD GO TO SLEEP AND NOT WAKE UP?: NO
2. HAVE YOU ACTUALLY HAD ANY THOUGHTS OF KILLING YOURSELF IN THE PAST MONTH?: NO

## 2025-01-09 ASSESSMENT — ACTIVITIES OF DAILY LIVING (ADL)
ADLS_ACUITY_SCORE: 41

## 2025-01-09 NOTE — TELEPHONE ENCOUNTER
----- Message from Karley SINGLETON sent at 1/9/2025  2:13 PM CST -----    ----- Message -----  From: Bobbi Cordero PA-C  Sent: 1/9/2025  12:51 PM CST  To: [facility] Pool    Team - please call patient with results. Thank you!  Your BNP, d dimer, and urea nitrogen are elevated which is concerning for possible heart strain and blood clots, go to the ER immediately for further investigation and managament

## 2025-01-09 NOTE — TELEPHONE ENCOUNTER
Left detailed message stating the voicemail was referring to his appointment from today 1/9/2025.     Sydnee Young, VF

## 2025-01-09 NOTE — TELEPHONE ENCOUNTER
RN Triage    Patient Contact    Attempt # 1    Was call answered?  No.  Left message on voicemail with information to call me back.    Lilian Alvarez RN on 1/9/2025 at 3:28 PM

## 2025-01-09 NOTE — PROGRESS NOTES
"Jaison is a 69 year old who is being evaluated via a billable video visit.    How would you like to obtain your AVS? MyChart  If the video visit is dropped, the invitation should be resent by: Text to cell phone: [phone]  Will anyone else be joining your video visit? No      Assessment & Plan     Leg swelling  - D dimer, quantitative; Future  - Basic metabolic panel  (Ca, Cl, CO2, Creat, Gluc, K, Na, BUN); Future  - furosemide (LASIX) 20 MG tablet; Take 1 tablet (20 mg) by mouth daily for 5 days.  - BNP-N terminal pro; Future    SOB (shortness of breath)  - D dimer, quantitative; Future  - Basic metabolic panel  (Ca, Cl, CO2, Creat, Gluc, K, Na, BUN); Future  - furosemide (LASIX) 20 MG tablet; Take 1 tablet (20 mg) by mouth daily for 5 days.  - BNP-N terminal pro; Future      No LOS data to display   30 minutes. Time spent by me today doing chart review, history and exam, documentation and further activities per the note      Nicotine/Tobacco Cessation  He reports that he has been smoking cigarettes. He has never used smokeless tobacco.  Nicotine/Tobacco Cessation Plan          See Patient Instructions  There are no Patient Instructions on file for this visit.    Subjective   Jaison is a 69 year old, presenting for the following health issues:  Swelling      1/9/2025     8:41 AM   Additional Questions   Roomed by Sydnee         1/9/2025     8:41 AM   Patient Reported Additional Medications   Patient reports taking the following new medications none     History of Present Illness       Reason for visit:  Swelling in  legs  Symptom onset:  3-7 days ago  Symptom intensity:  Moderate  Symptom progression:  Staying the same  Had these symptoms before:  No  What makes it worse:  No  What makes it better:  No   He is taking medications regularly.   Nurse Triage SBAR     Is this a 2nd Level Triage? YES, LICENSED PRACTITIONER REVIEW IS REQUIRED     Situation: \"Swelling from toes to hips\"     Background: Patient reports he has " "a disability and works sitting down because of this. On Friday he needed to stand for work and woke up Saturday with swelling bilaterally. This has progressively gotten worse and extends from \"toes to hips\". Patient also reports that his legs are a little red. No pain, no weeping.      Assessment: See Triage     Protocol Recommended Disposition:   Go To Office Now     Recommendation: No appointments available with PCP or other providers in clinic today, patient declined urgent care as he \"cannot afford a $600 bill\".     Routed to provider     Does the patient meet one of the following criteria for ADS visit consideration? No         Reason for Disposition   SEVERE swelling (e.g., swelling extends above knee, entire leg is swollen, weeping fluid)    Additional Information   Negative: Chest pain   Negative: Followed an insect bite and has localized swelling (e.g., small area of puffy or swollen skin)   Negative: Followed a knee injury   Negative: Ankle injury   Negative: Pregnant with leg swelling or edema   Negative: Sounds like a life-threatening emergency to the triager   Negative: Difficulty breathing at rest   Negative: Entire foot is cool or blue in comparison to other side   Negative: Cast on leg or ankle and has increasing pain   Negative: Can't walk or can barely stand (new-onset)   Negative: Fever and red area (or area very tender to touch)   Negative: Patient sounds very sick or weak to the triager    Answer Assessment - Initial Assessment Questions  1. ONSET: \"When did the swelling start?\" (e.g., minutes, hours, days)        Saturday morning    2. LOCATION: \"What part of the leg is swollen?\"  \"Are both legs swollen or just one leg?\"        From feet to hips, bilaterally.     3. SEVERITY: \"How bad is the swelling?\" (e.g., localized; mild, moderate, severe)        Moderate    4. REDNESS: \"Does the swelling look red or infected?\"        A little red    5. PAIN: \"Is the swelling painful to touch?\" If Yes, ask: " "\"How painful is it?\"   (Scale 1-10; mild, moderate or severe)        No    6. FEVER: \"Do you have a fever?\" If Yes, ask: \"What is it, how was it measured, and when did it start?\"         No    7. CAUSE: \"What do you think is causing the leg swelling?\"        Patient reports he has a sit down job due to disability and needed to stand at work on Friday, woke up swollen on Saturday    8. MEDICAL HISTORY: \"Do you have a history of blood clots (e.g., DVT), cancer, heart failure, kidney disease, or liver failure?\"        Blood clots    9. RECURRENT SYMPTOM: \"Have you had leg swelling before?\" If Yes, ask: \"When was the last time?\" \"What happened that time?\"        \"A long time ago when I had a blood clot in one leg\"    10. OTHER SYMPTOMS: \"Do you have any other symptoms?\" (e.g., chest pain, difficulty breathing)          Moderate difficulty breathing    11. PREGNANCY: \"Is there any chance you are pregnant?\" \"When was your last menstrual period?\"          N/A    Protocols used: Leg Swelling and Edema-A-OH      SUBJECTIVE  Leg swelling. Trouble logging into [employer] for video visit. Toes to hips are swollen, not normal. Aileen sits down at work, Friday on feet for a long time, Saturday morning swelling, wears compression socks, had to cuta pair off due to not being able to get them over the heel. Shortness of breath once in a while, no hear or kidney problems, hisotry of blood clots, DVTs a while ago, doesn't feel like a DVT per patient, no pain in legs, pain in hip joints, pulled a muscle in his hip, havent tried elevating legs, usually wears compression socks, legs are a little red, no itching, finger into leg, dent goes away quickly, swelling is the same, moderate swelling, double swelling than normal, gets swelling if doesn't have compression socks on,                   Objective           Vitals:  No vitals were obtained today due to virtual visit.    Physical Exam   GENERAL: alert and no distress  RESP: No audible " wheeze, cough  PSYCH: Appropriate affect, tone, and pace of words          Video-Visit Details    Type of service:  Video Visit   Originating Location (pt. Location): Home    Distant Location (provider location):  Off-site  Platform used for Video Visit: Renetta  Signed Electronically by: Bobbi Cordero PA-C

## 2025-01-09 NOTE — TELEPHONE ENCOUNTER
Patient notified of providers advice.     Patient states a message was left stating Mondays appointment needs to be virtual- please advise.    Lilian Alvarez RN on 1/9/2025 at 4:06 PM

## 2025-01-09 NOTE — TELEPHONE ENCOUNTER
"Patient is very reluctant to go to the ED. He said he will eat some thing and \"think about it.\"    He is concerned about cost, and his dog he has at home. Discussed concerns of lab results. Patient was still unsure at end of call if he will be going or not.      Ankit Joshi RN on 1/9/2025 at 3:02 PM    "

## 2025-01-09 NOTE — PATIENT INSTRUCTIONS
Labs today    Discussed fluid retention in legs    Appointment scheduled for Monday for in person evaluation    Lasix for 5 days    Compression socks    Elevate legs    Go to the ER if symptoms worsen or fail to improve    Leg swelling, also known as peripheral edema, can result from various causes ranging from benign to serious medical conditions. Proper diagnosis is crucial for effective treatment. Here is an overview of the potential causes, diagnostic approaches, and treatment options for leg swelling:    ### Common Causes of Leg Swelling    1. **Venous Insufficiency**     - Blood pools in the veins due to weakened valves.       2. **Heart Failure**     - The heart's inability to pump blood effectively can cause fluid buildup.    3. **Kidney Disease**     - Impaired kidney function can lead to fluid retention.    4. **Liver Disease**     - Liver dysfunction, such as in cirrhosis, can cause fluid accumulation.    5. **Lymphedema**     - Blockage in the lymphatic system leading to fluid retention.    6. **Infection**     - Cellulitis or other infections can cause localized swelling.    7. **Deep Vein Thrombosis (DVT)**     - A blood clot in the leg veins can cause sudden swelling and pain.    8. **Medications**     - Certain drugs, like calcium channel blockers, steroids, or NSAIDs, can cause swelling.    9. **Injury**     - Trauma or surgery to the leg can lead to swelling.    10. **Obesity**      - Excess weight can cause increased pressure on leg veins.    11. **Pregnancy**      - Hormonal changes and increased blood volume can cause leg swelling.    ### Diagnosis of Leg Swelling    1. **Medical History and Physical Examination**     - Detailed medical history and physical exam to assess the extent and pattern of swelling.    2. **Blood Tests**     - To evaluate kidney function, liver function, and protein levels.    3. **Urine Tests**     - To check for kidney disease or proteinuria.    4. **Imaging  Studies**     - **Ultrasound**: To check for DVT or venous insufficiency.     - **Echocardiogram**: To evaluate heart function.     - **CT Scan or MRI**: If deeper issues are suspected.    5. **Electrocardiogram (ECG)**     - To assess heart function and identify potential heart failure.    6. **Lymphoscintigraphy**     - To diagnose lymphedema by imaging the lymphatic system.    ### Treatment of Leg Swelling    #### **General Measures**    - **Elevation**: Elevate the legs above heart level to reduce swelling.  - **Compression Stockings**: Use graduated compression stockings to help prevent fluid buildup.  - **Diet and Lifestyle**: Low-sodium diet, regular exercise, and weight management.    #### **Specific Treatments Based on Cause**    1. **Venous Insufficiency**     - **Compression Therapy**: Stockings or bandages.     - **Medications**: Diuretics may be used, but with caution.     - **Surgical Options**: Vein stripping, sclerotherapy, or laser treatment.    2. **Heart Failure**     - **Medications**: Diuretics, ACE inhibitors, beta-blockers, or digoxin.     - **Lifestyle Changes**: Low-sodium diet, fluid restriction, and monitoring weight.    3. **Kidney Disease**     - **Medications**: Diuretics, ACE inhibitors, or ARBs.     - **Dialysis**: In severe cases of kidney failure.    4. **Liver Disease**     - **Diuretics**: Spironolactone or furosemide.     - **Paracentesis**: For severe ascites.     - **Diet**: Low-sodium diet and alcohol abstinence.    5. **Lymphedema**     - **Compression Therapy**: Bandaging or stockings.     - **Manual Lymph Drainage**: Specialized massage techniques.     - **Exercise**: Gentle exercises to promote lymphatic drainage.    6. **Infection (Cellulitis)**     - **Antibiotics**: Appropriate antibiotics to treat infection.     - **Elevation and Compression**: To reduce swelling and promote healing.    7. **Deep Vein Thrombosis (DVT)**     - **Anticoagulants**: Blood thinners such as  warfarin, heparin, or DOACs.     - **Compression Stockings**: To reduce symptoms and prevent complications.     - **Surgery**: In severe cases, thrombolysis or thrombectomy.    8. **Medication-Induced Edema**     - **Adjustment**: Review and adjust medications with a healthcare provider.    9. **Obesity**     - **Weight Loss**: Dietary changes and increased physical activity.    10. **Pregnancy**      - **Supportive Measures**: Elevation, compression stockings, and adequate hydration.    ### Summary    Leg swelling can have numerous causes, and a thorough diagnostic workup is essential to identify the underlying condition. Treatment involves addressing the specific cause of the swelling, alongside general measures such as leg elevation, compression therapy, and lifestyle modifications. Regular follow-up with healthcare providers is important to monitor the condition and adjust treatment as needed.     [place]    Patient understood and verbally consented to the treatment plan. Discussed symptoms that would warrant an urgent or emergent visit. All of the patients' questions were answered. Patient was instructed to contact the clinic if questions or concerns arise. Recommend follow up appointments if symptoms worsen or fail to improve. Recommend follow up as needed. Recommend ER in the case of an emergency.    Bobbi Cordero PA-C    Please note: Voice recognition software may have been used in preparing this note, unintended word substitutions may be present.

## 2025-01-09 NOTE — RESULT ENCOUNTER NOTE
Team - please call patient with results. Thank you!  Your BNP, d dimer, and urea nitrogen are elevated which is concerning for possible heart strain and blood clots, go to the ER immediately for further investigation and managament

## 2025-01-10 ENCOUNTER — APPOINTMENT (OUTPATIENT)
Dept: CARDIOLOGY | Facility: CLINIC | Age: 70
End: 2025-01-10
Attending: STUDENT IN AN ORGANIZED HEALTH CARE EDUCATION/TRAINING PROGRAM
Payer: COMMERCIAL

## 2025-01-10 LAB
ATRIAL RATE - MUSE: 152 BPM
ATRIAL RATE - MUSE: 84 BPM
BASE EXCESS BLDV CALC-SCNC: 5.4 MMOL/L (ref -3–3)
BASE EXCESS BLDV CALC-SCNC: 5.5 MMOL/L (ref -3–3)
BASE EXCESS BLDV CALC-SCNC: 6.7 MMOL/L (ref -3–3)
BASE EXCESS BLDV CALC-SCNC: 7.3 MMOL/L (ref -3–3)
BASE EXCESS BLDV CALC-SCNC: 8.6 MMOL/L (ref -3–3)
BI-PLANE LVEF ECHO: NORMAL
DIASTOLIC BLOOD PRESSURE - MUSE: NORMAL MMHG
DIASTOLIC BLOOD PRESSURE - MUSE: NORMAL MMHG
HCO3 BLDV-SCNC: 33 MMOL/L (ref 21–28)
HCO3 BLDV-SCNC: 34 MMOL/L (ref 21–28)
HCO3 BLDV-SCNC: 36 MMOL/L (ref 21–28)
HCO3 BLDV-SCNC: 36 MMOL/L (ref 21–28)
HCO3 BLDV-SCNC: 37 MMOL/L (ref 21–28)
HOLD SPECIMEN: NORMAL
INTERPRETATION ECG - MUSE: NORMAL
INTERPRETATION ECG - MUSE: NORMAL
O2/TOTAL GAS SETTING VFR VENT: 36 %
O2/TOTAL GAS SETTING VFR VENT: 36 %
O2/TOTAL GAS SETTING VFR VENT: 40 %
O2/TOTAL GAS SETTING VFR VENT: 40 %
O2/TOTAL GAS SETTING VFR VENT: 60 %
OXYHGB MFR BLDV: 38 % (ref 70–75)
OXYHGB MFR BLDV: 48 % (ref 70–75)
OXYHGB MFR BLDV: 58 % (ref 70–75)
OXYHGB MFR BLDV: 66 % (ref 70–75)
OXYHGB MFR BLDV: 90 % (ref 70–75)
P AXIS - MUSE: 60 DEGREES
P AXIS - MUSE: NORMAL DEGREES
PCO2 BLDV: 62 MM HG (ref 40–50)
PCO2 BLDV: 69 MM HG (ref 40–50)
PCO2 BLDV: 71 MM HG (ref 40–50)
PCO2 BLDV: 71 MM HG (ref 40–50)
PCO2 BLDV: 75 MM HG (ref 40–50)
PH BLDV: 7.29 [PH] (ref 7.32–7.43)
PH BLDV: 7.3 [PH] (ref 7.32–7.43)
PH BLDV: 7.31 [PH] (ref 7.32–7.43)
PH BLDV: 7.33 [PH] (ref 7.32–7.43)
PH BLDV: 7.33 [PH] (ref 7.32–7.43)
PO2 BLDV: 27 MM HG (ref 25–47)
PO2 BLDV: 32 MM HG (ref 25–47)
PO2 BLDV: 39 MM HG (ref 25–47)
PO2 BLDV: 41 MM HG (ref 25–47)
PO2 BLDV: 67 MM HG (ref 25–47)
PR INTERVAL - MUSE: 116 MS
PR INTERVAL - MUSE: 130 MS
QRS DURATION - MUSE: 134 MS
QRS DURATION - MUSE: 98 MS
QT - MUSE: 272 MS
QT - MUSE: 374 MS
QTC - MUSE: 432 MS
QTC - MUSE: 441 MS
R AXIS - MUSE: 118 DEGREES
R AXIS - MUSE: 144 DEGREES
SAO2 % BLDV: 38.4 % (ref 70–75)
SAO2 % BLDV: 48.8 % (ref 70–75)
SAO2 % BLDV: 58.8 % (ref 70–75)
SAO2 % BLDV: 67.6 % (ref 70–75)
SAO2 % BLDV: 91.8 % (ref 70–75)
SYSTOLIC BLOOD PRESSURE - MUSE: NORMAL MMHG
SYSTOLIC BLOOD PRESSURE - MUSE: NORMAL MMHG
T AXIS - MUSE: -24 DEGREES
T AXIS - MUSE: 91 DEGREES
UFH PPP CHRO-ACNC: 0.39 IU/ML
UFH PPP CHRO-ACNC: 0.4 IU/ML
VENTRICULAR RATE- MUSE: 152 BPM
VENTRICULAR RATE- MUSE: 84 BPM

## 2025-01-10 PROCEDURE — 36415 COLL VENOUS BLD VENIPUNCTURE: CPT | Performed by: STUDENT IN AN ORGANIZED HEALTH CARE EDUCATION/TRAINING PROGRAM

## 2025-01-10 PROCEDURE — 85520 HEPARIN ASSAY: CPT | Performed by: STUDENT IN AN ORGANIZED HEALTH CARE EDUCATION/TRAINING PROGRAM

## 2025-01-10 PROCEDURE — 82805 BLOOD GASES W/O2 SATURATION: CPT | Performed by: STUDENT IN AN ORGANIZED HEALTH CARE EDUCATION/TRAINING PROGRAM

## 2025-01-10 PROCEDURE — 258N000003 HC RX IP 258 OP 636: Performed by: FAMILY MEDICINE

## 2025-01-10 PROCEDURE — C8929 TTE W OR WO FOL WCON,DOPPLER: HCPCS

## 2025-01-10 PROCEDURE — 250N000013 HC RX MED GY IP 250 OP 250 PS 637: Performed by: FAMILY MEDICINE

## 2025-01-10 PROCEDURE — 250N000009 HC RX 250: Performed by: FAMILY MEDICINE

## 2025-01-10 PROCEDURE — 250N000011 HC RX IP 250 OP 636: Performed by: FAMILY MEDICINE

## 2025-01-10 PROCEDURE — 93306 TTE W/DOPPLER COMPLETE: CPT | Mod: 26 | Performed by: INTERNAL MEDICINE

## 2025-01-10 PROCEDURE — 250N000011 HC RX IP 250 OP 636: Performed by: STUDENT IN AN ORGANIZED HEALTH CARE EDUCATION/TRAINING PROGRAM

## 2025-01-10 PROCEDURE — 999N000157 HC STATISTIC RCP TIME EA 10 MIN

## 2025-01-10 PROCEDURE — 250N000009 HC RX 250: Performed by: STUDENT IN AN ORGANIZED HEALTH CARE EDUCATION/TRAINING PROGRAM

## 2025-01-10 PROCEDURE — 36415 COLL VENOUS BLD VENIPUNCTURE: CPT | Performed by: FAMILY MEDICINE

## 2025-01-10 PROCEDURE — 999N000208 ECHOCARDIOGRAM COMPLETE

## 2025-01-10 PROCEDURE — 82805 BLOOD GASES W/O2 SATURATION: CPT | Performed by: FAMILY MEDICINE

## 2025-01-10 PROCEDURE — 272N000054 HC CANNULA HIGH FLOW, ADULT

## 2025-01-10 PROCEDURE — 255N000002 HC RX 255 OP 636: Performed by: INTERNAL MEDICINE

## 2025-01-10 PROCEDURE — 92960 CARDIOVERSION ELECTRIC EXT: CPT | Performed by: STUDENT IN AN ORGANIZED HEALTH CARE EDUCATION/TRAINING PROGRAM

## 2025-01-10 RX ORDER — ONDANSETRON 2 MG/ML
4 INJECTION INTRAMUSCULAR; INTRAVENOUS ONCE
Status: COMPLETED | OUTPATIENT
Start: 2025-01-10 | End: 2025-01-10

## 2025-01-10 RX ORDER — ETOMIDATE 2 MG/ML
10 INJECTION INTRAVENOUS ONCE
Status: COMPLETED | OUTPATIENT
Start: 2025-01-10 | End: 2025-01-10

## 2025-01-10 RX ORDER — SODIUM CHLORIDE 9 MG/ML
INJECTION, SOLUTION INTRAVENOUS CONTINUOUS
Status: DISCONTINUED | OUTPATIENT
Start: 2025-01-10 | End: 2025-01-11 | Stop reason: HOSPADM

## 2025-01-10 RX ORDER — DILTIAZEM HYDROCHLORIDE 5 MG/ML
20 INJECTION INTRAVENOUS ONCE
Status: COMPLETED | OUTPATIENT
Start: 2025-01-10 | End: 2025-01-10

## 2025-01-10 RX ORDER — METOPROLOL TARTRATE 1 MG/ML
5 INJECTION, SOLUTION INTRAVENOUS ONCE
Status: COMPLETED | OUTPATIENT
Start: 2025-01-10 | End: 2025-01-10

## 2025-01-10 RX ORDER — GLIPIZIDE 10 MG/1
1 TABLET ORAL
Status: DISCONTINUED | OUTPATIENT
Start: 2025-01-10 | End: 2025-01-11 | Stop reason: HOSPADM

## 2025-01-10 RX ORDER — METOPROLOL TARTRATE 50 MG
50 TABLET ORAL ONCE
Status: COMPLETED | OUTPATIENT
Start: 2025-01-10 | End: 2025-01-10

## 2025-01-10 RX ADMIN — SODIUM CHLORIDE 500 ML: 9 INJECTION, SOLUTION INTRAVENOUS at 20:49

## 2025-01-10 RX ADMIN — METOPROLOL TARTRATE 5 MG: 1 INJECTION, SOLUTION INTRAVENOUS at 14:45

## 2025-01-10 RX ADMIN — HUMAN ALBUMIN MICROSPHERES AND PERFLUTREN 3 ML: 10; .22 INJECTION, SOLUTION INTRAVENOUS at 08:35

## 2025-01-10 RX ADMIN — HEPARIN SODIUM 1200 UNITS/HR: 10000 INJECTION, SOLUTION INTRAVENOUS at 14:00

## 2025-01-10 RX ADMIN — AMIODARONE HYDROCHLORIDE 0.5 MG/MIN: 1.8 INJECTION, SOLUTION INTRAVENOUS at 17:04

## 2025-01-10 RX ADMIN — ETOMIDATE 10 MG: 2 INJECTION, SOLUTION INTRAVENOUS at 18:20

## 2025-01-10 RX ADMIN — SALINE NASAL SPRAY 1 SPRAY: 1.5 SOLUTION NASAL at 17:05

## 2025-01-10 RX ADMIN — ETOMIDATE 10 MG: 2 INJECTION, SOLUTION INTRAVENOUS at 03:35

## 2025-01-10 RX ADMIN — DILTIAZEM HYDROCHLORIDE 20 MG: 5 INJECTION, SOLUTION INTRAVENOUS at 13:34

## 2025-01-10 RX ADMIN — AMIODARONE HYDROCHLORIDE 0.5 MG/MIN: 1.8 INJECTION, SOLUTION INTRAVENOUS at 05:28

## 2025-01-10 RX ADMIN — ONDANSETRON 4 MG: 2 INJECTION, SOLUTION INTRAMUSCULAR; INTRAVENOUS at 17:51

## 2025-01-10 RX ADMIN — AMIODARONE HYDROCHLORIDE 150 MG: 1.5 INJECTION, SOLUTION INTRAVENOUS at 03:42

## 2025-01-10 RX ADMIN — AZITHROMYCIN MONOHYDRATE 500 MG: 500 INJECTION, POWDER, LYOPHILIZED, FOR SOLUTION INTRAVENOUS at 20:17

## 2025-01-10 RX ADMIN — METOPROLOL TARTRATE 5 MG: 1 INJECTION, SOLUTION INTRAVENOUS at 15:54

## 2025-01-10 RX ADMIN — METOPROLOL TARTRATE 50 MG: 50 TABLET, FILM COATED ORAL at 15:52

## 2025-01-10 ASSESSMENT — ACTIVITIES OF DAILY LIVING (ADL)
ADLS_ACUITY_SCORE: 41

## 2025-01-10 ASSESSMENT — ENCOUNTER SYMPTOMS: COUGH: 1

## 2025-01-10 NOTE — PROGRESS NOTES
Pt was placed on BiPAP/CPAP (Settings  12/7 rate 12 and FiO2 40)   Mask Sizes: FFM Large  Follow up was completed with MD post placement of BiPAP  Plan was discussed with RN   Rounding Q4 unless otherwise noted by RT or RN  Breakdown noted (No)  RT will continue to rounds per protocol.  Thank you

## 2025-01-10 NOTE — PROGRESS NOTES
External Facility Transfer  Location: [facility]    Diagnosis: Acute Pulmonary Embolism  Reason for transfer: Need for specialized service or procedure  Clinical details: 69 year old male who presented to the ED from clinic for bilateral leg swelling and abnormal labs. PMH is significant for COPD, hx of DVT and a remote hx of testicular cancer, he is no on any anticoagulation at this point but has been on in the past. During the work up a CT PE showed Exam is positive for pulmonary emboli with segmental size emboli present within right middle and lower lobes. There is also likely a small embolism present within left lung. Thoracic aorta is negative for dissection. There is evidence of right heart strain with RV/LV ratio greater than 1. PERT was not activated but IR was consulted and did not accept the patient given the clinical scenario  Care everywhere has been updated and reviewed: Yes  Referring facility has made imaging available through PACS: Yes  If patient is transferring for specialty care or procedure, the specialist has agreed with need for transfer and anticipated timeline: IR was consulted and the patient was not a candidate for intervention  Transfer accepted: No (Specify) There are currently no ICU beds available across the system given that he is not a candidate for any intervention by IR the management of this patient is supportive at this time. He is currently on heparin for management of his segmental Pulmonary embolisms. The patient will remain on the transfer list but given his stability at this point with the lack of an intervention he does not meet criteria to transfer at this point in time  ICU Level of Care Recommendation: N/A: patient not accepted for transfer   ICU Service Recommendation: [place]- N/A  Final destination:  Remains at [facility] ED unless clinically decompensates  Recommendations for referring provider: Supportive care, should remain on heparin gtt for PE if clinically  decompensates please reach out with regards to transfer. Patient can remain on Bipap if this helps with his acidosis however positive pressure ventilation can be detrimental to RV function. If acidosis is unchanged or improved would recommend trial off BiPAP to assess if positive pressure is causing RV to work harder.    Dimitry Hui MD  Tele ICU

## 2025-01-10 NOTE — ED NOTES
Rt at bedside pt being started on high flow-O2 sats trending into the upper 80's. States he feels ok. Heart rate continues in the 150's. Family here at bedside

## 2025-01-10 NOTE — ED PROVIDER NOTES
History     Chief Complaint   Patient presents with    Abnormal Labs     HPI  Keith Gonzalez is a 69 year old male who presenting from home secondary to abnormal labs.  Patient was initially seen today by his primary provider secondary to bilateral leg swelling.  He notes that is gotten worse over the past few days and has had increased cough and shortness of breath.  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  He does not use any breathing treatments.  He is not any fevers chest pains headaches dizziness confusion extremity abnormalities or abdominal pain or change in his urine or bowel habits.    His outpatient lab work shows a potassium of 5.0 sodium of 138 a renal function of 1.17 up from 0.62.  His BNP was 9004 and 77 with patient's over the lower leg swelling and increasing shortness of breath and tachycardia concern for acute congestive heart failure.  He was provided with Lasix prior to arrival therefore provide another dose of IV 40 Lasix at this time.  He does have bilateral lower leg swelling but notes this feels different than when he had his DVTs.  Unfortunately his D-dimer is also elevated at 9.32.  He is not currently taking any anticoagulants for his history of DVTs and testicular cancer.      Allergies:  Allergies   Allergen Reactions    No Known Drug Allergy        Problem List:    Patient Active Problem List    Diagnosis Date Noted    History of compression fracture of spine 09/11/2024     Priority: Medium    Gastroesophageal reflux disease with esophagitis without hemorrhage 09/11/2024     Priority: Medium    Kyphosis (acquired) (postural) 09/11/2024     Priority: Medium    Aortic atherosclerosis 06/11/2024     Priority: Medium    History of testicular cancer 01/08/2024     Priority: Medium    Age-related osteoporosis without current pathological fracture 03/13/2023     Priority: Medium    SI (sacroiliac) joint dysfunction  09/27/2021     Priority: Medium    Lumbar pain 09/27/2021     Priority: Medium    Scoliosis of thoracolumbar spine, unspecified scoliosis type 09/27/2021     Priority: Medium    history of VTE, R, L LE's 09/17/2021     Priority: Medium     had been on coumadin from [id number]      Chronic obstructive pulmonary disease (H) 06/20/2018     Priority: Medium        Past Medical History:    Past Medical History:   Diagnosis Date    Cancer (H)     COPD (chronic obstructive pulmonary disease) (H)        Past Surgical History:    Past Surgical History:   Procedure Laterality Date    DAVINCI HERNIORRHAPHY INGUINAL Left 10/28/2021    Procedure: Robotic assisted left inguinal hernia repair with mesh;  Surgeon: Gladys Whitehead MD;  Location: [facility]    ESOPHAGOSCOPY, GASTROSCOPY, DUODENOSCOPY (EGD), COMBINED N/A 9/13/2022    Procedure: ESOPHAGOGASTRODUODENOSCOPY, WITH BIOPSY;  Surgeon: Doyle Corbin MD;  Location: [facility]    LAPAROSCOPIC LYSIS ADHESIONS N/A 10/28/2021    Procedure: Laparoscopic lysis adhesions;  Surgeon: Gladys Whitehead MD;  Location: [facility]       Family History:    No family history on file.    Social History:  Marital Status:   [5]  Social History     Tobacco Use    Smoking status: Every Day     Current packs/day: 1.00     Types: Cigarettes    Smokeless tobacco: Never   Vaping Use    Vaping status: Never Used   Substance Use Topics    Alcohol use: No    Drug use: No        Medications:    alendronate (FOSAMAX) 70 MG tablet  aspirin 81 MG EC tablet  calcium carbonate-vitamin D (OSCAL) 500-5 MG-MCG tablet  COMBIVENT RESPIMAT  MCG/ACT inhaler  cyclobenzaprine (FLEXERIL) 5 MG tablet  ferrous sulfate (FEROSUL) 325 (65 Fe) MG tablet  fluticasone-salmeterol (ADVAIR) 500-50 MCG/ACT inhaler  furosemide (LASIX) 20 MG tablet  omeprazole (PRILOSEC) 40 MG DR capsule  prochlorperazine (COMPAZINE) 5 MG tablet          Review of Systems   Respiratory:  Positive for cough.    Musculoskeletal:         Lower leg swelling  bilaterally.   All other systems reviewed and are negative.      Physical Exam   BP: 110/84  Pulse: 116  Temp: 97.6  F (36.4  C)  Resp: 18  Weight: 67.1 kg (148 lb)  SpO2: (!) 88 %      Physical Exam  Vitals and nursing note reviewed.   Constitutional:       General: He is not in acute distress.     Appearance: Normal appearance. He is normal weight. He is not toxic-appearing.   HENT:      Head: Normocephalic and atraumatic.   Eyes:      General: No scleral icterus.     Conjunctiva/sclera: Conjunctivae normal.   Cardiovascular:      Rate and Rhythm: Regular rhythm. Tachycardia present.      Heart sounds: Normal heart sounds.   Pulmonary:      Effort: Pulmonary effort is normal. No respiratory distress.      Breath sounds: Wheezing and rales present.   Abdominal:      Palpations: Abdomen is soft.      Tenderness: There is no abdominal tenderness.   Musculoskeletal:         General: No deformity.      Cervical back: Neck supple.      Right lower leg: Edema present.      Left lower leg: Edema present.   Skin:     General: Skin is warm.      Capillary Refill: Capillary refill takes less than 2 seconds.   Neurological:      General: No focal deficit present.      Mental Status: He is alert and oriented to person, place, and time.         ED [facility]    -Cardioversion External    Date/Time: 1/10/2025 3:44 AM    Performed by: Noelle Pineda MD  Authorized by: Noelle Pineda MD    Risks, benefits and alternatives discussed.    ED EVALUATION:      Assessment Time: 1/10/2025 3:44 AM      I have performed an Emergency Department Evaluation including taking a history and physical examination, this evaluation will be documented in the electronic medical record for this ED encounter.     Indication: Atrial flutter    ASA Class: Class 2- mild systemic disease, no acute problems, no functional limitations    Mallampati: Grade 1- soft palate, uvula, tonsillar pillars, and  posterior pharyngeal wall visible    NPO Status: appropriately NPO for procedure    UNIVERSAL PROTOCOL   Site Marked: NA  Prior Images Obtained and Reviewed:  NA  Required items: Required blood products, implants, devices and special equipment available    Patient identity confirmed:  Verbally with patient, arm band, provided demographic data and hospital-assigned identification number  Patient was reevaluated immediately before administering moderate or deep sedation or anesthesia  Confirmation Checklist:  Patient's identity using two indicators, relevant allergies, procedure was appropriate and matched the consent or emergent situation and correct equipment/implants were available  Time out: Immediately prior to the procedure a time out was called    Universal Protocol: the Joint Commission Universal Protocol was followed    Preparation: Patient was prepped and draped in usual sterile fashion      SEDATION  Patient Sedated: Yes    Sedation Type:  Moderate (conscious) sedation  Sedation:  Etomidate  Vital signs: Vital signs monitored during sedation      PRE-PROCEDURE DETAILS:     Cardioversion basis:  Emergent    Rhythm:  Atrial flutter    Electrode placement:  Anterior-posterior  Attempt one:     Cardioversion mode:  Synchronous    Waveform:  Biphasic    Shock (Joules):  150    Shock outcome:  Conversion to normal sinus rhythm  Post-procedure details:     Patient status:  Awake      PROCEDURE    Patient Tolerance:  Patient tolerated the procedure well with no immediate complications  Length of time physician/provider present for 1:1 monitoring during sedation: 15           EKG self interpreted at 10:30 AM consistent with atrial flutter with 2 1 beat consistent and regular rhythm at 168 bpm.   QTc of 414.  Abnormal EKG.    Repeat EKG read at 3:39 AM with a sinus rhythm of 84 bpm.  HI interval of 116, QRS of 98 and a QTc of 441.  Mild Axis right axis deviation present.  Nonspecific T wave changes present.   Compared to previous consistent with a sinus rhythm with premature atrial complexes.  Similar to previous EKG done few months back.  No STEMI noted.    Critical Care time:  was 150 minutes for this patient excluding procedures.         Results for orders placed or performed during the hospital encounter of 01/09/25 (from the past 24 hours)   EKG 12-lead, tracing only   Result Value Ref Range    Systolic Blood Pressure  mmHg    Diastolic Blood Pressure  mmHg    Ventricular Rate 168 BPM    Atrial Rate 336 BPM    PA Interval  ms    QRS Duration 100 ms     ms    QTc 414 ms    P Axis 265 degrees    R AXIS 135 degrees    T Axis 48 degrees    Interpretation ECG       Atrial flutter with 2:1 A-V conduction  Right axis deviation  Pulmonary disease pattern  Incomplete right bundle branch block  Right ventricular hypertrophy  Nonspecific ST abnormality  Abnormal ECG  No previous ECGs available  Confirmed by SEE ED PROVIDER NOTE FOR, ECG INTERPRETATION ([id number]),  Zelalem Rm ([id number]) on 1/9/2025 7:18:57 PM     CBC with platelets differential    Narrative    The following orders were created for panel order CBC with platelets differential.  Procedure                               Abnormality         Status                     ---------                               -----------         ------                     CBC with platelets and d...[[id number]]  Abnormal            Final result                 Please view results for these tests on the individual orders.   Comprehensive metabolic panel   Result Value Ref Range    Sodium 141 135 - 145 mmol/L    Potassium 4.4 3.4 - 5.3 mmol/L    Carbon Dioxide (CO2) 28 22 - 29 mmol/L    Anion Gap 14 7 - 15 mmol/L    Urea Nitrogen 51.8 (H) 8.0 - 23.0 mg/dL    Creatinine 1.19 (H) 0.67 - 1.17 mg/dL    GFR Estimate 67 >60 mL/min/1.73m2    Calcium 9.2 8.8 - 10.4 mg/dL    Chloride 99 98 - 107 mmol/L    Glucose 93 70 - 99 mg/dL    Alkaline Phosphatase 127 40 - 150 U/L    AST 98 (H) 0 - 45  U/L     (H) 0 - 70 U/L    Protein Total 6.5 6.4 - 8.3 g/dL    Albumin 3.7 3.5 - 5.2 g/dL    Bilirubin Total 0.3 <=1.2 mg/dL   Troponin T, High Sensitivity   Result Value Ref Range    Troponin T, High Sensitivity 81 (H) <=22 ng/L   TSH with free T4 reflex   Result Value Ref Range    TSH 2.74 0.30 - 4.20 uIU/mL   Blood gas venous   Result Value Ref Range    pH Venous 7.34 7.32 - 7.43    pCO2 Venous 66 (H) 40 - 50 mm Hg    pO2 Venous 16 (L) 25 - 47 mm Hg    Bicarbonate Venous 35 (H) 21 - 28 mmol/L    Base Excess/Deficit Venous 7.3 (H) -3.0 - 3.0 mmol/L    FIO2 32     Oxyhemoglobin Venous 17 (L) 70 - 75 %    O2 Sat, Venous 17.3 (L) 70.0 - 75.0 %    Narrative    In healthy individuals, oxyhemoglobin (O2Hb) and oxygen saturation (SO2) are approximately equal. In the presence of dyshemoglobins, oxyhemoglobin can be considerably lower than oxygen saturation.   CBC with platelets and differential   Result Value Ref Range    WBC Count 15.7 (H) 4.0 - 11.0 10e3/uL    RBC Count 4.80 4.40 - 5.90 10e6/uL    Hemoglobin 13.1 (L) 13.3 - 17.7 g/dL    Hematocrit 41.4 40.0 - 53.0 %    MCV 86 78 - 100 fL    MCH 27.3 26.5 - 33.0 pg    MCHC 31.6 31.5 - 36.5 g/dL    RDW 15.3 (H) 10.0 - 15.0 %    Platelet Count 215 150 - 450 10e3/uL    % Neutrophils 87 %    % Lymphocytes 7 %    % Monocytes 5 %    % Eosinophils 0 %    % Basophils 0 %    % Immature Granulocytes 1 %    NRBCs per 100 WBC 0 <1 /100    Absolute Neutrophils 13.6 (H) 1.6 - 8.3 10e3/uL    Absolute Lymphocytes 1.2 0.8 - 5.3 10e3/uL    Absolute Monocytes 0.8 0.0 - 1.3 10e3/uL    Absolute Eosinophils 0.0 0.0 - 0.7 10e3/uL    Absolute Basophils 0.0 0.0 - 0.2 10e3/uL    Absolute Immature Granulocytes 0.1 <=0.4 10e3/uL    Absolute NRBCs 0.0 10e3/uL   Lactic acid whole blood with 1x repeat in 2 hr when >2   Result Value Ref Range    Lactic Acid, Initial 1.6 0.7 - 2.0 mmol/L   Influenza A/B, RSV and SARS-CoV2 PCR (COVID-19) Nose    Specimen: Nose; Swab   Result Value Ref Range     Influenza A PCR Negative Negative    Influenza B PCR Negative Negative    RSV PCR Negative Negative    SARS CoV2 PCR Negative Negative    Narrative    Testing was performed using the Xpert Xpress CoV2/Flu/RSV Assay on the [employer] GeneXpert Instrument. This test should be ordered for the detection of SARS-CoV2, influenza, and RSV viruses in individuals with signs and symptoms of respiratory tract infection. This test is for in vitro diagnostic use under the [place] FDA for laboratories certified under CLIA to perform high or moderate complexity testing. This test has been [place] FDA cleared. A negative result does not rule out the presence of PCR inhibitors in the specimen or target RNA in concentration below the limit of detection for the assay. If only one viral target is positive but coinfection with multiple targets is suspected, the sample should be re-tested with another FDA cleared, approved, or authorized test, if coninfection would change clinical management. This test was validated by the [facility]. These laboratories are certified under the Clinical Laboratory Improvement Amendments of 1988 (CLIA-88) as qualified to perfom high complexity laboratory testing.   UA with Microscopic reflex to Culture    Specimen: Urine, Clean Catch   Result Value Ref Range    Color Urine Straw Colorless, Straw, Light Yellow, Yellow    Appearance Urine Clear Clear    Glucose Urine Negative Negative mg/dL    Bilirubin Urine Negative Negative    Ketones Urine Negative Negative mg/dL    Specific Gravity Urine 1.010 1.003 - 1.035    Blood Urine Negative Negative    pH Urine 5.0 5.0 - 7.0    Protein Albumin Urine Negative Negative mg/dL    Urobilinogen Urine Normal Normal, 2.0 mg/dL    Nitrite Urine Negative Negative    Leukocyte Esterase Urine Negative Negative    Mucus Urine Present (A) None Seen /LPF    RBC Urine 0 <=2 /HPF    WBC Urine 0 <=5 /HPF    Narrative    Urine Culture not indicated   Troponin T, High  Sensitivity   Result Value Ref Range    Troponin T, High Sensitivity 80 (H) <=22 ng/L   Magnesium   Result Value Ref Range    Magnesium 1.7 1.7 - 2.3 mg/dL   US Lower Extremity Venous Duplex Bilateral    Narrative    EXAM: US LOWER EXTREMITY VENOUS DUPLEX BILATERAL  LOCATION: [facility]  DATE: 1/9/2025    INDICATION: lower leg swelling hx of DVTS  COMPARISON: None.  TECHNIQUE: Venous Duplex ultrasound of bilateral lower extremities with and without compression, augmentation and duplex. Color flow and spectral Doppler with waveform analysis performed.    FINDINGS: Exam includes the common femoral, femoral, popliteal veins as well as segmentally visualized deep calf veins and greater saphenous vein.     There is DVT in the femoral veins extending from the upper thigh into the popliteal veins and into the posterior tibial and peroneal veins bilaterally. The thrombus is occlusive in the femoral veins and popliteal veins. It is partially occlusive in the   posterior tibial and peroneal veins.      Impression    IMPRESSION:  1.  Bilateral lower extremity DVT as above.   CT Chest Pulmonary Embolism w Contrast    Narrative    EXAM: CT CHEST PULMONARY EMBOLISM W CONTRAST  LOCATION: [facility]  DATE: 1/9/2025    INDICATION: elevated dimer hf  COMPARISON: Abdominal CT 2/2/2022  TECHNIQUE: CT chest pulmonary angiogram during arterial phase injection of IV contrast. Multiplanar reformats and MIP reconstructions were performed. Dose reduction techniques were used.   CONTRAST: Isovue 370, 70mL    FINDINGS:  ANGIOGRAM CHEST: Exam is positive for pulmonary emboli with segmental size emboli present within right middle and lower lobes. There is also likely a small embolism present within left lung. Thoracic aorta is negative for dissection. There is evidence of   right heart strain with RV/LV ratio greater than 1.    LUNGS AND PLEURA: Small bilateral pleural effusions  with mild dependent atelectasis bilaterally. There are also small nonspecific bilateral subpleural opacifications that could be inflammatory or related to tiny peripheral lung infarcts. Extensive   emphysema.    MEDIASTINUM/AXILLAE: No lymphadenopathy. Mildly patulous esophagus.    CORONARY ARTERY CALCIFICATION: Moderate.    UPPER ABDOMEN: Reflux of contrast into the IVC suggesting elevated right heart pressures.    MUSCULOSKELETAL: Compressions of upper lumbar vertebral bodies unchanged.      Impression    IMPRESSION:  1.  Exam is positive for pulmonary emboli. Moderate burden of emboli predominantly involving right lung.  2.  RV/LV ratio greater than 1 suggesting right heart strain.  3.  Small bilateral pleural effusions. Scattered nonspecific small subpleural opacities, can't exclude small peripheral lung infarct.  4.  Emphysema.    Findings discussed with Dr. Pineda at 2230 hours   EKG 12 lead   Result Value Ref Range    Systolic Blood Pressure  mmHg    Diastolic Blood Pressure  mmHg    Ventricular Rate 84 BPM    Atrial Rate 84 BPM    NM Interval 116 ms    QRS Duration 98 ms     ms    QTc 441 ms    P Axis 60 degrees    R AXIS 118 degrees    T Axis 91 degrees    Interpretation ECG       Sinus rhythm with Premature atrial complexes  Right axis deviation  Pulmonary disease pattern  Nonspecific ST and T wave abnormality  Abnormal ECG  When compared with ECG of 09-Jan-2025 19:02,  Sinus rhythm has replaced Atrial flutter  Vent. rate has decreased by  84 bpm  Incomplete right bundle branch block is no longer Present     Heparin Unfractionated Anti Xa Level   Result Value Ref Range    Anti Xa Unfractionated Heparin 0.40 For Reference Range, See Comment IU/mL    Narrative    Therapeutic Range: UFH: 0.25-0.50 IU/mL for low intensity dosing,  0.30-0.70 IU/mL for high intensity dosing DVT and PE.  This test is not validated for other direct factor X inhibitors (e.g. rivaroxaban, apixaban, edoxaban, betrixaban,  fondaparinux) and should not be used for monitoring of other medications.   Blood gas venous   Result Value Ref Range    pH Venous 7.30 (L) 7.32 - 7.43    pCO2 Venous 69 (H) 40 - 50 mm Hg    pO2 Venous 41 25 - 47 mm Hg    Bicarbonate Venous 34 (H) 21 - 28 mmol/L    Base Excess/Deficit Venous 5.4 (H) -3.0 - 3.0 mmol/L    FIO2 40     Oxyhemoglobin Venous 66 (L) 70 - 75 %    O2 Sat, Venous 67.6 (L) 70.0 - 75.0 %    Narrative    In healthy individuals, oxyhemoglobin (O2Hb) and oxygen saturation (SO2) are approximately equal. In the presence of dyshemoglobins, oxyhemoglobin can be considerably lower than oxygen saturation.   Extra Tube    Narrative    The following orders were created for panel order Extra Tube.  Procedure                               Abnormality         Status                     ---------                               -----------         ------                     Extra Red Top Tube[[id number]]                               Final result                 Please view results for these tests on the individual orders.   Extra Red Top Tube   Result Value Ref Range    Hold Specimen JIC        Medications   azithromycin (ZITHROMAX) 500 mg vial to attach to  mL bag (0 mg Intravenous Stopped 1/9/25 2330)   heparin 25,000 units in 0.45% NaCl 250 mL ANTICOAGULANT infusion (1,200 Units/hr Intravenous Rate/Dose Verify 1/10/25 9781)   amiodarone (NEXTERONE) 1.8 mg/mL in dextrose 5% 200 mL ADULT STANDARD infusion (0 mg/min Intravenous Stopped 1/10/25 0529)   amiodarone (NEXTERONE) 1.8 mg/mL in dextrose 5% 200 mL ADULT STANDARD infusion (0.5 mg/min Intravenous Rate/Dose Verify 1/10/25 0748)   No lozenges or gum should be given while patient on BIPAP/AVAPS/AVAPS AE (has no administration in time range)   artificial tears (GENTEAL) 0.1-0.2-0.3 % ophthalmic solution 1 drop (has no administration in time range)   Patient may continue current oral medications (has no administration in time range)   diltiazem  (CARDIZEM) injection 20 mg (20 mg Intravenous $Given 1/9/25 2033)   iopamidol (ISOVUE-370) solution 500 mL (70 mLs Intravenous $Given 1/9/25 2154)   sodium chloride 0.9 % bag 100mL for CT scan flush use (70 mLs Intravenous $Given 1/9/25 2155)   cefTRIAXone (ROCEPHIN) 2 g vial to attach to  ml bag for ADULTS or NS 50 ml bag for PEDS (0 g Intravenous Stopped 1/9/25 2134)   furosemide (LASIX) injection 40 mg (40 mg Intravenous $Given 1/9/25 2017)   heparin ANTICOAGULANT Loading dose for HIGH INTENSITY TREATMENT * Give BEFORE starting heparin infusion (5,350 Units Intravenous $Given 1/9/25 2218)   sodium chloride 0.9% BOLUS 1,000 mL (0 mLs Intravenous Stopped 1/10/25 0022)   amiodarone (NEXTERONE) bolus 150 mg (0 mg Intravenous Stopped 1/9/25 2331)   magnesium sulfate 1 g in 100 mL D5W intermittent infusion (0 g Intravenous Stopped 1/10/25 0022)   etomidate (AMIDATE) injection 10 mg (10 mg Intravenous $Given 1/10/25 0335)   amiodarone (NEXTERONE) bolus 150 mg (0 mg Intravenous Stopped 1/10/25 0419)       Assessments & Plan (with Medical Decision Making)     I have reviewed the nursing notes.    I have reviewed the findings, diagnosis, plan and need for follow up with the patient.      Medical Decision Making  Keith Gonzalez is a 69 year old male who presenting from home secondary to abnormal labs.  Patient was initially seen today by his primary provider secondary to bilateral leg swelling.  He notes that is gotten worse over the past few days and has had increased cough and shortness of breath.  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  He does not use any breathing treatments.  He is not any fevers chest pains headaches dizziness confusion extremity abnormalities or abdominal pain or change in his urine or bowel habits.    His outpatient lab work shows a potassium of 5.0 sodium of 138 a renal function of 1.17 up from 0.62.  His BNP was 9004 and  77 with patient's over the lower leg swelling and increasing shortness of breath and tachycardia concern for acute congestive heart failure.  He was provided with Lasix prior to arrival therefore provide another dose of IV 40 Lasix at this time.  He does have bilateral lower leg swelling but notes this feels different than when he had his DVTs.  Unfortunately his D-dimer is also elevated at 9.32.  He is not currently taking any anticoagulants for his history of DVTs and testicular cancer.    On arrival patient's tachycardia has worsened from 120 in the triage bay to 162 170 bpm and regular at that time.  On review it seems there could be consistent with either an SVT versus sinus tachycardia versus a 2 1 AV block/flutter.  Previous imaging/cardiac EKGs reviewed consistent with atrial sinus rhythms and ectopic beats.  At this time cannot appreciate any significant ST segment elevations.  Patient is acutely hypoxic and is currently on a mask at 5 L of O2.    On our lab work he has a white cell count of 15.7 and a VBG showing a pCO2 of 66 and a pO2 of 16.  Will start patient on BiPAP at this moment.  While adding in Lasix to aid in patient's likely acute CHF exacerbation.    Repeat troponin reassuring at 80.  Magnesium 1.7.  CBC with a mild elevation of 15.7 therefore antibiotics was initiated with Rocephin azithromycin for concerns of possible associated secondary pneumonia.  His TSH was reassuring lactic acid is at 1.6 and CMP moderately unremarkable as we state aside from elevated creatinine 1.19 doubled from patient's baseline as well as an AST and ALT that is elevated 98 with .  With patient's elevated liver function enzymes and creatinine concern for possible cardiogenic pathology resulting in end-stage renal pathology.\\    Imaging showing bilateral DVTs to extensive lower legs.  At that time with these DVTs and patient's presentation concern the patient may be having significant clot burden to the chest  as well therefore CT imaging of the chest was ordered and pending.  Consistent with clot burden to the right lung and right heart strain.    Attempted to transfer patient however no bed availability at any locations.  Discussed case with telemetry ICU for further recommendations for plan.  Patient was provide initially a 20 mg bolus of diltiazem which did not touch his heart rate.  We discussed that with his carb burden significant concern that patient may decompensate further with adenosine and if other medical options are considered or should be considered versus cardioversion.  At this time option was to decide to go with amiodarone with hopes to have patient cardiovert with this.    Amiodarone initiated as well as heparin.  All [place] [facility], [facility], [facility] acute except this patient was contacted and no bed availability for ICU management.  After approximately 6 hours of amiodarone heart rate has slightly improved to 160 but patient also received a liter of fluids With recommendations from ICU to only give a liter due to patient's right heart strain could actively worsen as patient already having signs of fluid overload to his lower lungs and is approximate 20 pounds up from his baseline weight.    Consulted cardiology at 3 AM 6 hours post amiodarone initiation and with noted heart rate being at 160 without improvement and patient's history of right heart strain bilateral pleural effusions and fluid overload with his clot burden do not see any obvious pulmonary artery pathology contributing at this time and believe that at this point with the extent of his heart rate and the extent of the time that it has undergone with his heparin that the benefits of cardioversion electrically significantly outweigh the risks of maintain patient at this heart rate for more of an extended period which right result in complete decompensation and cardiogenic shock.    Was able to  discuss this with the patient and he notes that he understands the risks and benefits of the cardioversion he understands that there is the risk of concern of stroke however with his initial heart rate being assessed earlier this morning clinic and while in triage mid 110s and increasing suddenly while being roomed to 160 we have approximately a 6 to 8-hour window where we know the heart rate initially elevated to this range and he was unlikely to be in his on arrival.  This is also a regular rhythm and has been consistently regular the entirety of the time which is reassuring as well as been on heparin now for greater than 6 hours.  His neurological exam is otherwise benign with no neurodeficits.    We discussed etomidate  as the agent of choice at this point due to her mild hypotension for the past few hours in the 100-110 systolic range.     Cardioversion successful back to patient's baseline EKG similar to the one previously done approximate year back.    Case discussed with Dr. Almaguer for continued transfer plan.    New Prescriptions    No medications on file       Final diagnoses:   Acute pulmonary embolism, unspecified pulmonary embolism type, unspecified whether acute cor pulmonale present (H)   Sinus tachycardia   Panlobular emphysema (H)   Bilateral pleural effusion   Acute respiratory failure with hypoxia (H)       1/9/2025   [facility] EMERGENCY DEPT       Noelle Pineda MD  01/10/25 [id number]

## 2025-01-10 NOTE — MEDICATION SCRIBE - ADMISSION MEDICATION HISTORY
Medication Scribe Admission Medication History    Admission medication history is complete. The information provided in this note is only as accurate as the sources available at the time of the update.    Information Source(s): Patient and CareEverywhere/SureScripts via in-person    Pertinent Information: Verified no use of pain pump or prescription eye drops currently.    Changes made to PTA medication list:  Added: None  Deleted: aspirin, omeprazole, prednisone, compazine  Changed: None    Allergies reviewed with patient and updates made in EHR: yes    Medication History Completed By: JAZ CONNORS 1/10/2025 10:44 AM    PTA Med List   Medication Sig Last Dose/Taking    alendronate (FOSAMAX) 70 MG tablet TAKE 1 TABLET BY MOUTH EVERY 7 DAYS. TAKE 60 MINUTES BEFORE MORNING MEAL WITH 8 OUNCES OF WATER. REMAIN UPRIGHT FOR 30 MINUTES. (Patient taking differently: Take 70 mg by mouth every 7 days. Sundays) 1/5/2025 Morning    calcium carbonate-vitamin D (OSCAL) 500-5 MG-MCG tablet Take 1 tablet by mouth 2 times daily 1/9/2025 at  6:00 PM    COMBIVENT RESPIMAT  MCG/ACT inhaler INHALE ONE TO TWO  PUFFS INTO THE LUNGS BY MOUTH EVERY 4 HOURS AS NEEDED FOR SHORTNESS OF BREATH, WHEEZING OR COUGH (Patient taking differently: Inhale 1-2 puffs into the lungs every 4 hours as needed for shortness of breath, wheezing or cough. Currently in patient's pocket) 1/9/2025 at  6:00 PM    cyclobenzaprine (FLEXERIL) 5 MG tablet TAKE ONE TO TWO TABLETS BY MOUTH TWICE A DAY AS NEEDED FOR MUSCLE SPASMS (Patient taking differently: Take 5 mg by mouth 2 times daily as needed for muscle spasms.) Past Week    ferrous sulfate (FEROSUL) 325 (65 Fe) MG tablet Take 1 tablet (325 mg) by mouth once a week (Patient taking differently: Take 325 mg by mouth every 7 days. Saturdays) 1/4/2025 Morning    fluticasone-salmeterol (ADVAIR) 500-50 MCG/ACT inhaler Inhale 1 puff into the lungs every 12 hours 1/9/2025 Morning    furosemide (LASIX) 20 MG tablet  Take 1 tablet (20 mg) by mouth daily for 5 days. 1/9/2025 Noon

## 2025-01-10 NOTE — ED NOTES
Pt awaiting transfer-heart rate continues in the 150's. Does not demonstrate any distress at this time.

## 2025-01-10 NOTE — ED PROVIDER NOTES
Transfer of Care Note  This patient was signed out to me and I assumed care from Dr. Anton at change of shift.  Keith Gonzalez is a 69 year old male who presented to the emergency department with a chief complaint of Abnormal Labs  .  Please see the original providers history and physical for complete details.    The following issues were signed out to me to follow up on:  Following summary was what was signed out to me:  Medical Decision Making  Keith Gonzalez is a 69 year old male who presenting from home secondary to abnormal labs.  Patient was initially seen today by his primary provider secondary to bilateral leg swelling.  He notes that is gotten worse over the past few days and has had increased cough and shortness of breath.  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  He does not use any breathing treatments.  He is not any fevers chest pains headaches dizziness confusion extremity abnormalities or abdominal pain or change in his urine or bowel habits.     His outpatient lab work shows a potassium of 5.0 sodium of 138 a renal function of 1.17 up from 0.62.  His BNP was 9004 and 77 with patient's over the lower leg swelling and increasing shortness of breath and tachycardia concern for acute congestive heart failure.  He was provided with Lasix prior to arrival therefore provide another dose of IV 40 Lasix at this time.  He does have bilateral lower leg swelling but notes this feels different than when he had his DVTs.  Unfortunately his D-dimer is also elevated at 9.32.  He is not currently taking any anticoagulants for his history of DVTs and testicular cancer.     On arrival patient's tachycardia has worsened from 120 in the triage bay to 162 170 bpm and regular at that time.  On review it seems there could be consistent with either an SVT versus sinus tachycardia versus a 2 1 AV block/flutter.  Previous imaging/cardiac EKGs  reviewed consistent with atrial sinus rhythms and ectopic beats.  At this time cannot appreciate any significant ST segment elevations.  Patient is acutely hypoxic and is currently on a mask at 5 L of O2.     On our lab work he has a white cell count of 15.7 and a VBG showing a pCO2 of 66 and a pO2 of 16.  Will start patient on BiPAP at this moment.  While adding in Lasix to aid in patient's likely acute CHF exacerbation.     Repeat troponin reassuring at 80.  Magnesium 1.7.  CBC with a mild elevation of 15.7 therefore antibiotics was initiated with Rocephin azithromycin for concerns of possible associated secondary pneumonia.  His TSH was reassuring lactic acid is at 1.6 and CMP moderately unremarkable as we state aside from elevated creatinine 1.19 doubled from patient's baseline as well as an AST and ALT that is elevated 98 with .  With patient's elevated liver function enzymes and creatinine concern for possible cardiogenic pathology resulting in end-stage renal pathology.\\     Imaging showing bilateral DVTs to extensive lower legs.  At that time with these DVTs and patient's presentation concern the patient may be having significant clot burden to the chest as well therefore CT imaging of the chest was ordered and pending.  Consistent with clot burden to the right lung and right heart strain.     Attempted to transfer patient however no bed availability at any locations.  Discussed case with telemetry ICU for further recommendations for plan.  Patient was provide initially a 20 mg bolus of diltiazem which did not touch his heart rate.  We discussed that with his carb burden significant concern that patient may decompensate further with adenosine and if other medical options are considered or should be considered versus cardioversion.  At this time option was to decide to go with amiodarone with hopes to have patient cardiovert with this.     Amiodarone initiated as well as heparin.  [facility] [place]  [facility] acute except this patient was contacted and no bed availability for ICU management.  After approximately 6 hours of amiodarone heart rate has slightly improved to 160 but patient also received a liter of fluids With recommendations from ICU to only give a liter due to patient's right heart strain could actively worsen as patient already having signs of fluid overload to his lower lungs and is approximate 20 pounds up from his baseline weight.     Consulted cardiology at 3 AM 6 hours post amiodarone initiation and with noted heart rate being at 160 without improvement and patient's history of right heart strain bilateral pleural effusions and fluid overload with his clot burden do not see any obvious pulmonary artery pathology contributing at this time and believe that at this point with the extent of his heart rate and the extent of the time that it has undergone with his heparin that the benefits of cardioversion electrically significantly outweigh the risks of maintain patient at this heart rate for more of an extended period which right result in complete decompensation and cardiogenic shock.     Was able to discuss this with the patient and he notes that he understands the risks and benefits of the cardioversion he understands that there is the risk of concern of stroke however with his initial heart rate being assessed earlier this morning clinic and while in triage mid 110s and increasing suddenly while being roomed to 160 we have approximately a 6 to 8-hour window where we know the heart rate initially elevated to this range and he was unlikely to be in his on arrival.  This is also a regular rhythm and has been consistently regular the entirety of the time which is reassuring as well as been on heparin now for greater than 6 hours.  His neurological exam is otherwise benign with no neurodeficits.     We discussed etomidate  as the agent  of choice at this point due to her mild hypotension for the past few hours in the 100-110 systolic range.      Cardioversion successful back to patient's baseline EKG similar to the one previously done approximate year back.     Case discussed with Dr. Almaguer for continued transfer plan.      Pertant Lab/Imaging Findings During this Visit was     Results for orders placed or performed during the hospital encounter of 01/09/25 (from the past 24 hours)   EKG 12-lead, tracing only   Result Value Ref Range    Systolic Blood Pressure  mmHg    Diastolic Blood Pressure  mmHg    Ventricular Rate 168 BPM    Atrial Rate 336 BPM    WI Interval  ms    QRS Duration 100 ms     ms    QTc 414 ms    P Axis 265 degrees    R AXIS 135 degrees    T Axis 48 degrees    Interpretation ECG       Atrial flutter with 2:1 A-V conduction  Right axis deviation  Pulmonary disease pattern  Incomplete right bundle branch block  Right ventricular hypertrophy  Nonspecific ST abnormality  Abnormal ECG  No previous ECGs available  Confirmed by SEE ED PROVIDER NOTE FOR, ECG INTERPRETATION ([id number]),  Zelalem Rm ([id number]) on 1/9/2025 7:18:57 PM     CBC with platelets differential    Narrative    The following orders were created for panel order CBC with platelets differential.  Procedure                               Abnormality         Status                     ---------                               -----------         ------                     CBC with platelets and d...[[id number]]  Abnormal            Final result                 Please view results for these tests on the individual orders.   Comprehensive metabolic panel   Result Value Ref Range    Sodium 141 135 - 145 mmol/L    Potassium 4.4 3.4 - 5.3 mmol/L    Carbon Dioxide (CO2) 28 22 - 29 mmol/L    Anion Gap 14 7 - 15 mmol/L    Urea Nitrogen 51.8 (H) 8.0 - 23.0 mg/dL    Creatinine 1.19 (H) 0.67 - 1.17 mg/dL    GFR Estimate 67 >60 mL/min/1.73m2    Calcium 9.2 8.8 - 10.4 mg/dL     Chloride 99 98 - 107 mmol/L    Glucose 93 70 - 99 mg/dL    Alkaline Phosphatase 127 40 - 150 U/L    AST 98 (H) 0 - 45 U/L     (H) 0 - 70 U/L    Protein Total 6.5 6.4 - 8.3 g/dL    Albumin 3.7 3.5 - 5.2 g/dL    Bilirubin Total 0.3 <=1.2 mg/dL   Troponin T, High Sensitivity   Result Value Ref Range    Troponin T, High Sensitivity 81 (H) <=22 ng/L   TSH with free T4 reflex   Result Value Ref Range    TSH 2.74 0.30 - 4.20 uIU/mL   Blood gas venous   Result Value Ref Range    pH Venous 7.34 7.32 - 7.43    pCO2 Venous 66 (H) 40 - 50 mm Hg    pO2 Venous 16 (L) 25 - 47 mm Hg    Bicarbonate Venous 35 (H) 21 - 28 mmol/L    Base Excess/Deficit Venous 7.3 (H) -3.0 - 3.0 mmol/L    FIO2 32     Oxyhemoglobin Venous 17 (L) 70 - 75 %    O2 Sat, Venous 17.3 (L) 70.0 - 75.0 %    Narrative    In healthy individuals, oxyhemoglobin (O2Hb) and oxygen saturation (SO2) are approximately equal. In the presence of dyshemoglobins, oxyhemoglobin can be considerably lower than oxygen saturation.   CBC with platelets and differential   Result Value Ref Range    WBC Count 15.7 (H) 4.0 - 11.0 10e3/uL    RBC Count 4.80 4.40 - 5.90 10e6/uL    Hemoglobin 13.1 (L) 13.3 - 17.7 g/dL    Hematocrit 41.4 40.0 - 53.0 %    MCV 86 78 - 100 fL    MCH 27.3 26.5 - 33.0 pg    MCHC 31.6 31.5 - 36.5 g/dL    RDW 15.3 (H) 10.0 - 15.0 %    Platelet Count 215 150 - 450 10e3/uL    % Neutrophils 87 %    % Lymphocytes 7 %    % Monocytes 5 %    % Eosinophils 0 %    % Basophils 0 %    % Immature Granulocytes 1 %    NRBCs per 100 WBC 0 <1 /100    Absolute Neutrophils 13.6 (H) 1.6 - 8.3 10e3/uL    Absolute Lymphocytes 1.2 0.8 - 5.3 10e3/uL    Absolute Monocytes 0.8 0.0 - 1.3 10e3/uL    Absolute Eosinophils 0.0 0.0 - 0.7 10e3/uL    Absolute Basophils 0.0 0.0 - 0.2 10e3/uL    Absolute Immature Granulocytes 0.1 <=0.4 10e3/uL    Absolute NRBCs 0.0 10e3/uL   Lactic acid whole blood with 1x repeat in 2 hr when >2   Result Value Ref Range    Lactic Acid, Initial 1.6 0.7 - 2.0  mmol/L   Influenza A/B, RSV and SARS-CoV2 PCR (COVID-19) Nose    Specimen: Nose; Swab   Result Value Ref Range    Influenza A PCR Negative Negative    Influenza B PCR Negative Negative    RSV PCR Negative Negative    SARS CoV2 PCR Negative Negative    Narrative    Testing was performed using the Xpert Xpress CoV2/Flu/RSV Assay on the [employer] GeneXpert Instrument. This test should be ordered for the detection of SARS-CoV2, influenza, and RSV viruses in individuals with signs and symptoms of respiratory tract infection. This test is for in vitro diagnostic use under the [place] FDA for laboratories certified under CLIA to perform high or moderate complexity testing. This test has been [place] FDA cleared. A negative result does not rule out the presence of PCR inhibitors in the specimen or target RNA in concentration below the limit of detection for the assay. If only one viral target is positive but coinfection with multiple targets is suspected, the sample should be re-tested with another FDA cleared, approved, or authorized test, if coninfection would change clinical management. This test was validated by the [facility] [employer] These laboratories are certified under the Clinical Laboratory Improvement Amendments of 1988 (CLIA-88) as qualified to perfom high complexity laboratory testing.   UA with Microscopic reflex to Culture    Specimen: Urine, Clean Catch   Result Value Ref Range    Color Urine Straw Colorless, Straw, Light Yellow, Yellow    Appearance Urine Clear Clear    Glucose Urine Negative Negative mg/dL    Bilirubin Urine Negative Negative    Ketones Urine Negative Negative mg/dL    Specific Gravity Urine 1.010 1.003 - 1.035    Blood Urine Negative Negative    pH Urine 5.0 5.0 - 7.0    Protein Albumin Urine Negative Negative mg/dL    Urobilinogen Urine Normal Normal, 2.0 mg/dL    Nitrite Urine Negative Negative    Leukocyte Esterase Urine Negative Negative    Mucus Urine Present (A) None Seen /LPF     RBC Urine 0 <=2 /HPF    WBC Urine 0 <=5 /HPF    Narrative    Urine Culture not indicated   Troponin T, High Sensitivity   Result Value Ref Range    Troponin T, High Sensitivity 80 (H) <=22 ng/L   Magnesium   Result Value Ref Range    Magnesium 1.7 1.7 - 2.3 mg/dL   US Lower Extremity Venous Duplex Bilateral    Narrative    EXAM: US LOWER EXTREMITY VENOUS DUPLEX BILATERAL  LOCATION: [facility]  DATE: 1/9/2025    INDICATION: lower leg swelling hx of DVTS  COMPARISON: None.  TECHNIQUE: Venous Duplex ultrasound of bilateral lower extremities with and without compression, augmentation and duplex. Color flow and spectral Doppler with waveform analysis performed.    FINDINGS: Exam includes the common femoral, femoral, popliteal veins as well as segmentally visualized deep calf veins and greater saphenous vein.     There is DVT in the femoral veins extending from the upper thigh into the popliteal veins and into the posterior tibial and peroneal veins bilaterally. The thrombus is occlusive in the femoral veins and popliteal veins. It is partially occlusive in the   posterior tibial and peroneal veins.      Impression    IMPRESSION:  1.  Bilateral lower extremity DVT as above.   CT Chest Pulmonary Embolism w Contrast    Narrative    EXAM: CT CHEST PULMONARY EMBOLISM W CONTRAST  LOCATION: [facility]  DATE: 1/9/2025    INDICATION: elevated dimer hf  COMPARISON: Abdominal CT 2/2/2022  TECHNIQUE: CT chest pulmonary angiogram during arterial phase injection of IV contrast. Multiplanar reformats and MIP reconstructions were performed. Dose reduction techniques were used.   CONTRAST: Isovue 370, 70mL    FINDINGS:  ANGIOGRAM CHEST: Exam is positive for pulmonary emboli with segmental size emboli present within right middle and lower lobes. There is also likely a small embolism present within left lung. Thoracic aorta is negative for dissection. There is evidence of    right heart strain with RV/LV ratio greater than 1.    LUNGS AND PLEURA: Small bilateral pleural effusions with mild dependent atelectasis bilaterally. There are also small nonspecific bilateral subpleural opacifications that could be inflammatory or related to tiny peripheral lung infarcts. Extensive   emphysema.    MEDIASTINUM/AXILLAE: No lymphadenopathy. Mildly patulous esophagus.    CORONARY ARTERY CALCIFICATION: Moderate.    UPPER ABDOMEN: Reflux of contrast into the IVC suggesting elevated right heart pressures.    MUSCULOSKELETAL: Compressions of upper lumbar vertebral bodies unchanged.      Impression    IMPRESSION:  1.  Exam is positive for pulmonary emboli. Moderate burden of emboli predominantly involving right lung.  2.  RV/LV ratio greater than 1 suggesting right heart strain.  3.  Small bilateral pleural effusions. Scattered nonspecific small subpleural opacities, can't exclude small peripheral lung infarct.  4.  Emphysema.    Findings discussed with Dr. Pineda at 2230 hours   EKG 12 lead   Result Value Ref Range    Systolic Blood Pressure  mmHg    Diastolic Blood Pressure  mmHg    Ventricular Rate 84 BPM    Atrial Rate 84 BPM    VA Interval 116 ms    QRS Duration 98 ms     ms    QTc 441 ms    P Axis 60 degrees    R AXIS 118 degrees    T Axis 91 degrees    Interpretation ECG       Sinus rhythm with Premature atrial complexes  Right axis deviation  Pulmonary disease pattern  Nonspecific ST and T wave abnormality  Abnormal ECG  When compared with ECG of 09-Jan-2025 19:02,  Sinus rhythm has replaced Atrial flutter  Vent. rate has decreased by  84 bpm  Incomplete right bundle branch block is no longer Present  Confirmed by SEE ED PROVIDER NOTE FOR, ECG INTERPRETATION ([id number]),  Farzaneh Bradley ([id number]) on 1/10/2025 8:33:48 AM     -Cardioversion External    Narrative    Noelle Pineda MD     1/10/2025  7:56 AM  [facility]    -Cardioversion  External    Date/Time: 1/10/2025 3:44 AM    Performed by: Noelle Pineda MD  Authorized by: Noelle Pineda MD    Risks, benefits and alternatives discussed.    ED EVALUATION:      Assessment Time: 1/10/2025 3:44 AM      I have performed an Emergency Department Evaluation including taking a   history and physical examination, this evaluation will be documented in   the electronic medical record for this ED encounter.     Indication: Atrial flutter    ASA Class: Class 2- mild systemic disease, no acute problems, no   functional limitations    Mallampati: Grade 1- soft palate, uvula, tonsillar pillars, and   posterior pharyngeal wall visible    NPO Status: appropriately NPO for procedure    UNIVERSAL PROTOCOL   Site Marked: NA  Prior Images Obtained and Reviewed:  NA  Required items: Required blood products, implants, devices and special   equipment available    Patient identity confirmed:  Verbally with patient, arm band, provided   demographic data and hospital-assigned identification number  Patient was reevaluated immediately before administering moderate or deep   sedation or anesthesia  Confirmation Checklist:  Patient's identity using two indicators, relevant   allergies, procedure was appropriate and matched the consent or emergent   situation and correct equipment/implants were available  Time out: Immediately prior to the procedure a time out was called    Universal Protocol: the Joint Commission Universal Protocol was followed    Preparation: Patient was prepped and draped in usual sterile fashion      SEDATION  Patient Sedated: Yes    Sedation Type:  Moderate (conscious) sedation  Sedation:  Etomidate  Vital signs: Vital signs monitored during sedation      PRE-PROCEDURE DETAILS:     Cardioversion basis:  Emergent    Rhythm:  Atrial flutter    Electrode placement:  Anterior-posterior  Attempt one:     Cardioversion mode:  Synchronous    Waveform:  Biphasic    Shock (Joules):  150    Shock outcome:   Conversion to normal sinus rhythm  Post-procedure details:     Patient status:  Awake      PROCEDURE    Patient Tolerance:  Patient tolerated the procedure well with no immediate   complications  Length of time physician/provider present for 1:1 monitoring during   sedation: 15   Heparin Unfractionated Anti Xa Level   Result Value Ref Range    Anti Xa Unfractionated Heparin 0.40 For Reference Range, See Comment IU/mL    Narrative    Therapeutic Range: UFH: 0.25-0.50 IU/mL for low intensity dosing,  0.30-0.70 IU/mL for high intensity dosing DVT and PE.  This test is not validated for other direct factor X inhibitors (e.g. rivaroxaban, apixaban, edoxaban, betrixaban, fondaparinux) and should not be used for monitoring of other medications.   Blood gas venous   Result Value Ref Range    pH Venous 7.30 (L) 7.32 - 7.43    pCO2 Venous 69 (H) 40 - 50 mm Hg    pO2 Venous 41 25 - 47 mm Hg    Bicarbonate Venous 34 (H) 21 - 28 mmol/L    Base Excess/Deficit Venous 5.4 (H) -3.0 - 3.0 mmol/L    FIO2 40     Oxyhemoglobin Venous 66 (L) 70 - 75 %    O2 Sat, Venous 67.6 (L) 70.0 - 75.0 %    Narrative    In healthy individuals, oxyhemoglobin (O2Hb) and oxygen saturation (SO2) are approximately equal. In the presence of dyshemoglobins, oxyhemoglobin can be considerably lower than oxygen saturation.   Extra Tube    Narrative    The following orders were created for panel order Extra Tube.  Procedure                               Abnormality         Status                     ---------                               -----------         ------                     Extra Red Top Tube[[id number]]                               Final result                 Please view results for these tests on the individual orders.   Extra Red Top Tube   Result Value Ref Range    Hold Specimen JIC    Echocardiogram Complete   Result Value Ref Range    Biplane LVEF 46%     Narrative    [id number]  [id number]  [id number]  [id number]^KATHRYN^NATHAN Henao  [facility]  Echocardiography Laboratory  [address] [place] [address]     Name: PUNEET MARTINEZ  MRN: [id number]  : 1955  Study Date: 01/10/2025 08:03 AM  Age: 69 yrs  Gender: Male  Patient Location: [facility]  Reason For Study: Heart Failure, Unspecified  History: copd  Ordering Physician: NATHAN PERALTA  Referring Physician: Chase East  Performed By: Tracee López     BSA: 1.8 m2  Height: 70 in  Weight: 148 lb  HR: 82  BP: 102/68 mmHg  ______________________________________________________________________________  Procedure  Echocardiogram with two-dimensional, color and spectral Doppler. Optison (NDC  #[id number]) given intravenously.  ______________________________________________________________________________  Interpretation Summary     1. The left ventricle is normal in size. Biplane LVEF is 46%. Mild lateral  hypokinesis.  2. The right ventricle is mildly dilated. The right ventricular systolic  function is mild to moderately reduced.  3. No valve disease.  4. Mild TR. RVSP 30mmHg.     No previous echo for comparison.  ______________________________________________________________________________  Left Ventricle  The left ventricle is normal in size. There is normal left ventricular wall  thickness. Biplane LVEF is 46%. Left ventricular diastolic function is  indeterminate. Mild lateral hypokinesis.     Right Ventricle  The right ventricle is mildly dilated. The right ventricular systolic function  is mild to moderately reduced.     Atria  Normal left atrial size. Right atrial size is normal. There is no atrial shunt  seen.     Mitral Valve  There is mild (1+) mitral regurgitation.     Tricuspid Valve  There is mild (1+) tricuspid regurgitation.     Aortic Valve  The aortic valve is normal in structure and function.     Pulmonic Valve  The pulmonic valve is not well seen, but is grossly normal.     Vessels  Normal ascending, transverse (arch), and descending  aorta. Dilation of the  inferior vena cava is present with abnormal respiratory variation in diameter.     Pericardium  There is no pericardial effusion.     Rhythm  Sinus rhythm was noted.  ______________________________________________________________________________  MMode/2D Measurements & Calculations  IVSd: 1.1 cm     LVIDd: 4.0 cm  LVIDs: 3.2 cm  LVPWd: 1.1 cm  FS: 18.6 %  LV mass(C)d: 141.1 grams  LV mass(C)dI: 76.9 grams/m2  Ao root diam: 3.6 cm  LA dimension: 3.6 cm  asc Aorta Diam: 3.2 cm  LA/Ao: 1.0  Ao root diam index Ht(cm/m): 2.0  Ao root diam index BSA (cm/m2): 2.0  Asc Ao diam index BSA (cm/m2): 1.8  Asc Ao diam index Ht(cm/m): 1.8  EF Biplane: 48.7 %  LA Volume (BP): 51.8 ml     LA Volume Index (BP): 28.2 ml/m2  RWT: 0.57  TAPSE: 1.7 cm     Doppler Measurements & Calculations  MV E max gonzalo: 98.1 cm/sec  MV A max gonzalo: 75.4 cm/sec  MV E/A: 1.3  MV dec time: 0.21 sec  Ao V2 max: 117.4 cm/sec  Ao max P.0 mmHg  LV V1 max P.6 mmHg  LV V1 max: 81.0 cm/sec  PA V2 max: 77.1 cm/sec  PA max P.4 mmHg  PA acc time: 0.10 sec  PI end-d gonzalo: 111.5 cm/sec  TR max gonzalo: 275.1 cm/sec  TR max P.3 mmHg  AV Gonzalo Ratio (DI): 0.69  Medial E/e': 16.4     RV S Gonzalo: 12.2 cm/sec     ______________________________________________________________________________  Report approved by: Diego Eng MD on 01/10/2025 09:30 AM         Blood gas venous   Result Value Ref Range    pH Venous 7.31 (L) 7.32 - 7.43    pCO2 Venous 71 (H) 40 - 50 mm Hg    pO2 Venous 32 25 - 47 mm Hg    Bicarbonate Venous 36 (H) 21 - 28 mmol/L    Base Excess/Deficit Venous 7.3 (H) -3.0 - 3.0 mmol/L    FIO2 40     Oxyhemoglobin Venous 48 (L) 70 - 75 %    O2 Sat, Venous 48.8 (L) 70.0 - 75.0 %    Narrative    In healthy individuals, oxyhemoglobin (O2Hb) and oxygen saturation (SO2) are approximately equal. In the presence of dyshemoglobins, oxyhemoglobin can be considerably lower than oxygen saturation.         My focused follow up physical  exam shows:   Vitals:  B/P: 108/94, T: 97.6, P: 73, R: 15  Gen:  Pt appears stable and no apparent distress      ED Course & Medical Decision Makin AM: Patient has remained stable, remained in a sinus rhythm.  I talked to the ICU doc and they state that patient is not a thrombectomy candidate after talking to IR.  They would recommend discontinuing certain cares and when an ICU bed or bed does open up we can look at transferring the patient later but nothing to do emergently right now.  They did say to call back if anything to change.  1 PM: Unfortunately while patient was eating, he went back into a rapid rate of a flutter.  I called the telemetry ICU docs back with this change.  They state they still do not have any ICU beds and even with this rate change they would recommend trying to chemically rate control the patient first with diltiazem.  If this does not work could consider cardioversion again.  They are recommending since there is going to be no ICU beds at open up, and since patient is not going to be a thrombectomy candidate, they think that the patient could be monitored in our ICU here to work on rate control and monitoring.  I am not sure if her hospitalist will feel comfortable keeping the patient here but he thinks it be worth to try and talk with them about this so I will give them a call.  2 PM: I spoke to our hospitalist here and they do not feel comfortable keeping the patient here even our ICU as patient has failed amiodarone, diltiazem and had a recurrence after cardioversion.  They are concerned is because they do not have any other tools to treat this and patient will likely need an ablation.  Will continue to work on trying to find the patient transferred somewhere.  3:30 PM: I talked to the ICU doc and they still do not have an ICU bed, patient remains on the list, [place] bed for this patient.  I consulted cardiology about the patient going  back into a flutter.  Because of the PEs there recommending since he is hemodynamically stable to not do another cardioversion unless necessary.  Patient did not do anything with the diltiazem so they are recommending to do a few doses of IV metoprolol and do some oral metoprolol and give this a little bit more time.  As long as patient remains hemodynamically stable we have some time to see what is going on here.  Will go ahead and give a few doses of IV metoprolol and give 50 mg of oral metoprolol and continue to monitor.  5:30 PM: Patient received a few doses of IV metoprolol and oral metoprolol but patient's heart rate did not budge a bit.  Patient's pressures are starting to get a little soft now 90s to 85 systolic.  I talked to cardiology again, they said that we could try digoxin but really there is no other options other than to cardiovert.  Since patient is having soft blood pressures I will go ahead and proceed with cardioversion.  Unfortunately patient was just eating when we noticed that his blood pressures were trending down but because of the emergent nature of his case and the soft blood pressures I decided to go ahead and cardiovert the patient even though the patient was not appropriately NPO.  Using 15 mg of etomidate, patient was given this versus propofol because of his low blood pressures.  Adequate sedation was given and using synchronized cardioversion, patient was cardioverted with 150 J.  Patient tolerated this procedure well, there is no significant desaturations or other complications.  Patient will continue on the amiodarone drip    At this point patient will need to be signed out at change of shift to Dr. Goyal as we are still waiting for bed placement.  I have talked with patient placement multiple times throughout the day and there still is no bed availability for ICU or intermediate care.  They are hoping after 7:00 at shift change, some beds may open up.  Patient will be signed  out at this time.       Impression and Disposition:  Atrial flutter, bilateral pulmonary emboli, bilateral DVTs           This note was completed in part using Dragon voice recognition, and may contain word and grammatical errors.        Maximino Almaguer MD  01/10/25 [id number]

## 2025-01-11 ENCOUNTER — HOSPITAL ENCOUNTER (INPATIENT)
Facility: CLINIC | Age: 70
DRG: 175 | End: 2025-01-11
Attending: INTERNAL MEDICINE | Admitting: INTERNAL MEDICINE
Payer: COMMERCIAL

## 2025-01-11 ENCOUNTER — APPOINTMENT (OUTPATIENT)
Dept: GENERAL RADIOLOGY | Facility: CLINIC | Age: 70
DRG: 175 | End: 2025-01-11
Attending: INTERNAL MEDICINE
Payer: COMMERCIAL

## 2025-01-11 VITALS
SYSTOLIC BLOOD PRESSURE: 85 MMHG | HEART RATE: 63 BPM | OXYGEN SATURATION: 94 % | BODY MASS INDEX: 21.24 KG/M2 | DIASTOLIC BLOOD PRESSURE: 62 MMHG | TEMPERATURE: 98 F | RESPIRATION RATE: 18 BRPM | WEIGHT: 148 LBS

## 2025-01-11 DIAGNOSIS — M41.9 SCOLIOSIS OF THORACOLUMBAR SPINE, UNSPECIFIED SCOLIOSIS TYPE: ICD-10-CM

## 2025-01-11 DIAGNOSIS — J96.01 ACUTE RESPIRATORY FAILURE WITH HYPOXIA (H): ICD-10-CM

## 2025-01-11 DIAGNOSIS — I82.413 ACUTE BILATERAL DEEP VEIN THROMBOSIS (DVT) OF FEMORAL VEINS (H): ICD-10-CM

## 2025-01-11 DIAGNOSIS — R79.0 LOW IRON STORES: ICD-10-CM

## 2025-01-11 DIAGNOSIS — J43.9 PULMONARY EMPHYSEMA, UNSPECIFIED EMPHYSEMA TYPE (H): ICD-10-CM

## 2025-01-11 DIAGNOSIS — M54.50 LUMBAR PAIN: ICD-10-CM

## 2025-01-11 DIAGNOSIS — H04.123 DRY EYES: ICD-10-CM

## 2025-01-11 DIAGNOSIS — E11.9 TYPE 2 DIABETES MELLITUS WITHOUT COMPLICATION, WITHOUT LONG-TERM CURRENT USE OF INSULIN (H): ICD-10-CM

## 2025-01-11 DIAGNOSIS — I27.20 PULMONARY HYPERTENSION (H): ICD-10-CM

## 2025-01-11 DIAGNOSIS — K21.00 GASTROESOPHAGEAL REFLUX DISEASE WITH ESOPHAGITIS WITHOUT HEMORRHAGE: ICD-10-CM

## 2025-01-11 DIAGNOSIS — M81.0 AGE-RELATED OSTEOPOROSIS WITHOUT CURRENT PATHOLOGICAL FRACTURE: ICD-10-CM

## 2025-01-11 DIAGNOSIS — I48.0 PAROXYSMAL ATRIAL FIBRILLATION (H): ICD-10-CM

## 2025-01-11 DIAGNOSIS — I26.01 ACUTE SEPTIC PULMONARY EMBOLISM WITH ACUTE COR PULMONALE (H): Primary | ICD-10-CM

## 2025-01-11 PROBLEM — I26.99 PULMONARY EMBOLISM (H): Status: ACTIVE | Noted: 2025-01-11

## 2025-01-11 PROBLEM — I27.81 COR PULMONALE (H): Status: ACTIVE | Noted: 2025-01-11

## 2025-01-11 LAB
ALBUMIN SERPL BCG-MCNC: 3.2 G/DL (ref 3.5–5.2)
ALBUMIN UR-MCNC: 20 MG/DL
ALLEN'S TEST: YES
ALP SERPL-CCNC: 115 U/L (ref 40–150)
ALT SERPL W P-5'-P-CCNC: 156 U/L (ref 0–70)
ANION GAP SERPL CALCULATED.3IONS-SCNC: 12 MMOL/L (ref 7–15)
APPEARANCE UR: ABNORMAL
AST SERPL W P-5'-P-CCNC: 59 U/L (ref 0–45)
BASE EXCESS BLDA CALC-SCNC: -0.8 MMOL/L (ref -3–3)
BASE EXCESS BLDA CALC-SCNC: -1.3 MMOL/L (ref -3–3)
BASE EXCESS BLDA CALC-SCNC: 0.1 MMOL/L (ref -3–3)
BASE EXCESS BLDA CALC-SCNC: 0.8 MMOL/L (ref -3–3)
BASOPHILS # BLD AUTO: 0 10E3/UL (ref 0–0.2)
BASOPHILS NFR BLD AUTO: 0 %
BILIRUB SERPL-MCNC: 0.2 MG/DL
BILIRUB UR QL STRIP: NEGATIVE
BUN SERPL-MCNC: 56.4 MG/DL (ref 8–23)
CA-I BLD-MCNC: 4.2 MG/DL (ref 4.4–5.2)
CALCIUM SERPL-MCNC: 7.9 MG/DL (ref 8.8–10.4)
CHLORIDE SERPL-SCNC: 101 MMOL/L (ref 98–107)
CK SERPL-CCNC: 91 U/L (ref 39–308)
COLOR UR AUTO: YELLOW
CREAT SERPL-MCNC: 2.18 MG/DL (ref 0.67–1.17)
CREAT SERPL-MCNC: 2.22 MG/DL (ref 0.67–1.17)
CREAT UR-MCNC: 90.4 MG/DL
EGFRCR SERPLBLD CKD-EPI 2021: 31 ML/MIN/1.73M2
EGFRCR SERPLBLD CKD-EPI 2021: 32 ML/MIN/1.73M2
EOSINOPHIL # BLD AUTO: 0 10E3/UL (ref 0–0.7)
EOSINOPHIL NFR BLD AUTO: 0 %
ERYTHROCYTE [DISTWIDTH] IN BLOOD BY AUTOMATED COUNT: 15.3 % (ref 10–15)
FRACT EXCRET NA UR+SERPL-RTO: 0.6 %
GLUCOSE BLDC GLUCOMTR-MCNC: 136 MG/DL (ref 70–99)
GLUCOSE BLDC GLUCOMTR-MCNC: 136 MG/DL (ref 70–99)
GLUCOSE SERPL-MCNC: 121 MG/DL (ref 70–99)
GLUCOSE UR STRIP-MCNC: NEGATIVE MG/DL
HCO3 BLD-SCNC: 31 MMOL/L (ref 21–28)
HCO3 BLD-SCNC: 32 MMOL/L (ref 21–28)
HCO3 SERPL-SCNC: 25 MMOL/L (ref 22–29)
HCT VFR BLD AUTO: 45 % (ref 40–53)
HGB BLD-MCNC: 13 G/DL (ref 13.3–17.7)
HGB UR QL STRIP: NEGATIVE
HOLD SPECIMEN: NORMAL
HYALINE CASTS: 1 /LPF
IMM GRANULOCYTES # BLD: 0.2 10E3/UL
IMM GRANULOCYTES NFR BLD: 1 %
KETONES UR STRIP-MCNC: NEGATIVE MG/DL
LACTATE SERPL-SCNC: 0.5 MMOL/L (ref 0.7–2)
LEUKOCYTE ESTERASE UR QL STRIP: NEGATIVE
LYMPHOCYTES # BLD AUTO: 1.2 10E3/UL (ref 0.8–5.3)
LYMPHOCYTES NFR BLD AUTO: 7 %
MAGNESIUM SERPL-MCNC: 2.1 MG/DL (ref 1.7–2.3)
MCH RBC QN AUTO: 27.4 PG (ref 26.5–33)
MCHC RBC AUTO-ENTMCNC: 28.9 G/DL (ref 31.5–36.5)
MCV RBC AUTO: 95 FL (ref 78–100)
MONOCYTES # BLD AUTO: 0.8 10E3/UL (ref 0–1.3)
MONOCYTES NFR BLD AUTO: 5 %
MUCOUS THREADS #/AREA URNS LPF: PRESENT /LPF
NEUTROPHILS # BLD AUTO: 13.5 10E3/UL (ref 1.6–8.3)
NEUTROPHILS NFR BLD AUTO: 86 %
NITRATE UR QL: NEGATIVE
NRBC # BLD AUTO: 0 10E3/UL
NRBC BLD AUTO-RTO: 0 /100
NT-PROBNP SERPL-MCNC: 6854 PG/ML (ref 0–900)
O2/TOTAL GAS SETTING VFR VENT: 100 %
O2/TOTAL GAS SETTING VFR VENT: 50 %
O2/TOTAL GAS SETTING VFR VENT: 50 %
O2/TOTAL GAS SETTING VFR VENT: 70 %
OXYHGB MFR BLDA: 91 % (ref 92–100)
OXYHGB MFR BLDA: 91 % (ref 92–100)
OXYHGB MFR BLDA: 93 % (ref 92–100)
OXYHGB MFR BLDA: 94 % (ref 92–100)
PCO2 BLD: 115 MM HG (ref 35–45)
PCO2 BLD: 74 MM HG (ref 35–45)
PCO2 BLD: 84 MM HG (ref 35–45)
PCO2 BLD: 88 MM HG (ref 35–45)
PEEP: 0 CM H2O
PEEP: 0 CM H2O
PEEP: 7 CM H2O
PH BLD: 7.06 [PH] (ref 7.35–7.45)
PH BLD: 7.15 [PH] (ref 7.35–7.45)
PH BLD: 7.18 [PH] (ref 7.35–7.45)
PH BLD: 7.22 [PH] (ref 7.35–7.45)
PH UR STRIP: 5 [PH] (ref 5–7)
PLATELET # BLD AUTO: 197 10E3/UL (ref 150–450)
PO2 BLD: 67 MM HG (ref 80–105)
PO2 BLD: 68 MM HG (ref 80–105)
PO2 BLD: 81 MM HG (ref 80–105)
PO2 BLD: 88 MM HG (ref 80–105)
POTASSIUM SERPL-SCNC: 5.3 MMOL/L (ref 3.4–5.3)
PROCALCITONIN SERPL IA-MCNC: 0.5 NG/ML
PROT SERPL-MCNC: 5.8 G/DL (ref 6.4–8.3)
RBC # BLD AUTO: 4.75 10E6/UL (ref 4.4–5.9)
RBC URINE: 2 /HPF
SAO2 % BLDA: 92.1 % (ref 95–96)
SAO2 % BLDA: 92.4 % (ref 95–96)
SAO2 % BLDA: 94.6 % (ref 95–96)
SAO2 % BLDA: 95.2 % (ref 95–96)
SODIUM SERPL-SCNC: 138 MMOL/L (ref 135–145)
SODIUM SERPL-SCNC: 138 MMOL/L (ref 135–145)
SODIUM UR-SCNC: 36 MMOL/L
SP GR UR STRIP: 1.02 (ref 1–1.03)
TROPONIN T SERPL HS-MCNC: 148 NG/L
TROPONIN T SERPL HS-MCNC: 158 NG/L
UFH PPP CHRO-ACNC: 0.61 IU/ML
UROBILINOGEN UR STRIP-MCNC: NORMAL MG/DL
WBC # BLD AUTO: 15.7 10E3/UL (ref 4–11)
WBC URINE: 2 /HPF

## 2025-01-11 PROCEDURE — 93010 ELECTROCARDIOGRAM REPORT: CPT | Performed by: INTERNAL MEDICINE

## 2025-01-11 PROCEDURE — 82565 ASSAY OF CREATININE: CPT | Performed by: INTERNAL MEDICINE

## 2025-01-11 PROCEDURE — 99221 1ST HOSP IP/OBS SF/LOW 40: CPT | Mod: GC | Performed by: SURGERY

## 2025-01-11 PROCEDURE — 93005 ELECTROCARDIOGRAM TRACING: CPT

## 2025-01-11 PROCEDURE — 250N000011 HC RX IP 250 OP 636: Performed by: INTERNAL MEDICINE

## 2025-01-11 PROCEDURE — 84484 ASSAY OF TROPONIN QUANT: CPT | Performed by: NURSE PRACTITIONER

## 2025-01-11 PROCEDURE — 250N000011 HC RX IP 250 OP 636: Performed by: STUDENT IN AN ORGANIZED HEALTH CARE EDUCATION/TRAINING PROGRAM

## 2025-01-11 PROCEDURE — 99223 1ST HOSP IP/OBS HIGH 75: CPT | Mod: FS | Performed by: NURSE PRACTITIONER

## 2025-01-11 PROCEDURE — 82962 GLUCOSE BLOOD TEST: CPT

## 2025-01-11 PROCEDURE — 83880 ASSAY OF NATRIURETIC PEPTIDE: CPT | Performed by: NURSE PRACTITIONER

## 2025-01-11 PROCEDURE — 999N000157 HC STATISTIC RCP TIME EA 10 MIN

## 2025-01-11 PROCEDURE — 71045 X-RAY EXAM CHEST 1 VIEW: CPT

## 2025-01-11 PROCEDURE — 250N000011 HC RX IP 250 OP 636: Performed by: NURSE PRACTITIONER

## 2025-01-11 PROCEDURE — 94640 AIRWAY INHALATION TREATMENT: CPT

## 2025-01-11 PROCEDURE — 82805 BLOOD GASES W/O2 SATURATION: CPT | Performed by: INTERNAL MEDICINE

## 2025-01-11 PROCEDURE — 200N000001 HC R&B ICU

## 2025-01-11 PROCEDURE — 258N000003 HC RX IP 258 OP 636: Performed by: FAMILY MEDICINE

## 2025-01-11 PROCEDURE — 84145 PROCALCITONIN (PCT): CPT | Performed by: NURSE PRACTITIONER

## 2025-01-11 PROCEDURE — 84450 TRANSFERASE (AST) (SGOT): CPT | Performed by: NURSE PRACTITIONER

## 2025-01-11 PROCEDURE — 82330 ASSAY OF CALCIUM: CPT | Performed by: NURSE PRACTITIONER

## 2025-01-11 PROCEDURE — 5A09357 ASSISTANCE WITH RESPIRATORY VENTILATION, LESS THAN 24 CONSECUTIVE HOURS, CONTINUOUS POSITIVE AIRWAY PRESSURE: ICD-10-PCS | Performed by: INTERNAL MEDICINE

## 2025-01-11 PROCEDURE — 85520 HEPARIN ASSAY: CPT | Performed by: INTERNAL MEDICINE

## 2025-01-11 PROCEDURE — 99291 CRITICAL CARE FIRST HOUR: CPT | Performed by: INTERNAL MEDICINE

## 2025-01-11 PROCEDURE — 36415 COLL VENOUS BLD VENIPUNCTURE: CPT | Performed by: NURSE PRACTITIONER

## 2025-01-11 PROCEDURE — 85049 AUTOMATED PLATELET COUNT: CPT | Performed by: NURSE PRACTITIONER

## 2025-01-11 PROCEDURE — 36600 WITHDRAWAL OF ARTERIAL BLOOD: CPT

## 2025-01-11 PROCEDURE — 82805 BLOOD GASES W/O2 SATURATION: CPT | Performed by: NURSE PRACTITIONER

## 2025-01-11 PROCEDURE — 999N000215 HC STATISTIC HFNC ADULT NON-CPAP

## 2025-01-11 PROCEDURE — 82550 ASSAY OF CK (CPK): CPT | Performed by: NURSE PRACTITIONER

## 2025-01-11 PROCEDURE — 84295 ASSAY OF SERUM SODIUM: CPT | Performed by: INTERNAL MEDICINE

## 2025-01-11 PROCEDURE — 250N000009 HC RX 250: Performed by: INTERNAL MEDICINE

## 2025-01-11 PROCEDURE — 84300 ASSAY OF URINE SODIUM: CPT | Performed by: INTERNAL MEDICINE

## 2025-01-11 PROCEDURE — 258N000003 HC RX IP 258 OP 636: Performed by: INTERNAL MEDICINE

## 2025-01-11 PROCEDURE — 83735 ASSAY OF MAGNESIUM: CPT | Performed by: NURSE PRACTITIONER

## 2025-01-11 PROCEDURE — 81001 URINALYSIS AUTO W/SCOPE: CPT | Performed by: INTERNAL MEDICINE

## 2025-01-11 PROCEDURE — 83605 ASSAY OF LACTIC ACID: CPT | Performed by: NURSE PRACTITIONER

## 2025-01-11 PROCEDURE — 94640 AIRWAY INHALATION TREATMENT: CPT | Mod: 76

## 2025-01-11 PROCEDURE — 94660 CPAP INITIATION&MGMT: CPT

## 2025-01-11 PROCEDURE — 99254 IP/OBS CNSLTJ NEW/EST MOD 60: CPT | Performed by: INTERNAL MEDICINE

## 2025-01-11 RX ORDER — CHLORHEXIDINE GLUCONATE ORAL RINSE 1.2 MG/ML
15 SOLUTION DENTAL EVERY 12 HOURS
Status: DISCONTINUED | OUTPATIENT
Start: 2025-01-11 | End: 2025-01-12

## 2025-01-11 RX ORDER — SODIUM CHLORIDE 9 MG/ML
INJECTION, SOLUTION INTRAVENOUS CONTINUOUS
Status: DISCONTINUED | OUTPATIENT
Start: 2025-01-11 | End: 2025-01-12

## 2025-01-11 RX ORDER — METHYLPREDNISOLONE SODIUM SUCCINATE 125 MG/2ML
60 INJECTION INTRAMUSCULAR; INTRAVENOUS EVERY 6 HOURS
Status: DISCONTINUED | OUTPATIENT
Start: 2025-01-11 | End: 2025-01-18

## 2025-01-11 RX ORDER — HEPARIN SODIUM 10000 [USP'U]/100ML
0-5000 INJECTION, SOLUTION INTRAVENOUS CONTINUOUS
Status: DISCONTINUED | OUTPATIENT
Start: 2025-01-11 | End: 2025-01-18

## 2025-01-11 RX ORDER — ALBUTEROL SULFATE 0.83 MG/ML
2.5 SOLUTION RESPIRATORY (INHALATION)
Status: DISCONTINUED | OUTPATIENT
Start: 2025-01-11 | End: 2025-01-24 | Stop reason: HOSPADM

## 2025-01-11 RX ORDER — CARBOXYMETHYLCELLULOSE SODIUM 5 MG/ML
1 SOLUTION/ DROPS OPHTHALMIC
Status: DISCONTINUED | OUTPATIENT
Start: 2025-01-11 | End: 2025-01-11

## 2025-01-11 RX ORDER — METHYLPREDNISOLONE SODIUM SUCCINATE 125 MG/2ML
125 INJECTION INTRAMUSCULAR; INTRAVENOUS ONCE
Status: COMPLETED | OUTPATIENT
Start: 2025-01-11 | End: 2025-01-11

## 2025-01-11 RX ORDER — IPRATROPIUM BROMIDE AND ALBUTEROL SULFATE 2.5; .5 MG/3ML; MG/3ML
3 SOLUTION RESPIRATORY (INHALATION)
Status: DISCONTINUED | OUTPATIENT
Start: 2025-01-11 | End: 2025-01-22

## 2025-01-11 RX ORDER — DOXYCYCLINE 100 MG/10ML
100 INJECTION, POWDER, LYOPHILIZED, FOR SOLUTION INTRAVENOUS 2 TIMES DAILY
Status: DISCONTINUED | OUTPATIENT
Start: 2025-01-11 | End: 2025-01-15

## 2025-01-11 RX ORDER — PROPOFOL 10 MG/ML
5-75 INJECTION, EMULSION INTRAVENOUS CONTINUOUS
Status: DISCONTINUED | OUTPATIENT
Start: 2025-01-11 | End: 2025-01-11

## 2025-01-11 RX ORDER — ALBUTEROL SULFATE 0.83 MG/ML
2.5 SOLUTION RESPIRATORY (INHALATION)
Status: COMPLETED | OUTPATIENT
Start: 2025-01-11 | End: 2025-01-11

## 2025-01-11 RX ORDER — DEXTROSE MONOHYDRATE 25 G/50ML
25-50 INJECTION, SOLUTION INTRAVENOUS
Status: DISCONTINUED | OUTPATIENT
Start: 2025-01-11 | End: 2025-01-12

## 2025-01-11 RX ORDER — IPRATROPIUM BROMIDE AND ALBUTEROL SULFATE 2.5; .5 MG/3ML; MG/3ML
3 SOLUTION RESPIRATORY (INHALATION)
Status: DISCONTINUED | OUTPATIENT
Start: 2025-01-11 | End: 2025-01-11

## 2025-01-11 RX ORDER — CARBOXYMETHYLCELLULOSE SODIUM 5 MG/ML
1 SOLUTION/ DROPS OPHTHALMIC
Status: DISCONTINUED | OUTPATIENT
Start: 2025-01-11 | End: 2025-01-24 | Stop reason: HOSPADM

## 2025-01-11 RX ORDER — NICOTINE POLACRILEX 4 MG
15-30 LOZENGE BUCCAL
Status: DISCONTINUED | OUTPATIENT
Start: 2025-01-11 | End: 2025-01-12

## 2025-01-11 RX ADMIN — ALBUTEROL SULFATE 2.5 MG: 2.5 SOLUTION RESPIRATORY (INHALATION) at 10:05

## 2025-01-11 RX ADMIN — ALBUTEROL SULFATE 2.5 MG: 2.5 SOLUTION RESPIRATORY (INHALATION) at 10:00

## 2025-01-11 RX ADMIN — HEPARIN SODIUM 1200 UNITS/HR: 10000 INJECTION, SOLUTION INTRAVENOUS at 09:27

## 2025-01-11 RX ADMIN — AMIODARONE HYDROCHLORIDE 0.5 MG/MIN: 1.8 INJECTION, SOLUTION INTRAVENOUS at 12:58

## 2025-01-11 RX ADMIN — IPRATROPIUM BROMIDE AND ALBUTEROL SULFATE 3 ML: .5; 3 SOLUTION RESPIRATORY (INHALATION) at 22:57

## 2025-01-11 RX ADMIN — SODIUM CHLORIDE: 9 INJECTION, SOLUTION INTRAVENOUS at 00:03

## 2025-01-11 RX ADMIN — ALBUTEROL SULFATE 2.5 MG: 2.5 SOLUTION RESPIRATORY (INHALATION) at 10:43

## 2025-01-11 RX ADMIN — IPRATROPIUM BROMIDE AND ALBUTEROL SULFATE 3 ML: .5; 3 SOLUTION RESPIRATORY (INHALATION) at 14:26

## 2025-01-11 RX ADMIN — SODIUM CHLORIDE: 9 INJECTION, SOLUTION INTRAVENOUS at 23:51

## 2025-01-11 RX ADMIN — DOXYCYCLINE 100 MG: 100 INJECTION, POWDER, LYOPHILIZED, FOR SOLUTION INTRAVENOUS at 11:15

## 2025-01-11 RX ADMIN — AMIODARONE HYDROCHLORIDE 0.5 MG/MIN: 1.8 INJECTION, SOLUTION INTRAVENOUS at 04:17

## 2025-01-11 RX ADMIN — METHYLPREDNISOLONE SODIUM SUCCINATE 62.5 MG: 125 INJECTION, POWDER, FOR SOLUTION INTRAMUSCULAR; INTRAVENOUS at 18:33

## 2025-01-11 RX ADMIN — ALBUTEROL SULFATE 2.5 MG: 2.5 SOLUTION RESPIRATORY (INHALATION) at 10:06

## 2025-01-11 RX ADMIN — DOXYCYCLINE 100 MG: 100 INJECTION, POWDER, LYOPHILIZED, FOR SOLUTION INTRAVENOUS at 20:42

## 2025-01-11 RX ADMIN — ALBUTEROL SULFATE 2.5 MG: 2.5 SOLUTION RESPIRATORY (INHALATION) at 09:38

## 2025-01-11 RX ADMIN — IPRATROPIUM BROMIDE AND ALBUTEROL SULFATE 3 ML: .5; 3 SOLUTION RESPIRATORY (INHALATION) at 21:08

## 2025-01-11 RX ADMIN — PANTOPRAZOLE SODIUM 40 MG: 40 INJECTION, POWDER, FOR SOLUTION INTRAVENOUS at 11:17

## 2025-01-11 RX ADMIN — METHYLPREDNISOLONE SODIUM SUCCINATE 125 MG: 125 INJECTION, POWDER, FOR SOLUTION INTRAMUSCULAR; INTRAVENOUS at 09:38

## 2025-01-11 ASSESSMENT — ACTIVITIES OF DAILY LIVING (ADL)
ADLS_ACUITY_SCORE: 41
ADLS_ACUITY_SCORE: 41
ADLS_ACUITY_SCORE: 66
ADLS_ACUITY_SCORE: 47
ADLS_ACUITY_SCORE: 43
ADLS_ACUITY_SCORE: 57
ADLS_ACUITY_SCORE: 41
ADLS_ACUITY_SCORE: 47
ADLS_ACUITY_SCORE: 41
ADLS_ACUITY_SCORE: 43
ADLS_ACUITY_SCORE: 53
ADLS_ACUITY_SCORE: 45
ADLS_ACUITY_SCORE: 47
ADLS_ACUITY_SCORE: 47
ADLS_ACUITY_SCORE: 57
ADLS_ACUITY_SCORE: 57
ADLS_ACUITY_SCORE: 41
ADLS_ACUITY_SCORE: 57
ADLS_ACUITY_SCORE: 41

## 2025-01-11 NOTE — CONSULTS
[facility]    Cardiology Consultation     Date of Admission:  1/11/2025    Assessment & Plan   Keith Gonzalez is a 69 year old male who was admitted on 1/11/2025.    Acute typical atrial flutter, could be secondary to PE.  On Amio drip, s/p cardioversion twice and now remaining in sinus rhythm  AcurtePE and DVTs - On heparin, intermittent hypotension due to cardiogenic shock, Improved -given history of cancer wonder if there is relation to the PE.  Acute on chronic resp failure with hypercarbia - Bipap  Acute renal failure likely from cardiogenic shock due to RV dysfunction and pE  Mild cardiomyopathy on echo, may be related to PE, repeat echocardiogram next week.  History of testicular cancer, in remission      Discussion  Patient presented with acute shortness of breath with hypercarbic respiratory failure.  D-dimer was elevated and diagnosed with CT PE mainly in the right middle and lower lobes and a small PE in the left side.  There is RV dysfunction with RV enlargement noted on echo.  Patient had intermittent mild hypotension at the emergency room in [facility] but also was having rapid atrial flutter and therefore was cardioverted.  Then was put on IV amiodarone and again had recurrence of atrial flutter and again was cardioverted yesterday.  Now remaining in sinus rhythm.  Blood pressures have improved.    In addition, patient is having worsening breathing and CO2 retention likely from acute on chronic COPD and cor pulmonale, improved on BiPAP.    Plan  For now, continue anticoagulation for both PE and flutter  Continue IV amiodarone drip today and then switch to p.o. tomorrow  If there is recurrence of atrial flutter despite medical therapy, may need EP consult for consideration of ablation.  I am suspecting that atrial flutter also could be present with acute worsening of COPD and PE.  Management of acute PE and DVT per ICU team.  Discussed with ICU attending and he did  not believe there was any emergent need for mechanical thrombectomy as patient was hemodynamically stable.  He believes her CO2 retention is related to worsening COPD which is now improving    Critical care time of 45 minutes spent in evaluation management of this patient with acute respiratory failure, acute PE, atrial flutter with intermittent hypotension may be related to RV dysfunction and cardiogenic shock, requiring BiPAP.    Benjamin Cesar MD    Primary Care Physician   [facility]    Reason for Consult   Reason for consult: I was asked by ICU provider to evaluate this patient for atrial flutter.    History of Present Illness   Keith Gonzalez is a 69 year old male with a significant past medical history for emphysema, COPD, distant DVT, osteoporosis, gastroesophageal reflux disease and testicular cancer (remission) who was admitted as a direct admission to intensive care unit from [place] emergency department for further treatment and evaluation of bilateral pulmonary embolism with acute cor pulmonale, new onset atrial flutter, and extensive lower extremity DVTs.      Patient was sent to the emergency room at [facility] by primary care for abnormal labs and lower extremity swelling and shortness of breath.  He had elevated BNP and D-dimer.  CT chest revealed extensive PE as well as lower extremity DVTs were noted.  He was started on IV heparin.  Echocardiogram revealed EF of 46%, mild lateral wall hypokinesis. There was RV dilatation with mild to moderate RV dysfunction.  Patient developed rapid atrial flutter.  Initially he was cardioverted and put on amiodarone but had recurrence of atrial flutter daily despite diltiazem metoprolol and amiodarone.  He was cardioverted again yesterday evening.  On admission there he had severe respiratory acidosis with pH of 7.06 and CO2 115 and initially was put on BiPAP.     Troponin on admission was 80 and now increased to 158 and 148.  Creatinine on  admission 1.17 and now 2.18 ALT and AST 1 9498 and now 156 and 59.  WBC count of 15.7    Past Medical History   Past Medical History:   Diagnosis Date    Cancer (H)     COPD (chronic obstructive pulmonary disease) (H)          Past Surgical History   Past Surgical History:   Procedure Laterality Date    DAVINCI HERNIORRHAPHY INGUINAL Left 10/28/2021    Procedure: Robotic assisted left inguinal hernia repair with mesh;  Surgeon: Gladys Whitehead MD;  Location: [facility] OR    ESOPHAGOSCOPY, GASTROSCOPY, DUODENOSCOPY (EGD), COMBINED N/A 9/13/2022    Procedure: ESOPHAGOGASTRODUODENOSCOPY, WITH BIOPSY;  Surgeon: Doyle Corbin MD;  Location: [facility] GI    LAPAROSCOPIC LYSIS ADHESIONS N/A 10/28/2021    Procedure: Laparoscopic lysis adhesions;  Surgeon: Gladys Whitehead MD;  Location: [facility] OR         Prior to Admission Medications   Prior to Admission Medications   Prescriptions Last Dose Informant Patient Reported? Taking?   COMBIVENT RESPIMAT  MCG/ACT inhaler 1/9/2025 at  6:00 PM  No Yes   Sig: INHALE ONE TO TWO  PUFFS INTO THE LUNGS BY MOUTH EVERY 4 HOURS AS NEEDED FOR SHORTNESS OF BREATH, WHEEZING OR COUGH   Patient taking differently: Inhale 1-2 puffs into the lungs every 4 hours as needed for shortness of breath, wheezing or cough. Currently in patient's pocket   alendronate (FOSAMAX) 70 MG tablet 1/5/2025 Morning  No Yes   Sig: TAKE 1 TABLET BY MOUTH EVERY 7 DAYS. TAKE 60 MINUTES BEFORE MORNING MEAL WITH 8 OUNCES OF WATER. REMAIN UPRIGHT FOR 30 MINUTES.   Patient taking differently: Take 70 mg by mouth every 7 days. Sundays   calcium carbonate-vitamin D (OSCAL) 500-5 MG-MCG tablet 1/9/2025 at  6:00 PM  No Yes   Sig: Take 1 tablet by mouth 2 times daily   cyclobenzaprine (FLEXERIL) 5 MG tablet Past Week  No Yes   Sig: TAKE ONE TO TWO TABLETS BY MOUTH TWICE A DAY AS NEEDED FOR MUSCLE SPASMS   Patient taking differently: Take 5 mg by mouth 2 times daily as needed for muscle spasms.   ferrous sulfate (FEROSUL) 325 (65 Fe) MG  tablet 1/4/2025  No Yes   Sig: Take 1 tablet (325 mg) by mouth once a week   Patient taking differently: Take 325 mg by mouth every 7 days. Saturdays   fluticasone-salmeterol (ADVAIR) 500-50 MCG/ACT inhaler 1/9/2025 Morning  No Yes   Sig: Inhale 1 puff into the lungs every 12 hours   furosemide (LASIX) 20 MG tablet 1/9/2025 Noon  No Yes   Sig: Take 1 tablet (20 mg) by mouth daily for 5 days.      Facility-Administered Medications: None     Current Facility-Administered Medications   Medication Dose Route Frequency Provider Last Rate Last Admin    albuterol (PROVENTIL) neb solution 2.5 mg  2.5 mg Nebulization Q2H PRN Aryan Felton MD        amiodarone (NEXTERONE) 1.8 mg/mL in dextrose 5% 200 mL ADULT STANDARD infusion  0.5 mg/min Intravenous Continuous Susie Mcmullen APRN CNP 16.7 mL/hr at 01/11/25 1258 0.5 mg/min at 01/11/25 1258    amiodarone (NEXTERONE) 150 MG/100ML bolus             carboxymethylcellulose PF (REFRESH PLUS) 0.5 % ophthalmic solution 1 drop  1 drop Both Eyes Q1H PRN Aryan Felton MD        chlorhexidine (PERIDEX) 0.12 % solution 15 mL  15 mL Mouth/Throat Q12H Aryan Felton MD        glucose gel 15-30 g  15-30 g Oral Q15 Min PRN Susie Mcmullen APRN CNP        Or    dextrose 50 % injection 25-50 mL  25-50 mL Intravenous Q15 Min PRN Susie Mcmullen APRN CNP        Or    glucagon injection 1 mg  1 mg Subcutaneous Q15 Min PRN Susie Mcmullen APRN CNP        doxycycline (VIBRAMYCIN) 100 mg vial to attach to  mL bag  100 mg Intravenous BID Susie Mcmullen APRN CNP   100 mg at 01/11/25 1115    heparin 25,000 units in 0.45% NaCl 250 mL ANTICOAGULANT infusion  0-5,000 Units/hr Intravenous Continuous Susie Mcmullen APRN CNP 12 mL/hr at 01/11/25 0927 1,200 Units/hr at 01/11/25 0927    HOLD: All Oral Medications   Does not apply HOLD Aryan Felton MD        ipratropium - albuterol 0.5 mg/2.5 mg/3 mL (DUONEB) neb solution 3 mL  3 mL Nebulization Q4H Aryan Felton MD         methylPREDNISolone Na Suc (solu-MEDROL) injection 62.5 mg  62.5 mg Intravenous Q6H Aryan Felton MD        No lozenges or gum should be given while patient on BIPAP/AVAPS/AVAPS AE   Does not apply Continuous PRN Aryan Felton MD        No lozenges or gum should be given while patient on BIPAP/AVAPS/AVAPS AE   Does not apply Continuous PRN Aryan Felton MD        pantoprazole (PROTONIX) 2 mg/mL suspension 40 mg  40 mg Per Feeding Tube QAM AC Aryan Felton MD        Or    pantoprazole (PROTONIX) IV push injection 40 mg  40 mg Intravenous QAM  Aryan Felton MD   40 mg at 01/11/25 1117    Patient is already receiving anticoagulation with heparin, enoxaparin (LOVENOX), warfarin (COUMADIN)  or other anticoagulant medication   Does not apply Continuous PRSusie Paniagua APRN CNP        Patient may continue current oral medications   Does not apply Continuous PRN Aryan Felton MD         Current Facility-Administered Medications   Medication Dose Route Frequency Provider Last Rate Last Admin    albuterol (PROVENTIL) neb solution 2.5 mg  2.5 mg Nebulization Q2H PRN Aryan Felton MD        amiodarone (NEXTERONE) 1.8 mg/mL in dextrose 5% 200 mL ADULT STANDARD infusion  0.5 mg/min Intravenous Continuous Susie Mcmullen APRN CNP 16.7 mL/hr at 01/11/25 1258 0.5 mg/min at 01/11/25 1258    amiodarone (NEXTERONE) 150 MG/100ML bolus             carboxymethylcellulose PF (REFRESH PLUS) 0.5 % ophthalmic solution 1 drop  1 drop Both Eyes Q1H PRN Aryan Felton MD        chlorhexidine (PERIDEX) 0.12 % solution 15 mL  15 mL Mouth/Throat Q12H Aryan Felton MD        glucose gel 15-30 g  15-30 g Oral Q15 Min PRN Susie Mcmullen APRN CNP        Or    dextrose 50 % injection 25-50 mL  25-50 mL Intravenous Q15 Min PRN Susie Mcmullen APRN CNP        Or    glucagon injection 1 mg  1 mg Subcutaneous Q15 Min PRN Awes, Susie Torie, APRN CNP        doxycycline (VIBRAMYCIN) 100 mg vial to attach to   mL bag  100 mg Intravenous BID Susie Mcmullen APRN CNP   100 mg at 01/11/25 1115    heparin 25,000 units in 0.45% NaCl 250 mL ANTICOAGULANT infusion  0-5,000 Units/hr Intravenous Continuous Susie Mcmullen APRN CNP 12 mL/hr at 01/11/25 0927 1,200 Units/hr at 01/11/25 0927    HOLD: All Oral Medications   Does not apply HOLD Aryan Felton MD        ipratropium - albuterol 0.5 mg/2.5 mg/3 mL (DUONEB) neb solution 3 mL  3 mL Nebulization Q4H Aryan Felton MD        methylPREDNISolone Na Suc (solu-MEDROL) injection 62.5 mg  62.5 mg Intravenous Q6H Aryan Felton MD        No lozenges or gum should be given while patient on BIPAP/AVAPS/AVAPS AE   Does not apply Continuous PRN Aryan Felton MD        No lozenges or gum should be given while patient on BIPAP/AVAPS/AVAPS AE   Does not apply Continuous PRN Aryan Felton MD        pantoprazole (PROTONIX) 2 mg/mL suspension 40 mg  40 mg Per Feeding Tube QAM  Aryan Felton MD        Or    pantoprazole (PROTONIX) IV push injection 40 mg  40 [facility]   40 mg at 01/11/25 1117    Patient is already receiving anticoagulation with heparin, enoxaparin (LOVENOX), warfarin (COUMADIN)  or other anticoagulant medication   Does not apply Continuous PRN Susie Mcmullen APRN CNP        Patient may continue current oral medications   Does not apply Continuous PRN Aryan Felton MD         Allergies   Allergies   Allergen Reactions    No Known Drug Allergy        Social History    reports that he has been smoking cigarettes. He has never used smokeless tobacco. He reports that he does not drink alcohol and does not use drugs.      Family History     Noncontributory       Review of Systems   A comprehensive review of system was performed and is negative other than that noted in the HPI or here.     Physical Exam   Vital Signs with Ranges  Temp:  [96.6  F (35.9  C)-99.4  F (37.4  C)] 96.6  F (35.9  C)  Pulse:  [] 64  Resp:   "[13-30] 18  BP: ()/(44-78) 107/65  FiO2 (%):  [40 %-100 %] 50 %  SpO2:  [84 %-100 %] 98 %  Wt Readings from Last 4 Encounters:   01/09/25 67.1 kg (148 lb)   11/13/24 63 kg (139 lb)   09/11/24 61.4 kg (135 lb 6.4 oz)   07/08/24 61.7 kg (136 lb)     No intake/output data recorded.      Vitals: /65   Pulse 64   Temp (!) 96.6  F (35.9  C) (Axillary)   Resp 18   SpO2 98%     Physical Exam:   General -on BiPAP eyes - No scleral icterus  HEENT - Neck supple, moist mucous membranes  Cardiovascular -regular S1-S2 with distant heart sounds  Extremities - There is no edema  Respiratory -decreased air entry at bases, bilateral wheezes,  Skin - No pallor or cyanosis  Gastrointestinal - Non tender and non distended without rebound or guarding  Psych - Appropriate affect   Neurological - No gross motor neurological focal deficits    No lab results found in last 7 days.    Invalid input(s): \"TROPONINIES\"    Recent Labs   Lab 01/11/25  0918 01/11/25  0831 01/09/25  1916 01/09/25  1152   WBC 15.7*  --  15.7*  --    HGB 13.0*  --  13.1*  --    MCV 95  --  86  --      --  215  --      --  141 138   POTASSIUM 5.3  --  4.4 5.0   CHLORIDE 101  --  99 98   CO2 25  --  28 30*   BUN 56.4*  --  51.8* 53.2*   CR 2.18*  --  1.19* 1.17   GFRESTIMATED 32*  --  67 68   ANIONGAP 12  --  14 10   DAT 7.9*  --  9.2 9.2   * 136* 93 99   ALBUMIN 3.2*  --  3.7  --    PROTTOTAL 5.8*  --  6.5  --    BILITOTAL 0.2  --  0.3  --    ALKPHOS 115  --  127  --    *  --  194*  --    AST 59*  --  98*  --      Recent Labs   Lab Test 08/06/18  0952   CHOL 195   HDL 69   *   TRIG 57     Recent Labs   Lab 01/11/25  0918 01/09/25  1916   WBC 15.7* 15.7*   HGB 13.0* 13.1*   HCT 45.0 41.4   MCV 95 86    215     Recent Labs   Lab 01/11/25  1228 01/11/25  0904 01/10/25  1725 01/10/25  1329 01/10/25  1057   PH 7.15* 7.06*  --   --   --    PHV  --   --  7.33 7.29* 7.33   PO2 81 88  --   --   --    PO2V  --   --  67* 39 " "27   PCO2 88* 115*  --   --   --    PCO2V  --   --  62* 75* 71*   HCO3 31* 32*  --   --   --    HCO3V  --   --  33* 36* 37*     Recent Labs   Lab 01/11/25 0918 01/09/25  1152   NTBNPI 6,854*  --    NTBNP  --  9,477*     Recent Labs   Lab 01/09/25  1152   DD 9.32*     No results for input(s): \"SED\", \"CRP\" in the last 168 hours.  Recent Labs   Lab 01/11/25 0918 01/09/25 1916    215     Recent Labs   Lab 01/09/25 1916   TSH 2.74     Recent Labs   Lab 01/09/25 2055   COLOR Straw   APPEARANCE Clear   URINEGLC Negative   URINEBILI Negative   URINEKETONE Negative   SG 1.010   UBLD Negative   URINEPH 5.0   PROTEIN Negative   NITRITE Negative   LEUKEST Negative   RBCU 0   WBCU 0       Imaging:  Recent Results (from the past 48 hours)   US Lower Extremity Venous Duplex Bilateral    Narrative    EXAM: US LOWER EXTREMITY VENOUS DUPLEX BILATERAL  LOCATION: [facility]  DATE: 1/9/2025    INDICATION: lower leg swelling hx of DVTS  COMPARISON: None.  TECHNIQUE: Venous Duplex ultrasound of bilateral lower extremities with and without compression, augmentation and duplex. Color flow and spectral Doppler with waveform analysis performed.    FINDINGS: Exam includes the common femoral, femoral, popliteal veins as well as segmentally visualized deep calf veins and greater saphenous vein.     There is DVT in the femoral veins extending from the upper thigh into the popliteal veins and into the posterior tibial and peroneal veins bilaterally. The thrombus is occlusive in the femoral veins and popliteal veins. It is partially occlusive in the   posterior tibial and peroneal veins.      Impression    IMPRESSION:  1.  Bilateral lower extremity DVT as above.   CT Chest Pulmonary Embolism w Contrast    Narrative    EXAM: CT CHEST PULMONARY EMBOLISM W CONTRAST  LOCATION: [facility]  DATE: 1/9/2025    INDICATION: elevated dimer hf  COMPARISON: Abdominal CT " 2/2/2022  TECHNIQUE: CT chest pulmonary angiogram during arterial phase injection of IV contrast. Multiplanar reformats and MIP reconstructions were performed. Dose reduction techniques were used.   CONTRAST: Isovue 370, 70mL    FINDINGS:  ANGIOGRAM CHEST: Exam is positive for pulmonary emboli with segmental size emboli present within right middle and lower lobes. There is also likely a small embolism present within left lung. Thoracic aorta is negative for dissection. There is evidence of   right heart strain with RV/LV ratio greater than 1.    LUNGS AND PLEURA: Small bilateral pleural effusions with mild dependent atelectasis bilaterally. There are also small nonspecific bilateral subpleural opacifications that could be inflammatory or related to tiny peripheral lung infarcts. Extensive   emphysema.    MEDIASTINUM/AXILLAE: No lymphadenopathy. Mildly patulous esophagus.    CORONARY ARTERY CALCIFICATION: Moderate.    UPPER ABDOMEN: Reflux of contrast into the IVC suggesting elevated right heart pressures.    MUSCULOSKELETAL: Compressions of upper lumbar vertebral bodies unchanged.      Impression    IMPRESSION:  1.  Exam is positive for pulmonary emboli. Moderate burden of emboli predominantly involving right lung.  2.  RV/LV ratio greater than 1 suggesting right heart strain.  3.  Small bilateral pleural effusions. Scattered nonspecific small subpleural opacities, can't exclude small peripheral lung infarct.  4.  Emphysema.    Findings discussed with Dr. Pineda at 2230 hours   -Cardioversion External    Narrative    Noelle Pineda MD     1/10/2025  7:56 AM  [facility]    -Cardioversion External    Date/Time: 1/10/2025 3:44 AM    Performed by: Noelle Pineda MD  Authorized by: Noelle Pineda MD    Risks, benefits and alternatives discussed.    ED EVALUATION:      Assessment Time: 1/10/2025 3:44 AM      I have performed an Emergency Department Evaluation including  taking a   history and physical examination, this evaluation will be documented in   the electronic medical record for this ED encounter.     Indication: Atrial flutter    ASA Class: Class 2- mild systemic disease, no acute problems, no   functional limitations    Mallampati: Grade 1- soft palate, uvula, tonsillar pillars, and   posterior pharyngeal wall visible    NPO Status: appropriately NPO for procedure    UNIVERSAL PROTOCOL   Site Marked: NA  Prior Images Obtained and Reviewed:  NA  Required items: Required blood products, implants, devices and special   equipment available    Patient identity confirmed:  Verbally with patient, arm band, provided   demographic data and hospital-assigned identification number  Patient was reevaluated immediately before administering moderate or deep   sedation or anesthesia  Confirmation Checklist:  Patient's identity using two indicators, relevant   allergies, procedure was appropriate and matched the consent or emergent   situation and correct equipment/implants were available  Time out: Immediately prior to the procedure a time out was called    Universal Protocol: the Joint Commission Universal Protocol was followed    Preparation: Patient was prepped and draped in usual sterile fashion      SEDATION  Patient Sedated: Yes    Sedation Type:  Moderate (conscious) sedation  Sedation:  Etomidate  Vital signs: Vital signs monitored during sedation      PRE-PROCEDURE DETAILS:     Cardioversion basis:  Emergent    Rhythm:  Atrial flutter    Electrode placement:  Anterior-posterior  Attempt one:     Cardioversion mode:  Synchronous    Waveform:  Biphasic    Shock (Joules):  150    Shock outcome:  Conversion to normal sinus rhythm  Post-procedure details:     Patient status:  Awake      PROCEDURE    Patient Tolerance:  Patient tolerated the procedure well with no immediate   complications  Length of time physician/provider present for 1:1 monitoring during   sedation: 15    Echocardiogram Complete   Result Value    Biplane LVEF 46%    Narrative    [id number]  [id number]  [id number]  [id number]^KATHRYN^NATHAN     [facility]  Echocardiography Laboratory  [address] [place] [address]     Name: PUNEET MARTINEZ  MRN: [id number]  : 1955  Study Date: 01/10/2025 08:03 AM  Age: 69 yrs  Gender: Male  Patient Location: [facility]  Reason For Study: Heart Failure, Unspecified  History: copd  Ordering Physician: NATHAN PERALTA  Referring Physician: [facility]  Performed By: Tracee López     BSA: 1.8 m2  Height: 70 in  Weight: 148 lb  HR: 82  BP: 102/68 mmHg  ______________________________________________________________________________  Procedure  Echocardiogram with two-dimensional, color and spectral Doppler. Optison (NDC  #[id number]) given intravenously.  ______________________________________________________________________________  Interpretation Summary     1. The left ventricle is normal in size. Biplane LVEF is 46%. Mild lateral  hypokinesis.  2. The right ventricle is mildly dilated. The right ventricular systolic  function is mild to moderately reduced.  3. No valve disease.  4. Mild TR. RVSP 30mmHg.     No previous echo for comparison.  ______________________________________________________________________________  Left Ventricle  The left ventricle is normal in size. There is normal left ventricular wall  thickness. Biplane LVEF is 46%. Left ventricular diastolic function is  indeterminate. Mild lateral hypokinesis.     Right Ventricle  The right ventricle is mildly dilated. The right ventricular systolic function  is mild to moderately reduced.     Atria  Normal left atrial size. Right atrial size is normal. There is no atrial shunt  seen.     Mitral Valve  There is mild (1+) mitral regurgitation.     Tricuspid Valve  There is mild (1+) tricuspid regurgitation.     Aortic Valve  The aortic valve is normal in structure and  function.     Pulmonic Valve  The pulmonic valve is not well seen, but is grossly normal.     Vessels  Normal ascending, transverse (arch), and descending aorta. Dilation of the  inferior vena cava is present with abnormal respiratory variation in diameter.     Pericardium  There is no pericardial effusion.     Rhythm  Sinus rhythm was noted.  ______________________________________________________________________________  MMode/2D Measurements & Calculations  IVSd: 1.1 cm     LVIDd: 4.0 cm  LVIDs: 3.2 cm  LVPWd: 1.1 cm  FS: 18.6 %  LV mass(C)d: 141.1 grams  LV mass(C)dI: 76.9 grams/m2  Ao root diam: 3.6 cm  LA dimension: 3.6 cm  asc Aorta Diam: 3.2 cm  LA/Ao: 1.0  Ao root diam index Ht(cm/m): 2.0  Ao root diam index BSA (cm/m2): 2.0  Asc Ao diam index BSA (cm/m2): 1.8  Asc Ao diam index Ht(cm/m): 1.8  EF Biplane: 48.7 %  LA Volume (BP): 51.8 ml     LA Volume Index (BP): 28.2 ml/m2  RWT: 0.57  TAPSE: 1.7 cm     Doppler Measurements & Calculations  MV E max gonzalo: 98.1 cm/sec  MV A max gonzalo: 75.4 cm/sec  MV E/A: 1.3  MV dec time: 0.21 sec  Ao V2 max: 117.4 cm/sec  Ao max P.0 mmHg  LV V1 max P.6 mmHg  LV V1 max: 81.0 cm/sec  PA V2 max: 77.1 cm/sec  PA max P.4 mmHg  PA acc time: 0.10 sec  PI end-d gonzalo: 111.5 cm/sec  TR max gonzalo: 275.1 cm/sec  TR max P.3 mmHg  AV Gonzalo Ratio (DI): 0.69  Medial E/e': 16.4     RV S Gonzalo: 12.2 cm/sec     ______________________________________________________________________________  Report approved by: Diego Eng MD on 01/10/2025 09:30 AM             Echo:  No results found for this or any previous visit (from the past 4320 hours).    Clinically Significant Risk Factors Present on Admission           # Hypocalcemia: Lowest Ca = 7.9 mg/dL in last 2 days, will monitor and replace as appropriate     # Hypoalbuminemia: Lowest albumin = 3.2 g/dL at 2025  9:18 AM, will monitor as appropriate    # Acute Kidney Injury, unspecified: based on a >150% or 0.3 mg/dL increase in  last creatinine compared to past 90 day average, will monitor renal function      # Acute Hypoxic Respiratory Failure: Documented O2 saturation < 90%. Continue supplemental oxygen as needed  # Acute Hypercapnic Respiratory Failure: based on arterial blood gas results.  Continue supplemental oxygen and ventilatory support as indicated.  # Acute Hypercapnic Respiratory Failure: based on venous blood gas results.  Continue supplemental oxygen and ventilatory support as indicated.            # COPD: noted on problem list       Cardiac Arrhythmia: Atrial flutter: Typical      COPD   Chronic cor pulmonale      PE

## 2025-01-11 NOTE — ED PROVIDER NOTES
I was asked to take over the care of this patient at shift change by Dr. Maximino Almaguer.  Dr. Almaguer stated the patient was signed out to him previously and has been here for over a day and a half waiting bed placement.  Patient boarding in the ER.  Patient with a history for atrial flutter with rapid ventricular response as well as exam findings for bilateral deep vein thrombosis and pulmonary emboli.  There has been some mild heart strain noted.  Patient had an episode of rapid atrial flutter yesterday and multiple medications were trialed including amiodarone with thought improvement in his rate or rhythm.  He was eventually cardioverted and placed on amiodarone drip.  Dr. Almaguer stated he had another episode of rapid atrial flutter during the day today and again unresponsive to medication and subsequently was cardioverted once again about 45 minutes ago.  He continues on the amiodarone drip.  Patient is also on heparin therapy for the DVT.  He reported that his troponins have remained steady in the 80s.  He stated the patient needed BiPAP through the night last night but was weaned to high flow nasal cannula today and continues on that and is maintaining his sats as well as a CO2 levels more effectively with the high flow nasal cannula.  He stated that there was some hope of possible bed availability in an intermediate care bed after 7:00 this evening but they have not heard anything from [facility] hospitals as yet.      I introduced myself to the patient and he no specific complaints at this time but admits to being fatigued.  I asked if he has eaten anything or drank anything today and he states he does not think so maybe had a couple bites of something.  He states that he feels dry and would be willing to try some oral fluids.  Patient does state that he has had a history for clots in his legs in the past.  He was on blood thinning medications for several years but has been off of them for several years now.    On exam  it is noted that he has somewhat labile blood pressure with systolic pressure time of my exam was 78.  He had a high flow nasal cannula in place.  In review of his fluid intake through the day and review of his echocardiogram, I gave him a bolus of 500 cc of IV fluids and this did improve his blood pressure.  He continues on cardiac monitor and currently is in a sinus rhythm with a rate of 70 bpm.    Will continue monitoring until bed placement can be identified.  Patient had no additional questions about his care or condition at this time.    I did review his imaging and lab test results and copy those below:  Results for orders placed or performed during the hospital encounter of 01/09/25   CT Chest Pulmonary Embolism w Contrast     Status: None    Narrative    EXAM: CT CHEST PULMONARY EMBOLISM W CONTRAST  LOCATION: [facility]  DATE: 1/9/2025    INDICATION: elevated dimer hf  COMPARISON: Abdominal CT 2/2/2022  TECHNIQUE: CT chest pulmonary angiogram during arterial phase injection of IV contrast. Multiplanar reformats and MIP reconstructions were performed. Dose reduction techniques were used.   CONTRAST: Isovue 370, 70mL    FINDINGS:  ANGIOGRAM CHEST: Exam is positive for pulmonary emboli with segmental size emboli present within right middle and lower lobes. There is also likely a small embolism present within left lung. Thoracic aorta is negative for dissection. There is evidence of   right heart strain with RV/LV ratio greater than 1.    LUNGS AND PLEURA: Small bilateral pleural effusions with mild dependent atelectasis bilaterally. There are also small nonspecific bilateral subpleural opacifications that could be inflammatory or related to tiny peripheral lung infarcts. Extensive   emphysema.    MEDIASTINUM/AXILLAE: No lymphadenopathy. Mildly patulous esophagus.    CORONARY ARTERY CALCIFICATION: Moderate.    UPPER ABDOMEN: Reflux of contrast into the IVC suggesting elevated  right heart pressures.    MUSCULOSKELETAL: Compressions of upper lumbar vertebral bodies unchanged.      Impression    IMPRESSION:  1.  Exam is positive for pulmonary emboli. Moderate burden of emboli predominantly involving right lung.  2.  RV/LV ratio greater than 1 suggesting right heart strain.  3.  Small bilateral pleural effusions. Scattered nonspecific small subpleural opacities, can't exclude small peripheral lung infarct.  4.  Emphysema.    Findings discussed with Dr. Pineda at 2230 hours   US Lower Extremity Venous Duplex Bilateral     Status: None    Narrative    EXAM: US LOWER EXTREMITY VENOUS DUPLEX BILATERAL  LOCATION: [facility]  DATE: 1/9/2025    INDICATION: lower leg swelling hx of DVTS  COMPARISON: None.  TECHNIQUE: Venous Duplex ultrasound of bilateral lower extremities with and without compression, augmentation and duplex. Color flow and spectral Doppler with waveform analysis performed.    FINDINGS: Exam includes the common femoral, femoral, popliteal veins as well as segmentally visualized deep calf veins and greater saphenous vein.     There is DVT in the femoral veins extending from the upper thigh into the popliteal veins and into the posterior tibial and peroneal veins bilaterally. The thrombus is occlusive in the femoral veins and popliteal veins. It is partially occlusive in the   posterior tibial and peroneal veins.      Impression    IMPRESSION:  1.  Bilateral lower extremity DVT as above.   Comprehensive metabolic panel     Status: Abnormal   Result Value Ref Range    Sodium 141 135 - 145 mmol/L    Potassium 4.4 3.4 - 5.3 mmol/L    Carbon Dioxide (CO2) 28 22 - 29 mmol/L    Anion Gap 14 7 - 15 mmol/L    Urea Nitrogen 51.8 (H) 8.0 - 23.0 mg/dL    Creatinine 1.19 (H) 0.67 - 1.17 mg/dL    GFR Estimate 67 >60 mL/min/1.73m2    Calcium 9.2 8.8 - 10.4 mg/dL    Chloride 99 98 - 107 mmol/L    Glucose 93 70 - 99 mg/dL    Alkaline Phosphatase 127 40 - 150 U/L     AST 98 (H) 0 - 45 U/L     (H) 0 - 70 U/L    Protein Total 6.5 6.4 - 8.3 g/dL    Albumin 3.7 3.5 - 5.2 g/dL    Bilirubin Total 0.3 <=1.2 mg/dL   Troponin T, High Sensitivity     Status: Abnormal   Result Value Ref Range    Troponin T, High Sensitivity 81 (H) <=22 ng/L   TSH with free T4 reflex     Status: Normal   Result Value Ref Range    TSH 2.74 0.30 - 4.20 uIU/mL   Blood gas venous     Status: Abnormal   Result Value Ref Range    pH Venous 7.34 7.32 - 7.43    pCO2 Venous 66 (H) 40 - 50 mm Hg    pO2 Venous 16 (L) 25 - 47 mm Hg    Bicarbonate Venous 35 (H) 21 - 28 mmol/L    Base Excess/Deficit Venous 7.3 (H) -3.0 - 3.0 mmol/L    FIO2 32     Oxyhemoglobin Venous 17 (L) 70 - 75 %    O2 Sat, Venous 17.3 (L) 70.0 - 75.0 %    Narrative    In healthy individuals, oxyhemoglobin (O2Hb) and oxygen saturation (SO2) are approximately equal. In the presence of dyshemoglobins, oxyhemoglobin can be considerably lower than oxygen saturation.   UA with Microscopic reflex to Culture     Status: Abnormal    Specimen: Urine, Clean Catch   Result Value Ref Range    Color Urine Straw Colorless, Straw, Light Yellow, Yellow    Appearance Urine Clear Clear    Glucose Urine Negative Negative mg/dL    Bilirubin Urine Negative Negative    Ketones Urine Negative Negative mg/dL    Specific Gravity Urine 1.010 1.003 - 1.035    Blood Urine Negative Negative    pH Urine 5.0 5.0 - 7.0    Protein Albumin Urine Negative Negative mg/dL    Urobilinogen Urine Normal Normal, 2.0 mg/dL    Nitrite Urine Negative Negative    Leukocyte Esterase Urine Negative Negative    Mucus Urine Present (A) None Seen /LPF    RBC Urine 0 <=2 /HPF    WBC Urine 0 <=5 /HPF    Narrative    Urine Culture not indicated   Influenza A/B, RSV and SARS-CoV2 PCR (COVID-19) Nose     Status: Normal    Specimen: Nose; Swab   Result Value Ref Range    Influenza A PCR Negative Negative    Influenza B PCR Negative Negative    RSV PCR Negative Negative    SARS CoV2 PCR Negative  Negative    Narrative    Testing was performed using the Xpert Xpress CoV2/Flu/RSV Assay on the [employer] GeneXpert Instrument. This test should be ordered for the detection of SARS-CoV2, influenza, and RSV viruses in individuals with signs and symptoms of respiratory tract infection. This test is for in vitro diagnostic use under the [place] FDA for laboratories certified under CLIA to perform high or moderate complexity testing. This test has been [place] FDA cleared. A negative result does not rule out the presence of PCR inhibitors in the specimen or target RNA in concentration below the limit of detection for the assay. If only one viral target is positive but coinfection with multiple targets is suspected, the sample should be re-tested with another FDA cleared, approved, or authorized test, if coninfection would change clinical management. This test was validated by the [facility]. These laboratories are certified under the Clinical Laboratory Improvement Amendments of 1988 (CLIA-88) as qualified to perfom high complexity laboratory testing.   CBC with platelets and differential     Status: Abnormal   Result Value Ref Range    WBC Count 15.7 (H) 4.0 - 11.0 10e3/uL    RBC Count 4.80 4.40 - 5.90 10e6/uL    Hemoglobin 13.1 (L) 13.3 - 17.7 g/dL    Hematocrit 41.4 40.0 - 53.0 %    MCV 86 78 - 100 fL    MCH 27.3 26.5 - 33.0 pg    MCHC 31.6 31.5 - 36.5 g/dL    RDW 15.3 (H) 10.0 - 15.0 %    Platelet Count 215 150 - 450 10e3/uL    % Neutrophils 87 %    % Lymphocytes 7 %    % Monocytes 5 %    % Eosinophils 0 %    % Basophils 0 %    % Immature Granulocytes 1 %    NRBCs per 100 WBC 0 <1 /100    Absolute Neutrophils 13.6 (H) 1.6 - 8.3 10e3/uL    Absolute Lymphocytes 1.2 0.8 - 5.3 10e3/uL    Absolute Monocytes 0.8 0.0 - 1.3 10e3/uL    Absolute Eosinophils 0.0 0.0 - 0.7 10e3/uL    Absolute Basophils 0.0 0.0 - 0.2 10e3/uL    Absolute Immature Granulocytes 0.1 <=0.4 10e3/uL    Absolute NRBCs 0.0 10e3/uL   Lactic acid  whole blood with 1x repeat in 2 hr when >2     Status: Normal   Result Value Ref Range    Lactic Acid, Initial 1.6 0.7 - 2.0 mmol/L   Troponin T, High Sensitivity     Status: Abnormal   Result Value Ref Range    Troponin T, High Sensitivity 80 (H) <=22 ng/L   Magnesium     Status: Normal   Result Value Ref Range    Magnesium 1.7 1.7 - 2.3 mg/dL   Heparin Unfractionated Anti Xa Level     Status: Normal   Result Value Ref Range    Anti Xa Unfractionated Heparin 0.40 For Reference Range, See Comment IU/mL    Narrative    Therapeutic Range: UFH: 0.25-0.50 IU/mL for low intensity dosing,  0.30-0.70 IU/mL for high intensity dosing DVT and PE.  This test is not validated for other direct factor X inhibitors (e.g. rivaroxaban, apixaban, edoxaban, betrixaban, fondaparinux) and should not be used for monitoring of other medications.   Blood gas venous     Status: Abnormal   Result Value Ref Range    pH Venous 7.30 (L) 7.32 - 7.43    pCO2 Venous 69 (H) 40 - 50 mm Hg    pO2 Venous 41 25 - 47 mm Hg    Bicarbonate Venous 34 (H) 21 - 28 mmol/L    Base Excess/Deficit Venous 5.4 (H) -3.0 - 3.0 mmol/L    FIO2 40     Oxyhemoglobin Venous 66 (L) 70 - 75 %    O2 Sat, Venous 67.6 (L) 70.0 - 75.0 %    Narrative    In healthy individuals, oxyhemoglobin (O2Hb) and oxygen saturation (SO2) are approximately equal. In the presence of dyshemoglobins, oxyhemoglobin can be considerably lower than oxygen saturation.   Blood gas venous     Status: Abnormal   Result Value Ref Range    pH Venous 7.33 7.32 - 7.43    pCO2 Venous 71 (H) 40 - 50 mm Hg    pO2 Venous 27 25 - 47 mm Hg    Bicarbonate Venous 37 (H) 21 - 28 mmol/L    Base Excess/Deficit Venous 8.6 (H) -3.0 - 3.0 mmol/L    FIO2 36     Oxyhemoglobin Venous 38 (L) 70 - 75 %    O2 Sat, Venous 38.4 (L) 70.0 - 75.0 %    Narrative    In healthy individuals, oxyhemoglobin (O2Hb) and oxygen saturation (SO2) are approximately equal. In the presence of dyshemoglobins, oxyhemoglobin can be considerably  lower than oxygen saturation.   Extra Tube     Status: None    Narrative    The following orders were created for panel order Extra Tube.  Procedure                               Abnormality         Status                     ---------                               -----------         ------                     Extra Red Top Tube[[id number]]                               Final result                 Please view results for these tests on the individual orders.   Extra Red Top Tube     Status: None   Result Value Ref Range    Hold Specimen [facility]    Blood gas venous     Status: Abnormal   Result Value Ref Range    pH Venous 7.31 (L) 7.32 - 7.43    pCO2 Venous 71 (H) 40 - 50 mm Hg    pO2 Venous 32 25 - 47 mm Hg    Bicarbonate Venous 36 (H) 21 - 28 mmol/L    Base Excess/Deficit Venous 7.3 (H) -3.0 - 3.0 mmol/L    FIO2 40     Oxyhemoglobin Venous 48 (L) 70 - 75 %    O2 Sat, Venous 48.8 (L) 70.0 - 75.0 %    Narrative    In healthy individuals, oxyhemoglobin (O2Hb) and oxygen saturation (SO2) are approximately equal. In the presence of dyshemoglobins, oxyhemoglobin can be considerably lower than oxygen saturation.   Heparin Unfractionated Anti Xa Level     Status: Normal   Result Value Ref Range    Anti Xa Unfractionated Heparin 0.39 For Reference Range, See Comment IU/mL    Narrative    Therapeutic Range: UFH: 0.25-0.50 IU/mL for low intensity dosing,  0.30-0.70 IU/mL for high intensity dosing DVT and PE.  This test is not validated for other direct factor X inhibitors (e.g. rivaroxaban, apixaban, edoxaban, betrixaban, fondaparinux) and should not be used for monitoring of other medications.   Blood gas venous     Status: Abnormal   Result Value Ref Range    pH Venous 7.29 (L) 7.32 - 7.43    pCO2 Venous 75 (H) 40 - 50 mm Hg    pO2 Venous 39 25 - 47 mm Hg    Bicarbonate Venous 36 (H) 21 - 28 mmol/L    Base Excess/Deficit Venous 6.7 (H) -3.0 - 3.0 mmol/L    FIO2 36     Oxyhemoglobin Venous 58 (L) 70 - 75 %    O2 Sat, Venous  58.8 (L) 70.0 - 75.0 %    Narrative    In healthy individuals, oxyhemoglobin (O2Hb) and oxygen saturation (SO2) are approximately equal. In the presence of dyshemoglobins, oxyhemoglobin can be considerably lower than oxygen saturation.   Blood gas venous     Status: Abnormal   Result Value Ref Range    pH Venous 7.33 7.32 - 7.43    pCO2 Venous 62 (H) 40 - 50 mm Hg    pO2 Venous 67 (H) 25 - 47 mm Hg    Bicarbonate Venous 33 (H) 21 - 28 mmol/L    Base Excess/Deficit Venous 5.5 (H) -3.0 - 3.0 mmol/L    FIO2 60     Oxyhemoglobin Venous 90 (H) 70 - 75 %    O2 Sat, Venous 91.8 (H) 70.0 - 75.0 %    Narrative    In healthy individuals, oxyhemoglobin (O2Hb) and oxygen saturation (SO2) are approximately equal. In the presence of dyshemoglobins, oxyhemoglobin can be considerably lower than oxygen saturation.   EKG 12-lead, tracing only     Status: None   Result Value Ref Range    Systolic Blood Pressure  mmHg    Diastolic Blood Pressure  mmHg    Ventricular Rate 168 BPM    Atrial Rate 336 BPM    MS Interval  ms    QRS Duration 100 ms     ms    QTc 414 ms    P Axis 265 degrees    R AXIS 135 degrees    T Axis 48 degrees    Interpretation ECG       Atrial flutter with 2:1 A-V conduction  Right axis deviation  Pulmonary disease pattern  Incomplete right bundle branch block  Right ventricular hypertrophy  Nonspecific ST abnormality  Abnormal ECG  No previous ECGs available  Confirmed by SEE ED PROVIDER NOTE FOR, ECG INTERPRETATION ([id number]),  Zelalem Rm ([id number]) on 1/9/2025 7:18:57 PM     EKG 12 lead     Status: None   Result Value Ref Range    Systolic Blood Pressure  mmHg    Diastolic Blood Pressure  mmHg    Ventricular Rate 84 BPM    Atrial Rate 84 BPM    MS Interval 116 ms    QRS Duration 98 ms     ms    QTc 441 ms    P Axis 60 degrees    R AXIS 118 degrees    T Axis 91 degrees    Interpretation ECG       Sinus rhythm with Premature atrial complexes  Right axis deviation  Pulmonary disease pattern  Nonspecific  ST and T wave abnormality  Abnormal ECG  When compared with ECG of 09-Jan-2025 19:02,  Sinus rhythm has replaced Atrial flutter  Vent. rate has decreased by  84 bpm  Incomplete right bundle branch block is no longer Present  Confirmed by SEE ED PROVIDER NOTE FOR, ECG INTERPRETATION ([id number]),  Farzaneh Bradley ([id number]) on 1/10/2025 8:33:48 AM     EKG 12-lead, tracing only     Status: None   Result Value Ref Range    Systolic Blood Pressure  mmHg    Diastolic Blood Pressure  mmHg    Ventricular Rate 152 BPM    Atrial Rate 152 BPM    KY Interval 130 ms    QRS Duration 134 ms     ms    QTc 432 ms    P Axis  degrees    R AXIS 144 degrees    T Axis -24 degrees    Interpretation ECG       Sinus tachycardia  Right bundle branch block  Anterolateral infarct , age undetermined  Abnormal ECG  When compared with ECG of 10-Maciej-2025 03:39,  Premature atrial complexes are no longer Present  Vent. rate has increased by  68 bpm  Right bundle branch block is now Present  Confirmed by SEE ED PROVIDER NOTE FOR, ECG INTERPRETATION ([id number]),  Elena Monteiro ([id number]) on 1/10/2025 12:41:01 PM     -Cardioversion External     Status: None    Narrative    Noelle Pineda MD     1/10/2025  7:56 AM  [facility]    -Cardioversion External    Date/Time: 1/10/2025 3:44 AM    Performed by: Noelle Pineda MD  Authorized by: Noelle Pineda MD    Risks, benefits and alternatives discussed.    ED EVALUATION:      Assessment Time: 1/10/2025 3:44 AM      I have performed an Emergency Department Evaluation including taking a   history and physical examination, this evaluation will be documented in   the electronic medical record for this ED encounter.     Indication: Atrial flutter    ASA Class: Class 2- mild systemic disease, no acute problems, no   functional limitations    Mallampati: Grade 1- soft palate, uvula, tonsillar pillars, and   posterior pharyngeal wall visible    NPO Status:  appropriately NPO for procedure    UNIVERSAL PROTOCOL   Site Marked: NA  Prior Images Obtained and Reviewed:  NA  Required items: Required blood products, implants, devices and special   equipment available    Patient identity confirmed:  Verbally with patient, arm band, provided   demographic data and hospital-assigned identification number  Patient was reevaluated immediately before administering moderate or deep   sedation or anesthesia  Confirmation Checklist:  Patient's identity using two indicators, relevant   allergies, procedure was appropriate and matched the consent or emergent   situation and correct equipment/implants were available  Time out: Immediately prior to the procedure a time out was called    Universal Protocol: the Joint Commission Universal Protocol was followed    Preparation: Patient was prepped and draped in usual sterile fashion      SEDATION  Patient Sedated: Yes    Sedation Type:  Moderate (conscious) sedation  Sedation:  Etomidate  Vital signs: Vital signs monitored during sedation      PRE-PROCEDURE DETAILS:     Cardioversion basis:  Emergent    Rhythm:  Atrial flutter    Electrode placement:  Anterior-posterior  Attempt one:     Cardioversion mode:  Synchronous    Waveform:  Biphasic    Shock (Joules):  150    Shock outcome:  Conversion to normal sinus rhythm  Post-procedure details:     Patient status:  Awake      PROCEDURE    Patient Tolerance:  Patient tolerated the procedure well with no immediate   complications  Length of time physician/provider present for 1:1 monitoring during   sedation: 15   Echocardiogram Complete     Status: None   Result Value Ref Range    Biplane LVEF 46%     Narrative    [id number]  [id number]  [id number]  [id number]^KATHRYN^NATHAN     [facility]  Echocardiography Laboratory  [address] [address]     Name: PUNEET MARTINEZ  MRN: [id number]  : 1955  Study Date: 01/10/2025 08:03 AM  Age: 69 yrs  Gender:  Male  Patient Location: [facility]  Reason For Study: Heart Failure, Unspecified  History: copd  Ordering Physician: NATHAN PERALTA  Referring Physician: Chase East  Performed By: Tracee López     BSA: 1.8 m2  Height: 70 in  Weight: 148 lb  HR: 82  BP: 102/68 mmHg  ______________________________________________________________________________  Procedure  Echocardiogram with two-dimensional, color and spectral Doppler. Optison (NDC  #[id number]) given intravenously.  ______________________________________________________________________________  Interpretation Summary     1. The left ventricle is normal in size. Biplane LVEF is 46%. Mild lateral  hypokinesis.  2. The right ventricle is mildly dilated. The right ventricular systolic  function is mild to moderately reduced.  3. No valve disease.  4. Mild TR. RVSP 30mmHg.     No previous echo for comparison.  ______________________________________________________________________________  Left Ventricle  The left ventricle is normal in size. There is normal left ventricular wall  thickness. Biplane LVEF is 46%. Left ventricular diastolic function is  indeterminate. Mild lateral hypokinesis.     Right Ventricle  The right ventricle is mildly dilated. The right ventricular systolic function  is mild to moderately reduced.     Atria  Normal left atrial size. Right atrial size is normal. There is no atrial shunt  seen.     Mitral Valve  There is mild (1+) mitral regurgitation.     Tricuspid Valve  There is mild (1+) tricuspid regurgitation.     Aortic Valve  The aortic valve is normal in structure and function.     Pulmonic Valve  The pulmonic valve is not well seen, but is grossly normal.     Vessels  Normal ascending, transverse (arch), and descending aorta. Dilation of the  inferior vena cava is present with abnormal respiratory variation in diameter.     Pericardium  There is no pericardial effusion.     Rhythm  Sinus rhythm was  noted.  ______________________________________________________________________________  MMode/2D Measurements & Calculations  IVSd: 1.1 cm     LVIDd: 4.0 cm  LVIDs: 3.2 cm  LVPWd: 1.1 cm  FS: 18.6 %  LV mass(C)d: 141.1 grams  LV mass(C)dI: 76.9 grams/m2  Ao root diam: 3.6 cm  LA dimension: 3.6 cm  asc Aorta Diam: 3.2 cm  LA/Ao: 1.0  Ao root diam index Ht(cm/m): 2.0  Ao root diam index BSA (cm/m2): 2.0  Asc Ao diam index BSA (cm/m2): 1.8  Asc Ao diam index Ht(cm/m): 1.8  EF Biplane: 48.7 %  LA Volume (BP): 51.8 ml     LA Volume Index (BP): 28.2 ml/m2  RWT: 0.57  TAPSE: 1.7 cm     Doppler Measurements & Calculations  MV E max gonzalo: 98.1 cm/sec  MV A max gonzalo: 75.4 cm/sec  MV E/A: 1.3  MV dec time: 0.21 sec  Ao V2 max: 117.4 cm/sec  Ao max P.0 mmHg  LV V1 max P.6 mmHg  LV V1 max: 81.0 cm/sec  PA V2 max: 77.1 cm/sec  PA max P.4 mmHg  PA acc time: 0.10 sec  PI end-d gonzalo: 111.5 cm/sec  TR max gonzalo: 275.1 cm/sec  TR max P.3 mmHg  AV Gonzalo Ratio (DI): 0.69  Medial E/e': 16.4     RV S Gonzalo: 12.2 cm/sec     ______________________________________________________________________________  Report approved by: Diego Eng MD on 01/10/2025 09:30 AM         Blood Culture Arm, Right     Status: Normal (Preliminary result)    Specimen: Arm, Right; Blood   Result Value Ref Range    Culture No growth after 12 hours    Blood Culture Arm, Left     Status: Normal (Preliminary result)    Specimen: Arm, Left; Blood   Result Value Ref Range    Culture No growth after 12 hours    CBC with platelets differential     Status: Abnormal    Narrative    The following orders were created for panel order CBC with platelets differential.  Procedure                               Abnormality         Status                     ---------                               -----------         ------                     CBC with platelets and d...[[id number]]  Abnormal            Final result                 Please view results for these tests on  the individual orders.       I spoke to the hospitalist at [facility], Dr. Judd, who agreed except the patient to their ICU. EMTALA and transfer forms were filled out and signed.  Patient Aagreed to the transfer.  The patient will be transported by ALS ambulance to the [facility].    Dx:  1. Acute pulmonary embolism, unspecified pulmonary embolism type, unspecified whether acute cor pulmonale present (H)    2. Sinus tachycardia    3. Panlobular emphysema (H)    4. Bilateral pleural effusion    5. Acute respiratory failure with hypoxia (H)               Aryan Goyal,   01/11/25 0421

## 2025-01-11 NOTE — CONSULTS
Vascular Surgery Consult Note    Reason for Consult: BL DVTs    Length of Stay: 0 days  Code Status: Full Code         Assessment/Plan: Patient is a 69 year old male with new PE with RHS, PE likely secondary to bilateral DVTs. He is already on a heparin gtt. Noninvasive studies reviewed, DVTs noted to be infrainguinal in both lower extremities without extension to SFJ. He has some component of chronic peripheral vascular disease, but no evidence of phlegmasia.     - No vascular surgical intervention indicated for DVTs.   - Continue anticoagulation with heparin gtt.   - Discussed case with IR as well, who are aware of the patient and will provide recommendations for PE.  - Further care per primary team. Vascular Surgery signing off at this time. Thank you for the opportunity to participate in this patient's care. Please call or page with any questions or concerns.     Staff: Dr. Summer Jimenez MD  PGY-6  Vascular Surgery  Pager: [phone]    Please page me directly with any questions/concerns during regular weekday hours before 5PM. If there is no response, if it is a weekend, or if it is during evening hours, then please use the [employer] system to page the first-call resident on call for vascular surgery.          HPI: Keith Gonzalez is a 69 year old male who was transferred from [facility] ER after presenting on 1/9/25 with new pulmonary emboli and lower extremity DVTs in the BLE in the femoropopliteal and tibial veins without extension into the iliac veins. CTA indicative of right heart strain and patient tachycardic with significantly elevated PCO2 levels into the 100s. At OSH, he was loaded with amiodarone and then required cardioversion prior to transfer to [facility].     PMH:   Past Medical History:   Diagnosis Date    Cancer (H)     COPD (chronic obstructive pulmonary disease) (H)      PSH:   Past Surgical History:   Procedure Laterality Date    DAVINCI HERNIORRHAPHY INGUINAL Left 10/28/2021     Procedure: Robotic assisted left inguinal hernia repair with mesh;  Surgeon: Gladys Whitehead MD;  [facility] OR    ESOPHAGOSCOPY, GASTROSCOPY, DUODENOSCOPY (EGD), COMBINED N/A 9/13/2022    Procedure: ESOPHAGOGASTRODUODENOSCOPY, WITH BIOPSY;  Surgeon: Doyle Corbin MD;  Location: [facility] GI    LAPAROSCOPIC LYSIS ADHESIONS N/A 10/28/2021    Procedure: Laparoscopic lysis adhesions;  Surgeon: Gladys Whitehead MD;  Location: [facility] OR      FHx:   No family history on file.     SHx:   Social History     Tobacco Use    Smoking status: Every Day     Current packs/day: 1.00     Types: Cigarettes    Smokeless tobacco: Never   Vaping Use    Vaping status: Never Used   Substance Use Topics    Alcohol use: No    Drug use: No      Allergies:   Allergies   Allergen Reactions    No Known Drug Allergy      Meds:  Current Outpatient Medications   Medication Instructions    alendronate (FOSAMAX) 70 MG tablet TAKE 1 TABLET BY MOUTH EVERY 7 DAYS. TAKE 60 MINUTES BEFORE MORNING MEAL WITH 8 OUNCES OF WATER. REMAIN UPRIGHT FOR 30 MINUTES.    calcium carbonate-vitamin D (OSCAL) 500-5 MG-MCG tablet 1 tablet, Oral, 2 TIMES DAILY    COMBIVENT RESPIMAT  MCG/ACT inhaler INHALE ONE TO TWO  PUFFS INTO THE LUNGS BY MOUTH EVERY 4 HOURS AS NEEDED FOR SHORTNESS OF BREATH, WHEEZING OR COUGH    cyclobenzaprine (FLEXERIL) 5 MG tablet TAKE ONE TO TWO TABLETS BY MOUTH TWICE A DAY AS NEEDED FOR MUSCLE SPASMS    ferrous sulfate (FEROSUL) 325 mg, Oral, WEEKLY    fluticasone-salmeterol (ADVAIR) 500-50 MCG/ACT inhaler 1 puff, Inhalation, EVERY 12 HOURS    furosemide (LASIX) 20 mg, Oral, DAILY      Physical Exam:  /61   Pulse 69   Temp 97.7  F (36.5  C) (Axillary)   Resp 18   Wt 67.8 kg (149 lb 7.6 oz)   SpO2 95%   BMI 21.45 kg/m     Gen: somnolent but rousable  CV: tachycardic, irregularly irregular rhythm on telemetry  Resp: NLB on BiPAP  Ext: 3+ pedal swelling with diffuse BLE telangiectasias especially distally. Moving both lower extremities.    Vasc: strong monophasic PT signals in BLE.     Labs:  Recent Labs   Lab 01/11/25  0918 01/09/25 1916   WBC 15.7* 15.7*   RBC 4.75 4.80   HGB 13.0* 13.1*   HCT 45.0 41.4   MCV 95 86   MCH 27.4 27.3   MCHC 28.9* 31.6   RDW 15.3* 15.3*    215     Recent Labs   Lab 01/11/25  1047 01/11/25 0918 01/09/25 1916 01/09/25  1152    138 141 138   CO2  --  25 28 30*   BUN  --  56.4* 51.8* 53.2*   ALBUMIN  --  3.2* 3.7  --    AST  --  59* 98*  --    ALKPHOS  --  115 127  --    ALT  --  156* 194*  --      ABG  Recent Labs   Lab 01/11/25  1609   PH 7.22*   PO2 67*   PCO2 74*   HCO3 31*     Recent Labs   Lab 01/11/25  1640 01/09/25 2055   PROTEIN 20* Negative   NITRITE Negative Negative     Micro:  All cultures:  30-Day Micro Results       Collected Updated Procedure Result Status      01/09/2025 2007 01/11/2025 0346 Blood Culture Arm, Left [[id number]]   Blood from Arm, Left    Preliminary result Component Value   Culture No growth after 1 day  [P]                01/09/2025 1959 01/11/2025 0346 Blood Culture Arm, Right [[id number]]   Blood from Arm, Right    Preliminary result Component Value   Culture No growth after 1 day  [P]                01/09/2025 1920 01/09/2025 2000 Influenza A/B, RSV and SARS-CoV2 PCR (COVID-19) Nose [[id number]]    Swab from Nose    Final result Component Value   Influenza A PCR Negative   Influenza B PCR Negative   RSV PCR Negative   SARS CoV2 PCR Negative   NEGATIVE: SARS-CoV-2 (COVID-19) RNA not detected, presumed negative.                  Pertinent Imaging:  Results for orders placed or performed during the hospital encounter of 01/11/25   XR Chest Port 1 View    Impression    IMPRESSION: Heart size is enlarged. Moderate pulmonary edema pattern has developed in the interval. Dense retrocardiac consolidation may represent superimposed pneumonia or aspiration versus confluent edema. Small to moderate bilateral pleural effusions.   No pneumothorax.     STAFF:  Pt examined with  Dr Jimenez in ICU.  On CPAP and lethargic.  Images reviwed with LE DVT (not CFV or Iliac veins) and subsegmental PE.    Also discussed with Dr Alanis IR staff.  We all agree that anticoagulation and medical care only indicated.      Jabari Wheeler MD

## 2025-01-11 NOTE — H&P
[facility]    History and Physical - Hospitalist Service       Date of Admission:  1/11/2025    Assessment & Plan      Keith Gonzalez is a 69 year old male with a significant past medical history for emphysema, COPD, distant DVT, osteoporosis, gastroesophageal reflux disease and testicular cancer (remission) who was admitted as a direct admission to intensive care unit from [place] emergency department for further treatment and evaluation of bilateral pulmonary embolism with acute cor pulmonale, new onset atrial flutter, and extensive lower extremity DVTs.  The hospitalist service requested to admit.    Acute cor pulmonale pulmonary embolism   Acute hypoxic and hypercanpeac respiratory failure   Acute COPD exacerbation   Extensive bilateral LE DVTs  - Patient arrived to ICU from [place] ER where he has spent 3 days prior to direct admission awaiting appropriate facility on heparin and amiodarone drip.  -Initial evaluation upon admission to ICU with patient's significant decrease of mentation and nonpalpable lower extremity pulses, faint with Doppler..  -Stat ABG obtained, intensivist consult, vascular consult, IR consult.  -ABG concerning for pH of 7.06 with pCO2 115.  Patient initiated on BiPAP.  -Consulted and discussed patient's lower extremity extensive DVTs with vascular surgery no intervention at this time with advisement to continue on current heparin drip.  -Consulted and discussed clot burden and PEs with interventional radiologist who does not recommend any interventions at this time advised to continue on anticoagulation heparin drip.  -CBC, CMP, procalcitonin, lactic acid  -Continuous BiPAP recheck ABG in 2 hours  -Continuous cardiac and SpO2 monitoring  -Appreciate intensivist assistance in collaboration  -Cardiology consulted as noted more below    Type II MI, secondary to demand ischemia and cardiogenic shock  New onset Aflutter s/p cardioversion   Acute HFrEF  -at  outside ER, patient noted to be in aflutter with 2:1 conduction. He was cardioverted x2 according to record with guidance from telecardiology.   -Troponin's rising from 80>158. Renal function also worsening, however   -EKG now  -cardiology is consulted urgently  -continue amiodarone as recommend by cardiology at outside hospital, completed bolus at outside facility.    -on heparin gtt.   -Echocardiogram on 01/09/2025 LVEF 46% with lateral hypokinesis and right heart dilation.   -continuous cardiac monitoring     Acute metabolic encephalopathy   -suspect directly related to profound hypercapnia and respiratory acidosis  -initiated on Bipap  -Will follow ABGs  -may necessitate intubation     Leukocytosis   -suspect marginal stress reaction in the setting of acute aflutter and extensive clot burden.   -outside facility CT without convincing evidence of acute pneumonia.   -last cbc was 1/9, awaiting admission labs cbc, cmp, procalcitonin  -lactic acid normal, afebrile   -patient received rocephin at outside facility for concerns of possible pneumonia  -will cover for his copd exacerbation for now with doxycycline and change if needed if there is convincing infectious evidence    Acute kidney injury, suspect prerenal  Hypocalcemia  Metabolic alkalosis   -significant worsening of creatinine with last lab on 01/9 prior to arrival at 1.19, creatinine upon ICU admission is 2.18  -nephrology consulted   -ionized calcium pending     Transaminitis   -lfts minimally elevated alt 156, ast 59  -bilirubin is 0.2  -likely related to multifactorial stressors occurring.   -monitor     GERD  -40mg protonix iv daily while admitted     Hx of testicular cancer   -review of record with distant history, in remission     Osteoporosis  Hx of compression fractures     Hx of iron deficiency anemia   -hold ferrous sulfate   -most recent hgb on 1/9/2025 stable 13.1  -awaiting admission cbc results     Tobacco abuse with continued use           Diet:  NPO  DVT Prophylaxis: heparin gtt   Dunn Catheter: Not present  Lines: None     Cardiac Monitoring: None  Code Status:  FULL code     Clinically Significant Risk Factors Present on Admission                       # Acute Hypoxic Respiratory Failure: Documented O2 saturation < 90%. Continue supplemental oxygen as needed  # Acute Hypercapnic Respiratory Failure: based on venous blood gas results.  Continue supplemental oxygen and ventilatory support as indicated.            # COPD: noted on problem list        Disposition Plan     Medically Ready for Discharge: Anticipated in 5+ Days         The patient's care was discussed with the Attending Physician, Dr. Monzon .    [facility]  Hospitalist Service  [facility]  Securely message with [employer] (more info)  Text page via [facility] Paging/Directory     ______________________________________________________________________    Chief Complaint   Pulmonary embolism, corpulmonale     History is obtained from the patient, electronic health record, and emergency department physician    History of Present Illness   Keith Gonzalez is a 69 year old male with a significant past medical history for emphysema, COPD, distant DVT, osteoporosis, gastroesophageal reflux disease and testicular cancer (remission) who was admitted as a direct admission to intensive care unit from [place] emergency department for further treatment and evaluation of bilateral pulmonary embolism with acute cor pulmonale, new onset atrial flutter, and extensive lower extremity DVTs.  The hospitalist service requested to admit.    Originally presented to the [place] emergency department sent by primary care office due to concern with abnormal labs after video evaluation for lower extremity swelling and shortness of breath.  Patient advised to go immediately to the emergency department with elevated BNP and D-dimer.  Patient found to have extensive  bilateral lower extremity DVTs and pulmonary embolisms.  With concern cor pulmonale transferred to [facility] ICU.  Patient arrived to ICU from [facility] where he has spent the last 3 days prior to direct admission awaiting appropriate facility.     Patient is seen as direct admission in intensive care unit.  Upon entering room patient appears quite lethargic not mentating well does not answer my questions.  Stat ABG ordered.  Patient has notable significant lower extremity swelling.  Besides moaning patient does not respond much to questions.  Discussed with intensivist who will be consulted and did assist with obtaining lower extremity Doppler pulses.  ABG with significan respiratory acidosis with pH 7.06 and CO2 115.  Initiated on BiPAP.  Plan to follow ABGs closely.  Anticipate that patient may be needed to have intubation if mentation does not improve.  Past Medical History    Past Medical History:   Diagnosis Date    Cancer (H)     COPD (chronic obstructive pulmonary disease) (H)        Past Surgical History   Past Surgical History:   Procedure Laterality Date    DAVINCI HERNIORRHAPHY INGUINAL Left 10/28/2021    Procedure: Robotic assisted left inguinal hernia repair with mesh;  Surgeon: Gladys Whitehead MD;  [facility] OR    ESOPHAGOSCOPY, GASTROSCOPY, DUODENOSCOPY (EGD), COMBINED N/A 9/13/2022    Procedure: ESOPHAGOGASTRODUODENOSCOPY, WITH BIOPSY;  Surgeon: Doyle Corbin MD;  Location:  GI    LAPAROSCOPIC LYSIS ADHESIONS N/A 10/28/2021    Procedure: Laparoscopic lysis adhesions;  Surgeon: Gladys Whitehead MD;  Location: [facility] OR       Prior to Admission Medications   Prior to Admission Medications   Prescriptions Last Dose Informant Patient Reported? Taking?   COMBIVENT RESPIMAT  MCG/ACT inhaler 1/9/2025 at  6:00 PM  No Yes   Sig: INHALE ONE TO TWO  PUFFS INTO THE LUNGS BY MOUTH EVERY 4 HOURS AS NEEDED FOR SHORTNESS OF BREATH, WHEEZING OR COUGH   Patient taking differently: Inhale 1-2 puffs  into the lungs every 4 hours as needed for shortness of breath, wheezing or cough. Currently in patient's pocket   alendronate (FOSAMAX) 70 MG tablet 1/5/2025 Morning  No Yes   Sig: TAKE 1 TABLET BY MOUTH EVERY 7 DAYS. TAKE 60 MINUTES BEFORE MORNING MEAL WITH 8 OUNCES OF WATER. REMAIN UPRIGHT FOR 30 MINUTES.   Patient taking differently: Take 70 mg by mouth every 7 days. Sundays   calcium carbonate-vitamin D (OSCAL) 500-5 MG-MCG tablet 1/9/2025 at  6:00 PM  No Yes   Sig: Take 1 tablet by mouth 2 times daily   cyclobenzaprine (FLEXERIL) 5 MG tablet Past Week  No Yes   Sig: TAKE ONE TO TWO TABLETS BY MOUTH TWICE A DAY AS NEEDED FOR MUSCLE SPASMS   Patient taking differently: Take 5 mg by mouth 2 times daily as needed for muscle spasms.   ferrous sulfate (FEROSUL) 325 (65 Fe) MG tablet 1/4/2025  No Yes   Sig: Take 1 tablet (325 mg) by mouth once a week   Patient taking differently: Take 325 mg by mouth every 7 days. Saturdays   fluticasone-salmeterol (ADVAIR) 500-50 MCG/ACT inhaler 1/9/2025 Morning  No Yes   Sig: Inhale 1 puff into the lungs every 12 hours   furosemide (LASIX) 20 MG tablet 1/9/2025 Noon  No Yes   Sig: Take 1 tablet (20 mg) by mouth daily for 5 days.      Facility-Administered Medications: None        Social History   I have reviewed this patient's social history and updated it with pertinent information if needed.  Social History     Tobacco Use    Smoking status: Every Day     Current packs/day: 1.00     Types: Cigarettes    Smokeless tobacco: Never   Vaping Use    Vaping status: Never Used   Substance Use Topics    Alcohol use: No    Drug use: No        Physical Exam   Vital Signs:       Pulse: 60            Weight: 0 lbs 0 oz    Physical Exam  Constitutional:       Appearance: He is ill-appearing and toxic-appearing.   HENT:      Mouth/Throat:      Mouth: Mucous membranes are dry.   Cardiovascular:      Rate and Rhythm: Regular rhythm.      Pulses: Normal pulses.      Heart sounds: Normal heart  sounds.   Pulmonary:      Breath sounds: Rales present.   Musculoskeletal:      Right lower leg: Edema present.      Left lower leg: Edema present.   Skin:     General: Skin is dry.   Neurological:      Comments: Obtunded, lethargic           Medical Decision Making       87 MINUTES SPENT BY ME on the date of service doing chart review, history, exam, documentation & further activities per the note.      Data     I have personally reviewed the following data over the past 24 hrs:    N/A  \   N/A   / N/A     138 101 56.4 (H) /  121 (H)   5.3 25 2.18 (H) \     ALT: 156 (H) AST: 59 (H) AP: 115 TBILI: 0.2   ALB: 3.2 (L) TOT PROTEIN: 5.8 (L) LIPASE: N/A     Trop: 158 (HH) BNP: N/A     Procal: 0.50 (H) CRP: N/A Lactic Acid: 0.5 (L)         Imaging results reviewed over the past 24 hrs:   No results found for this or any previous visit (from the past 24 hours).

## 2025-01-11 NOTE — CONSULTS
[facility]    Nephrology Consultation     Date of Admission:  1/11/2025    Assessment & Plan     Keith Gonzalez is a 69 year old male who was admitted on 1/11/2025.     1) Baseline Normal Kidney Function as of 7/31/2023.  Cr 0.62 and GFR >90.    2) LAI:  Likely multifactorial.  A big part is contrast nephropathy.  He may have had some poor renal perfusion with his tachycardia and R heart strain from PE as well.      3) DVT and PE    4) AFLUTTER    5) COPD with severe hypercapnic respiratory failure    Plan:    Check UA and FENA  Supportive Care   IV maintenance Normal Saline at 50 mL/hour.      Aryan Arrington MD  [facility] - Nephrology  [phone]    Reason for Consult     I was asked to see the patient for LAI.    Primary Care Physician     Chase Eats    Chief Complaint     LAI    History is obtained from chart review.      History of Present Illness     Keith Gonzalez is a 69 year old male who presents with LAI.    He was originally seen at [facility] ED after presenting there 1/9/25 with abnormal labs.    He had been seen by PMD out of concern for leg swelling and had labs.    BNP 9477  Cr 1.17 up from baseline of 0.62  D dimer 9.32.      In [facility] ED he was found to have:    Bilateral DVT  PE based on CTA chest  R heart strain  Tachycardia with rates up to 160 in aflutter    He was loaded with amiodarone and then cardioverted.    Ultimately he was transferred to [facility].    He is on BIPAP here and is maybe improving his respiratory acidosis.      Cr is up to 2.18.  He has had 275 mL urine out since midnight.          Past Medical History   I have reviewed this patient's medical history and updated it with pertinent information if needed.   Past Medical History:   Diagnosis Date    Cancer (H)     COPD (chronic obstructive pulmonary disease) (H)        Past Surgical History   I have reviewed this patient's surgical history and updated it with pertinent information  if needed.  Past Surgical History:   Procedure Laterality Date    DAVINCI HERNIORRHAPHY INGUINAL Left 10/28/2021    Procedure: Robotic assisted left inguinal hernia repair with mesh;  Surgeon: Gladys Whitehead MD;  [facility]: [facility]    ESOPHAGOSCOPY, GASTROSCOPY, DUODENOSCOPY (EGD), COMBINED N/A 9/13/2022    Procedure: ESOPHAGOGASTRODUODENOSCOPY, WITH BIOPSY;  Surgeon: Doyle Corbin MD;  Location: [facility] GI    LAPAROSCOPIC LYSIS ADHESIONS N/A 10/28/2021    Procedure: Laparoscopic lysis adhesions;  Surgeon: Gladys Whitehead MD;  Location: [facility] OR       Prior to Admission Medications   Prior to Admission Medications   Prescriptions Last Dose Informant Patient Reported? Taking?   COMBIVENT RESPIMAT  MCG/ACT inhaler 1/9/2025 at  6:00 PM  No Yes   Sig: INHALE ONE TO TWO  PUFFS INTO THE LUNGS BY MOUTH EVERY 4 HOURS AS NEEDED FOR SHORTNESS OF BREATH, WHEEZING OR COUGH   Patient taking differently: Inhale 1-2 puffs into the lungs every 4 hours as needed for shortness of breath, wheezing or cough. Currently in patient's pocket   alendronate (FOSAMAX) 70 MG tablet 1/5/2025 Morning  No Yes   Sig: TAKE 1 TABLET BY MOUTH EVERY 7 DAYS. TAKE 60 MINUTES BEFORE MORNING MEAL WITH 8 OUNCES OF WATER. REMAIN UPRIGHT FOR 30 MINUTES.   Patient taking differently: Take 70 mg by mouth every 7 days. Sundays   calcium carbonate-vitamin D (OSCAL) 500-5 MG-MCG tablet 1/9/2025 at  6:00 PM  No Yes   Sig: Take 1 tablet by mouth 2 times daily   cyclobenzaprine (FLEXERIL) 5 MG tablet Past Week  No Yes   Sig: TAKE ONE TO TWO TABLETS BY MOUTH TWICE A DAY AS NEEDED FOR MUSCLE SPASMS   Patient taking differently: Take 5 mg by mouth 2 times daily as needed for muscle spasms.   ferrous sulfate (FEROSUL) 325 (65 Fe) MG tablet 1/4/2025  No Yes   Sig: Take 1 tablet (325 mg) by mouth once a week   Patient taking differently: Take 325 mg by mouth every 7 days. Saturdays   fluticasone-salmeterol (ADVAIR) 500-50 MCG/ACT inhaler 1/9/2025 Morning  No Yes   Sig:  Inhale 1 puff into the lungs every 12 hours   furosemide (LASIX) 20 MG tablet 1/9/2025 Noon  No Yes   Sig: Take 1 tablet (20 mg) by mouth daily for 5 days.      Facility-Administered Medications: None     Allergies   Allergies   Allergen Reactions    No Known Drug Allergy        Social History   I have reviewed this patient's social history and updated it with pertinent information if needed. Keith Gonzalez  reports that he has been smoking cigarettes. He has never used smokeless tobacco. He reports that he does not drink alcohol and does not use drugs.    Family History   I have reviewed this patient's family history and updated it with pertinent information if needed.   No family history on file.    Review of Systems   The 10 point Review of Systems is negative other than noted in the HPI.     Physical Exam   Temp: (!) 96.6  F (35.9  C) Temp src: Axillary BP: 108/62 Pulse: 67   Resp: 19 SpO2: 97 % O2 Device: BiPAP/CPAP Oxygen Delivery: 15 LPM  Vital Signs with Ranges  Temp:  [96.6  F (35.9  C)-99.4  F (37.4  C)] 96.6  F (35.9  C)  Pulse:  [] 67  Resp:  [13-30] 19  BP: ()/(44-78) 108/62  FiO2 (%):  [40 %-100 %] 50 %  SpO2:  [84 %-100 %] 97 %  149 lbs 7.55 oz    GENERAL: somnolent but does awaken, On BIPAP  HEENT:  Normocephalic. No gross abnormalities  CV: RRR, no murmurs, no clicks, gallops, or rubs, 1-2+ BLE edema  RESP: BIPAP noise  GI: Abdomen soft/nt/nd, BS normal. No masses, organomegaly  MUSCULOSKELETAL: extremities nl - no gross deformities noted  SKIN: no suspicious lesions or rashes, dry to touch  NEURO:  somnolent.  Awakens with voice.    PSYCH: unable    Data   BMP  Recent Labs   Lab 01/11/25  0918 01/11/25  0831 01/09/25  1916 01/09/25  1152     --  141 138   POTASSIUM 5.3  --  4.4 5.0   CHLORIDE 101  --  99 98   DAT 7.9*  --  9.2 9.2   CO2 25  --  28 30*   BUN 56.4*  --  51.8* 53.2*   CR 2.18*  --  1.19* 1.17   * 136* 93 99     Phos@LABNTIPR(phos:4)  CBC)  Recent Labs   Lab  01/11/25 0918 01/09/25  1916   WBC 15.7* 15.7*   HGB 13.0* 13.1*   HCT 45.0 41.4   MCV 95 86    215     Recent Labs   Lab 01/11/25 0918   AST 59*   *   ALKPHOS 115   BILITOTAL 0.2

## 2025-01-11 NOTE — PROGRESS NOTES
Intensivist addendum:    Situation seems to be improving with bipap and nebs.  Now awake without stimulation and conversant with me.  Knows he was at [facility], I updated him that he's now at [facility]. He is now awake and appropriately answering questions.  Will recheck blood gas, but at least clinically he is responding well to bipap.    --stop continuous nebs, space out to duoneb q4  --continue methylpred  --stop continuous bipap after 1 more hour.  Space out bipap to intermittent 1 hour every 6 and while sleeping.    Intensivist service will follow with you.

## 2025-01-11 NOTE — SEDATION DOCUMENTATION
Synchronized cardioversion, 150 j after 10 mg of Etomidate IV.  BP after 85/55.  Tolerated procedure.

## 2025-01-11 NOTE — CONSULTS
Critical Care Services Consult Note:    I have evaluated all laboratory values and imaging studies from the past 24 hours.  Summary of hospital course:  We are consulted by JIMMY Mcmullen of the hospitalist service or acute hypercapnic respiratory failure.  69M pmh of copd who presented to OSH with bilateral leg swelling found to have b/l DVTs and also some PE burden (non-saddle, mostly on R side) and some evidence of RH strain.    On arrival here he is acutely encephalopathic, wheezing and found to have a blood gas of 7.06/115/88.  Overnight events/pertinent findings today:  See above.  Data  Satting acceptably on nasal cannula and hemodynamically stable.  Sleepy but briefly rousable to tactile stimuli  Labs (personally reviewed):  lytes ok.  Creat elevated 2.18.  Mild hypocalcemia Ical 4.2.  lactate 0.5 ok.  Abg with respiratory acidosis 7.06/115/88/32.  wbc elevated 15.7.  hgb 13.0 acute anemia of critical illness (no apparent blood loss).  Pltls 197 ok.  CTA:  moderate PE burden predominantly involving the R lung.  No saddle embolus to my eye.   Other changes seen c/w chronic copd.  LE US:  b/l occlusive DVTs    Assessment/plan:  Acute hypercapnic respiratory failure:  likely copd exacerbation.  Definitely has co2 narcosis.  B/l wheezing on exam.  Will do a brief trial of bipap with continuous nebs to see if this can reverse his co2 narcosis but I fear there is a high chance he will ultimately require intubation.  I have discussed this with his son Lamont.  COPD exacerbation:  continuous nebs for now.  Starting methylpred.  PE, mostly R sided, non-saddle, submassive:  JIMMY Mcmullen d/w IR.  No reasonable targets for catheter based therapy and as his PE is only submassive would not give systemic lytes.  Continue heparin drip.  D/w vascular surgery.  B/l DVT:  was not on AC at home so reasonable to continue heparin here.  No direct indication for IVC filter.  D/w vascular surgery.   LAI:  creat elevated to 2.18.  continue to  support hemodynamics and trend creat/UOP. Avoid nephrotoxins.  Clinically Significant Risk Factors Present on Admission           # Hypocalcemia: Lowest Ca = 7.9 mg/dL in last 2 days, will monitor and replace as appropriate     # Hypoalbuminemia: Lowest albumin = 3.2 g/dL at 1/11/2025  9:18 AM, will monitor as appropriate    # Acute Kidney Injury, unspecified: based on a >150% or 0.3 mg/dL increase in last creatinine compared to past 90 day average, will monitor renal function      # Acute Hypoxic Respiratory Failure: Documented O2 saturation < 90%. Continue supplemental oxygen as needed  # Acute Hypercapnic Respiratory Failure: based on arterial blood gas results.  Continue supplemental oxygen and ventilatory support as indicated.  # Acute Hypercapnic Respiratory Failure: based on venous blood gas results.  Continue supplemental oxygen and ventilatory support as indicated.            # COPD: noted on problem list          D/w JIMMY Awes of hospitalist service and Dr. Roth of vascular surgery.    I spent a total of 45 minutes (excluding procedure time) personally providing and directing critical care services at the bedside and on the critical care unit for this patient.     Aryan Felton

## 2025-01-11 NOTE — PHARMACY-ADMISSION MEDICATION HISTORY
Admission medication history completed at [facility]. Please see Medication Scribe Admission Medication History note from 01/10/2025.    Ebony Madera, PharmD

## 2025-01-11 NOTE — ED NOTES
Pt currently on HFNC 60 LPM 75%  Writer present for cardioversion for airway monitoring.  No interventions required.  RT to follow for HF management.

## 2025-01-11 NOTE — CONSULTS
Interventional Radiology - Brief Consult Note:  [facility] - [facility]  01/11/2025     Reason for Consult:  DVT/PE   Requesting Provider:  HAILY Vaughnothy ISAEL Carlos is a 69 year old male admitted as transfer from outside facility with DVT and PE, also developed a-flutter s/p cardioversion. IR contacted for evaluation of possible thrombectomy.    CT and US imaging reviewed in detail. PE burden is very small with only distal subsegmental/segmental emboli noted (right>left), not amendable for pulmonary thrombectomy.  BLE DVT extends proximally through the proximal femoral veins, and CFVs remain patent bilaterally.  LE thrombectomy not indicated given no iliac involvement.  Patient appropriately being treated with anticoagulation.  No IVC filter placement indicated given toleration of AC.    The above was discussed with hospitalist team.      Mario Alanis MD  Vascular & Interventional Radiology  [facility]  Office: [phone]  1/11/2025  11:09 AM

## 2025-01-11 NOTE — PROGRESS NOTES
Spoke with Hospitalist at [facility] regarding patient transferring to [facility].     The [facility] hospitalist is concerned that if IR needed for PE intervention or IVF filter for bilateral LE DVT that [facility] does not have this care available. Although per IR consultation earlier and did not accept    Patients Oxygen requirements have moderately increased over his 36hr ER stay, acidosis is improved. Atrial fibrillation is controlled, but now BP 96/61 on no pressors.     [facility] hospitalist consulting with [facility] hospatilist for transfer to [facility] ICU    Shaina Rod

## 2025-01-11 NOTE — PROGRESS NOTES
Pt remained on HFNC overnight with flow titrated from 50 to 30L and Fi02 between 70-95% to keep sats >90%. Sats have dipped occasionally in the mid to high 80's most likely due to mouth breathing. BS diminished throughout, rr 16-20 unlabored.

## 2025-01-12 ENCOUNTER — APPOINTMENT (OUTPATIENT)
Dept: PHYSICAL THERAPY | Facility: CLINIC | Age: 70
DRG: 175 | End: 2025-01-12
Attending: NURSE PRACTITIONER
Payer: COMMERCIAL

## 2025-01-12 ENCOUNTER — APPOINTMENT (OUTPATIENT)
Dept: OCCUPATIONAL THERAPY | Facility: CLINIC | Age: 70
DRG: 175 | End: 2025-01-12
Attending: NURSE PRACTITIONER
Payer: COMMERCIAL

## 2025-01-12 ENCOUNTER — APPOINTMENT (OUTPATIENT)
Dept: GENERAL RADIOLOGY | Facility: CLINIC | Age: 70
DRG: 175 | End: 2025-01-12
Attending: INTERNAL MEDICINE
Payer: COMMERCIAL

## 2025-01-12 LAB
ALBUMIN SERPL BCG-MCNC: 3 G/DL (ref 3.5–5.2)
ANION GAP SERPL CALCULATED.3IONS-SCNC: 11 MMOL/L (ref 7–15)
ATRIAL RATE - MUSE: 65 BPM
BUN SERPL-MCNC: 61.9 MG/DL (ref 8–23)
CALCIUM SERPL-MCNC: 8 MG/DL (ref 8.8–10.4)
CHLORIDE SERPL-SCNC: 101 MMOL/L (ref 98–107)
CREAT SERPL-MCNC: 1.9 MG/DL (ref 0.67–1.17)
DIASTOLIC BLOOD PRESSURE - MUSE: NORMAL MMHG
EGFRCR SERPLBLD CKD-EPI 2021: 38 ML/MIN/1.73M2
EST. AVERAGE GLUCOSE BLD GHB EST-MCNC: 137 MG/DL
GLUCOSE BLDC GLUCOMTR-MCNC: 151 MG/DL (ref 70–99)
GLUCOSE BLDC GLUCOMTR-MCNC: 158 MG/DL (ref 70–99)
GLUCOSE BLDC GLUCOMTR-MCNC: 164 MG/DL (ref 70–99)
GLUCOSE BLDC GLUCOMTR-MCNC: 195 MG/DL (ref 70–99)
GLUCOSE BLDC GLUCOMTR-MCNC: 229 MG/DL (ref 70–99)
GLUCOSE BLDC GLUCOMTR-MCNC: 268 MG/DL (ref 70–99)
GLUCOSE SERPL-MCNC: 155 MG/DL (ref 70–99)
HBA1C MFR BLD: 6.4 %
HCO3 SERPL-SCNC: 28 MMOL/L (ref 22–29)
HOLD SPECIMEN: NORMAL
INR PPP: 1.11 (ref 0.85–1.15)
INTERPRETATION ECG - MUSE: NORMAL
MAGNESIUM SERPL-MCNC: 2 MG/DL (ref 1.7–2.3)
P AXIS - MUSE: 85 DEGREES
PHOSPHATE SERPL-MCNC: 4 MG/DL (ref 2.5–4.5)
POTASSIUM SERPL-SCNC: 5 MMOL/L (ref 3.4–5.3)
PR INTERVAL - MUSE: 182 MS
QRS DURATION - MUSE: 114 MS
QT - MUSE: 434 MS
QTC - MUSE: 451 MS
R AXIS - MUSE: 116 DEGREES
SODIUM SERPL-SCNC: 140 MMOL/L (ref 135–145)
SYSTOLIC BLOOD PRESSURE - MUSE: NORMAL MMHG
T AXIS - MUSE: 95 DEGREES
UFH PPP CHRO-ACNC: 0.27 IU/ML
UFH PPP CHRO-ACNC: 0.4 IU/ML
UFH PPP CHRO-ACNC: 0.4 IU/ML
VENTRICULAR RATE- MUSE: 65 BPM

## 2025-01-12 PROCEDURE — 97162 PT EVAL MOD COMPLEX 30 MIN: CPT | Mod: GP

## 2025-01-12 PROCEDURE — 82565 ASSAY OF CREATININE: CPT | Performed by: INTERNAL MEDICINE

## 2025-01-12 PROCEDURE — 250N000011 HC RX IP 250 OP 636: Performed by: INTERNAL MEDICINE

## 2025-01-12 PROCEDURE — 200N000001 HC R&B ICU

## 2025-01-12 PROCEDURE — 97530 THERAPEUTIC ACTIVITIES: CPT | Mod: GP

## 2025-01-12 PROCEDURE — 97530 THERAPEUTIC ACTIVITIES: CPT | Mod: GO | Performed by: OCCUPATIONAL THERAPIST

## 2025-01-12 PROCEDURE — 36415 COLL VENOUS BLD VENIPUNCTURE: CPT | Performed by: NURSE PRACTITIONER

## 2025-01-12 PROCEDURE — 94640 AIRWAY INHALATION TREATMENT: CPT | Mod: 76

## 2025-01-12 PROCEDURE — 94640 AIRWAY INHALATION TREATMENT: CPT

## 2025-01-12 PROCEDURE — 97166 OT EVAL MOD COMPLEX 45 MIN: CPT | Mod: GO | Performed by: OCCUPATIONAL THERAPIST

## 2025-01-12 PROCEDURE — 99231 SBSQ HOSP IP/OBS SF/LOW 25: CPT | Performed by: INTERNAL MEDICINE

## 2025-01-12 PROCEDURE — 83735 ASSAY OF MAGNESIUM: CPT | Performed by: INTERNAL MEDICINE

## 2025-01-12 PROCEDURE — 250N000012 HC RX MED GY IP 250 OP 636 PS 637: Performed by: INTERNAL MEDICINE

## 2025-01-12 PROCEDURE — 85610 PROTHROMBIN TIME: CPT | Performed by: INTERNAL MEDICINE

## 2025-01-12 PROCEDURE — 250N000009 HC RX 250: Performed by: INTERNAL MEDICINE

## 2025-01-12 PROCEDURE — 99233 SBSQ HOSP IP/OBS HIGH 50: CPT | Performed by: INTERNAL MEDICINE

## 2025-01-12 PROCEDURE — 83036 HEMOGLOBIN GLYCOSYLATED A1C: CPT | Performed by: INTERNAL MEDICINE

## 2025-01-12 PROCEDURE — 97110 THERAPEUTIC EXERCISES: CPT | Mod: GP

## 2025-01-12 PROCEDURE — 999N000157 HC STATISTIC RCP TIME EA 10 MIN

## 2025-01-12 PROCEDURE — 94660 CPAP INITIATION&MGMT: CPT

## 2025-01-12 PROCEDURE — 36415 COLL VENOUS BLD VENIPUNCTURE: CPT | Performed by: INTERNAL MEDICINE

## 2025-01-12 PROCEDURE — 250N000011 HC RX IP 250 OP 636: Performed by: NURSE PRACTITIONER

## 2025-01-12 PROCEDURE — 250N000013 HC RX MED GY IP 250 OP 250 PS 637: Performed by: INTERNAL MEDICINE

## 2025-01-12 PROCEDURE — 71045 X-RAY EXAM CHEST 1 VIEW: CPT

## 2025-01-12 PROCEDURE — 85520 HEPARIN ASSAY: CPT | Performed by: NURSE PRACTITIONER

## 2025-01-12 PROCEDURE — 99232 SBSQ HOSP IP/OBS MODERATE 35: CPT | Performed by: INTERNAL MEDICINE

## 2025-01-12 RX ORDER — NICOTINE POLACRILEX 4 MG
15-30 LOZENGE BUCCAL
Status: DISCONTINUED | OUTPATIENT
Start: 2025-01-12 | End: 2025-01-24 | Stop reason: HOSPADM

## 2025-01-12 RX ORDER — DIGOXIN 0.25 MG/ML
125 INJECTION INTRAMUSCULAR; INTRAVENOUS ONCE
Status: COMPLETED | OUTPATIENT
Start: 2025-01-12 | End: 2025-01-12

## 2025-01-12 RX ORDER — FUROSEMIDE 10 MG/ML
40 INJECTION INTRAMUSCULAR; INTRAVENOUS ONCE
Status: COMPLETED | OUTPATIENT
Start: 2025-01-12 | End: 2025-01-12

## 2025-01-12 RX ORDER — DILTIAZEM HYDROCHLORIDE 30 MG/1
30 TABLET, FILM COATED ORAL EVERY 6 HOURS SCHEDULED
Status: DISCONTINUED | OUTPATIENT
Start: 2025-01-12 | End: 2025-01-13

## 2025-01-12 RX ORDER — PIPERACILLIN SODIUM, TAZOBACTAM SODIUM 3; .375 G/15ML; G/15ML
3.38 INJECTION, POWDER, LYOPHILIZED, FOR SOLUTION INTRAVENOUS EVERY 6 HOURS
Status: DISCONTINUED | OUTPATIENT
Start: 2025-01-12 | End: 2025-01-14

## 2025-01-12 RX ORDER — DEXTROSE MONOHYDRATE 25 G/50ML
25-50 INJECTION, SOLUTION INTRAVENOUS
Status: DISCONTINUED | OUTPATIENT
Start: 2025-01-12 | End: 2025-01-24 | Stop reason: HOSPADM

## 2025-01-12 RX ADMIN — FUROSEMIDE 40 MG: 10 INJECTION, SOLUTION INTRAMUSCULAR; INTRAVENOUS at 11:59

## 2025-01-12 RX ADMIN — IPRATROPIUM BROMIDE AND ALBUTEROL SULFATE 3 ML: .5; 3 SOLUTION RESPIRATORY (INHALATION) at 19:41

## 2025-01-12 RX ADMIN — METHYLPREDNISOLONE SODIUM SUCCINATE 62.5 MG: 125 INJECTION, POWDER, FOR SOLUTION INTRAMUSCULAR; INTRAVENOUS at 23:31

## 2025-01-12 RX ADMIN — INSULIN ASPART 2 UNITS: 100 INJECTION, SOLUTION INTRAVENOUS; SUBCUTANEOUS at 13:22

## 2025-01-12 RX ADMIN — PIPERACILLIN AND TAZOBACTAM 3.38 G: 3; .375 INJECTION, POWDER, FOR SOLUTION INTRAVENOUS at 23:31

## 2025-01-12 RX ADMIN — METHYLPREDNISOLONE SODIUM SUCCINATE 62.5 MG: 125 INJECTION, POWDER, FOR SOLUTION INTRAMUSCULAR; INTRAVENOUS at 12:06

## 2025-01-12 RX ADMIN — METHYLPREDNISOLONE SODIUM SUCCINATE 62.5 MG: 125 INJECTION, POWDER, FOR SOLUTION INTRAMUSCULAR; INTRAVENOUS at 05:55

## 2025-01-12 RX ADMIN — PIPERACILLIN AND TAZOBACTAM 3.38 G: 3; .375 INJECTION, POWDER, FOR SOLUTION INTRAVENOUS at 13:02

## 2025-01-12 RX ADMIN — INSULIN ASPART 2 UNITS: 100 INJECTION, SOLUTION INTRAVENOUS; SUBCUTANEOUS at 20:18

## 2025-01-12 RX ADMIN — IPRATROPIUM BROMIDE AND ALBUTEROL SULFATE 3 ML: .5; 3 SOLUTION RESPIRATORY (INHALATION) at 06:57

## 2025-01-12 RX ADMIN — AMIODARONE HYDROCHLORIDE 0.5 MG/MIN: 1.8 INJECTION, SOLUTION INTRAVENOUS at 23:41

## 2025-01-12 RX ADMIN — DILTIAZEM HYDROCHLORIDE 30 MG: 30 TABLET, FILM COATED ORAL at 23:31

## 2025-01-12 RX ADMIN — AMIODARONE HYDROCHLORIDE 0.5 MG/MIN: 1.8 INJECTION, SOLUTION INTRAVENOUS at 01:13

## 2025-01-12 RX ADMIN — DOXYCYCLINE 100 MG: 100 INJECTION, POWDER, LYOPHILIZED, FOR SOLUTION INTRAVENOUS at 08:06

## 2025-01-12 RX ADMIN — AMIODARONE HYDROCHLORIDE 0.5 MG/MIN: 1.8 INJECTION, SOLUTION INTRAVENOUS at 12:02

## 2025-01-12 RX ADMIN — HEPARIN SODIUM 1350 UNITS/HR: 10000 INJECTION, SOLUTION INTRAVENOUS at 20:13

## 2025-01-12 RX ADMIN — IPRATROPIUM BROMIDE AND ALBUTEROL SULFATE 3 ML: .5; 3 SOLUTION RESPIRATORY (INHALATION) at 04:14

## 2025-01-12 RX ADMIN — DIGOXIN 125 MCG: 0.25 INJECTION INTRAMUSCULAR; INTRAVENOUS at 08:15

## 2025-01-12 RX ADMIN — INSULIN ASPART 3 UNITS: 100 INJECTION, SOLUTION INTRAVENOUS; SUBCUTANEOUS at 16:44

## 2025-01-12 RX ADMIN — IPRATROPIUM BROMIDE AND ALBUTEROL SULFATE 3 ML: .5; 3 SOLUTION RESPIRATORY (INHALATION) at 11:07

## 2025-01-12 RX ADMIN — IPRATROPIUM BROMIDE AND ALBUTEROL SULFATE 3 ML: .5; 3 SOLUTION RESPIRATORY (INHALATION) at 14:18

## 2025-01-12 RX ADMIN — PIPERACILLIN AND TAZOBACTAM 3.38 G: 3; .375 INJECTION, POWDER, FOR SOLUTION INTRAVENOUS at 16:23

## 2025-01-12 RX ADMIN — DILTIAZEM HYDROCHLORIDE 30 MG: 30 TABLET, FILM COATED ORAL at 17:56

## 2025-01-12 RX ADMIN — PANTOPRAZOLE SODIUM 40 MG: 40 INJECTION, POWDER, FOR SOLUTION INTRAVENOUS at 07:51

## 2025-01-12 RX ADMIN — INSULIN ASPART 1 UNITS: 100 INJECTION, SOLUTION INTRAVENOUS; SUBCUTANEOUS at 23:36

## 2025-01-12 RX ADMIN — AMIODARONE HYDROCHLORIDE 150 MG: 1.5 INJECTION, SOLUTION INTRAVENOUS at 12:27

## 2025-01-12 RX ADMIN — METHYLPREDNISOLONE SODIUM SUCCINATE 62.5 MG: 125 INJECTION, POWDER, FOR SOLUTION INTRAMUSCULAR; INTRAVENOUS at 00:14

## 2025-01-12 RX ADMIN — METHYLPREDNISOLONE SODIUM SUCCINATE 62.5 MG: 125 INJECTION, POWDER, FOR SOLUTION INTRAMUSCULAR; INTRAVENOUS at 17:55

## 2025-01-12 RX ADMIN — DOXYCYCLINE 100 MG: 100 INJECTION, POWDER, LYOPHILIZED, FOR SOLUTION INTRAVENOUS at 20:20

## 2025-01-12 RX ADMIN — HEPARIN SODIUM 1200 UNITS/HR: 10000 INJECTION, SOLUTION INTRAVENOUS at 03:00

## 2025-01-12 ASSESSMENT — ACTIVITIES OF DAILY LIVING (ADL)
ADLS_ACUITY_SCORE: 66
PREVIOUS_RESPONSIBILITIES: MEAL PREP;HOUSEKEEPING;LAUNDRY;SHOPPING;MEDICATION MANAGEMENT;FINANCES;DRIVING;WORK
ADLS_ACUITY_SCORE: 66

## 2025-01-12 NOTE — PROGRESS NOTES
Critical Care Services Consult Note:    I have evaluated all laboratory values and imaging studies from the past 24 hours.  Summary of hospital course:  We are consulted by JIMMY Mcmullen of the hospitalist service or acute hypercapnic respiratory failure.  69M pmh of copd who presented to OSH with bilateral leg swelling found to have b/l DVTs and also some PE burden (non-saddle, mostly on R side) and some evidence of RH strain.    On arrival here he is acutely encephalopathic, wheezing and found to have a blood gas of 7.06/115/88.  This was treated as a copd exacerbation and he quickly improved with bipap and nebs.  Overnight events/pertinent findings today:  This morning he is much more awake and seems fully alert.  He is pleasant and conversant with me.  He has worsening o2 sats this morning, but he tells me he does not have any subjective trouble breathing.  He denies pain or discomfort to me.    Data  Awake alert and pleasant on my visit.  Lungs with no wheezing this morning.  He is requiring high flow at 40L and 75% to maintain sats in mid 90s but appears unlabored and speaking in full sentences.  He is tachycardic--appears to have gone back into a fib.  Labs (personally reviewed):  lytes ok.  Creat elevated but improving 1.90.  co2 on bmp resolved to 28.  wbc elevated 15.7.  hgb 13.0 acute anemia of critical illness (no apparent blood loss).  Pltls 197 ok.    Assessment/plan:  Acute hypercapnic respiratory failure/COPD exacerbation:  Much improved today. Continue intermittent nebs and methylpred.  Stopped trending blood gases to minimize patient discomfort, but his co2 on bmp has resolved to normal and he is clinically fully awake and much improved wheeziness today.  Acute hypoxemia:  worsening o2 needs today.  Will recheck cxr, but he does have an infiltrate that could be c/w pna on yesterdays cxr.  Will start zosyn empirically.  Will also start gentle diuresis today.  Afib with rvr:  re-developed early this  morning despite amio at 0.5/min.  Re-bolusing amio and will start diltiazem.  PE, mostly R sided, non-saddle, submassive:  JIMMY Awes d/w IR.  No reasonable targets for catheter based therapy and as his PE is only submassive would not give systemic lytes.  Continue heparin drip.  D/w vascular surgery.  B/l DVT:  was not on AC at home so reasonable to continue heparin here.  No direct indication for IVC filter.  D/w vascular surgery.   LAI:  Creat improved to 1.90.  continue to support hemodynamics and trend creat/UOP. Avoid nephrotoxins.  Hyperglycemia:  likely steroid induced.  Starting sliding scale insulin.  On cardiac diet.  Clinically Significant Risk Factors           # Hypocalcemia: Lowest iCa = 4.2 mg/dL in last 2 days, will monitor and replace as appropriate     # Hypoalbuminemia: Lowest albumin = 3 g/dL at 1/12/2025  5:33 AM, will monitor as appropriate           # Acute Hypercapnic Respiratory Failure: based on arterial blood gas results.  Continue supplemental oxygen and ventilatory support as indicated.  # Acute Hypercapnic Respiratory Failure: based on venous blood gas results.  Continue supplemental oxygen and ventilatory support as indicated.             # COPD: noted on problem list              Aryan Felton

## 2025-01-12 NOTE — PROGRESS NOTES
Patient was switched from BiPAP to HFNC per MD order. Pt is currently on HFNC 40L FiO2 70% with SpO2 in the low to mid 90's. RT will continue to monitor as needed.

## 2025-01-12 NOTE — PLAN OF CARE
"Goal Outcome Evaluation:      Plan of Care Reviewed With: patient    Overall Patient Progress: improvingOverall Patient Progress: improving    Outcome Evaluation: Mentation greatly improving from this morning. ABGs improving. BIPAP weaned to 6L oxymask. Tele SR. Bladder scanned for ~750cc, voided 100 spontaneously, lamb placed for retention. Plan to continue amio gtt until morning per cards. Hep gtt, Xa recheck in the AM. NS started 50ml/hr. Family updated by Dr. Felton, they can be here Monday.      Problem: Adult Inpatient Plan of Care  Goal: Plan of Care Review  Description: The Plan of Care Review/Shift note should be completed every shift.  The Outcome Evaluation is a brief statement about your assessment that the patient is improving, declining, or no change.  This information will be displayed automatically on your shift  note.  Outcome: Progressing  Flowsheets (Taken 1/11/2025 1840)  Outcome Evaluation: Mentation greatly improving from this morning. ABGs improving. BIPAP weaned to 6L oxymask. Tele SR. Bladder scanned for ~750cc, voided 100 spontaneously, lamb placed for retention. Plan to continue amio gtt until morning per cards. Hep gtt, Xa recheck in the AM. NS started 50ml/hr. Family updated by Dr. Felton, they can be here Monday.  Plan of Care Reviewed With: patient  Overall Patient Progress: improving  Goal: Patient-Specific Goal (Individualized)  Description: You can add care plan individualizations to a care plan. Examples of Individualization might be:  \"Parent requests to be called daily at 9am for status\", \"I have a hard time hearing out of my right ear\", or \"Do not touch me to wake me up as it startles  me\".  Outcome: Progressing  Goal: Absence of Hospital-Acquired Illness or Injury  Outcome: Progressing  Intervention: Identify and Manage Fall Risk  Recent Flowsheet Documentation  Taken 1/11/2025 1600 by Graham Ramos RN  Safety Promotion/Fall Prevention:   activity supervised   " clutter free environment maintained   increase visualization of patient   lighting adjusted   room door open   room organization consistent   supervised activity   treat underlying cause  Taken 1/11/2025 1200 by Graham Ramos RN  Safety Promotion/Fall Prevention:   activity supervised   clutter free environment maintained   increase visualization of patient   lighting adjusted   room door open   room organization consistent   supervised activity   treat underlying cause  Taken 1/11/2025 0815 by Graham Ramos RN  Safety Promotion/Fall Prevention:   activity supervised   clutter free environment maintained   increase visualization of patient   lighting adjusted   room door open   room organization consistent   supervised activity   treat underlying cause  Intervention: Prevent Skin Injury  Recent Flowsheet Documentation  Taken 1/11/2025 1800 by Graham Ramos RN  Body Position:   right   turned   heels elevated   legs elevated   upper extremity elevated  Taken 1/11/2025 1700 by Graham Ramos RN  Body Position: supine, head elevated  Taken 1/11/2025 1600 by Graham Ramos RN  Body Position:   right   heels elevated   legs elevated   upper extremity elevated  Taken 1/11/2025 1400 by Graham Ramos RN  Body Position:   left   turned   heels elevated   legs elevated   upper extremity elevated  Taken 1/11/2025 1200 by Graham Ramos RN  Body Position:   upper extremity elevated   right   turned   heels elevated   legs elevated  Taken 1/11/2025 0815 by Graham Ramos RN  Body Position:   left   turned   heels elevated   legs elevated   log-rolled   upper extremity elevated  Intervention: Prevent and Manage VTE (Venous Thromboembolism) Risk  Recent Flowsheet Documentation  Taken 1/11/2025 1600 by Graham Ramos RN  VTE Prevention/Management: SCDs off (sequential compression devices)  Taken 1/11/2025 1200 by Graham Ramos  RN  VTE Prevention/Management: SCDs off (sequential compression devices)  Taken 1/11/2025 0815 by Graham Ramos RN  VTE Prevention/Management: SCDs off (sequential compression devices)  Goal: Optimal Comfort and Wellbeing  Outcome: Progressing  Intervention: Provide Person-Centered Care  Recent Flowsheet Documentation  Taken 1/11/2025 1600 by Graham Ramos RN  Trust Relationship/Rapport:   care explained   choices provided   questions encouraged   reassurance provided  Taken 1/11/2025 1200 by Graham Ramos RN  Trust Relationship/Rapport:   care explained   choices provided   questions encouraged   reassurance provided  Taken 1/11/2025 0815 by Graham Ramos RN  Trust Relationship/Rapport:   care explained   choices provided   questions encouraged   reassurance provided  Goal: Readiness for Transition of Care  Outcome: Progressing  Intervention: Mutually Develop Transition Plan  Recent Flowsheet Documentation  Taken 1/11/2025 0854 by Graham Ramos RN  Equipment Currently Used at Home: cane, straight     Problem: Risk for Delirium  Goal: Optimal Coping  Outcome: Progressing  Goal: Improved Behavioral Control  Outcome: Progressing  Intervention: Minimize Safety Risk  Recent Flowsheet Documentation  Taken 1/11/2025 1600 by Graham Ramos RN  Enhanced Safety Measures: room near unit station  Trust Relationship/Rapport:   care explained   choices provided   questions encouraged   reassurance provided  Taken 1/11/2025 1200 by Graham Rmaos RN  Enhanced Safety Measures: room near unit station  Trust Relationship/Rapport:   care explained   choices provided   questions encouraged   reassurance provided  Taken 1/11/2025 0815 by Graham Ramos RN  Enhanced Safety Measures: room near unit station  Trust Relationship/Rapport:   care explained   choices provided   questions encouraged   reassurance provided  Goal: Improved Attention and Thought  Clarity  Outcome: Progressing  Intervention: Maximize Cognitive Function  Recent Flowsheet Documentation  Taken 1/11/2025 1600 by Graham Ramos RN  Reorientation Measures:   calendar in view   clock in view   reorientation provided  Taken 1/11/2025 1200 by Graham Ramos RN  Reorientation Measures:   calendar in view   clock in view   reorientation provided  Taken 1/11/2025 0815 by Graham Ramos RN  Reorientation Measures:   calendar in view   clock in view   reorientation provided  Goal: Improved Sleep  Outcome: Progressing     Problem: Skin Injury Risk Increased  Goal: Skin Health and Integrity  Outcome: Progressing  Intervention: Plan: Nurse Driven Intervention: Moisture Management  Recent Flowsheet Documentation  Taken 1/11/2025 1600 by Graham Ramos RN  Moisture Interventions:   No brief in bed   Incontinence pad   Urinary collection device   Perineal cleanser  Taken 1/11/2025 1200 by Graham Ramos RN  Moisture Interventions:   No brief in bed   Incontinence pad   Urinary collection device   Perineal cleanser  Taken 1/11/2025 0815 by Graham Ramos RN  Moisture Interventions:   No brief in bed   Incontinence pad   Urinary collection device   Perineal cleanser  Bathing/Skin Care:   bath, complete   wipes, CHG  Intervention: Plan: Nurse Driven Intervention: Friction and Shear  Recent Flowsheet Documentation  Taken 1/11/2025 1600 by Graham Ramos RN  Friction/Shear Interventions:   HOB 30 degrees or less   Silicone foam sacral dressing   Repositioning device (TAP system, etc.)  Taken 1/11/2025 1200 by Graham Ramos RN  Friction/Shear Interventions:   HOB 30 degrees or less   Silicone foam sacral dressing   Repositioning device (TAP system, etc.)  Taken 1/11/2025 0815 by Graham Ramos RN  Friction/Shear Interventions:   HOB 30 degrees or less   Silicone foam sacral dressing   Repositioning device (TAP system,  etc.)  Intervention: Optimize Skin Protection  Recent Flowsheet Documentation  Taken 1/11/2025 1800 by Graham Ramos RN  Head of Bed (HOB) Positioning: HOB at 30 degrees  Taken 1/11/2025 1600 by Graham Ramos RN  Activity Management: activity adjusted per tolerance  Head of Bed (HOB) Positioning: HOB at 30 degrees  Taken 1/11/2025 1400 by Graham Ramos RN  Head of Bed (HOB) Positioning: HOB at 30 degrees  Taken 1/11/2025 1200 by Graham Ramos RN  Activity Management: activity adjusted per tolerance  Head of Bed (HOB) Positioning: HOB at 30 degrees  Taken 1/11/2025 0815 by Graham Ramos RN  Activity Management: activity adjusted per tolerance  Head of Bed (HOB) Positioning: HOB at 30 degrees     Problem: Comorbidity Management  Goal: Maintenance of COPD Symptom Control  Outcome: Progressing  Intervention: Maintain COPD Symptom Control  Recent Flowsheet Documentation  Taken 1/11/2025 1600 by Graham Ramos RN  Medication Review/Management: medications reviewed  Taken 1/11/2025 1200 by Graham Ramos RN  Medication Review/Management: medications reviewed  Taken 1/11/2025 0815 by Graahm Ramos RN  Medication Review/Management: medications reviewed

## 2025-01-12 NOTE — PROGRESS NOTES
01/12/25 1400   Appointment Info   Signing Clinician's Name / Credentials (OT) Chanelle ELIDA PersaudRL   Living Environment   People in Home alone   Current Living Arrangements apartment   Home Accessibility stairs to enter home   Number of Stairs, Main Entrance greater than 10 stairs   Stair Railings, Main Entrance railings safe and in good condition   Living Environment Comments Pt lives alone in walk up apartment, 4 stairs to enter building and then 16 to apartment. Pt has a tub shower and raised toilet seats   Self-Care   Usual Activity Tolerance good   Current Activity Tolerance poor   Regular Exercise No   Equipment Currently Used at Home cane, straight   Fall history within last six months no   Activity/Exercise/Self-Care Comment PT reports independence with ADLs and mobility without AE at baseline. Intermittently uses cane when back hurts for longer distances   Instrumental Activities of Daily Living (IADL)   Previous Responsibilities meal prep;housekeeping;laundry;shopping;medication management;finances;driving;work   IADL Comments Pt works at a plastic manufacturing plant. Pt reports independence with IADLs at baseline   General Information   Onset of Illness/Injury or Date of Surgery 01/11/25   Referring Physician Susie Mcmullen APRN CNP   Patient/Family Therapy Goal Statement (OT) PT would like to feel better, return to prior level of function   Additional Occupational Profile Info/Pertinent History of Current Problem 69 year old male with a significant past medical history for emphysema, COPD, distant DVT, osteoporosis, gastroesophageal reflux disease and testicular cancer (remission) who was admitted as a direct admission to intensive care unit from [place] emergency department for further treatment and evaluation of bilateral pulmonary embolism with acute cor pulmonale, new onset atrial flutter, and extensive lower extremity DVTs.  Critical care was consulted after admission to further assist  with acute hypoxic and hypercapnic respiratory failure and to assess patient closely for possible need for intubation.     --Patient care was assumed this morning, patient was seen and examined, patient has been evaluated in detail by Dr. Felton from intensivist team this morning  -- Discussed the case with intensivist this morning  -- Patient has been in atrial fibrillation since this morning, receiving further amiodarone bolus and has been started on Cardizem, will continue to monitor patient tonight in ICU  -- Plan to follow-up on patient tomorrow morning 01/13/2025, highly appreciate Dr. Felton,s help   Existing Precautions/Restrictions fall   Limitations/Impairments safety/cognitive   Cognitive Status Examination   Orientation Status person;place  (oriented to year, cues for correct month and date)   Affect/Mental Status (Cognitive) anxious;confused   Follows Commands follows one-step commands;WFL   Safety Deficit minimal deficit   Memory Deficit moderate deficit   Attention Deficit moderate deficit   Executive Function Deficit moderate deficit   Cognitive Status Comments See Short Blessed below for details. Pt reports no noticable decline in cognition, unclear if memory decline is acute or chronic   Cognitive Screens/Assessments   Cognitive Assessments Completed Other Cognitive Screen/Assessment   Cognitive Assessment Test Short [facility]   Cognitive Assessment Test Score -15   Cognitive Assessment Test Interpretation Pt demonstrated errors on orientation to month, counting backwards from 20-1, naming months backwards and recall of a name and address   Sensory   Sensory Comments Pt reports intermittent numbness in BLE   Pain Assessment   Patient Currently in Pain Yes, see Vital Sign flowsheet   Posture   Posture forward head position;protracted shoulders;kyphosis   Range of Motion Comprehensive   Comment, General Range of Motion Trunk ROM limited by chronic back pain, limited mobility at end range of joints    Strength Comprehensive (MMT)   Comment, General Manual Muscle Testing (MMT) Assessment Decline in tolerance for activity   Coordination   Coordination Comments Delayed   Bed Mobility   Comment (Bed Mobility) Min assist   Transfers   Transfer Comments CGA   Balance   Balance Comments Impaired in standing   Activities of Daily Living   BADL Assessment/Intervention bathing;lower body dressing;grooming;toileting   Bathing Assessment/Intervention   Comment, (Bathing) Mod assist per clinical reasoning   Lower Body Dressing Assessment/Training   Comment, (Lower Body Dressing) Max assist   Grooming Assessment/Training   Comment, (Grooming) Poor tolerance for activity in standing   Toileting   Comment, (Toileting) Min assist per clinical reasoning   Clinical Impression   Criteria for Skilled Therapeutic Interventions Met (OT) Yes, treatment indicated   OT Diagnosis Decline in ADL independence and safety   Influenced by the following impairments Hypoxia, DVTs, pulmonary embolisms   OT Problem List-Impairments impacting ADL problems related to;activity tolerance impaired;balance;cognition;mobility;range of motion (ROM);strength;pain   Assessment of Occupational Performance 3-5 Performance Deficits   Identified Performance Deficits Impaired dressing bathing toileting an grooming tolerance with decline in cognition   Planned Therapy Interventions (OT) ADL retraining;cognition;progressive activity/exercise   Clinical Decision Making Complexity (OT) detailed assessment/moderate complexity   Risk & Benefits of therapy have been explained evaluation/treatment results reviewed;care plan/treatment goals reviewed;risks/benefits reviewed;current/potential barriers reviewed;participants voiced agreement with care plan;patient;participants included   OT Total Evaluation Time   OT Eval, Moderate Complexity Minutes (62984) 10   OT Goals   Therapy Frequency (OT) Daily   OT Predicted Duration/Target Date for Goal Attainment 01/21/25   OT  Goals Hygiene/Grooming;Lower Body Dressing;Lower Body Bathing;Toilet Transfer/Toileting;Cognition   OT: Hygiene/Grooming modified independent;using adaptive equipment;while standing   OT: Lower Body Dressing Modified independent;including set-up/clothing retrieval   OT: Lower Body Bathing Modified independent;using adaptive equipment  (including tub transfer)   OT: Toilet Transfer/Toileting Modified independent;toilet transfer;cleaning and garment management;using adaptive equipment   OT: Cognitive Patient/caregiver will verbalize understanding of cognitive assessment results/recommendations as needed for safe discharge planning   Therapeutic Activities   Therapeutic Activity Minutes (01537) 23   Symptoms noted during/after treatment fatigue;dizziness;significant change in vital signs   Treatment Detail/Skilled Intervention Ot: Pt agreeable to therapy. Pt appeared anxious initially, time spent developing therapeutic rapport to improve participation. Pt appeared to benefit from step by step instruction and education on safety measures in place to prevent fall and injury. Pt completed supine to sit with cues for technique and SBA. EOB pt reporting dizziness, BP 100s systolic with symptoms improving with rest break. Pt educated on LE dressing technique, pt appeared fearful leaning forward EOB and unable to complete figure four, max assist to don socks. Pt completed sit tost and with CGA and cues for technique, pt very kyphotic limiting upright posture. Pt able to take a few steps to the HOB with hand hold min assist and cues for technique with assist for lines. PT seated EOB, dizziness increased and heart rate jumping from 80s to 140s. Sit to supine with min assist for BLE. Pt adjusted in bed for skin and joint integirty. Educated on results ofthe short blessed and offered reorientation to improve awareness. Pt in bed with alarm on and call lightu manuel TO departure   OT Discharge Planning   OT Plan Stand pivot to  commode/chair with walker and monitor HR, cog (SLUMS in a few days), LE dressing in a supportive chair, g/h EOB   OT Discharge Recommendation (DC Rec) home with home care occupational therapy   OT Rationale for DC Rec Anticipate with medical management and continued inpatient therapy that pt will return to prior level and be able to discharge home with HHOT to progress activity tolerance needed for ADL independence. OT will continue to follow   OT Brief overview of current status Goals of therapy will be to address safe mobility and make recs for d/c to next level of care. Pt and RN will continue to follow all falls risk precautions as documented by RN staff while hospitalized.

## 2025-01-12 NOTE — PROGRESS NOTES
[facility]    Medicine Progress Note - Hospitalist Service    Date of Admission:  1/11/2025    Assessment & Plan     Keith Gonzalez is a 69 year old male with a significant past medical history for emphysema, COPD, distant DVT, osteoporosis, gastroesophageal reflux disease and testicular cancer (remission) who was admitted as a direct admission to intensive care unit from [place] emergency department for further treatment and evaluation of bilateral pulmonary embolism with acute cor pulmonale, new onset atrial flutter, and extensive lower extremity DVTs.  Critical care was consulted after admission to further assist with acute hypoxic and hypercapnic respiratory failure and to assess patient closely for possible need for intubation.    --Patient care was assumed this morning, patient was seen and examined, patient has been evaluated in detail by Dr. Felton from intensivist team this morning  -- Discussed the case with intensivist this morning  -- Patient has been in atrial fibrillation since this morning, receiving further amiodarone bolus and has been started on Cardizem, will continue to monitor patient tonight in ICU  -- Plan to follow-up on patient tomorrow morning 01/13/2025, highly appreciate Dr. Felton,s help       Diet: NPO for Medical/Clinical Reasons Except for: Meds    DVT Prophylaxis: Heparin infusion  Dunn Catheter: PRESENT, indication: Acute retention or obstruction  Lines: None     Cardiac Monitoring: ACTIVE order. Indication: ICU  Code Status: Full Code      Clinically Significant Risk Factors Present on Admission           # Hypocalcemia: Lowest iCa = 4.2 mg/dL in last 2 days, will monitor and replace as appropriate     # Hypoalbuminemia: Lowest albumin = 3 g/dL at 1/12/2025  5:33 AM, will monitor as appropriate         # Acute Hypercapnic Respiratory Failure: based on arterial blood gas results.  Continue supplemental oxygen and ventilatory support as indicated.  #  Acute Hypercapnic Respiratory Failure: based on venous blood gas results.  Continue supplemental oxygen and ventilatory support as indicated.            # COPD: noted on problem list        Social Drivers of Health    Food Insecurity: Unknown (1/11/2025)    Food Insecurity     Within the past 12 months, did you worry that your food would run out before you got money to buy more?: Patient unable to answer     Within the past 12 months, did the food you bought just not last and you didn t have money to get more?: Patient unable to answer   Housing Stability: Unknown (1/11/2025)    Housing Stability     Do you have housing? : Patient unable to answer     Are you worried about losing your housing?: Patient unable to answer   Tobacco Use: High Risk (1/9/2025)    Patient History     Smoking Tobacco Use: Every Day     Smokeless Tobacco Use: Never   Financial Resource Strain: Unknown (1/11/2025)    Financial Resource Strain     Within the past 12 months, have you or your family members you live with been unable to get utilities (heat, electricity) when it was really needed?: Patient unable to answer   Transportation Needs: Unknown (1/11/2025)    Transportation Needs     Within the past 12 months, has lack of transportation kept you from medical appointments, getting your medicines, non-medical meetings or appointments, work, or from getting things that you need?: Patient unable to answer   Interpersonal Safety: Unknown (1/11/2025)    Interpersonal Safety     Do you feel physically and emotionally safe where you currently live?: Patient unable to answer     Within the past 12 months, have you been hit, slapped, kicked or otherwise physically hurt by someone?: Patient unable to answer     Within the past 12 months, have you been humiliated or emotionally abused in other ways by your partner or ex-partner?: Patient unable to answer          Disposition Plan     Medically Ready for Discharge: Anticipated in 2-4 Days    Shila  MD Jhonatan  Hospitalist Service  [facility]  Securely message with [employer] (more info)  Text page via [employer] Paging/Directory   ______________________________________________________________________    Interval History     Patient care was assumed this morning, patient was seen and examined, currently awaiting high flow nasal cannula, eating breakfast, denying any complaints, asking if he would be staying in hospital for another day.  Discussed with patient in detail regarding his current respiratory and cardiac status all the questions were answered    Physical Exam   Vital Signs: Temp: 97.5  F (36.4  C) Temp src: Axillary BP: 91/76 Pulse: (!) 123   Resp: 15 SpO2: 91 % O2 Device: BiPAP/CPAP Oxygen Delivery: 6 LPM  Weight: 149 lbs 7.55 oz    Physical Exam  Vitals and nursing note reviewed.   Constitutional:       Appearance: He is well-developed.      Comments: Wearing high flow nasal cannula   HENT:      Head: Normocephalic and atraumatic.   Eyes:      Pupils: Pupils are equal, round, and reactive to light.   Neck:      Thyroid: No thyromegaly.   Cardiovascular:      Rate and Rhythm: Normal rate and regular rhythm.      Heart sounds: Normal heart sounds.   Pulmonary:      Effort: Pulmonary effort is normal. No respiratory distress.      Comments: Decreased breath sound in bases, no significant wheezing, bilateral chest tightness, no use of accessory muscles  Abdominal:      General: Bowel sounds are normal. There is no distension.      Palpations: Abdomen is soft.   Musculoskeletal:         General: No tenderness. Normal range of motion.      Cervical back: Normal range of motion and neck supple.   Skin:     General: Skin is warm and dry.   Neurological:      Mental Status: He is alert and oriented to person, place, and time.   Psychiatric:         Behavior: Behavior normal.       Medical Decision Making       20 MINUTES SPENT BY ME on the date of service doing chart review, history, exam,  documentation & further activities per the note.      Data     I have personally reviewed the following data over the past 24 hrs:    N/A  \   N/A   / N/A     140 101 61.9 (H) /  158 (H)   5.0 28 1.90 (H) \     ALT: N/A AST: N/A AP: N/A TBILI: N/A   ALB: 3.0 (L) TOT PROTEIN: N/A LIPASE: N/A     INR:  1.11 PTT:  N/A   D-dimer:  N/A Fibrinogen:  N/A       Imaging results reviewed over the past 24 hrs:   Recent Results (from the past 24 hours)   XR Chest Port 1 View    Narrative    EXAM: XR CHEST PORT 1 VIEW  LOCATION: [facility]  DATE: 1/11/2025    INDICATION: acute hypercapnia  COMPARISON: 7/31/2023      Impression    IMPRESSION: Heart size is enlarged. Moderate pulmonary edema pattern has developed in the interval. Dense retrocardiac consolidation may represent superimposed pneumonia or aspiration versus confluent edema. Small to moderate bilateral pleural effusions.   No pneumothorax.

## 2025-01-12 NOTE — PROGRESS NOTES
[facility]    Nephrology Progress Note     Assessment & Plan     Keith Gonzalez is a 69 year old male who was admitted on 1/11/2025.      1) Baseline Normal Kidney Function as of 7/31/2023.  Cr 0.62 and GFR >90.     2) LAI:  Likely multifactorial.  A big part is contrast nephropathy.  He may have had some poor renal perfusion with his tachycardia and R heart strain from PE as well.    Improving.        3) DVT and PE     4) AFLUTTER     5) COPD with severe hypercapnic respiratory failure     Plan:     Cont supportive care for LAI  No need for the IV maintenance now as he is eating and drinking.       Aryan Arrington MD  [facility] - Nephrology  [phone]    Interval History     He is awake and feels fine.  He is not sure what the fuss is all about.    I explained the concern about PE.    Urine output 1130 yesterday and 870  so far today.    Cr falling.      Physical Exam   Temp: 98.8  F (37.1  C) Temp src: Oral BP: 127/55 Pulse: (!) 126   Resp: 23 SpO2: 93 % O2 Device: High Flow Nasal Cannula (HFNC) Oxygen Delivery: 40 LPM  Vitals:    01/11/25 1330   Weight: 67.8 kg (149 lb 7.6 oz)     Vital Signs with Ranges  Temp:  [96.6  F (35.9  C)-98.8  F (37.1  C)] 98.8  F (37.1  C)  Pulse:  [] 126  Resp:  [10-24] 23  BP: ()/(44-89) 127/55  FiO2 (%):  [40 %-75 %] 75 %  SpO2:  [89 %-99 %] 93 %  I/O last 3 completed shifts:  In: 1963.16 [P.O.:450; I.V.:1513.16]  Out: 1850 [Urine:1850]    GENERAL APPEARANCE: pleasant, NAD, a & o  HEENT:  Eyes/ears/nose/neck grossly normal  RESP: lungs cta b c good efforts, no crackles, rhonchi or wheezes  CV: irreg irreg rapid S1/S2, no m/r/g   ABDOMEN: o/s/nt/nd, bs present  EXTREMITIES/SKIN: no rashes/lesions; 1+ edema    Medications   Current Facility-Administered Medications   Medication Dose Route Frequency Provider Last Rate Last Admin    amiodarone (NEXTERONE) 1.8 mg/mL in dextrose 5% 200 mL ADULT STANDARD infusion  0.5 mg/min Intravenous  Continuous Susie Mcmullen APRN CNP 16.7 mL/hr at 01/12/25 0800 0.5 mg/min at 01/12/25 0800    heparin 25,000 units in 0.45% NaCl 250 mL ANTICOAGULANT infusion  0-5,000 Units/hr Intravenous Continuous Susie Mcmullen APRN CNP 13.5 mL/hr at 01/12/25 0757 1,350 Units/hr at 01/12/25 0757    No lozenges or gum should be given while patient on BIPAP/AVAPS/AVAPS AE   Does not apply Continuous PRN Aryan Felton MD        No lozenges or gum should be given while patient on BIPAP/AVAPS/AVAPS AE   Does not apply Continuous PRN Aryan Felton MD        Patient is already receiving anticoagulation with heparin, enoxaparin (LOVENOX), warfarin (COUMADIN)  or other anticoagulant medication   Does not apply Continuous PRN Susie Mcmullen APRN CNP        Patient may continue current oral medications   Does not apply Continuous PRN Aryan Felton MD        sodium chloride 0.9 % infusion   Intravenous Continuous Aryan Arrington MD 50 mL/hr at 01/12/25 0800 Rate Verify at 01/12/25 0800     Current Facility-Administered Medications   Medication Dose Route Frequency Provider Last Rate Last Admin    doxycycline (VIBRAMYCIN) 100 mg vial to attach to  mL bag  100 mg Intravenous BID Susie Mcmullen APRN CNP   100 mg at 01/12/25 0806    ipratropium - albuterol 0.5 mg/2.5 mg/3 mL (DUONEB) neb solution 3 mL  3 mL Nebulization Q4H Aryan Felton MD   3 mL at 01/12/25 0657    methylPREDNISolone Na Suc (solu-MEDROL) injection 62.5 mg  62.5 mg Intravenous Q6H Aryan Felton MD   62.5 mg at 01/12/25 0555    pantoprazole (PROTONIX) 2 mg/mL suspension 40 mg  40 mg Per Feeding Tube [facility] Aryan Felton MD        Or    pantoprazole (PROTONIX) IV push injection 40 mg  40 mg Intravenous [facility] Aryan Olmos MD   40 mg at 01/12/25 0751       Data   BMP  Recent Labs   Lab 01/12/25  0744 01/12/25  0533 01/12/25  0026 01/11/25  2006 01/11/25  1047 01/11/25  0918 01/11/25  0831 01/09/25  1916 01/09/25  1152   NA  --  140  --    --  138 138  --  141 138   POTASSIUM  --  5.0  --   --   --  5.3  --  4.4 5.0   CHLORIDE  --  101  --   --   --  101  --  99 98   DAT  --  8.0*  --   --   --  7.9*  --  9.2 9.2   CO2  --  28  --   --   --  25  --  28 30*   BUN  --  61.9*  --   --   --  56.4*  --  51.8* 53.2*   CR  --  1.90*  --   --  2.22* 2.18*  --  1.19* 1.17   * 155* 151* 136*  --  121*   < > 93 99    < > = values in this interval not displayed.     [email or web address](phos:4)  CBC)  Recent Labs   Lab 01/11/25 0918 01/09/25  1916   WBC 15.7* 15.7*   HGB 13.0* 13.1*   HCT 45.0 41.4   MCV 95 86    215     Recent Labs   Lab 01/11/25 0918   AST 59*   *   ALKPHOS 115   BILITOTAL 0.2     Recent Labs   Lab 01/12/25  0533   INR 1.11     Attestation:   I have reviewed today's relevant vital signs, notes, medications, labs and imaging.

## 2025-01-12 NOTE — PLAN OF CARE
Goal Outcome Evaluation:       Pt irritable and calling frequently for water.  Bipap off at 1800 - at 2000, performed oral care and did bedside swallow test.  Education done on importance of safe swallowing, not drinking too much at one time, etc.  Pt passed the swallow test.  Then gave pt a glass of ice water to sip, however, he refused to stop drinking as directed.  Water only given with direct supervision.  Pt is noncompliant, refuses education on safety, and essentially does what he wants.      Pt was in SR in the 80s until approx 0430 when he went into A-Fib RVR with HR in the 120s.  BP stable.  Pt frequently pulling at Bipap, causing alarms for air leaks, and then demanding water when staff came to address Bipap alarms.  Education ongoing but pt was dismissive about risks of aspiration when using Bipap.  Oral care and ice chips provided when pt was awake and alert.  Lung sounds last evening were tight with expiratory wheezing throughout all lobes.  Pt stayed on oxymask until 2200 when he was requiring increasing amounts of oxygen, up to 8 L/min, when he was placed back on bipap.  Lung sounds are midnight and 0400 were less restricted, there was no expiratory wheezing, and slightly diminished  BS to R-middle and lower lobes.      Pt taken off bipap at 0600 and placed on oxymask - pt desatting to low-mid 80s.  Called ICU MD and received order for HFNC which was started before 0700.

## 2025-01-12 NOTE — PROGRESS NOTES
25 1023   Appointment Info   Signing Clinician's Name / Credentials (PT) Archana Lee, PT, DPT   Living Environment   People in Home alone   Current Living Arrangements apartment   Home Accessibility stairs to enter home   Number of Stairs, Main Entrance greater than 10 stairs   Stair Railings, Main Entrance railings safe and in good condition   Transportation Anticipated car, drives self   Living Environment Comments Pt lives alone in walk up apartment, 4 stairs to enter building and then 16 to apartment   Self-Care   Usual Activity Tolerance good   Current Activity Tolerance poor   Regular Exercise No   Equipment Currently Used at Home cane, straight   Fall history within last six months no   Activity/Exercise/Self-Care Comment At baseline, pt is independent, spouse  4 years ago   General Information   Onset of Illness/Injury or Date of Surgery 25   Referring Physician Susie Mcmullen, HAILY CNP   Patient/Family Therapy Goals Statement (PT) To get stronger   Pertinent History of Current Problem (include personal factors and/or comorbidities that impact the POC) Pt is 69 year male adm on 25 as a transfer from [place] for further management of DVT and PE, acute COPD exacerbation. PMH includes GERD, testicular cancer, osteoporosis, compression fractures, iron deficiency anemia, tobacco abuse with continued use.   Existing Precautions/Restrictions fall;oxygen therapy device and L/min  (hiflow)   Cognition   Affect/Mental Status (Cognition) WFL   Orientation Status (Cognition) oriented x 3   Follows Commands (Cognition) WFL   Cognitive Status Comments Pt pleasant and cooperative throughout, acknowledges that he was not earlier   Integumentary/Edema   Integumentary/Edema Comments Pt with B LE edema especially in feet   Posture    Posture Forward head position   Range of Motion (ROM)   Range of Motion ROM is WFL   Strength (Manual Muscle Testing)   Strength (Manual Muscle Testing) strength  is WFL   Strength Comments No focal weakness, is overall deconditioned   Bed Mobility   Comment, (Bed Mobility) Min assist   Transfers   Comment, (Transfers) Unable to wanda at thist [place]   Gait/Stairs (Locomotion)   Comment, (Gait/Stairs) Unable to tolerate at this time   Balance   Balance Comments Impaired   Sensory Examination   Sensory Perception patient reports no sensory changes   Clinical Impression   Criteria for Skilled Therapeutic Intervention Yes, treatment indicated   PT Diagnosis (PT) Impaired mobility   Influenced by the following impairments Decreased activity tolerance, decreased strength, decreased balance   Functional limitations due to impairments Decreased ability to participate in daily tasks   Clinical Presentation (PT Evaluation Complexity) evolving   Clinical Presentation Rationale Current presentation, [facility]   Clinical Decision Making (Complexity) moderate complexity   Planned Therapy Interventions (PT) balance training;bed mobility training;gait training;home exercise program;patient/family education;stair training;strengthening;transfer training   Risk & Benefits of therapy have been explained evaluation/treatment results reviewed;care plan/treatment goals reviewed;risks/benefits reviewed;current/potential barriers reviewed;participants voiced agreement with care plan;participants included;patient   PT Total Evaluation Time   PT Eval, Moderate Complexity Minutes (45116) 10   Physical Therapy Goals   PT Frequency 5x/week   PT Predicted Duration/Target Date for Goal Attainment 01/25/25   PT Goals Bed Mobility;Transfers;Gait;Stairs   PT: Bed Mobility Independent;Supine to/from sit;Rolling   PT: Transfers Modified independent;Sit to/from stand;Bed to/from chair;Assistive device   PT: Gait Modified independent;Greater than 200 feet;Assistive device   PT: Stairs Supervision/stand-by assist;Greater than 10 stairs;Rail on both sides   Interventions   Interventions Quick Adds Gait Training;Therapeutic  Activity;Therapeutic Procedure   Therapeutic Procedure/Exercise   Ther. Procedure: strength, endurance, ROM, flexibillity Minutes (59128) 10   Symptoms Noted During/After Treatment fatigue   Treatment Detail/Skilled Intervention Pt completes 10 reps ankle pumps, SLR, heel slides with cues for technique throughout. Pt's HR increases to 140's with minimal activity.   Therapeutic Activity   Therapeutic Activities: dynamic activities to improve functional performance Minutes (41574) 10   Symptoms Noted During/After Treatment Fatigue   Treatment Detail/Skilled Intervention Pt seated upright in bed on arrival, agreeable to participate. Pt acknowledges he was upset earlier today because he wanted ice cream and water and no one would give it to him. Pt also tearful during session talking about his spouse who passed away 4 years ago. Pt participated in LE exercises while supine but then became anxious about sitting at EOB or trying to stand. Pt was able to perform supine to sit with min assist but did not want to trial standing and HR increasing and sustaining to 140's. Pt returned to supine and able to reposition with min assist and verbal cues. Pillows and wedges adjusted for pt comfort, needs within reach, bed alarm activated.   PT Discharge Planning   PT Plan Trial standing, general strengthening, ambulation as able   PT Discharge Recommendation (DC Rec) Transitional Care Facility;home with assist;home with home care physical therapy   PT Rationale for DC Rec At this time pt is well below baseline level of function, significantly decreased activity tolerance, needs assist for all mobility. Will continue to follow during hospital stay and update discharge recommendations as needed.   PT Brief overview of current status Goals of therapy will be to address safe mobility and make recs for d/c to next level of care. Pt and RN will continue to follow all falls risk precautions as documented by RN staff while hospitalized    Physical Therapy Time and Intention   Timed Code Treatment Minutes 20   Total Session Time (sum of timed and untimed services) 30

## 2025-01-12 NOTE — PROGRESS NOTES
Cardiology Progress Note  Benjamin Cesar MD         Assessment and Plan:        Acute typical atrial flutter, afib could be secondary to PE.  On Amio drip, s/p cardioversion twice at [facility] and now recurrent atrial arrhythmia, received dig while on amio  Acurte submassive PE and DVTs - On heparin, intermittent hypotension due to cardiogenic shock, Improved -given history of cancer wonder if there is relation to the PE.  Acute on chronic resp failure with hypercarbia - Bipap  Acute renal failure likely from cardiogenic shock due to RV dysfunction and pE  Mild cardiomyopathy on echo, may be related to PE, repeat echocardiogram next week.  History of testicular cancer, in remission    Discussion  Patient now has again rapid atrial arrhythmia, likely atrial A-fib/flutter.  Still on IV amnio.  Having breakthrough episodes.  Received digoxin with no change in the rhythm.  Discussed with ICU attending.  They might try some diltiazem cautiously although I am worried that it might cause negative inotropic effect of hypotension given underlying LV dysfunction.  Will try it cautiously.  Not a candidate for beta-blockers because of underlying COPD and active wheezing.  Continue respiratory support for COPD exacerbation with nebulizations, positive pressure ventilation as needed.  Off BiPAP for now.                     Interval History:     In mild respiratory distress and ongoing cough          Review of Systems:     The 5 point Review of Systems is negative other than noted in the HPI          Physical Exam:        Blood pressure 127/55, pulse (!) 126, temperature 98.8  F (37.1  C), temperature source Oral, resp. rate 23, weight 67.8 kg (149 lb 7.6 oz), SpO2 93%.  Vitals:    01/11/25 1330   Weight: 67.8 kg (149 lb 7.6 oz)     Weights since admission  Vitals:    01/11/25 1330   Weight: 67.8 kg (149 lb 7.6 oz)     Vital Signs with Ranges  Temp:  [96.6  F (35.9  C)-98.8  F (37.1  C)] 98.8  F (37.1  C)  Pulse:  []  126  Resp:  [10-24] 23  BP: ()/(44-89) 127/55  FiO2 (%):  [40 %-76 %] 76 %  SpO2:  [89 %-98 %] 93 %  I/O's Last 24 hours  I/O last 3 completed shifts:  In: 1963.16 [P.O.:450; I.V.:1513.16]  Out: 1850 [Urine:1850]  Net I/O since admission  01/07 0700 - 01/12 0659  In: 1963.16 [P.O.:450; I.V.:1513.16]  Out: 1850 [Urine:1850]  Net: 113.16    EXAM:    Alert, coughing.  Irregular S1-S2, tachycardic.  Bilateral wheezes with decreased air entry at bases.  No peripheral edema.           Medications:        Current Facility-Administered Medications   Medication Dose Route Frequency Provider Last Rate Last Admin    amiodarone (NEXTERONE) bolus 150 mg  150 mg Intravenous Once Aryan Felton MD        diltiazem (CARDIZEM) tablet 30 mg  30 mg Oral Q6H [facility] Aryan Felton MD        doxycycline (VIBRAMYCIN) 100 mg vial to attach to  mL bag  100 mg Intravenous BID Susie Mcmullen APRN CNP   100 mg at 01/12/25 0806    furosemide (LASIX) injection 40 mg  40 mg Intravenous Once Aryan Felton MD        insulin aspart (NovoLOG) injection (RAPID ACTING)  1-7 Units Subcutaneous Q4H Aryan Felton MD        ipratropium - albuterol 0.5 mg/2.5 mg/3 mL (DUONEB) neb solution 3 mL  3 mL Nebulization Q4H Aryan Felton MD   3 mL at 01/12/25 1107    methylPREDNISolone Na Suc (solu-MEDROL) injection 62.5 mg  62.5 mg Intravenous Q6H Aryan Felton MD   62.5 mg at 01/12/25 0555    piperacillin-tazobactam (ZOSYN) 3.375 g vial to attach to  mL bag  3.375 g Intravenous Q6H Aryan Felton MD                Data:      All new lab and imaging data was reviewed.   Recent Labs   Lab Test 01/12/25  0533 01/11/25  0918 01/09/25  1916 03/21/24  0924 07/31/23  1449   WBC  --  15.7* 15.7*  --  10.7   HGB  --  13.0* 13.1* 14.4 14.4   MCV  --  95 86  --  88   PLT  --  197 215  --  168   INR 1.11  --   --   --  1.06      Recent Labs   Lab Test 01/12/25  0744 01/12/25  0533 01/12/25  0026 01/11/25 2006 01/11/25  1047  "01/11/25 0918 01/11/25 0831 01/09/25  1916   NA  --  140  --   --  138 138  --  141   POTASSIUM  --  5.0  --   --   --  5.3  --  4.4   CHLORIDE  --  101  --   --   --  101  --  99   CO2  --  28  --   --   --  25  --  28   BUN  --  61.9*  --   --   --  56.4*  --  51.8*   CR  --  1.90*  --   --  2.22* 2.18*  --  1.19*   ANIONGAP  --  11  --   --   --  12  --  14   DAT  --  8.0*  --   --   --  7.9*  --  9.2   * 155* 151*   < >  --  121*   < > 93    < > = values in this interval not displayed.     No lab results found.    Invalid input(s): \"TROP\", \"TROPONINIES\"      Imaging:   Recent Results (from the past 24 hours)   XR Chest Port 1 View    Narrative    EXAM: XR CHEST PORT 1 VIEW  LOCATION: [facility]  DATE: 1/11/2025    INDICATION: acute hypercapnia  COMPARISON: 7/31/2023      Impression    IMPRESSION: Heart size is enlarged. Moderate pulmonary edema pattern has developed in the interval. Dense retrocardiac consolidation may represent superimposed pneumonia or aspiration versus confluent edema. Small to moderate bilateral pleural effusions.   No pneumothorax.      "

## 2025-01-13 ENCOUNTER — APPOINTMENT (OUTPATIENT)
Dept: PHYSICAL THERAPY | Facility: CLINIC | Age: 70
DRG: 175 | End: 2025-01-13
Attending: INTERNAL MEDICINE
Payer: COMMERCIAL

## 2025-01-13 ENCOUNTER — APPOINTMENT (OUTPATIENT)
Dept: OCCUPATIONAL THERAPY | Facility: CLINIC | Age: 70
DRG: 175 | End: 2025-01-13
Attending: INTERNAL MEDICINE
Payer: COMMERCIAL

## 2025-01-13 LAB
ALBUMIN SERPL BCG-MCNC: 3 G/DL (ref 3.5–5.2)
ANION GAP SERPL CALCULATED.3IONS-SCNC: 8 MMOL/L (ref 7–15)
ATRIAL RATE - MUSE: 111 BPM
BUN SERPL-MCNC: 63.5 MG/DL (ref 8–23)
CALCIUM SERPL-MCNC: 7.6 MG/DL (ref 8.8–10.4)
CHLORIDE SERPL-SCNC: 98 MMOL/L (ref 98–107)
CREAT SERPL-MCNC: 1.82 MG/DL (ref 0.67–1.17)
DIASTOLIC BLOOD PRESSURE - MUSE: NORMAL MMHG
EGFRCR SERPLBLD CKD-EPI 2021: 40 ML/MIN/1.73M2
ERYTHROCYTE [DISTWIDTH] IN BLOOD BY AUTOMATED COUNT: 15.1 % (ref 10–15)
GLUCOSE BLDC GLUCOMTR-MCNC: 152 MG/DL (ref 70–99)
GLUCOSE BLDC GLUCOMTR-MCNC: 162 MG/DL (ref 70–99)
GLUCOSE BLDC GLUCOMTR-MCNC: 167 MG/DL (ref 70–99)
GLUCOSE BLDC GLUCOMTR-MCNC: 172 MG/DL (ref 70–99)
GLUCOSE BLDC GLUCOMTR-MCNC: 203 MG/DL (ref 70–99)
GLUCOSE SERPL-MCNC: 156 MG/DL (ref 70–99)
HCO3 SERPL-SCNC: 27 MMOL/L (ref 22–29)
HCT VFR BLD AUTO: 34.1 % (ref 40–53)
HGB BLD-MCNC: 10.9 G/DL (ref 13.3–17.7)
INR PPP: 1.21 (ref 0.85–1.15)
INTERPRETATION ECG - MUSE: NORMAL
MAGNESIUM SERPL-MCNC: 2.1 MG/DL (ref 1.7–2.3)
MCH RBC QN AUTO: 27.7 PG (ref 26.5–33)
MCHC RBC AUTO-ENTMCNC: 32 G/DL (ref 31.5–36.5)
MCV RBC AUTO: 87 FL (ref 78–100)
P AXIS - MUSE: NORMAL DEGREES
PHOSPHATE SERPL-MCNC: 3.5 MG/DL (ref 2.5–4.5)
PLATELET # BLD AUTO: 178 10E3/UL (ref 150–450)
POTASSIUM SERPL-SCNC: 4.8 MMOL/L (ref 3.4–5.3)
PR INTERVAL - MUSE: NORMAL MS
QRS DURATION - MUSE: 92 MS
QT - MUSE: 356 MS
QTC - MUSE: 472 MS
R AXIS - MUSE: 121 DEGREES
RBC # BLD AUTO: 3.93 10E6/UL (ref 4.4–5.9)
SODIUM SERPL-SCNC: 133 MMOL/L (ref 135–145)
SYSTOLIC BLOOD PRESSURE - MUSE: NORMAL MMHG
T AXIS - MUSE: 69 DEGREES
UFH PPP CHRO-ACNC: 0.37 IU/ML
VENTRICULAR RATE- MUSE: 106 BPM
WBC # BLD AUTO: 16.7 10E3/UL (ref 4–11)

## 2025-01-13 PROCEDURE — 97530 THERAPEUTIC ACTIVITIES: CPT | Mod: GO | Performed by: OCCUPATIONAL THERAPIST

## 2025-01-13 PROCEDURE — 94640 AIRWAY INHALATION TREATMENT: CPT

## 2025-01-13 PROCEDURE — 36415 COLL VENOUS BLD VENIPUNCTURE: CPT | Performed by: SURGERY

## 2025-01-13 PROCEDURE — 94640 AIRWAY INHALATION TREATMENT: CPT | Mod: 76

## 2025-01-13 PROCEDURE — 250N000013 HC RX MED GY IP 250 OP 250 PS 637: Performed by: INTERNAL MEDICINE

## 2025-01-13 PROCEDURE — 250N000011 HC RX IP 250 OP 636: Performed by: NURSE PRACTITIONER

## 2025-01-13 PROCEDURE — 250N000009 HC RX 250: Performed by: INTERNAL MEDICINE

## 2025-01-13 PROCEDURE — 83735 ASSAY OF MAGNESIUM: CPT | Performed by: INTERNAL MEDICINE

## 2025-01-13 PROCEDURE — 120N000013 HC R&B IMCU

## 2025-01-13 PROCEDURE — 99233 SBSQ HOSP IP/OBS HIGH 50: CPT | Performed by: INTERNAL MEDICINE

## 2025-01-13 PROCEDURE — 85027 COMPLETE CBC AUTOMATED: CPT | Performed by: INTERNAL MEDICINE

## 2025-01-13 PROCEDURE — 85520 HEPARIN ASSAY: CPT | Performed by: SURGERY

## 2025-01-13 PROCEDURE — 250N000011 HC RX IP 250 OP 636: Performed by: INTERNAL MEDICINE

## 2025-01-13 PROCEDURE — 97530 THERAPEUTIC ACTIVITIES: CPT | Mod: GP

## 2025-01-13 PROCEDURE — 84100 ASSAY OF PHOSPHORUS: CPT | Performed by: INTERNAL MEDICINE

## 2025-01-13 PROCEDURE — 93010 ELECTROCARDIOGRAM REPORT: CPT | Performed by: INTERNAL MEDICINE

## 2025-01-13 PROCEDURE — 93005 ELECTROCARDIOGRAM TRACING: CPT

## 2025-01-13 PROCEDURE — 99232 SBSQ HOSP IP/OBS MODERATE 35: CPT | Performed by: INTERNAL MEDICINE

## 2025-01-13 PROCEDURE — 999N000157 HC STATISTIC RCP TIME EA 10 MIN

## 2025-01-13 PROCEDURE — 85610 PROTHROMBIN TIME: CPT | Performed by: INTERNAL MEDICINE

## 2025-01-13 RX ORDER — ONDANSETRON 2 MG/ML
4 INJECTION INTRAMUSCULAR; INTRAVENOUS EVERY 6 HOURS PRN
Status: DISCONTINUED | OUTPATIENT
Start: 2025-01-13 | End: 2025-01-15

## 2025-01-13 RX ORDER — METOPROLOL TARTRATE 1 MG/ML
2.5 INJECTION, SOLUTION INTRAVENOUS EVERY 5 MIN PRN
Status: DISCONTINUED | OUTPATIENT
Start: 2025-01-13 | End: 2025-01-13

## 2025-01-13 RX ORDER — LIDOCAINE 40 MG/G
CREAM TOPICAL
Status: DISCONTINUED | OUTPATIENT
Start: 2025-01-13 | End: 2025-01-22

## 2025-01-13 RX ORDER — FUROSEMIDE 10 MG/ML
40 INJECTION INTRAMUSCULAR; INTRAVENOUS EVERY 12 HOURS
Status: ACTIVE | OUTPATIENT
Start: 2025-01-13 | End: 2025-01-14

## 2025-01-13 RX ORDER — DEXTROSE MONOHYDRATE 25 G/50ML
25-50 INJECTION, SOLUTION INTRAVENOUS
Status: DISCONTINUED | OUTPATIENT
Start: 2025-01-13 | End: 2025-01-13

## 2025-01-13 RX ORDER — DILTIAZEM HYDROCHLORIDE 30 MG/1
60 TABLET, FILM COATED ORAL EVERY 6 HOURS SCHEDULED
Status: DISCONTINUED | OUTPATIENT
Start: 2025-01-13 | End: 2025-01-15

## 2025-01-13 RX ORDER — METOPROLOL TARTRATE 1 MG/ML
2.5 INJECTION, SOLUTION INTRAVENOUS EVERY 5 MIN PRN
Status: DISCONTINUED | OUTPATIENT
Start: 2025-01-13 | End: 2025-01-24 | Stop reason: HOSPADM

## 2025-01-13 RX ORDER — NICOTINE POLACRILEX 4 MG
15-30 LOZENGE BUCCAL
Status: DISCONTINUED | OUTPATIENT
Start: 2025-01-13 | End: 2025-01-13

## 2025-01-13 RX ADMIN — IPRATROPIUM BROMIDE AND ALBUTEROL SULFATE 3 ML: .5; 3 SOLUTION RESPIRATORY (INHALATION) at 11:12

## 2025-01-13 RX ADMIN — DILTIAZEM HYDROCHLORIDE 60 MG: 30 TABLET, FILM COATED ORAL at 14:03

## 2025-01-13 RX ADMIN — ONDANSETRON 4 MG: 2 INJECTION, SOLUTION INTRAMUSCULAR; INTRAVENOUS at 21:02

## 2025-01-13 RX ADMIN — IPRATROPIUM BROMIDE AND ALBUTEROL SULFATE 3 ML: .5; 3 SOLUTION RESPIRATORY (INHALATION) at 19:27

## 2025-01-13 RX ADMIN — IPRATROPIUM BROMIDE AND ALBUTEROL SULFATE 3 ML: .5; 3 SOLUTION RESPIRATORY (INHALATION) at 07:27

## 2025-01-13 RX ADMIN — IPRATROPIUM BROMIDE AND ALBUTEROL SULFATE 3 ML: .5; 3 SOLUTION RESPIRATORY (INHALATION) at 22:51

## 2025-01-13 RX ADMIN — DILTIAZEM HYDROCHLORIDE 30 MG: 30 TABLET, FILM COATED ORAL at 05:12

## 2025-01-13 RX ADMIN — PIPERACILLIN AND TAZOBACTAM 3.38 G: 3; .375 INJECTION, POWDER, FOR SOLUTION INTRAVENOUS at 17:22

## 2025-01-13 RX ADMIN — INSULIN ASPART 1 UNITS: 100 INJECTION, SOLUTION INTRAVENOUS; SUBCUTANEOUS at 04:18

## 2025-01-13 RX ADMIN — METHYLPREDNISOLONE SODIUM SUCCINATE 62.5 MG: 125 INJECTION, POWDER, FOR SOLUTION INTRAMUSCULAR; INTRAVENOUS at 17:22

## 2025-01-13 RX ADMIN — DOXYCYCLINE 100 MG: 100 INJECTION, POWDER, LYOPHILIZED, FOR SOLUTION INTRAVENOUS at 22:13

## 2025-01-13 RX ADMIN — DOXYCYCLINE 100 MG: 100 INJECTION, POWDER, LYOPHILIZED, FOR SOLUTION INTRAVENOUS at 08:35

## 2025-01-13 RX ADMIN — PIPERACILLIN AND TAZOBACTAM 3.38 G: 3; .375 INJECTION, POWDER, FOR SOLUTION INTRAVENOUS at 12:15

## 2025-01-13 RX ADMIN — INSULIN ASPART 1 UNITS: 100 INJECTION, SOLUTION INTRAVENOUS; SUBCUTANEOUS at 08:44

## 2025-01-13 RX ADMIN — AMIODARONE HYDROCHLORIDE 0.5 MG/MIN: 1.8 INJECTION, SOLUTION INTRAVENOUS at 12:14

## 2025-01-13 RX ADMIN — HEPARIN SODIUM 1350 UNITS/HR: 10000 INJECTION, SOLUTION INTRAVENOUS at 18:46

## 2025-01-13 RX ADMIN — IPRATROPIUM BROMIDE AND ALBUTEROL SULFATE 3 ML: .5; 3 SOLUTION RESPIRATORY (INHALATION) at 00:02

## 2025-01-13 RX ADMIN — DILTIAZEM HYDROCHLORIDE 60 MG: 30 TABLET, FILM COATED ORAL at 17:22

## 2025-01-13 RX ADMIN — IPRATROPIUM BROMIDE AND ALBUTEROL SULFATE 3 ML: .5; 3 SOLUTION RESPIRATORY (INHALATION) at 03:48

## 2025-01-13 RX ADMIN — METHYLPREDNISOLONE SODIUM SUCCINATE 62.5 MG: 125 INJECTION, POWDER, FOR SOLUTION INTRAMUSCULAR; INTRAVENOUS at 12:17

## 2025-01-13 RX ADMIN — METHYLPREDNISOLONE SODIUM SUCCINATE 62.5 MG: 125 INJECTION, POWDER, FOR SOLUTION INTRAMUSCULAR; INTRAVENOUS at 05:12

## 2025-01-13 RX ADMIN — AMIODARONE HYDROCHLORIDE 0.5 MG/MIN: 1.8 INJECTION, SOLUTION INTRAVENOUS at 23:55

## 2025-01-13 RX ADMIN — IPRATROPIUM BROMIDE AND ALBUTEROL SULFATE 3 ML: .5; 3 SOLUTION RESPIRATORY (INHALATION) at 15:38

## 2025-01-13 RX ADMIN — PIPERACILLIN AND TAZOBACTAM 3.38 G: 3; .375 INJECTION, POWDER, FOR SOLUTION INTRAVENOUS at 04:11

## 2025-01-13 ASSESSMENT — ACTIVITIES OF DAILY LIVING (ADL)
ADLS_ACUITY_SCORE: 66

## 2025-01-13 NOTE — PLAN OF CARE
Problem: Risk for Delirium  Goal: Improved Attention and Thought Clarity  Intervention: Maximize Cognitive Function  Recent Flowsheet Documentation  Taken 1/13/2025 0400 by Steffanie Skinner RN  Reorientation Measures:   calendar in view   clock in view   reorientation provided  Taken 1/13/2025 0000 by Steffanie Skinner RN  Reorientation Measures:   calendar in view   clock in view   reorientation provided  Taken 1/12/2025 2000 by Steffanie Skinner RN  Reorientation Measures:   calendar in view   clock in view   reorientation provided     Problem: Adult Inpatient Plan of Care  Goal: Plan of Care Review  Description: The Plan of Care Review/Shift note should be completed every shift.  The Outcome Evaluation is a brief statement about your assessment that the patient is improving, declining, or no change.  This information will be displayed automatically on your shift  note.  Flowsheets (Taken 1/13/2025 0434)  Plan of Care Reviewed With: patient  Overall Patient Progress: improving   Goal Outcome Evaluation:      Plan of Care Reviewed With: patient    Overall Patient Progress: improvingOverall Patient Progress: improving    Patient is A&O x 3-4, fallow commands, A Fib RVR, -140's, on p.o. Diltiazem, on HFNC 80%, 40 L. Tolerated well, good sat's. 2 therapeutic Xa on Hep gtt, recheck pending. Reg diet tolerated well. Dunn in place for retention, good uop. Worked with PT/OT. Continue with plan of care.

## 2025-01-13 NOTE — PROGRESS NOTES
Cardiology    Noted HR remains elevated. He was not wheezing on my exam today.   Ordered  low dose of IV metoprolol and if tolerated start low dose metoprolol tartrate for better rate control.    Thania Nunez MD on 1/13/2025 at 4:55 PM

## 2025-01-13 NOTE — PROGRESS NOTES
Critical Care Services Consult Note:    I have evaluated all laboratory values and imaging studies from the past 24 hours.  Summary of hospital course:  We are consulted by JIMMY Mcmullen of the hospitalist service or acute hypercapnic respiratory failure.  69M pmh of copd who presented to OSH with bilateral leg swelling found to have b/l DVTs and also some PE burden (non-saddle, mostly on R side) and some evidence of RH strain.    On arrival here he is acutely encephalopathic, wheezing and found to have a blood gas of 7.06/115/88.  This was treated as a copd exacerbation and he quickly improved with bipap and nebs.  Overnight events/pertinent findings today:  No changes this morning.  Continues to have hfnc needs but breathing comfortably on my visit.  He tells me he does not have any subjective trouble breathing.  He denies pain or discomfort to me.    Data  Awake alert and pleasant on my visit.  Lungs with no wheezing this morning.  He is requiring high flow at 40L and 75% to maintain sats in mid 90s but appears unlabored and speaking in full sentences.  He remains tachycardic--appears to have gone back into a fib.  Labs (personally reviewed):  lytes ok.  Creat elevated but improving 1.82.  co2 on bmp resolved to 27.  wbc elevated 16.7.  hgb 10.9 acute anemia of critical illness (no apparent blood loss).  Pltls 178 ok.    Assessment/plan:  Acute hypercapnic respiratory failure/COPD exacerbation:  Much improved today. Continue intermittent nebs and methylpred.  Stopped trending blood gases to minimize patient discomfort, but his co2 on bmp has resolved to normal and he is clinically fully awake and much improved wheeziness today.  Acute hypoxemia:  stable o2 needs today. Continue on zosyn for possible pna (retrocardiac opacity on cxr).  Will also continue gentle diuresis today--ordered lasix 40 mg q12 for 2 more doses.  Afib with rvr:  appreciate cardiology support.  Continues on amio drip at 0.5 and dilt at 30 PO  q6.  PE, mostly R sided, non-saddle, submassive:  No reasonable targets for catheter based therapy and as his PE is only submassive would not give systemic lytes.  Continue heparin drip.  D/w vascular surgery.  B/l DVT:  was not on AC at home so reasonable to continue heparin here.  No direct indication for IVC filter.  D/w vascular surgery.   LAI:  Creat continues to improve.  continue to support hemodynamics and trend creat/UOP. Avoid nephrotoxins.  Hyperglycemia:  likely steroid induced.  Sliding scale insulin.  On cardiac diet.  Will sign off; pls reconsult as needed.  D/w Dr. Israel of hosptilatist service.  Clinically Significant Risk Factors         # Hyponatremia: Lowest Na = 133 mmol/L in last 2 days, will monitor as appropriate       # Hypoalbuminemia: Lowest albumin = 3 g/dL at 1/13/2025  4:07 AM, will monitor as appropriate  # Coagulation Defect: INR = 1.21 (Ref range: 0.85 - 1.15) and/or PTT = N/A, will monitor for bleeding          # Acute Hypercapnic Respiratory Failure: based on arterial blood gas results.  Continue supplemental oxygen and ventilatory support as indicated.  # Acute Hypercapnic Respiratory Failure: based on venous blood gas results.  Continue supplemental oxygen and ventilatory support as indicated.             # COPD: noted on problem list              Aryan Felton

## 2025-01-13 NOTE — PLAN OF CARE
"Goal Outcome Evaluation:      Plan of Care Reviewed With: patient    Overall Patient Progress: improvingOverall Patient Progress: improving    Outcome Evaluation: BIPAP weaned to HFNC for the day. Tolerated well. Remained in Afib 120-130s despite amio bolus, 1x digoxin dose. 1st dose of PO diltiazem trialed at 1800. 1 therapeutic Xa on Hep gtt, recheck pending. Reg diet tolerated well. Dunn in place for retention, good uop. Worked with PT/OT today.      Problem: Adult Inpatient Plan of Care  Goal: Plan of Care Review  Description: The Plan of Care Review/Shift note should be completed every shift.  The Outcome Evaluation is a brief statement about your assessment that the patient is improving, declining, or no change.  This information will be displayed automatically on your shift  note.  Outcome: Progressing  Flowsheets (Taken 1/12/2025 1812)  Outcome Evaluation: BIPAP weaned to HFNC for the day. Tolerated well. Remained in Afib 120-130s despite amio bolus, 1x digoxin dose. 1st dose of PO diltiazem trialed at 1800. 1 therapeutic Xa on Hep gtt, recheck pending. Reg diet tolerated well. Dunn in place for retention, good uop. Worked with PT/OT today.  Plan of Care Reviewed With: patient  Overall Patient Progress: improving  Goal: Patient-Specific Goal (Individualized)  Description: You can add care plan individualizations to a care plan. Examples of Individualization might be:  \"Parent requests to be called daily at 9am for status\", \"I have a hard time hearing out of my right ear\", or \"Do not touch me to wake me up as it startles  me\".  Outcome: Progressing  Goal: Absence of Hospital-Acquired Illness or Injury  Outcome: Progressing  Intervention: Identify and Manage Fall Risk  Recent Flowsheet Documentation  Taken 1/12/2025 1600 by Graham Ramos, RN  Safety Promotion/Fall Prevention:   activity supervised   clutter free environment maintained   increase visualization of patient   lighting adjusted   room " door open   room organization consistent   supervised activity   treat underlying cause  Taken 1/12/2025 1200 by Graham Ramos RN  Safety Promotion/Fall Prevention:   activity supervised   clutter free environment maintained   increase visualization of patient   lighting adjusted   room door open   room organization consistent   supervised activity   treat underlying cause  Taken 1/12/2025 0800 by Graham Ramos RN  Safety Promotion/Fall Prevention:   activity supervised   clutter free environment maintained   increase visualization of patient   lighting adjusted   room door open   room organization consistent   supervised activity   treat underlying cause  Intervention: Prevent Skin Injury  Recent Flowsheet Documentation  Taken 1/12/2025 1800 by Graham Ramos RN  Body Position:   position changed independently   upper extremity elevated  Taken 1/12/2025 1600 by Graham Ramos RN  Body Position:   left   turned   heels elevated   legs elevated   upper extremity elevated  Taken 1/12/2025 1400 by Graham Ramos RN  Body Position:   right   turned   heels elevated   legs elevated   upper extremity elevated  Taken 1/12/2025 1200 by Graham Ramos RN  Body Position:   left   turned   heels elevated   legs elevated   upper extremity elevated  Taken 1/12/2025 1000 by Graham Ramos RN  Body Position:   right   turned   heels elevated   legs elevated   upper extremity elevated  Taken 1/12/2025 0800 by Graham Ramos RN  Body Position:   left   turned   heels elevated   legs elevated   upper extremity elevated  Intervention: Prevent and Manage VTE (Venous Thromboembolism) Risk  Recent Flowsheet Documentation  Taken 1/12/2025 1600 by Graham Raoms RN  VTE Prevention/Management: SCDs off (sequential compression devices)  Taken 1/12/2025 1200 by Graham Ramos RN  VTE Prevention/Management: SCDs off (sequential compression  devices)  Taken 1/12/2025 0800 by Graham Ramos RN  VTE Prevention/Management: SCDs off (sequential compression devices)  Goal: Optimal Comfort and Wellbeing  Outcome: Progressing  Intervention: Provide Person-Centered Care  Recent Flowsheet Documentation  Taken 1/12/2025 1600 by Graham Ramos RN  Trust Relationship/Rapport:   care explained   choices provided   questions encouraged   reassurance provided  Taken 1/12/2025 1200 by Graham Ramos RN  Trust Relationship/Rapport:   care explained   choices provided   questions encouraged   reassurance provided  Taken 1/12/2025 0800 by Graham Ramos RN  Trust Relationship/Rapport:   care explained   choices provided   questions encouraged   reassurance provided  Goal: Readiness for Transition of Care  Outcome: Progressing     Problem: Risk for Delirium  Goal: Optimal Coping  Outcome: Progressing  Goal: Improved Behavioral Control  Outcome: Progressing  Intervention: Minimize Safety Risk  Recent Flowsheet Documentation  Taken 1/12/2025 1600 by Graham Ramos RN  Enhanced Safety Measures: room near unit station  Trust Relationship/Rapport:   care explained   choices provided   questions encouraged   reassurance provided  Taken 1/12/2025 1200 by Graham Ramos RN  Enhanced Safety Measures: room near unit station  Trust Relationship/Rapport:   care explained   choices provided   questions encouraged   reassurance provided  Taken 1/12/2025 0800 by Graham Ramos RN  Enhanced Safety Measures: room near unit station  Trust Relationship/Rapport:   care explained   choices provided   questions encouraged   reassurance provided  Goal: Improved Attention and Thought Clarity  Outcome: Progressing  Intervention: Maximize Cognitive Function  Recent Flowsheet Documentation  Taken 1/12/2025 1600 by Graham Ramos RN  Reorientation Measures:   calendar in view   clock in view   reorientation  provided  Taken 1/12/2025 1200 by Graham Ramos RN  Reorientation Measures:   calendar in view   clock in view   reorientation provided  Taken 1/12/2025 0800 by Graham Ramos RN  Reorientation Measures:   calendar in view   clock in view   reorientation provided  Goal: Improved Sleep  Outcome: Progressing     Problem: Skin Injury Risk Increased  Goal: Skin Health and Integrity  Outcome: Progressing  Intervention: Plan: Nurse Driven Intervention: Moisture Management  Recent Flowsheet Documentation  Taken 1/12/2025 1600 by Graham Ramos RN  Moisture Interventions:   No brief in bed   Incontinence pad   Urinary collection device  Taken 1/12/2025 1200 by Graham Ramos RN  Moisture Interventions:   No brief in bed   Incontinence pad   Urinary collection device  Taken 1/12/2025 0800 by Graham Ramos RN  Moisture Interventions:   No brief in bed   Incontinence pad   Urinary collection device  Intervention: Plan: Nurse Driven Intervention: Friction and Shear  Recent Flowsheet Documentation  Taken 1/12/2025 1600 by Graham Ramos RN  Friction/Shear Interventions:   HOB 30 degrees or less   Silicone foam sacral dressing   Repositioning device (TAP system, etc.)  Taken 1/12/2025 1200 by Graham Ramos RN  Friction/Shear Interventions:   HOB 30 degrees or less   Silicone foam sacral dressing   Repositioning device (TAP system, etc.)  Taken 1/12/2025 0800 by Graham Ramos RN  Friction/Shear Interventions:   HOB 30 degrees or less   Silicone foam sacral dressing   Repositioning device (TAP system, etc.)  Intervention: Optimize Skin Protection  Recent Flowsheet Documentation  Taken 1/12/2025 1800 by Graham Ramos RN  Head of Bed (HOB) Positioning: HOB at 30 degrees  Taken 1/12/2025 1600 by Graham Ramos RN  Activity Management: activity adjusted per tolerance  Head of Bed (HOB) Positioning: HOB at 30 degrees  Taken 1/12/2025  1400 by Graham Ramos RN  Head of Bed (HOB) Positioning: HOB at 30 degrees  Taken 1/12/2025 1200 by Graham Ramos RN  Activity Management: activity adjusted per tolerance  Head of Bed (HOB) Positioning: HOB at 30 degrees  Taken 1/12/2025 1000 by Graham Ramos RN  Head of Bed (HOB) Positioning: HOB at 30 degrees  Taken 1/12/2025 0800 by Graham Ramos RN  Activity Management: activity adjusted per tolerance  Head of Bed (HOB) Positioning: HOB at 30 degrees     Problem: Comorbidity Management  Goal: Maintenance of COPD Symptom Control  Outcome: Progressing  Intervention: Maintain COPD Symptom Control  Recent Flowsheet Documentation  Taken 1/12/2025 1600 by Graham Ramos RN  Medication Review/Management: medications reviewed  Taken 1/12/2025 1200 by Graham Ramos RN  Medication Review/Management: medications reviewed  Taken 1/12/2025 0800 by Graham Ramos RN  Medication Review/Management: medications reviewed

## 2025-01-13 NOTE — PROGRESS NOTES
[facility]    Cardiology Progress Note     Assessment & Plan   Keith Gonzalez is a 69 year old male who was admitted on 1/11/2025.     Atrial fibrillation with rapid ventricular response  Admitted with acute hypoxic/hypercapnic respiratory failure: submassive PE and COPD  Submassive PE  Mild biventricular cardiomyopathy maybe due to afib and PE: Moderate RV hypokinesis, mild RV dilation, mild LV dysfunction.   Bilateral DVT  LAI. Significant diuresis in last 24 hrs by weight.    Repeat echo when rate controlled  Continue amiodarone gtt. Will consider transition to oral tomorrow.   Increased diltiazem to 60mg PO QID  Held next furosemide dose, BUN increasing.   May consider nebivolol for rate control      Thania Nunez MD    Interval History   70 yo M admitted 1/11/25 with dyspnea, found to have submassive PE in addition to known COPD causing acute respiratoryt failure.  Also w/DVT and h/o GERD, testicular cancer in remission.      Today he is in the ICU, but plans to transition out and feels ok. He does not feel short of breath or have chest pain, but feels tired.    Echo 1/10/2020 mild RV dilation, moderate RV hypokinesis. RVSP 30mmhg.     He is on amiodarone and diltiazem.  Did not give BB due to acute respiratory failure with wheezing.     Physical Exam   Temp: 98.2  F (36.8  C) Temp src: Axillary BP: 103/69 Pulse: (!) 130   Resp: 15 SpO2: 93 % O2 Device: High Flow Nasal Cannula (HFNC) Oxygen Delivery: 40 LPM  Vitals:    01/11/25 1330 01/13/25 0348   Weight: 67.8 kg (149 lb 7.6 oz) 68.1 kg (150 lb 2.1 oz)     Vital Signs with Ranges  Temp:  [98.2  F (36.8  C)-99.5  F (37.5  C)] 98.2  F (36.8  C)  Pulse:  [] 130  Resp:  [12-29] 15  BP: ()/() 103/69  FiO2 (%):  [75 %-80 %] 80 %  SpO2:  [89 %-97 %] 93 %  I/O last 3 completed shifts:  In: 1476.12 [P.O.:460; I.V.:1016.12]  Out: 1745 [Urine:1745]    150 lbs 2.13 oz  Wt Readings from Last 3 Encounters:   01/13/25 68.1 kg  (150 lb 2.1 oz)   01/09/25 67.1 kg (148 lb)   11/13/24 63 kg (139 lb)     Tele afib with RVR    Patient Active Problem List   Diagnosis    Chronic obstructive pulmonary disease (H)    history of VTE, R, L LE's    SI (sacroiliac) joint dysfunction    Lumbar pain    Scoliosis of thoracolumbar spine, unspecified scoliosis type    Age-related osteoporosis without current pathological fracture    History of testicular cancer    Aortic atherosclerosis    History of compression fracture of spine    Gastroesophageal reflux disease with esophagitis without hemorrhage    Kyphosis (acquired) (postural)    Cor pulmonale (H)    Pulmonary embolism (H)       Constitutional: NAD  Eyes: clear sclera  ENT: Mucous membranes are moist. Facemask O2  Respiratory: no wheezing on my exam, mild crackles, mild tachypnea  Cardiovascular: irregular, tachy no murmur  GI: bowel sounds present  Skin: 1+ ankle edema bilat. Sitting in chair  Neurologic: awake and alert  Psychiatric: normal affect    Medications   Current Facility-Administered Medications   Medication Dose Route Frequency Provider Last Rate Last Admin    amiodarone (NEXTERONE) 1.8 mg/mL in dextrose 5% 200 mL ADULT STANDARD infusion  0.5 mg/min Intravenous Continuous Susie Mcmullen APRN CNP 16.7 mL/hr at 01/13/25 0400 0.5 mg/min at 01/13/25 0400    heparin 25,000 units in 0.45% NaCl 250 mL ANTICOAGULANT infusion  0-5,000 Units/hr Intravenous Continuous Susie Mcmullen APRN CNP 13.5 mL/hr at 01/13/25 0400 1,350 Units/hr at 01/13/25 0400    No lozenges or gum should be given while patient on BIPAP/AVAPS/AVAPS AE   Does not apply Continuous PRN Aryan Felton MD        No lozenges or gum should be given while patient on BIPAP/AVAPS/AVAPS AE   Does not apply Continuous PRN Aryan Felton MD        Patient is already receiving anticoagulation with heparin, enoxaparin (LOVENOX), warfarin (COUMADIN)  or other anticoagulant medication   Does not apply Continuous PRN Susie Mcmullen  HAILY Vogt CNP        Patient may continue current oral medications   Does not apply Continuous PRN Aryan Felton MD         Current Facility-Administered Medications   Medication Dose Route Frequency Provider Last Rate Last Admin    diltiazem (CARDIZEM) tablet 30 mg  30 mg Oral Q6H [facility] Aryan Felton MD   30 mg at 01/13/25 0512    doxycycline (VIBRAMYCIN) 100 mg vial to attach to  mL bag  100 mg Intravenous BID Susie Mcmullen APRN  mL/hr at 01/13/25 0835 100 mg at 01/13/25 0835    furosemide (LASIX) injection 40 mg  40 mg Intravenous Q12H Aryan Felton MD        insulin aspart (NovoLOG) injection (RAPID ACTING)  1-7 Units Subcutaneous Q4H Aryan Felton MD   1 Units at 01/13/25 0844    ipratropium - albuterol 0.5 mg/2.5 mg/3 mL (DUONEB) neb solution 3 mL  3 mL Nebulization Q4H Aryan Felton MD   3 mL at 01/13/25 1112    methylPREDNISolone Na Suc (solu-MEDROL) injection 62.5 mg  62.5 mg Intravenous Q6H Aryan Felton MD   62.5 mg at 01/13/25 0512    piperacillin-tazobactam (ZOSYN) 3.375 g vial to attach to  mL bag  3.375 g Intravenous Q6H Aryan Felton MD   3.375 g at 01/13/25 0411       Data   Results for orders placed or performed during the hospital encounter of 01/11/25 (from the past 24 hours)   Heparin Unfractionated Anti Xa Level   Result Value Ref Range    Anti Xa Unfractionated Heparin 0.40 For Reference Range, See Comment IU/mL    Narrative    Therapeutic Range: UFH: 0.25-0.50 IU/mL for low intensity dosing,  0.30-0.70 IU/mL for high intensity dosing DVT and PE.  This test is not validated for other direct factor X inhibitors (e.g. rivaroxaban, apixaban, edoxaban, betrixaban, fondaparinux) and should not be used for monitoring of other medications.   Hemoglobin A1c   Result Value Ref Range    Estimated Average Glucose 137 (H) <117 mg/dL    Hemoglobin A1C 6.4 (H) <5.7 %   Glucose by meter   Result Value Ref Range    GLUCOSE BY METER POCT 195 (H) 70 - 99 mg/dL    Glucose by meter   Result Value Ref Range    GLUCOSE BY METER POCT 268 (H) 70 - 99 mg/dL   Glucose by meter   Result Value Ref Range    GLUCOSE BY METER POCT 229 (H) 70 - 99 mg/dL   Heparin Unfractionated Anti Xa Level   Result Value Ref Range    Anti Xa Unfractionated Heparin 0.40 For Reference Range, See Comment IU/mL    Narrative    Therapeutic Range: UFH: 0.25-0.50 IU/mL for low intensity dosing,  0.30-0.70 IU/mL for high intensity dosing DVT and PE.  This test is not validated for other direct factor X inhibitors (e.g. rivaroxaban, apixaban, edoxaban, betrixaban, fondaparinux) and should not be used for monitoring of other medications.   Extra Tube    Narrative    The following orders were created for panel order Extra Tube.  Procedure                               Abnormality         Status                     ---------                               -----------         ------                     Extra Green Top (Lithium...[[id number]]                      Final result                 Please view results for these tests on the individual orders.   Extra Green Top (Lithium Heparin) Tube   Result Value Ref Range    Hold Specimen JI    Glucose by meter   Result Value Ref Range    GLUCOSE BY METER POCT 164 (H) 70 - 99 mg/dL   INR   Result Value Ref Range    INR 1.21 (H) 0.85 - 1.15   Renal panel   Result Value Ref Range    Sodium 133 (L) 135 - 145 mmol/L    Potassium 4.8 3.4 - 5.3 mmol/L    Chloride 98 98 - 107 mmol/L    Carbon Dioxide (CO2) 27 22 - 29 mmol/L    Anion Gap 8 7 - 15 mmol/L    Glucose 156 (H) 70 - 99 mg/dL    Urea Nitrogen 63.5 (H) 8.0 - 23.0 mg/dL    Creatinine 1.82 (H) 0.67 - 1.17 mg/dL    GFR Estimate 40 (L) >60 mL/min/1.73m2    Calcium 7.6 (L) 8.8 - 10.4 mg/dL    Albumin 3.0 (L) 3.5 - 5.2 g/dL    Phosphorus 3.5 2.5 - 4.5 mg/dL   CBC with platelets   Result Value Ref Range    WBC Count 16.7 (H) 4.0 - 11.0 10e3/uL    RBC Count 3.93 (L) 4.40 - 5.90 10e6/uL    Hemoglobin 10.9 (L) 13.3 - 17.7 g/dL     Hematocrit 34.1 (L) 40.0 - 53.0 %    MCV 87 78 - 100 fL    MCH 27.7 26.5 - 33.0 pg    MCHC 32.0 31.5 - 36.5 g/dL    RDW 15.1 (H) 10.0 - 15.0 %    Platelet Count 178 150 - 450 10e3/uL   Magnesium   Result Value Ref Range    Magnesium 2.1 1.7 - 2.3 mg/dL   Heparin Unfractionated Anti Xa Level   Result Value Ref Range    Anti Xa Unfractionated Heparin 0.37 For Reference Range, See Comment IU/mL    Narrative    Therapeutic Range: UFH: 0.25-0.50 IU/mL for low intensity dosing,  0.30-0.70 IU/mL for high intensity dosing DVT and PE.  This test is not validated for other direct factor X inhibitors (e.g. rivaroxaban, apixaban, edoxaban, betrixaban, fondaparinux) and should not be used for monitoring of other medications.   Glucose by meter   Result Value Ref Range    GLUCOSE BY METER POCT 172 (H) 70 - 99 mg/dL   EKG 12-lead, tracing only   Result Value Ref Range    Systolic Blood Pressure  mmHg    Diastolic Blood Pressure  mmHg    Ventricular Rate 106 BPM    Atrial Rate 111 BPM    DE Interval  ms    QRS Duration 92 ms     ms    QTc 472 ms    P Axis  degrees    R AXIS 121 degrees    T Axis 69 degrees    Interpretation ECG       Atrial fibrillation with rapid ventricular response  Incomplete right bundle branch block  Possible Lateral infarct (cited on or before 10-Maciej-2025)  T wave abnormality, consider anterior ischemia  Abnormal ECG  When compared with ECG of 11-Jan-2025 10:46,  Atrial fibrillation has replaced Sinus rhythm  Vent. rate has increased by  41 bpm  Incomplete right bundle branch block has replaced Right bundle branch block  Questionable change in initial forces of Lateral leads     Glucose by meter   Result Value Ref Range    GLUCOSE BY METER POCT 167 (H) 70 - 99 mg/dL

## 2025-01-13 NOTE — PROGRESS NOTES
[facility]    Medicine Progress Note - Hospitalist Service    Date of Admission:  1/11/2025    Assessment & Plan     Keith Gonzalez is a 69 year old male with a significant past medical history for emphysema, COPD, distant DVT, osteoporosis, gastroesophageal reflux disease and testicular cancer (remission) who was admitted as a direct admission to intensive care unit from [place] emergency department for further treatment and evaluation of bilateral pulmonary embolism with acute cor pulmonale, new onset atrial flutter, and extensive lower extremity DVTs.  Critical care was consulted after admission to further assist with acute hypoxic and hypercapnic respiratory failure and to assess patient closely for possible need for intubation.    Acute hypoxic and hypercapnic respiratory failure  Acute cor pulmonale  Submassive pulmonary embolism  Acute COPD exacerbation.  Extensive bilateral lower extremity DVTs.    --Patient arrived initially to ICU from [place] ED where he has spent 3 days prior to direct admission awaiting appropriate facility on heparin and amiodarone infusion.  --Patient has been evaluated by intensivist, vascular and interventional radiology after admission.  --Vascular surgery is recommending no intervention at this point for lower extremity extensive DVT and recommending to continue current heparin infusion.  --Case was also discussed with interventional radiology regarding clot burden and PEs who do not recommend any intervention and also recommended anticoagulation with heparin infusion.  --Patient has been switched from BiPAP to high flow nasal cannula.  --Continue patient on doxycycline 100 mg IV twice daily for COPD exacerbation.  --Continue patient on DuoNeb every 4 hours scheduled.  --Continue patient on Zosyn every 6 hours to complete 5-day course for hospital-acquired pneumonia as recommended by intensivist.  --Continue patient on heparin infusion, will discuss  with patient regarding switching to DOAC, will also consult pharmacy liaison for DOAC coverage on 01/14/2025.  -- DuoNeb every 2 hours available as needed for shortness of breath.    Type II MI, secondary to demand ischemia, concern for cardiogenic shock.  New onset atrial flutter s/p cardioversion  Paroxysmal atrial fibrillation with RVR  Acute heart failure with reduced EF  Mild biventricular cardiomyopathy    --Continue to monitor patient on telemetry  --Echocardiogram done on 01/09/2025 showing normal LV size, EF of 46% with mild lateral hypokinesis.  --Right ventricle is also mildly dilated with mild to moderately reduced function no valve disease.  --Cardiology following, highly appreciate their help  --Patient has been on amiodarone gtt. for 48 hours, cards is planning to transition him to oral tomorrow.  --Patient has been started on diltiazem 30 mg p.o. 4 times daily on 01/12/25, increasing to 60 mg p.o. 4 times daily.  --Cards recommending to hold furosemide due to LAI.  --Cards will evaluate if patient is a good candidate for Nebivolol    Acute metabolic encephalopathy: Resolved  Most likely was secondary to profound hypercapnia and respiratory acidosis, initially was a started on BiPAP now has been switched to high flow nasal cannula  --Continue to monitor, will order physical and Occupational Therapy evaluation.    Leukocytosis:  Most likely secondary to stress reaction in the setting of cardiac/respiratory issues, patient has been getting treated for possible hospital-acquired pneumonia.  -- Patient initially received ceftriaxone in outside hospital.  --Continue patient on current antibiotics to finish 5-day course.    Acute kidney injury, suspect prerenal  Hypocalcemia  Metabolic alkalosis  Worsening of creatinine with last lab 101/09 prior to arrival was 1.1, all upon admission creatinine was up to 2.18, nephrology were also consulted    --BMP reviewed from this morning, creatinine is 1.8, patient  was also evaluated by nephrology earlier, recommended supportive care with IV maintenance.  -- Agree with holding diuresis for now, does not seem fluid overloaded.    Transaminitis   --LFT minimally elevated alt 156, ast 59  --bilirubin is 0.2  --likely related to multifactorial stressors occurring.   --monitor      GERD  --40mg protonix iv daily while admitted     Hx of testicular cancer   --review of record with distant history, in remission      Osteoporosis  Hx of compression fractures      Hx of iron deficiency anemia   --hold ferrous sulfate   --most recent hgb on 1/9/2025 stable 13.1  --awaiting admission cbc results     Tobacco abuse with continued use       Diet: Regular Diet Adult    DVT Prophylaxis: Heparin infusion  Dunn Catheter: PRESENT, indication: Acute retention or obstruction  Lines: None     Cardiac Monitoring: ACTIVE order. Indication: Tachyarrhythmias, acute (48 hours)  Code Status: Full Code      Clinically Significant Risk Factors         # Hyponatremia: Lowest Na = 133 mmol/L in last 2 days, will monitor as appropriate       # Hypoalbuminemia: Lowest albumin = 3 g/dL at 1/13/2025  4:07 AM, will monitor as appropriate  # Coagulation Defect: INR = 1.21 (Ref range: 0.85 - 1.15) and/or PTT = N/A, will monitor for bleeding          # Acute Hypercapnic Respiratory Failure: based on arterial blood gas results.  Continue supplemental oxygen and ventilatory support as indicated.  # Acute Hypercapnic Respiratory Failure: based on venous blood gas results.  Continue supplemental oxygen and ventilatory support as indicated.             # COPD: noted on problem list        Social Drivers of Health    Food Insecurity: Unknown (1/11/2025)    Food Insecurity     Within the past 12 months, did you worry that your food would run out before you got money to buy more?: Patient unable to answer     Within the past 12 months, did the food you bought just not last and you didn t have money to get more?: Patient  unable to answer   Housing Stability: Unknown (1/11/2025)    Housing Stability     Do you have housing? : Patient unable to answer     Are you worried about losing your housing?: Patient unable to answer   Tobacco Use: High Risk (1/9/2025)    Patient History     Smoking Tobacco Use: Every Day     Smokeless Tobacco Use: Never   Financial Resource Strain: Unknown (1/11/2025)    Financial Resource Strain     Within the past 12 months, have you or your family members you live with been unable to get utilities (heat, electricity) when it was really needed?: Patient unable to answer   Transportation Needs: Unknown (1/11/2025)    Transportation Needs     Within the past 12 months, has lack of transportation kept you from medical appointments, getting your medicines, non-medical meetings or appointments, work, or from getting things that you need?: Patient unable to answer   Interpersonal Safety: Unknown (1/11/2025)    Interpersonal Safety     Do you feel physically and emotionally safe where you currently live?: Patient unable to answer     Within the past 12 months, have you been hit, slapped, kicked or otherwise physically hurt by someone?: Patient unable to answer     Within the past 12 months, have you been humiliated or emotionally abused in other ways by your partner or ex-partner?: Patient unable to answer          Disposition Plan     Medically Ready for Discharge: Anticipated in 2-4 Days    Shila Israel MD  Hospitalist Service  [facility]  Securely message with [employer] (more info)  Text page via [facility] Paging/Directory   ______________________________________________________________________    Interval History     Patient was seen and examined, sitting in chair, wearing high flow nasal cannula, denying any new complaints, updated patient regarding his current clinical work and lab results.  Discussed with patient regarding consulting therapies, patient will also be evaluated by cardiology,  discussed with patient that we will let cardiology review if patient will benefit from EP evaluation.    Physical Exam   Vital Signs: Temp: 98  F (36.7  C) Temp src: Axillary BP: (!) 106/99 Pulse: (!) 122   Resp: 18 SpO2: 96 % O2 Device: High Flow Nasal Cannula (HFNC) Oxygen Delivery: 45 LPM  Weight: 150 lbs 2.13 oz    Physical Exam  Vitals and nursing note reviewed.   Constitutional:       Appearance: He is well-developed.      Comments: Wearing high flow nasal cannula   HENT:      Head: Normocephalic and atraumatic.   Eyes:      Pupils: Pupils are equal, round, and reactive to light.   Neck:      Thyroid: No thyromegaly.   Cardiovascular:      Rate and Rhythm: Normal rate and regular rhythm.      Heart sounds: Normal heart sounds.   Pulmonary:      Effort: Pulmonary effort is normal. No respiratory distress.      Comments: Decreased breath sound in bases, no significant wheezing, bilateral chest tightness, no use of accessory muscles  Abdominal:      General: Bowel sounds are normal. There is no distension.      Palpations: Abdomen is soft.   Musculoskeletal:         General: No tenderness. Normal range of motion.      Cervical back: Normal range of motion and neck supple.   Skin:     General: Skin is warm and dry.   Neurological:      Mental Status: He is alert and oriented to person, place, and time.   Psychiatric:         Behavior: Behavior normal.       Medical Decision Making       55 MINUTES SPENT BY ME on the date of service doing chart review, history, exam, documentation & further activities per the note.      Data     I have personally reviewed the following data over the past 24 hrs:    16.7 (H)  \   10.9 (L)   / 178     133 (L) 98 63.5 (H) /  203 (H)   4.8 27 1.82 (H) \     ALT: N/A AST: N/A AP: N/A TBILI: N/A   ALB: 3.0 (L) TOT PROTEIN: N/A LIPASE: N/A     INR:  1.21 (H) PTT:  N/A   D-dimer:  N/A Fibrinogen:  N/A       Imaging results reviewed over the past 24 hrs:   No results found for this or any  previous visit (from the past 24 hours).

## 2025-01-13 NOTE — PROGRESS NOTES
Renal Medicine Progress Note            Assessment/Plan:     Keith Gonzalez is a 69 year old male who was admitted on 1/11/2025.      1) Baseline Normal Kidney Function as of 7/31/2023.  Cr 0.62 and GFR >90.     2) LAI:  Likely multifactorial, likely contrast-induced tubular injury.  May have component related to PE and right heart strain  Peak creatinine at 2.22, slow improvement with creatinine 1.82 today.  Noted mild hyponatremia at 133 this morning.       3) DVT and PE     4) AFLUTTER     5) COPD with severe hypercapnic respiratory failure.  Intensivist has signed off.     Plan:  1) continue supportive cares regarding LAI   2)  If serum sodium falls further, would start fluid restriction   3) continue daily BMPs, avoidance of nephrotoxins, contrast.    Riki Abarca DO  [facility]  Office: [phone]  Cell: [phone]        Interval History:     Patient remains on high flow oxygen support.  He feels he is improved in status.  Conversant and in no distress.    Yesterday with 1.99 L urine output, slight negative I's and O's.    Blood pressure 100-120 systolic.          Medications and Allergies:     Current Facility-Administered Medications   Medication Dose Route Frequency Provider Last Rate Last Admin    diltiazem (CARDIZEM) tablet 30 mg  30 mg Oral Q6H [facility] Aryan Felton MD   30 mg at 01/13/25 0512    doxycycline (VIBRAMYCIN) 100 mg vial to attach to  mL bag  100 mg Intravenous BID Susie Mcmullen APRN  mL/hr at 01/13/25 0835 100 mg at 01/13/25 0835    furosemide (LASIX) injection 40 mg  40 mg Intravenous Q12H Aryan Felton MD        insulin aspart (NovoLOG) injection (RAPID ACTING)  1-7 Units Subcutaneous Q4H Aryan Felton MD   1 Units at 01/13/25 0844    ipratropium - albuterol 0.5 mg/2.5 mg/3 mL (DUONEB) neb solution 3 mL  3 mL Nebulization Q4H Aryan Felton MD   3 mL at 01/13/25 1112    methylPREDNISolone Na Suc (solu-MEDROL) injection 62.5 mg  62.5 mg  Intravenous Q6H Aryan Felton MD   62.5 mg at 01/13/25 0512    piperacillin-tazobactam (ZOSYN) 3.375 g vial to attach to  mL bag  3.375 g Intravenous Q6H Aryan Felton MD   3.375 g at 01/13/25 0411        Allergies   Allergen Reactions    No Known Drug Allergy             Physical Exam:   Vitals were reviewed  /69   Pulse (!) 130   Temp 98.2  F (36.8  C) (Axillary)   Resp 15   Wt 68.1 kg (150 lb 2.1 oz)   SpO2 93%   BMI 21.54 kg/m      Wt Readings from Last 3 Encounters:   01/13/25 68.1 kg (150 lb 2.1 oz)   01/09/25 67.1 kg (148 lb)   11/13/24 63 kg (139 lb)       Intake/Output Summary (Last 24 hours) at 1/13/2025 1146  Last data filed at 1/13/2025 1000  Gross per 24 hour   Intake 1558.24 ml   Output 1785 ml   Net -226.76 ml       GENERAL APPEARANCE: pleasant, NAD, a & o  HEENT:  Eyes/ears/nose/neck grossly normal  RESP: lungs with rare wheeze, scattered crackles  CV: irreg irreg rapid S1/S2, no m/r/g   ABDOMEN: o/s/nt/nd, bs present  EXTREMITIES/SKIN: no rashes/lesions; 1+ edema           Data:     BMP  Recent Labs   Lab 01/13/25  0839 01/13/25  0417 01/13/25  0407 01/12/25  2335 01/12/25  0744 01/12/25  0533 01/11/25 2006 01/11/25  1047 01/11/25  0918 01/11/25  0831 01/09/25  1916   NA  --   --  133*  --   --  140  --  138 138  --  141   POTASSIUM  --   --  4.8  --   --  5.0  --   --  5.3  --  4.4   CHLORIDE  --   --  98  --   --  101  --   --  101  --  99   DAT  --   --  7.6*  --   --  8.0*  --   --  7.9*  --  9.2   CO2  --   --  27  --   --  28  --   --  25  --  28   BUN  --   --  63.5*  --   --  61.9*  --   --  56.4*  --  51.8*   CR  --   --  1.82*  --   --  1.90*  --  2.22* 2.18*  --  1.19*   * 172* 156* 164*   < > 155*   < >  --  121*   < > 93    < > = values in this interval not displayed.     CBC  Recent Labs   Lab 01/13/25  0407 01/11/25  0918 01/09/25  1916   WBC 16.7* 15.7* 15.7*   HGB 10.9* 13.0* 13.1*   HCT 34.1* 45.0 41.4   MCV 87 95 86    197 215     Lab Results    Component Value Date    AST 59 (H) 01/11/2025     (H) 01/11/2025    ALKPHOS 115 01/11/2025    BILITOTAL 0.2 01/11/2025     Lab Results   Component Value Date    INR 1.21 (H) 01/13/2025       Attestation:  I have reviewed today's vital signs, notes, medications, labs and imaging.    DO [facility]  Office: [phone]  Cell: [phone]

## 2025-01-13 NOTE — PROGRESS NOTES
Pt is on HFNC 40L 80% and tolerating well. BBS diminished throughout. Neb tx given as schedule. RT will continue to follow.  1/13/2025  Rosalinda Beverly, RT

## 2025-01-14 ENCOUNTER — APPOINTMENT (OUTPATIENT)
Dept: OCCUPATIONAL THERAPY | Facility: CLINIC | Age: 70
DRG: 175 | End: 2025-01-14
Attending: INTERNAL MEDICINE
Payer: COMMERCIAL

## 2025-01-14 ENCOUNTER — APPOINTMENT (OUTPATIENT)
Dept: PHYSICAL THERAPY | Facility: CLINIC | Age: 70
DRG: 175 | End: 2025-01-14
Attending: INTERNAL MEDICINE
Payer: COMMERCIAL

## 2025-01-14 PROBLEM — J96.01 ACUTE RESPIRATORY FAILURE WITH HYPOXIA (H): Status: ACTIVE | Noted: 2025-01-14

## 2025-01-14 PROBLEM — J44.9 CHRONIC OBSTRUCTIVE PULMONARY DISEASE (H): Status: ACTIVE | Noted: 2018-06-20

## 2025-01-14 PROBLEM — I26.01 ACUTE SEPTIC PULMONARY EMBOLISM WITH ACUTE COR PULMONALE (H): Status: ACTIVE | Noted: 2025-01-11

## 2025-01-14 PROBLEM — I82.413 ACUTE BILATERAL DEEP VEIN THROMBOSIS (DVT) OF FEMORAL VEINS (H): Status: ACTIVE | Noted: 2025-01-14

## 2025-01-14 LAB
ANION GAP SERPL CALCULATED.3IONS-SCNC: 8 MMOL/L (ref 7–15)
BUN SERPL-MCNC: 53.2 MG/DL (ref 8–23)
CALCIUM SERPL-MCNC: 8.3 MG/DL (ref 8.8–10.4)
CHLORIDE SERPL-SCNC: 101 MMOL/L (ref 98–107)
CREAT SERPL-MCNC: 1.39 MG/DL (ref 0.67–1.17)
EGFRCR SERPLBLD CKD-EPI 2021: 55 ML/MIN/1.73M2
ERYTHROCYTE [DISTWIDTH] IN BLOOD BY AUTOMATED COUNT: 15.2 % (ref 10–15)
GLUCOSE BLDC GLUCOMTR-MCNC: 131 MG/DL (ref 70–99)
GLUCOSE BLDC GLUCOMTR-MCNC: 150 MG/DL (ref 70–99)
GLUCOSE BLDC GLUCOMTR-MCNC: 200 MG/DL (ref 70–99)
GLUCOSE BLDC GLUCOMTR-MCNC: 215 MG/DL (ref 70–99)
GLUCOSE SERPL-MCNC: 147 MG/DL (ref 70–99)
HCO3 SERPL-SCNC: 31 MMOL/L (ref 22–29)
HCT VFR BLD AUTO: 35.3 % (ref 40–53)
HGB BLD-MCNC: 11 G/DL (ref 13.3–17.7)
INR PPP: 1.21 (ref 0.85–1.15)
MAGNESIUM SERPL-MCNC: 2.3 MG/DL (ref 1.7–2.3)
MCH RBC QN AUTO: 27 PG (ref 26.5–33)
MCHC RBC AUTO-ENTMCNC: 31.2 G/DL (ref 31.5–36.5)
MCV RBC AUTO: 87 FL (ref 78–100)
PHOSPHATE SERPL-MCNC: 3.7 MG/DL (ref 2.5–4.5)
PLATELET # BLD AUTO: 159 10E3/UL (ref 150–450)
POTASSIUM SERPL-SCNC: 4.6 MMOL/L (ref 3.4–5.3)
RBC # BLD AUTO: 4.08 10E6/UL (ref 4.4–5.9)
SODIUM SERPL-SCNC: 140 MMOL/L (ref 135–145)
UFH PPP CHRO-ACNC: 0.22 IU/ML
UFH PPP CHRO-ACNC: 0.44 IU/ML
UFH PPP CHRO-ACNC: 0.49 IU/ML
WBC # BLD AUTO: 11.3 10E3/UL (ref 4–11)

## 2025-01-14 PROCEDURE — 97535 SELF CARE MNGMENT TRAINING: CPT | Mod: GO

## 2025-01-14 PROCEDURE — 99233 SBSQ HOSP IP/OBS HIGH 50: CPT | Mod: FS | Performed by: PHYSICIAN ASSISTANT

## 2025-01-14 PROCEDURE — 85014 HEMATOCRIT: CPT | Performed by: INTERNAL MEDICINE

## 2025-01-14 PROCEDURE — 250N000011 HC RX IP 250 OP 636: Performed by: INTERNAL MEDICINE

## 2025-01-14 PROCEDURE — 83735 ASSAY OF MAGNESIUM: CPT | Performed by: INTERNAL MEDICINE

## 2025-01-14 PROCEDURE — 97110 THERAPEUTIC EXERCISES: CPT | Mod: GP

## 2025-01-14 PROCEDURE — 94640 AIRWAY INHALATION TREATMENT: CPT

## 2025-01-14 PROCEDURE — 97530 THERAPEUTIC ACTIVITIES: CPT | Mod: GP

## 2025-01-14 PROCEDURE — 250N000009 HC RX 250: Performed by: INTERNAL MEDICINE

## 2025-01-14 PROCEDURE — 99232 SBSQ HOSP IP/OBS MODERATE 35: CPT | Performed by: PHYSICIAN ASSISTANT

## 2025-01-14 PROCEDURE — 85610 PROTHROMBIN TIME: CPT | Performed by: INTERNAL MEDICINE

## 2025-01-14 PROCEDURE — 94640 AIRWAY INHALATION TREATMENT: CPT | Mod: 76

## 2025-01-14 PROCEDURE — 250N000011 HC RX IP 250 OP 636: Performed by: PHYSICIAN ASSISTANT

## 2025-01-14 PROCEDURE — 82565 ASSAY OF CREATININE: CPT | Performed by: INTERNAL MEDICINE

## 2025-01-14 PROCEDURE — 120N000013 HC R&B IMCU

## 2025-01-14 PROCEDURE — 94660 CPAP INITIATION&MGMT: CPT

## 2025-01-14 PROCEDURE — 36415 COLL VENOUS BLD VENIPUNCTURE: CPT | Performed by: INTERNAL MEDICINE

## 2025-01-14 PROCEDURE — 85520 HEPARIN ASSAY: CPT | Performed by: INTERNAL MEDICINE

## 2025-01-14 PROCEDURE — 999N000157 HC STATISTIC RCP TIME EA 10 MIN

## 2025-01-14 PROCEDURE — 84100 ASSAY OF PHOSPHORUS: CPT | Performed by: INTERNAL MEDICINE

## 2025-01-14 PROCEDURE — 250N000013 HC RX MED GY IP 250 OP 250 PS 637: Performed by: PHYSICIAN ASSISTANT

## 2025-01-14 PROCEDURE — 99418 PROLNG IP/OBS E/M EA 15 MIN: CPT | Performed by: INTERNAL MEDICINE

## 2025-01-14 PROCEDURE — 250N000013 HC RX MED GY IP 250 OP 250 PS 637: Performed by: INTERNAL MEDICINE

## 2025-01-14 RX ORDER — PROCHLORPERAZINE 25 MG
12.5 SUPPOSITORY, RECTAL RECTAL EVERY 12 HOURS PRN
Status: DISCONTINUED | OUTPATIENT
Start: 2025-01-14 | End: 2025-01-15

## 2025-01-14 RX ORDER — METOPROLOL TARTRATE 25 MG/1
25 TABLET, FILM COATED ORAL 2 TIMES DAILY
Status: DISCONTINUED | OUTPATIENT
Start: 2025-01-14 | End: 2025-01-15

## 2025-01-14 RX ORDER — FUROSEMIDE 10 MG/ML
40 INJECTION INTRAMUSCULAR; INTRAVENOUS ONCE
Status: COMPLETED | OUTPATIENT
Start: 2025-01-14 | End: 2025-01-14

## 2025-01-14 RX ORDER — PROCHLORPERAZINE MALEATE 5 MG/1
5 TABLET ORAL EVERY 6 HOURS PRN
Status: DISCONTINUED | OUTPATIENT
Start: 2025-01-14 | End: 2025-01-15

## 2025-01-14 RX ORDER — AMIODARONE HYDROCHLORIDE 200 MG/1
400 TABLET ORAL DAILY
Status: DISCONTINUED | OUTPATIENT
Start: 2025-01-21 | End: 2025-01-24 | Stop reason: HOSPADM

## 2025-01-14 RX ORDER — PIPERACILLIN SODIUM, TAZOBACTAM SODIUM 4; .5 G/20ML; G/20ML
4.5 INJECTION, POWDER, LYOPHILIZED, FOR SOLUTION INTRAVENOUS EVERY 6 HOURS
Status: COMPLETED | OUTPATIENT
Start: 2025-01-14 | End: 2025-01-21

## 2025-01-14 RX ORDER — AMIODARONE HYDROCHLORIDE 200 MG/1
200 TABLET ORAL DAILY
Status: DISCONTINUED | OUTPATIENT
Start: 2025-01-27 | End: 2025-01-21

## 2025-01-14 RX ORDER — AMIODARONE HYDROCHLORIDE 200 MG/1
400 TABLET ORAL 2 TIMES DAILY
Status: COMPLETED | OUTPATIENT
Start: 2025-01-14 | End: 2025-01-20

## 2025-01-14 RX ADMIN — METOPROLOL TARTRATE 25 MG: 25 TABLET, FILM COATED ORAL at 12:48

## 2025-01-14 RX ADMIN — DOXYCYCLINE 100 MG: 100 INJECTION, POWDER, LYOPHILIZED, FOR SOLUTION INTRAVENOUS at 23:16

## 2025-01-14 RX ADMIN — METOPROLOL TARTRATE 2.5 MG: 5 INJECTION INTRAVENOUS at 06:21

## 2025-01-14 RX ADMIN — FUROSEMIDE 40 MG: 10 INJECTION, SOLUTION INTRAMUSCULAR; INTRAVENOUS at 15:51

## 2025-01-14 RX ADMIN — PIPERACILLIN AND TAZOBACTAM 4.5 G: 4; .5 INJECTION, POWDER, FOR SOLUTION INTRAVENOUS at 12:48

## 2025-01-14 RX ADMIN — IPRATROPIUM BROMIDE AND ALBUTEROL SULFATE 3 ML: .5; 3 SOLUTION RESPIRATORY (INHALATION) at 07:27

## 2025-01-14 RX ADMIN — METHYLPREDNISOLONE SODIUM SUCCINATE 62.5 MG: 125 INJECTION, POWDER, FOR SOLUTION INTRAMUSCULAR; INTRAVENOUS at 08:05

## 2025-01-14 RX ADMIN — DOXYCYCLINE 100 MG: 100 INJECTION, POWDER, LYOPHILIZED, FOR SOLUTION INTRAVENOUS at 08:05

## 2025-01-14 RX ADMIN — METHYLPREDNISOLONE SODIUM SUCCINATE 62.5 MG: 125 INJECTION, POWDER, FOR SOLUTION INTRAMUSCULAR; INTRAVENOUS at 23:22

## 2025-01-14 RX ADMIN — DILTIAZEM HYDROCHLORIDE 60 MG: 30 TABLET, FILM COATED ORAL at 01:15

## 2025-01-14 RX ADMIN — IPRATROPIUM BROMIDE AND ALBUTEROL SULFATE 3 ML: .5; 3 SOLUTION RESPIRATORY (INHALATION) at 15:16

## 2025-01-14 RX ADMIN — PIPERACILLIN AND TAZOBACTAM 4.5 G: 4; .5 INJECTION, POWDER, FOR SOLUTION INTRAVENOUS at 17:06

## 2025-01-14 RX ADMIN — HEPARIN SODIUM 1500 UNITS/HR: 10000 INJECTION, SOLUTION INTRAVENOUS at 17:04

## 2025-01-14 RX ADMIN — IPRATROPIUM BROMIDE AND ALBUTEROL SULFATE 3 ML: .5; 3 SOLUTION RESPIRATORY (INHALATION) at 23:36

## 2025-01-14 RX ADMIN — AMIODARONE HYDROCHLORIDE 400 MG: 200 TABLET ORAL at 23:23

## 2025-01-14 RX ADMIN — IPRATROPIUM BROMIDE AND ALBUTEROL SULFATE 3 ML: .5; 3 SOLUTION RESPIRATORY (INHALATION) at 10:47

## 2025-01-14 RX ADMIN — PIPERACILLIN AND TAZOBACTAM 3.38 G: 3; .375 INJECTION, POWDER, FOR SOLUTION INTRAVENOUS at 01:14

## 2025-01-14 RX ADMIN — IPRATROPIUM BROMIDE AND ALBUTEROL SULFATE 3 ML: .5; 3 SOLUTION RESPIRATORY (INHALATION) at 20:01

## 2025-01-14 RX ADMIN — ONDANSETRON 4 MG: 2 INJECTION, SOLUTION INTRAMUSCULAR; INTRAVENOUS at 12:48

## 2025-01-14 RX ADMIN — METHYLPREDNISOLONE SODIUM SUCCINATE 62.5 MG: 125 INJECTION, POWDER, FOR SOLUTION INTRAMUSCULAR; INTRAVENOUS at 14:20

## 2025-01-14 RX ADMIN — PROCHLORPERAZINE EDISYLATE 5 MG: 5 INJECTION INTRAMUSCULAR; INTRAVENOUS at 14:20

## 2025-01-14 RX ADMIN — METHYLPREDNISOLONE SODIUM SUCCINATE 62.5 MG: 125 INJECTION, POWDER, FOR SOLUTION INTRAMUSCULAR; INTRAVENOUS at 03:01

## 2025-01-14 RX ADMIN — DILTIAZEM HYDROCHLORIDE 60 MG: 30 TABLET, FILM COATED ORAL at 12:48

## 2025-01-14 RX ADMIN — AMIODARONE HYDROCHLORIDE 400 MG: 200 TABLET ORAL at 13:35

## 2025-01-14 RX ADMIN — IPRATROPIUM BROMIDE AND ALBUTEROL SULFATE 3 ML: .5; 3 SOLUTION RESPIRATORY (INHALATION) at 02:56

## 2025-01-14 RX ADMIN — METOPROLOL TARTRATE 25 MG: 25 TABLET, FILM COATED ORAL at 23:29

## 2025-01-14 RX ADMIN — PIPERACILLIN AND TAZOBACTAM 3.38 G: 3; .375 INJECTION, POWDER, FOR SOLUTION INTRAVENOUS at 06:21

## 2025-01-14 RX ADMIN — DILTIAZEM HYDROCHLORIDE 60 MG: 30 TABLET, FILM COATED ORAL at 18:47

## 2025-01-14 ASSESSMENT — ACTIVITIES OF DAILY LIVING (ADL)
ADLS_ACUITY_SCORE: 70
ADLS_ACUITY_SCORE: 70
ADLS_ACUITY_SCORE: 66
ADLS_ACUITY_SCORE: 70
ADLS_ACUITY_SCORE: 66
ADLS_ACUITY_SCORE: 70
ADLS_ACUITY_SCORE: 68
ADLS_ACUITY_SCORE: 70
ADLS_ACUITY_SCORE: 68
ADLS_ACUITY_SCORE: 66
ADLS_ACUITY_SCORE: 70
ADLS_ACUITY_SCORE: 66
ADLS_ACUITY_SCORE: 66
ADLS_ACUITY_SCORE: 68
ADLS_ACUITY_SCORE: 70
ADLS_ACUITY_SCORE: 68
ADLS_ACUITY_SCORE: 70
ADLS_ACUITY_SCORE: 68

## 2025-01-14 NOTE — PLAN OF CARE
"Patient Name: Sol  MRN: [id number]  Date of Admission: 1/11/2025  Reason for Admission: DVT PE  [place] [facility] [place]itals:   BP Readings from Last 1 Encounters:   01/14/25 109/78     Pulse Readings from Last 1 Encounters:   01/14/25 107     Wt Readings from Last 1 Encounters:   01/14/25 73.1 kg (161 lb 2.5 oz)     Ht Readings from Last 1 Encounters:   07/08/24 1.778 m (5' 10\")     Estimated body mass index is 23.12 kg/m  as calculated from the following:    Height as of 7/8/24: 1.778 m (5' 10\").    Weight as of this encounter: 73.1 kg (161 lb 2.5 oz).  Temp Readings from Last 1 Encounters:   01/14/25 98.3  F (36.8  C) (Oral)     Pain: Pain goal 0 Pain Rating 0 Effective pain medication/regimen 0     CV Surgery Patient: No     Assessment     Resp: lung sounds diminished on HFNC at 45L 80%  Telemetry: afib RVR  Neuro: A&O X4  GI/: lamb in place urine output adequate, 2 large loose BM, emesis X2, Zofran and compazine given  Skin/Wounds: blanchable redness to bilateral heels and ears.  Lines/Drains: PIV in R forearm infusing heparin at 1500 unit(s)/hr [id number] [id number]  Activity: up assist 1 with gaitbelt and cane  Sleep:   Abnormal Labs:      Aggression Stop Light: Green          Patient Care Plan: continue with plan of care      "

## 2025-01-14 NOTE — PROGRESS NOTES
[facility]    Medicine Progress Note - Hospitalist Service    Date of Admission:  1/11/2025    Assessment & Plan     Keith Gonzalez is a 69 year old male with PMH of remote b/l DVT (no cause found per pt), off AC, tobacco abuse, emphysema, COPD, osteoporosis, GERD and testicular cancer (remission) who was admitted as a direct admission to intensive care unit from [place] emergency department for further treatment and evaluation of bilateral pulmonary embolism with acute cor pulmonale, new onset atrial flutter, and extensive lower extremity DVTs.  Critical care was consulted after admission to further assist with acute hypoxic and hypercapnic respiratory failure and to assess patient closely for possible need for intubation.    Acute hypoxic and hypercapnic respiratory failure, multifactorial  Acute Submassive pulmonary embolism w cor pulmonale  Extensive bilateral lower extremity DVTs.  Hx of remote DVTs, LLE Dvt 2004, RLE Dvt 2006,   Acute COPD exacerbation.  -hx Dvt x 2, 2004/2006, pt state no w/u & cause unknown to him. Off Ac x yrs   -Patient arrived initially to ICU from [place] ED where he has spent 3 days prior to direct admission awaiting facility on heparin and amiodarone infusion.  -- prev intensivist, vascular and interventional radiology after admission.  --Vascular surgery is recommending no intervention at this point for lower extremity extensive DVT and recommending to continue current heparin infusion.  --Case was also discussed with interventional radiology regarding clot burden and PEs who do not recommend any intervention and also recommended anticoagulation with heparin infusion.  --Patient has been switched from BiPAP to high flow nasal cannula.  1/14- stable overall and HFNC o2 needs, cont heparin for now  --AC- Continue patient on heparin infusion,  --future DOAC, consult pharmacy liaison re $ on 01/14/2025.  - Hem/Onc consulted, as this is 3rd lifetime DVT, pt  state no past cause/ w/u  --Continue patient on Zosyn every 6 hours to complete 5-day course for hospital-acquired pneumonia as recommended by intensivist.  --Continue patient on doxycycline 100 mg IV twice daily for COPD exacerbation.  --Continue patient on DuoNeb every 4 hours scheduled.  -- DuoNeb every 2 hours available as needed for shortness of breath.  - tobacco cessation strongly encouraged  - am Cbc BMP    Type II MI, secondary to demand ischemia, concern for cardiogenic shock.  New onset atrial flutter s/p cardioversion  Paroxysmal atrial fibrillation with RVR  Acute heart failure with reduced EF  Mild biventricular cardiomyopathy  1/14- Currently w/o CP, cards managing rx, future Amio  --Continue to monitor patient on telemetry  --Echocardiogram done on 01/09/2025 showing normal LV size, EF of 46% with mild lateral hypokinesis.  --Right ventricle is also mildly dilated with mild to moderately reduced function no valve disease.  --Cardiology following, highly appreciate their help  --Patient has been on amiodarone gtt. for 48 hours,-- PO transition pending  --PTA started diltiazem 30 mg p.o. 4 times daily on 01/12/25, increasing to 60 mg p.o. 4 times daily. Cards managing  --Cards recommending to hold furosemide due to LAI.  --Cards will evaluate if patient is a good candidate for Nebivolol    Tobacco abuse  Currently about 4 cigarettes a day.  Quit for 8 months prior.  Risk for recurrent DVT contribution.  Willing to quit.  No cravings now, declines need for rx now  -Smoking cessation strongly encouraged , and to lower DVT risk  -declines nicotine patch  -Nicotine gum/lozenge added as needed craving  -PCP follow-up  -Declines outpatient smoking cessation meds    Acute metabolic encephalopathy: Resolved  Most likely was secondary to profound hypercapnia and respiratory acidosis, initially was a started on BiPAP now has been switched to high flow nasal cannula  --Continue to monitor, will order physical and  Occupational Therapy evaluation.    Leukocytosis:  Most likely secondary to stress reaction in the setting of cardiac/respiratory issues, patient has been getting treated for possible hospital-acquired pneumonia.   1/14- WBC decr 11.3 on abx  -- Patient initially received ceftriaxone in outside hospital.  --Continue patient on current antibiotics to finish 5-day course.    Acute kidney injury, suspect prerenal  Hypocalcemia  Metabolic alkalosis  Worsening of creatinine with last lab 101/09 prior to arrival was 1.1, all upon admission creatinine was up to 2.18, nephrology were also consulted  1/14- Cr decr to 1.39, renal signed off  - renal followed, signed off 1/14  - diuresis held, at present, defer to cards  when to resume  - tx w IVF  -- Agree with holding diuresis for now, does not seem fluid overloaded.    Transaminitis   --LFT minimally elevated alt 156, ast 59  --bilirubin is 0.2  --likely related to multifactorial stressors occurring.   --monitor      GERD  --40mg Protonix iv daily while admitted     Hx of testicular cancer   --review of record with distant history, in remission     Hx of iron deficiency anemia   --hold ferrous sulfate   --most recent hgb on 1/9/2025 stable 13.1  --awaiting admission cbc results      Osteoporosis  Hx of compression fractures      Tobacco abuse with continued use, see above       Diet: Regular Diet Adult    DVT Prophylaxis: Heparin infusion  Dunn Catheter: PRESENT, indication: Acute retention or obstruction  Lines: None     Cardiac Monitoring: ACTIVE order. Indication: Tachyarrhythmias, acute (48 hours)  Code Status: Full Code      Clinically Significant Risk Factors         # Hyponatremia: Lowest Na = 133 mmol/L in last 2 days, will monitor as appropriate       # Hypoalbuminemia: Lowest albumin = 3 g/dL at 1/13/2025  4:07 AM, will monitor as appropriate  # Coagulation Defect: INR = 1.21 (Ref range: 0.85 - 1.15) and/or PTT = N/A, will monitor for bleeding          # Acute  Hypoxic Respiratory Failure: Documented O2 saturation < 90%. Continue supplemental oxygen as needed  # Acute Hypercapnic Respiratory Failure: based on arterial blood gas results.  Continue supplemental oxygen and ventilatory support as indicated.  # Acute Hypercapnic Respiratory Failure: based on venous blood gas results.  Continue supplemental oxygen and ventilatory support as indicated.             # COPD: noted on problem list        Social Drivers of Health    Food Insecurity: Unknown (1/11/2025)    Food Insecurity     Within the past 12 months, did you worry that your food would run out before you got money to buy more?: Patient unable to answer     Within the past 12 months, did the food you bought just not last and you didn t have money to get more?: Patient unable to answer   Housing Stability: Unknown (1/11/2025)    Housing Stability     Do you have housing? : Patient unable to answer     Are you worried about losing your housing?: Patient unable to answer   Tobacco Use: High Risk (1/9/2025)    Patient History     Smoking Tobacco Use: Every Day     Smokeless Tobacco Use: Never   Financial Resource Strain: Unknown (1/11/2025)    Financial Resource Strain     Within the past 12 months, have you or your family members you live with been unable to get utilities (heat, electricity) when it was really needed?: Patient unable to answer   Transportation Needs: Unknown (1/11/2025)    Transportation Needs     Within the past 12 months, has lack of transportation kept you from medical appointments, getting your medicines, non-medical meetings or appointments, work, or from getting things that you need?: Patient unable to answer   Interpersonal Safety: Unknown (1/11/2025)    Interpersonal Safety     Do you feel physically and emotionally safe where you currently live?: Patient unable to answer     Within the past 12 months, have you been hit, slapped, kicked or otherwise physically hurt by someone?: Patient unable to  answer     Within the past 12 months, have you been humiliated or emotionally abused in other ways by your partner or ex-partner?: Patient unable to answer          Disposition Plan     Medically Ready for Discharge: Anticipated in 2-4 Days    Staff w Dr Israel, the above is our collaborative treatment plan    Woo Fernandez PA-C  Hospitalist Service  [facility]  Securely message with [employer] (more info)  Text page via [employer] Paging/Directory   ______________________________________________________________________    Interval History     Met patient for the first time  -On high flow nasal cannula use stable O2 needs.,  States he feels okay  If no current chest pain or pressure.No abdominal symptoms. No fevers or chills.  Does not offer any other new symptoms.    -Regarding DVT history this is actually his third.  No classic trigger, i.e. no immobility.  It is of note this is his third DVT, and 1 time he actually had bilateral.  Appears last was 2006. Has not been on any AC for years.  He is insistent there was never any past workup or cause given to them for this.  The current DVT has multiple possible etiologies but given its third I would recommend heme-onc workup for additional blood dyscrasia not otherwise identified    -Smoker.  Quit for 8 months and then resumed at some point prior to this- was about 4 cigarettes a day.  States he has no cravings at all.  Understands the risk that this also contributes to DVT and commits to fully quit.  States he does not need any meds right now but I added a as needed    -    Physical Exam   Vital Signs: Temp: 97.7  F (36.5  C) Temp src: Axillary BP: 108/74 Pulse: 106   Resp: 17 SpO2: 94 % O2 Device: High Flow Nasal Cannula (HFNC) Oxygen Delivery: 45 LPM  Weight: 161 lbs 2.5 oz    Lines, PIV , HFNC, Hep, amiodarone Zosyn, drip.  /88, heart rate 119 and bedside telemetry appears to be A-fib.  Respiratory rate 20.  SpO2 90%, high flow NC    Constitutional: vs as above and/or per EMR  General:  adult pt lying in bed without acute distress  HEENT: NC/AT,    Neck: w/o JVD, supple  CV :S1S2, w/o obvious murmur  Resp: O2 via HFNC, ? 45lpm rate Spo2 90, chest rise unlabored, lungs  w/o rhonchi, rales, whz    GI:  soft, nontender, nondistended  Ext: MAEW, no lower edema or mottling  Skin: no obvious rashes on exposed skin  Lymph: defer  Musculoskeletal: no bony joint deformities  Neuro: gross exam nonfocal, faces symmetric   Psych: calm, pleasant, no obvious confusion on discussion today.  Able to give history.    Data:   Recent Labs   Lab Test 01/14/25  0755 01/14/25  0602 01/13/25  0417 01/13/25  0407 01/11/25  1047 01/11/25  0918 01/09/25  2129 01/09/25  1916   WBC  --  11.3*  --  16.7*  --  15.7*  --  15.7*   HGB  --  11.0*  --  10.9*  --  13.0*  --  13.1*   MCV  --  87  --  87  --  95  --  86   PLT  --  159  --  178  --  197  --  215   NA  --  140  --  133*   < > 138  --  141   POTASSIUM  --  4.6  --  4.8   < > 5.3  --  4.4   CHLORIDE  --  101  --  98   < > 101  --  99   CO2  --  31*  --  27   < > 25  --  28   ANIONGAP  --  8  --  8   < > 12  --  14   * 147*   < > 156*   < > 121*   < > 93   BUN  --  53.2*  --  63.5*   < > 56.4*  --  51.8*   CR  --  1.39*  --  1.82*   < > 2.18*  --  1.19*   GFRESTIMATED  --  55*  --  40*   < > 32*  --  67   DAT  --  8.3*  --  7.6*   < > 7.9*  --  9.2   MAG  --  2.3  --  2.1   < > 2.1   < >  --    AST  --   --   --   --   --  59*  --  98*   ALT  --   --   --   --   --  156*  --  194*   ALKPHOS  --   --   --   --   --  115  --  127   PROTTOTAL  --   --   --   --   --  5.8*  --  6.5   ALBUMIN  --   --   --  3.0*   < > 3.2*  --  3.7   BILITOTAL  --   --   --   --   --  0.2  --  0.3    < > = values in this interval not displayed.        Medical Decision Making       55 MINUTES SPENT BY ME on the date of service doing chart review, history, exam, documentation & further activities per the note.      Data     I have  personally reviewed the following data over the past 24 hrs:    11.3 (H)  \   11.0 (L)   / 159     140 101 53.2 (H) /  200 (H)   4.6 31 (H) 1.39 (H) \     INR:  1.21 (H) PTT:  N/A   D-dimer:  N/A Fibrinogen:  N/A       Imaging results reviewed over the past 24 hrs:   No results found for this or any previous visit (from the past 24 hours).    XR Chest Port 1 View 1/12/2025  IMPRESSION: Stable cardiomegaly with pulmonary vascular congestion, interstitial pulmonary edema and small bilateral pleural effusions. No large confluent airspace opacities or pneumothorax.    XR Chest Port 1 View 1/11/2025  EXAM: XR CHEST PORT 1 VIEW LOCATION: [facility] DATE: 1/11/2025 INDICATION: acute hypercapnia COMPARISON: 7/31/2023   IMPRESSION: Heart size is enlarged. Moderate pulmonary edema pattern has developed in the interval. Dense retrocardiac consolidation may represent superimposed pneumonia or aspiration versus confluent edema. Small to moderate bilateral pleural effusions.  No pneumothorax.       CT CHEST PULMONARY EMBOLISM W CONTRAST  LOCATION: [facility]  DATE: 1/9/2025  IMPRESSION:  1.  Exam is positive for pulmonary emboli. Moderate burden of emboli predominantly involving right lung.  2.  RV/LV ratio greater than 1 suggesting right heart strain.  3.  Small bilateral pleural effusions. Scattered nonspecific small subpleural opacities, can't exclude small peripheral lung infarct.  4.  Emphysema

## 2025-01-14 NOTE — PROGRESS NOTES
[facility]  Cardiology Progress Note    Date of Service (when I saw the patient): 01/14/2025  Primary Cardiologist: will establish at [facility]        Interval History:   SOB same. No new issues. Feels peripheral edema is still significant.     ----------------------------------------------------------------------------------------    Assessment:  Keith Gonzalez is a 69 year old male who was admitted on 1/11/2025 with submassive PE and COPD exacerbation. Cardiology was consulted for A fib RVR and mild biV cardiomyopathy noted on TTE.     # Paroxysmal Atrial fibrillation/flutter  -- s/p dccv x2 on admission for flutter, now in a fib  -- remains on Diltiazem 60 mg q6h and IV amiodarone   -- has received IV metoprolol 2.5 mg this AM  -- CHADSVASC score of 3- on heparin at this time     # Mild BiV Cardiomyopathy   -- LVEF 46%; mild RV dysfunction and dilation  -- on low dose CCB    # Submassive PE with brendan DVT  -- on heparin   -- IR recommended no intervention    # COPD exacerbation with cor pulmonale     # Acute hypoxic and hypercapnic Resp Failure secondary to PE and COPD    # LAI, improved  -- diuretic held yesterday (1/13) due to this     # Active tobacco dependence     # Hx of testicular cancer    ----------------------------------------------------------------------------------------    Plan:  -- Transition to PO amiodarone today (400 mg BID for 1 week -> 400 mg daily for 1 week -> 200 mg daily thereafter)  -- Initiate po metoprolol tartrate 25 mg BID   -- IV furosemide 40 mg once today   -- cardiology will continue to follow along     I spent 30 minutes face-to-face or coordinating care of Keith Gonzalez. Over 50% of our time on the unit was spent counseling the patient and/or coordinating.      ----------------------------------------------------------------------------------------  Physical Exam   Temp: 98.6  F (37  C) Temp src: Oral BP: 108/74 Pulse: 106   Resp: 17  SpO2: 94 % O2 Device: High Flow Nasal Cannula (HFNC) Oxygen Delivery: 45 LPM  Vitals:    01/11/25 1330 01/13/25 0348 01/14/25 0600   Weight: 67.8 kg (149 lb 7.6 oz) 68.1 kg (150 lb 2.1 oz) 73.1 kg (161 lb 2.5 oz)     GEN:  NAD. Oxygen per hfnc  HEENT: Mucous membranes moist.  NECK:  + JVD.  C/V:  Regular rate and rhythm, no murmur, rub or gallop.   RESP: CTA with no wheezing.  GI: Abdomen soft, nontender, nondistended.    EXTREM: 2+ pitting LE edema.   NEURO: Alert and oriented, cooperative.   PSYCH: Normal affect.  SKIN: Warm and dry.   VASC: 2+ radial and dorsalis pedis pulses bilaterally.      Medications   Current Facility-Administered Medications   Medication Dose Route Frequency Provider Last Rate Last Admin    amiodarone (NEXTERONE) 1.8 mg/mL in dextrose 5% 200 mL ADULT STANDARD infusion  0.5 mg/min Intravenous Continuous Shila Israel MD 16.7 mL/hr at 01/13/25 2355 0.5 mg/min at 01/13/25 2355    heparin 25,000 units in 0.45% NaCl 250 mL ANTICOAGULANT infusion  0-5,000 Units/hr Intravenous Continuous Shila Israel MD 15 mL/hr at 01/14/25 0910 1,500 Units/hr at 01/14/25 0910    Patient is already receiving anticoagulation with heparin, enoxaparin (LOVENOX), warfarin (COUMADIN)  or other anticoagulant medication   Does not apply Continuous PRN Shila Israel MD        Patient may continue current oral medications   Does not apply Continuous PRN Shila Israel MD         Current Facility-Administered Medications   Medication Dose Route Frequency Provider Last Rate Last Admin    diltiazem (CARDIZEM) tablet 60 mg  60 mg Oral Q6H Thania Hennessy MD   60 mg at 01/14/25 0115    doxycycline (VIBRAMYCIN) 100 mg vial to attach to  mL bag  100 mg Intravenous BID Shila Israel  mL/hr at 01/13/25 0835 100 mg at 01/14/25 0805    insulin aspart (NovoLOG) injection (RAPID ACTING)   Subcutaneous TID w/meals Shila Israel MD   11 Units at 01/14/25 0804    insulin aspart (NovoLOG) injection (RAPID ACTING)  1-10  Units Subcutaneous TID AC Shila Israel MD   1 Units at 01/14/25 0802    insulin aspart (NovoLOG) injection (RAPID ACTING)  1-7 Units Subcutaneous At Bedtime Shila Israel MD        ipratropium - albuterol 0.5 mg/2.5 mg/3 mL (DUONEB) neb solution 3 mL  3 mL Nebulization Q4H Shila Israel MD   3 mL at 01/14/25 1047    methylPREDNISolone Na Suc (solu-MEDROL) injection 62.5 mg  62.5 mg Intravenous Q6H Shila Israel MD   62.5 mg at 01/14/25 0805    piperacillin-tazobactam (ZOSYN) 4.5 g vial to attach to  mL bag  4.5 g Intravenous Q6H Shila Israel MD        sodium chloride (PF) 0.9% PF flush 3 mL  3 mL Intracatheter Q8H Shila Israel MD           Data   Reviewed      Rahel Cárdenas PA-C   1/14/2025  Pager: [place]

## 2025-01-14 NOTE — PROGRESS NOTES
Nephrology chart check    Patient seen briefly, chart reviewed.  Significantly improving renal function, creatinine 1.39 this morning.  Should continue improving towards baseline.  Nephrology will sign off at this time.  Diuresis will be managed by cardiology when indicated.  Call for concerns or questions    Riki Abarca, DO

## 2025-01-14 NOTE — PLAN OF CARE
"Patient Name: Sol  MRN: [id number]  Date of Admission: 1/11/2025  Reason for Admission: DVT, PE  [place] [facility] [place]    [id number]    Vitals:   BP Readings from Last 1 Encounters:   01/14/25 107/70     Pulse Readings from Last 1 Encounters:   01/14/25 119     Wt Readings from Last 1 Encounters:   01/14/25 73.1 kg (161 lb 2.5 oz)     Ht Readings from Last 1 Encounters:   07/08/24 1.778 m (5' 10\")     Estimated body mass index is 23.12 kg/m  as calculated from the following:    Height as of 7/8/24: 1.778 m (5' 10\").    Weight as of this encounter: 73.1 kg (161 lb 2.5 oz).  Temp Readings from Last 1 Encounters:   01/14/25 97.7  F (36.5  C) (Axillary)       Pain: pt denied pain    CV Surgery Patient: No    Assessment    Resp: LS dim, on HFNC 45L 70% most of night, moved to BIPAP 90% FiO2 @0300 when pt not maintaining >90% SpO2. LS dim, congested, non productive cough. Denied SOB.   Telemetry: Afib RVR 100s-120s, metoprolol PRN given x1   Neuro: A&Ox4, intact  GI/: Nausea improved with PRN zofran given x1, denied N/V rest of shift. +BS, +flatus, -BM this shift. AUO thru lamb.   Skin/Wounds: Scattered bruising, blanchable redness to ears and coccyx, mepis in place.   Lines/Drains: L PIV x2, lower PIV infusing heparin @1350 units/hr, upper PIV infusing amio @ 0.5mg/min. Lamb  Activity: A1 pivot to commode  Sleep: 6-7 hours between cares  Abnormal Labs: HepXA 0.22, Cr 1.39, Ca 8.3     Aggression Stop Light: Green          Patient Care Plan: Wean off BiPAP as able back to HFNC, encourage pulm hygiene   "

## 2025-01-14 NOTE — PROGRESS NOTES
Pt transitioned off of BiPAP to HFNC this morning. Current settings: 45 LPM, 80% FiO2. Sats low 90s. Scheduled nebulizers given in-line. Skin intact. RT to follow.    Kinza Lau, RT  January 14, 2025

## 2025-01-14 NOTE — PLAN OF CARE
"Patient Name: Sol  MRN: [id number]  Date of Admission: 1/11/2025  Reason for Admission: DVT, PE  [place] Care[place] [facility] [place]    Vitals:   BP Readings from Last 1 Encounters:   01/13/25 118/63     Pulse Readings from Last 1 Encounters:   01/13/25 (!) 122     Wt Readings from Last 1 Encounters:   01/13/25 68.1 kg (150 lb 2.1 oz)     Ht Readings from Last 1 Encounters:   07/08/24 1.778 m (5' 10\")     Estimated body mass index is 21.54 kg/m  as calculated from the following:    Height as of 7/8/24: 1.778 m (5' 10\").    Weight as of this encounter: 68.1 kg (150 lb 2.1 oz).  Temp Readings from Last 1 Encounters:   01/13/25 98.3  F (36.8  C) (Oral)       Pain: Pain goal 0 Pain Rating 0 Effective pain medication/regimen 0    CV Surgery Patient: No    Assessment    Resp: lung sounds diminished on HFNC at 40L 80%  Telemetry: afib RVR  Neuro: A&O X4  GI/: lamb in place urine output adequate, -BM, emesis X2  Skin/Wounds: blanchable redness to bilateral heels and ears.  Lines/Drains: PIV in R forearm infusing amio at 0.5mg.min, PIV L forearm infusing heparin at 1350 unit(s)/hr hep10a tomorrow am  Activity: up assist 1 with gaitbelt and cane  Sleep:   Abnormal Labs:     Aggression Stop Light: Green          Patient Care Plan: continue with plan of care     "

## 2025-01-14 NOTE — CONSULTS
Patient has Medicare Advantage through [facility].    Eliquis/Xarelto  --$142/mo.  --If/when total out of premium + total out of pocket drug costs exceed $2000, patient will pay $0 for all covered drugs for the remainder of the year.    Patient is able to change his Medicare plan once before March 31st.  With the addition of an expensive medication such as this, he may want to research if there are plans that better suit his needs and/or are more cost effective.  Plans can be reviewed at medicare.gov.  The Senior Linkage Line provides free assistance in choosing a plan by calling [phone].    Dabigatran  --$68/mo.  --If/when total out of premium + total out of pocket drug costs exceed $2000, patient will pay $0 for all covered drugs for the remainder of the year.    Vijaya Peralta  Pharmacy Technician/Liaison, Discharge Pharmacy   [phone] (voice or text)  [email or web address]  Pharmacy test claims are estimates and may not reflect final costs.   Suggested alternatives aim to be cost-effective but may not be therapeutically equivalent as this consult is informational and does not constitute medical advice.   Clinical decisions should be made by qualified healthcare providers.

## 2025-01-15 ENCOUNTER — APPOINTMENT (OUTPATIENT)
Dept: GENERAL RADIOLOGY | Facility: CLINIC | Age: 70
DRG: 175 | End: 2025-01-15
Attending: PHYSICIAN ASSISTANT
Payer: COMMERCIAL

## 2025-01-15 LAB
ALBUMIN SERPL BCG-MCNC: 3.1 G/DL (ref 3.5–5.2)
ALP SERPL-CCNC: 73 U/L (ref 40–150)
ALT SERPL W P-5'-P-CCNC: 63 U/L (ref 0–70)
ANION GAP SERPL CALCULATED.3IONS-SCNC: 5 MMOL/L (ref 7–15)
AST SERPL W P-5'-P-CCNC: 26 U/L (ref 0–45)
BACTERIA BLD CULT: NO GROWTH
BACTERIA BLD CULT: NO GROWTH
BASE EXCESS BLDV CALC-SCNC: 10.3 MMOL/L (ref -3–3)
BILIRUB DIRECT SERPL-MCNC: <0.2 MG/DL (ref 0–0.3)
BILIRUB SERPL-MCNC: 0.5 MG/DL
BUN SERPL-MCNC: 45.3 MG/DL (ref 8–23)
CALCIUM SERPL-MCNC: 8.6 MG/DL (ref 8.8–10.4)
CHLORIDE SERPL-SCNC: 100 MMOL/L (ref 98–107)
CREAT SERPL-MCNC: 1.2 MG/DL (ref 0.67–1.17)
EGFRCR SERPLBLD CKD-EPI 2021: 66 ML/MIN/1.73M2
ERYTHROCYTE [DISTWIDTH] IN BLOOD BY AUTOMATED COUNT: 15.1 % (ref 10–15)
FLUAV RNA SPEC QL NAA+PROBE: NEGATIVE
FLUBV RNA RESP QL NAA+PROBE: NEGATIVE
GLUCOSE BLDC GLUCOMTR-MCNC: 122 MG/DL (ref 70–99)
GLUCOSE BLDC GLUCOMTR-MCNC: 142 MG/DL (ref 70–99)
GLUCOSE BLDC GLUCOMTR-MCNC: 142 MG/DL (ref 70–99)
GLUCOSE BLDC GLUCOMTR-MCNC: 168 MG/DL (ref 70–99)
GLUCOSE SERPL-MCNC: 128 MG/DL (ref 70–99)
HCO3 BLDV-SCNC: 38 MMOL/L (ref 21–28)
HCO3 SERPL-SCNC: 34 MMOL/L (ref 22–29)
HCT VFR BLD AUTO: 37 % (ref 40–53)
HGB BLD-MCNC: 11.3 G/DL (ref 13.3–17.7)
INR PPP: 1.25 (ref 0.85–1.15)
LACTATE SERPL-SCNC: 1.6 MMOL/L (ref 0.7–2)
MAGNESIUM SERPL-MCNC: 2.3 MG/DL (ref 1.7–2.3)
MCH RBC QN AUTO: 26.8 PG (ref 26.5–33)
MCHC RBC AUTO-ENTMCNC: 30.5 G/DL (ref 31.5–36.5)
MCV RBC AUTO: 88 FL (ref 78–100)
O2/TOTAL GAS SETTING VFR VENT: 70 %
OXYHGB MFR BLDV: 94 % (ref 70–75)
PCO2 BLDV: 62 MM HG (ref 40–50)
PH BLDV: 7.39 [PH] (ref 7.32–7.43)
PHOSPHATE SERPL-MCNC: 3.6 MG/DL (ref 2.5–4.5)
PLATELET # BLD AUTO: 166 10E3/UL (ref 150–450)
PO2 BLDV: 72 MM HG (ref 25–47)
POTASSIUM SERPL-SCNC: 4.4 MMOL/L (ref 3.4–5.3)
PROCALCITONIN SERPL IA-MCNC: 0.14 NG/ML
PROT SERPL-MCNC: 5.4 G/DL (ref 6.4–8.3)
RBC # BLD AUTO: 4.22 10E6/UL (ref 4.4–5.9)
RSV RNA SPEC NAA+PROBE: NEGATIVE
SAO2 % BLDV: 94.7 % (ref 70–75)
SARS-COV-2 RNA RESP QL NAA+PROBE: NEGATIVE
SODIUM SERPL-SCNC: 139 MMOL/L (ref 135–145)
UFH PPP CHRO-ACNC: 0.55 IU/ML
UFH PPP CHRO-ACNC: 0.62 IU/ML
UFH PPP CHRO-ACNC: 0.72 IU/ML
WBC # BLD AUTO: 10.4 10E3/UL (ref 4–11)

## 2025-01-15 PROCEDURE — 250N000011 HC RX IP 250 OP 636: Performed by: INTERNAL MEDICINE

## 2025-01-15 PROCEDURE — 250N000011 HC RX IP 250 OP 636: Performed by: PHYSICIAN ASSISTANT

## 2025-01-15 PROCEDURE — 82565 ASSAY OF CREATININE: CPT | Performed by: INTERNAL MEDICINE

## 2025-01-15 PROCEDURE — 94640 AIRWAY INHALATION TREATMENT: CPT | Mod: 76

## 2025-01-15 PROCEDURE — 85610 PROTHROMBIN TIME: CPT | Performed by: INTERNAL MEDICINE

## 2025-01-15 PROCEDURE — 250N000009 HC RX 250: Performed by: PHYSICIAN ASSISTANT

## 2025-01-15 PROCEDURE — 74018 RADEX ABDOMEN 1 VIEW: CPT

## 2025-01-15 PROCEDURE — 250N000009 HC RX 250: Performed by: INTERNAL MEDICINE

## 2025-01-15 PROCEDURE — 85520 HEPARIN ASSAY: CPT | Performed by: PHYSICIAN ASSISTANT

## 2025-01-15 PROCEDURE — 250N000009 HC RX 250: Performed by: STUDENT IN AN ORGANIZED HEALTH CARE EDUCATION/TRAINING PROGRAM

## 2025-01-15 PROCEDURE — 36415 COLL VENOUS BLD VENIPUNCTURE: CPT | Performed by: INTERNAL MEDICINE

## 2025-01-15 PROCEDURE — 250N000013 HC RX MED GY IP 250 OP 250 PS 637: Performed by: INTERNAL MEDICINE

## 2025-01-15 PROCEDURE — 999N000157 HC STATISTIC RCP TIME EA 10 MIN

## 2025-01-15 PROCEDURE — 99233 SBSQ HOSP IP/OBS HIGH 50: CPT | Mod: FS | Performed by: PHYSICIAN ASSISTANT

## 2025-01-15 PROCEDURE — 250N000013 HC RX MED GY IP 250 OP 250 PS 637: Performed by: PHYSICIAN ASSISTANT

## 2025-01-15 PROCEDURE — 36415 COLL VENOUS BLD VENIPUNCTURE: CPT | Performed by: PHYSICIAN ASSISTANT

## 2025-01-15 PROCEDURE — 82247 BILIRUBIN TOTAL: CPT | Performed by: PHYSICIAN ASSISTANT

## 2025-01-15 PROCEDURE — 84100 ASSAY OF PHOSPHORUS: CPT | Performed by: INTERNAL MEDICINE

## 2025-01-15 PROCEDURE — 120N000013 HC R&B IMCU

## 2025-01-15 PROCEDURE — 87637 SARSCOV2&INF A&B&RSV AMP PRB: CPT | Performed by: PHYSICIAN ASSISTANT

## 2025-01-15 PROCEDURE — 99255 IP/OBS CONSLTJ NEW/EST HI 80: CPT | Performed by: INTERNAL MEDICINE

## 2025-01-15 PROCEDURE — 85014 HEMATOCRIT: CPT | Performed by: INTERNAL MEDICINE

## 2025-01-15 PROCEDURE — 83605 ASSAY OF LACTIC ACID: CPT | Performed by: PHYSICIAN ASSISTANT

## 2025-01-15 PROCEDURE — 71045 X-RAY EXAM CHEST 1 VIEW: CPT

## 2025-01-15 PROCEDURE — 83735 ASSAY OF MAGNESIUM: CPT | Performed by: INTERNAL MEDICINE

## 2025-01-15 PROCEDURE — 99233 SBSQ HOSP IP/OBS HIGH 50: CPT | Performed by: PHYSICIAN ASSISTANT

## 2025-01-15 PROCEDURE — 82805 BLOOD GASES W/O2 SATURATION: CPT | Performed by: PHYSICIAN ASSISTANT

## 2025-01-15 PROCEDURE — 84145 PROCALCITONIN (PCT): CPT | Performed by: PHYSICIAN ASSISTANT

## 2025-01-15 PROCEDURE — 94640 AIRWAY INHALATION TREATMENT: CPT

## 2025-01-15 RX ORDER — PROCHLORPERAZINE MALEATE 5 MG/1
5 TABLET ORAL EVERY 6 HOURS PRN
Status: DISCONTINUED | OUTPATIENT
Start: 2025-01-15 | End: 2025-01-24 | Stop reason: HOSPADM

## 2025-01-15 RX ORDER — FUROSEMIDE 10 MG/ML
40 INJECTION INTRAMUSCULAR; INTRAVENOUS 2 TIMES DAILY
Status: DISCONTINUED | OUTPATIENT
Start: 2025-01-15 | End: 2025-01-15

## 2025-01-15 RX ORDER — ONDANSETRON 2 MG/ML
4 INJECTION INTRAMUSCULAR; INTRAVENOUS EVERY 6 HOURS PRN
Status: DISCONTINUED | OUTPATIENT
Start: 2025-01-15 | End: 2025-01-24 | Stop reason: HOSPADM

## 2025-01-15 RX ORDER — BUDESONIDE 0.5 MG/2ML
0.5 INHALANT ORAL 2 TIMES DAILY
Status: DISCONTINUED | OUTPATIENT
Start: 2025-01-15 | End: 2025-01-24 | Stop reason: HOSPADM

## 2025-01-15 RX ORDER — METOPROLOL TARTRATE 50 MG
50 TABLET ORAL 2 TIMES DAILY
Status: DISCONTINUED | OUTPATIENT
Start: 2025-01-15 | End: 2025-01-19

## 2025-01-15 RX ORDER — METOCLOPRAMIDE 5 MG/1
5 TABLET ORAL 3 TIMES DAILY PRN
Status: DISCONTINUED | OUTPATIENT
Start: 2025-01-15 | End: 2025-01-24 | Stop reason: HOSPADM

## 2025-01-15 RX ORDER — FUROSEMIDE 10 MG/ML
40 INJECTION INTRAMUSCULAR; INTRAVENOUS 2 TIMES DAILY
Status: DISCONTINUED | OUTPATIENT
Start: 2025-01-16 | End: 2025-01-21

## 2025-01-15 RX ORDER — DILTIAZEM HYDROCHLORIDE 30 MG/1
30 TABLET, FILM COATED ORAL EVERY 6 HOURS SCHEDULED
Status: DISCONTINUED | OUTPATIENT
Start: 2025-01-15 | End: 2025-01-16

## 2025-01-15 RX ORDER — PROCHLORPERAZINE 25 MG
12.5 SUPPOSITORY, RECTAL RECTAL EVERY 12 HOURS PRN
Status: DISCONTINUED | OUTPATIENT
Start: 2025-01-15 | End: 2025-01-24 | Stop reason: HOSPADM

## 2025-01-15 RX ADMIN — BUDESONIDE 0.5 MG: 0.5 INHALANT RESPIRATORY (INHALATION) at 19:12

## 2025-01-15 RX ADMIN — AMIODARONE HYDROCHLORIDE 400 MG: 200 TABLET ORAL at 23:09

## 2025-01-15 RX ADMIN — HEPARIN SODIUM 1500 UNITS/HR: 10000 INJECTION, SOLUTION INTRAVENOUS at 06:34

## 2025-01-15 RX ADMIN — IPRATROPIUM BROMIDE AND ALBUTEROL SULFATE 3 ML: .5; 3 SOLUTION RESPIRATORY (INHALATION) at 11:36

## 2025-01-15 RX ADMIN — FUROSEMIDE 40 MG: 10 INJECTION, SOLUTION INTRAMUSCULAR; INTRAVENOUS at 12:42

## 2025-01-15 RX ADMIN — METOPROLOL TARTRATE 50 MG: 50 TABLET, FILM COATED ORAL at 21:52

## 2025-01-15 RX ADMIN — DILTIAZEM HYDROCHLORIDE 30 MG: 30 TABLET, FILM COATED ORAL at 15:28

## 2025-01-15 RX ADMIN — PIPERACILLIN AND TAZOBACTAM 4.5 G: 4; .5 INJECTION, POWDER, FOR SOLUTION INTRAVENOUS at 18:15

## 2025-01-15 RX ADMIN — METHYLPREDNISOLONE SODIUM SUCCINATE 62.5 MG: 125 INJECTION, POWDER, FOR SOLUTION INTRAMUSCULAR; INTRAVENOUS at 06:35

## 2025-01-15 RX ADMIN — IPRATROPIUM BROMIDE AND ALBUTEROL SULFATE 3 ML: .5; 3 SOLUTION RESPIRATORY (INHALATION) at 16:10

## 2025-01-15 RX ADMIN — DILTIAZEM HYDROCHLORIDE 60 MG: 30 TABLET, FILM COATED ORAL at 01:06

## 2025-01-15 RX ADMIN — IPRATROPIUM BROMIDE AND ALBUTEROL SULFATE 3 ML: .5; 3 SOLUTION RESPIRATORY (INHALATION) at 19:12

## 2025-01-15 RX ADMIN — DILTIAZEM HYDROCHLORIDE 30 MG: 30 TABLET, FILM COATED ORAL at 20:58

## 2025-01-15 RX ADMIN — METOPROLOL TARTRATE 2.5 MG: 5 INJECTION INTRAVENOUS at 18:21

## 2025-01-15 RX ADMIN — DOXYCYCLINE 100 MG: 100 INJECTION, POWDER, LYOPHILIZED, FOR SOLUTION INTRAVENOUS at 08:49

## 2025-01-15 RX ADMIN — DILTIAZEM HYDROCHLORIDE 60 MG: 30 TABLET, FILM COATED ORAL at 06:47

## 2025-01-15 RX ADMIN — PROCHLORPERAZINE MALEATE 5 MG: 5 TABLET ORAL at 12:45

## 2025-01-15 RX ADMIN — ONDANSETRON 4 MG: 2 INJECTION, SOLUTION INTRAMUSCULAR; INTRAVENOUS at 08:44

## 2025-01-15 RX ADMIN — PIPERACILLIN AND TAZOBACTAM 4.5 G: 4; .5 INJECTION, POWDER, FOR SOLUTION INTRAVENOUS at 00:47

## 2025-01-15 RX ADMIN — METHYLPREDNISOLONE SODIUM SUCCINATE 62.5 MG: 125 INJECTION, POWDER, FOR SOLUTION INTRAMUSCULAR; INTRAVENOUS at 12:41

## 2025-01-15 RX ADMIN — IPRATROPIUM BROMIDE AND ALBUTEROL SULFATE 3 ML: .5; 3 SOLUTION RESPIRATORY (INHALATION) at 23:52

## 2025-01-15 RX ADMIN — PIPERACILLIN AND TAZOBACTAM 4.5 G: 4; .5 INJECTION, POWDER, FOR SOLUTION INTRAVENOUS at 12:45

## 2025-01-15 RX ADMIN — PROCHLORPERAZINE EDISYLATE 5 MG: 5 INJECTION INTRAMUSCULAR; INTRAVENOUS at 20:57

## 2025-01-15 RX ADMIN — PANTOPRAZOLE SODIUM 40 MG: 40 INJECTION, POWDER, FOR SOLUTION INTRAVENOUS at 15:15

## 2025-01-15 RX ADMIN — AMIODARONE HYDROCHLORIDE 400 MG: 200 TABLET ORAL at 08:55

## 2025-01-15 RX ADMIN — METHYLPREDNISOLONE SODIUM SUCCINATE 62.5 MG: 125 INJECTION, POWDER, FOR SOLUTION INTRAMUSCULAR; INTRAVENOUS at 18:15

## 2025-01-15 RX ADMIN — METOCLOPRAMIDE 5 MG: 5 TABLET ORAL at 15:38

## 2025-01-15 RX ADMIN — IPRATROPIUM BROMIDE AND ALBUTEROL SULFATE 3 ML: .5; 3 SOLUTION RESPIRATORY (INHALATION) at 07:48

## 2025-01-15 RX ADMIN — PIPERACILLIN AND TAZOBACTAM 4.5 G: 4; .5 INJECTION, POWDER, FOR SOLUTION INTRAVENOUS at 06:36

## 2025-01-15 ASSESSMENT — ACTIVITIES OF DAILY LIVING (ADL)
ADLS_ACUITY_SCORE: 74
ADLS_ACUITY_SCORE: 74
ADLS_ACUITY_SCORE: 70
ADLS_ACUITY_SCORE: 74
ADLS_ACUITY_SCORE: 70
ADLS_ACUITY_SCORE: 70
ADLS_ACUITY_SCORE: 74
ADLS_ACUITY_SCORE: 70
ADLS_ACUITY_SCORE: 74
ADLS_ACUITY_SCORE: 70
ADLS_ACUITY_SCORE: 74
ADLS_ACUITY_SCORE: 79
DEPENDENT_IADLS:: INDEPENDENT
ADLS_ACUITY_SCORE: 74
ADLS_ACUITY_SCORE: 74
ADLS_ACUITY_SCORE: 70
ADLS_ACUITY_SCORE: 74
ADLS_ACUITY_SCORE: 70
ADLS_ACUITY_SCORE: 74
ADLS_ACUITY_SCORE: 70
ADLS_ACUITY_SCORE: 74
ADLS_ACUITY_SCORE: 74

## 2025-01-15 NOTE — PLAN OF CARE
"Patient Name: Sol  MRN: [id number]  Date of Admission: 1/11/2025  Reason for Admission: DVT, PE   [place] [facility] [place]     [id number]    Vitals:   BP Readings from Last 1 Encounters:   01/15/25 101/74     Pulse Readings from Last 1 Encounters:   01/15/25 115     Wt Readings from Last 1 Encounters:   01/15/25 72 kg (158 lb 11.7 oz)     Ht Readings from Last 1 Encounters:   07/08/24 1.778 m (5' 10\")     Estimated body mass index is 22.78 kg/m  as calculated from the following:    Height as of 7/8/24: 1.778 m (5' 10\").    Weight as of this encounter: 72 kg (158 lb 11.7 oz).  Temp Readings from Last 1 Encounters:   01/15/25 98.3  F (36.8  C) (Oral)       Pain: denied pain    CV Surgery Patient: No    Assessment    Resp: LS clear/dim on HFNC 80% 45L. Congested, non produtive cough. RUBY  Telemetry: Afib RVR 100s-110s  Neuro: A&Ox4, intact   GI/: +BS, +flatus, x2 loose incontinent BM this shift. AUO thru lamb.   Skin/Wounds:  Scattered bruising, blanchable redness to ears, mepis in place. Edema +1-2 back, flank, arms, hips. +2-3 BLE  Lines/Drains:  L PIV x2, lower PIV infusing heparin @1500 units/hr, upper PIV infusing int abx  Activity: A1 pivot to commode   Sleep: 7-8 hours between cares   Abnormal Labs: HepXA 0.72, Cr 1.2, Ca 8.6, Hgb 11.3    Aggression Stop Light: Green          Patient Care Plan: Encourage activity/OOB as able, pulm hygiene.   "

## 2025-01-15 NOTE — CONSULTS
Care Management Initial Consult    General Information  Assessment completed with: Patient,    Type of CM/SW Visit: Initial Assessment    Primary Care Provider verified and updated as needed: Yes   Readmission within the last 30 days: no previous admission in last 30 days      Reason for Consult: discharge planning  Advance Care Planning: Advance Care Planning Reviewed: no concerns identified          Communication Assessment  Patient's communication style: spoken language (English or Bilingual)    Hearing Difficulty or Deaf: other (see comments) (unable to respond)   Wear Glasses or Blind: other (see comments) (unable to respond)    Cognitive  Cognitive/Neuro/Behavioral: WDL  Level of Consciousness: alert  Arousal Level: opens eyes spontaneously  Orientation: oriented x 4  Mood/Behavior: calm  Best Language: 0 - No aphasia  Speech: logical, spontaneous    Living Environment:   People in home: alone     Current living Arrangements: house      Able to return to prior arrangements: no       Family/Social Support:  Care provided by: self  Provides care for: no one  Marital Status:   Support system: Children          Description of Support System: Involved    Support Assessment: Adequate family and caregiver support    Current Resources:   Patient receiving home care services: No        Community Resources: None  Equipment currently used at home: none  Supplies currently used at home: None    Employment/Financial:  Employment Status: employed full-time        Financial Concerns: none   Referral to Financial Worker: No       Does the patient's insurance plan have a 3 day qualifying hospital stay waiver?  No    Lifestyle & Psychosocial Needs:  Social Drivers of Health     Food Insecurity: Unknown (1/11/2025)    Food Insecurity     Within the past 12 months, did you worry that your food would run out before you got money to buy more?: Patient unable to answer     Within the past 12 months, did the food you bought  just not last and you didn t have money to get more?: Patient unable to answer   Depression: Not at risk (1/9/2025)    PHQ-2     PHQ-2 Score: 1   Housing Stability: Unknown (1/11/2025)    Housing Stability     Do you have housing? : Patient unable to answer     Are you worried about losing your housing?: Patient unable to answer   Tobacco Use: High Risk (1/9/2025)    Patient History     Smoking Tobacco Use: Every Day     Smokeless Tobacco Use: Never     Passive Exposure: Not on file   Financial Resource Strain: Unknown (1/11/2025)    Financial Resource Strain     Within the past 12 months, have you or your family members you live with been unable to get utilities (heat, electricity) when it was really needed?: Patient unable to answer   Alcohol Use: Not on file   Transportation Needs: Unknown (1/11/2025)    Transportation Needs     Within the past 12 months, has lack of transportation kept you from medical appointments, getting your medicines, non-medical meetings or appointments, work, or from getting things that you need?: Patient unable to answer   Physical Activity: Not on file   Interpersonal Safety: Unknown (1/11/2025)    Interpersonal Safety     Do you feel physically and emotionally safe where you currently live?: Patient unable to answer     Within the past 12 months, have you been hit, slapped, kicked or otherwise physically hurt by someone?: Patient unable to answer     Within the past 12 months, have you been humiliated or emotionally abused in other ways by your partner or ex-partner?: Patient unable to answer   Stress: Not on file   Social Connections: Not on file   Health Literacy: Not on file       Functional Status:  Prior to admission patient needed assistance:   Dependent ADLs:: Independent  Dependent IADLs:: Independent       Mental Health Status:  Mental Health Status: No Current Concerns       Chemical Dependency Status:  Chemical Dependency Status: No Current Concerns              Values/Beliefs:  Spiritual, Cultural Beliefs, Oriental orthodox Practices, Values that affect care: no               Discussed  Partnership in Safe Discharge Planning  document with patient/family: No    Additional Information:  SW/CM consult for discharge planning and elevated risk score. SW reviewed chart. PT/OT recommend TCU. SW met with pt. He is still on high flow O2. Pt lives alone with his dog in an apartment in [place], [place]. He reports his wife  from Covid. His son lives in [place]. Pt reports he is independent and able to care for himself. He drives and works full-time at a [employer]. He anticipates still working for another 2 years before retiring. His [employer] plan is through his employer. Confirmed his PCP is Dr East. Discussed the recommendation for TCU. Pt is in agreement. SW provided him with a list of TCU's in his area. Pt is familiar with [facility] and this would be his first choice.     Next Steps: Send TCU referrals when off high flow O2 and medically ready. RANDALL following for discharge planning.     SHELDON Hernadez, LICSW  [phone] Desk phone  [phone] Cell/text (Preferred)  [facility]

## 2025-01-15 NOTE — PLAN OF CARE
Goal Outcome Evaluation:    0700-1900    Patient Name: Sol  MRN: [id number]  Date of Admission: 1/11/2025  Reason for Admission: DVT, PE, pneumonia, COPD exacerbation, CHF, LAI    Level of Care: [facility]  /72   Pulse 112   Temp 97.4  F (36.3  C) (Oral)   Resp 12   Wt 72.1 kg (159 lb)   SpO2 93%   BMI 22.81 kg/m      Resp: LS clear/dim on HFNC 62% 45L. Congested, non produtive cough. RUBY  Telemetry: Afib RVR 100s-110s, on PO amio, dil, metoprolol  Neuro: A&Ox4, intact   GI/: +BS, +flatus, x1 loose incontinent BM this shift. AUO thru lamb.poor PO intake.  continues with ongoing nausea/emesis despite zofran/compazine, abd xray negative, started on PPI and   prn reglan (use zofran last with QT parameters)     Skin/Wounds: Scattered bruising, blanchable redness to ears, mepis in place.   Edema +1-2 back, flank, arms, hips. +2-3 BLE.  Lines/Drains:  L PIV x2, lower PIV infusing heparin @1400 units/hr, int abx  Activity: A1 to chair, to BSC   Abnormal Labs: HepXA 0.72->0.62, Cr 1.2, Hgb 11.3, repeat swab for covid/influenza neg  Aggression Stop Light: Green  Patient Care Plan: repeat CXR done, trend blood gases. Followed by Cards and Onc/Hemac.   Encourage activity/OOB as able, pulm hygiene.

## 2025-01-15 NOTE — PROGRESS NOTES
[facility]    Medicine Progress Note - Hospitalist Service    Date of Admission:  1/11/2025    Assessment & Plan     Keith Gonzalez is a 69 year old male with PMH of remote b/l DVT (no cause found per pt), off AC, tobacco abuse, emphysema, COPD, osteoporosis, GERD and testicular cancer (remission) who was admitted as a direct admission to intensive care unit from [facility] emergency department for further treatment and evaluation of bilateral pulmonary embolism with acute cor pulmonale, new onset atrial flutter, and extensive lower extremity DVTs.  Critical care was consulted after admission to further assist with acute hypoxic and hypercapnic respiratory failure and to assess patient closely for possible need for intubation.    Acute hypoxic and hypercapnic respiratory failure, multifactorial  Acute Submassive pulmonary embolism w cor pulmonale  Extensive bilateral lower extremity DVTs.  Hx remote DVTs, LLE Dvt 2004, RLE Dvt 2006,  off AC x yrs prior  1/15- stable overall re resp on HFNC o2 at 45lpm, cont heparin for now  -hx Dvt x 2, 2004/2006, pt state no w/u & cause unknown to him. Off Ac x yrs   -Patient arrived initially to ICU from [facility] ED where he has spent 3 days prior to direct admission awaiting facility on heparin and amiodarone infusion.  -- prev intensivist, vascular and interventional radiology after admission.  --Case was also discussed with interventional radiology regarding clot burden and PEs who do not recommend any intervention and also recommended anticoagulation with heparin infusion.  --Vascular surgery is recommending no intervention at this point for lower extremity extensive DVT and recommending to continue current heparin infusion.  --Patient has been switched from BiPAP to high flow nasal cannula.  --AC- Continue patient on heparin infusion,  --future DOAC, consult pharmacy liaison and d/w pt re $ on 01/15 (past use of coumadin).  --Cards  consulted  --suspect underlying cor pulmonale with right heart failure secondary to COPD   -- caution w diuresis (defer to cards)  - Hem/Onc consulted, d/t 3rd lifetime DVT, pt state no past cause/ w/u  - am CBC BMP    Acute COPD exacerbation.  HCAP  Dx w both in ICU , see prior notes  1/15- still on 45l HFNC , hard to taper. Consulted and D/w Pulm in detail, likely will take time, ok to stop doxy [id number] tx and if SE risk  - Cont HFNC, wia210%, 40lpm, likely will be slow to wean  - Pulm consulted ongoing HFNC needs, PE/PNA, d/w 1/15 will see 1/16  - CXR 1 v- r/o incr/new infiltrate  - VBG now (similar to past), and am. Check lactate  - Steroids- continues high dose Methpred 62.5 q 6 hrs for now  - Pulm added pulmicort neb 0.5mg BID on 1/15  --ABX-Zosyn q 6 hours (? assoc vomiting)- day 4, goal 5d HCAP per intensivist  == s/p Azithro x2d->doxycycline x5d- COPD exac stopped 1/15 (? Contrib to SE)   ==-note prior to admit on ceftriaxone in outside hospital.  -- DuoNeb every 4 hours scheduled + prn q 2hr avail for whz/SOB?incr o2 need   - tobacco cessation strongly encouraged, pt concurs  - consider  future CT Chest w/o update (vane if worsening resp)  - outpt PFT, COPD eval when improved  - am Cbc BMP    New onset paroxysmal atrial fibrillation with RVR, s/p cardioversion  Acute heart failure with reduced EF, Mild biventricular cardiomyopathy  Type II MI, secondary to demand ischemia, concern for cardiogenic shock.  1/15- Currently w/o CP, cards managing rx, Amio - CXR 1 v- no infiltrate, incr pleural effusions and volume signs,   --s/p dccv x2 on admission for flutter, now in a fib . [id number] 3  --Echo 1/09/25 EF 46%, normal LV, mild lateral hypokinesis.  Mild RV dilate w reduce RV fxn  --Cardiology following, highly appreciate and managing antiarrhythmics  --New amiodarone gtt. for 48 hrs-->PO,-- card managing PO transition    --diltiazem 30 mg p.o  QID 1/12/25--> 60 mg p.o. 4 times daily.  - 1/14 started metoprolol  25mg BID  --Cards recommending to hold furosemide due to LAI.  - see CXR 1 v 1/15 w incr vol signs,  Defer to cards re lasix resumption   --Continue to monitor patient on telemetry  --am CBC BMP, Mag K protocol    Tobacco abuse  PTA smoking 4 cigarettes a day.  Quit x 8 months last yr.  Discussed smoking as recurrent DVT contribution risk .  Willing to quit.  No cravings now, declines need for rx now  -Smoking cessation strongly encouraged , and to lower DVT risk  -declines nicotine patch  -Nicotine gum/lozenge added as needed craving  -PCP follow-up  -Declines outpatient smoking cessation meds    Severe nausea, retching ? Med /abx related  1/15- main sx today if profound nausea, retching. Thinks from meds, abx vs amiod. Will add Reglan as 1st prn, compazine 2nd, and zofran 3rd (risk re Qtc)  - Abx XR 1 v- NO SBP signs, free air  - see above, stop doxy (completed tx)  - ? Related to Amiodarone, but needs as above  - readd PPI 40mg IV QD (lost after ICU)  - trail Reglan 1st prn  - compazine as 2nd prn  - zofran last Prn (QT risk)  - am recheck formal ECG Qtc    Leukocytosis, resolved  Most likely secondary to stress reaction in the setting of cardiac/respiratory issues, patient has been getting treated for possible hospital-acquired pneumonia.   1/14- WBC decr 11.3 on abx.   1/15- WBC 10.4 WNL now, afebrile  - ABX as above,Zosyn, stop Doxy, as above-- finish 5-day course.  - CXR 1 v- no infiltrate, incr pleural effusions and volume signs,   - am CBC    Acute kidney injury, suspect prerenal. resolved  Hypocalcemia, resolved  Metabolic alkalosis, resolved  Last baseline PTA 1.1, Admit creatinine was up to 2.18, c/w LAI, from PE/ARF nephrology consulted holding diuresis for now, does not seem fluid overloaded.  1/14- Cr decr to 1.39, renal signed off  1/15- Cr decr 1.20, off IVF, off lasix  - renal followed, signed off 1/14  - tx w IVF  - diuresis held at present, defer to cards if/when to resume    Transaminitis    --LFT minimally elevated alt 156, ast 59 bilirubin is 0.2  --likely related to multifactorial stressors .   --recheck      GERD  --40mg Protonix iv Qd resumed 1/15 w nausea sx     Hx of testicular cancer   --review of record with distant history, in remission     Hx of iron deficiency anemia   --hold ferrous sulfate   --most recent hgb on 1/9/2025 stable 13.1  --awaiting admission cbc results      Osteoporosis  Hx of compression fractures    - APAP prn pain    Tobacco abuse with continued use, see above     Acute metabolic encephalopathy: Resolved  Most likely was secondary to profound hypercapnia and respiratory acidosis, initially was a started on BiPAP now has been switched to high flow nasal cannula  --Continue to monitor, will order physical and Occupational Therapy evaluation.    Diet: Regular Diet Adult    DVT Prophylaxis: Heparin infusion  Dunn Catheter: PRESENT, indication: Acute retention or obstruction  Lines: None     Cardiac Monitoring: ACTIVE order. Indication: Tachyarrhythmias, acute (48 hours)  Code Status: Full Code      Clinically Significant Risk Factors               # Hypoalbuminemia: Lowest albumin = 3 g/dL at 1/13/2025  4:07 AM, will monitor as appropriate  # Coagulation Defect: INR = 1.25 (Ref range: 0.85 - 1.15) and/or PTT = N/A, will monitor for bleeding          # Acute Hypercapnic Respiratory Failure: based on arterial blood gas results.  Continue supplemental oxygen and ventilatory support as indicated.  # Acute Hypercapnic Respiratory Failure: based on venous blood gas results.  Continue supplemental oxygen and ventilatory support as indicated.             # COPD: noted on problem list        Social Drivers of Health    Food Insecurity: Unknown (1/11/2025)    Food Insecurity     Within the past 12 months, did you worry that your food would run out before you got money to buy more?: Patient unable to answer     Within the past 12 months, did the food you bought just not last and you  didn t have money to get more?: Patient unable to answer   Housing Stability: Unknown (1/11/2025)    Housing Stability     Do you have housing? : Patient unable to answer     Are you worried about losing your housing?: Patient unable to answer   Tobacco Use: High Risk (1/9/2025)    Patient History     Smoking Tobacco Use: Every Day     Smokeless Tobacco Use: Never   Financial Resource Strain: Unknown (1/11/2025)    Financial Resource Strain     Within the past 12 months, have you or your family members you live with been unable to get utilities (heat, electricity) when it was really needed?: Patient unable to answer   Transportation Needs: Unknown (1/11/2025)    Transportation Needs     Within the past 12 months, has lack of transportation kept you from medical appointments, getting your medicines, non-medical meetings or appointments, work, or from getting things that you need?: Patient unable to answer   Interpersonal Safety: Unknown (1/11/2025)    Interpersonal Safety     Do you feel physically and emotionally safe where you currently live?: Patient unable to answer     Within the past 12 months, have you been hit, slapped, kicked or otherwise physically hurt by someone?: Patient unable to answer     Within the past 12 months, have you been humiliated or emotionally abused in other ways by your partner or ex-partner?: Patient unable to answer          Disposition Plan     Medically Ready for Discharge: Anticipated in 2-4 Days     Time - I spent 50 minutes w greater than 50% spent face to face counseling and including coordination of care    Pt was staffed and discussed in detail w Dr Israel,   who also saw the patient today.The above assessment and plan is the product of our joint decision making, please see their addendum note for any additional details and changes.     D/w Dr Salazar via phone, appreciate recs.    Woo Fernandez PA-C  Hospitalist Service  [facility]  Securely message  with Donny (more info)  Text page via [facility] Paging/Directory   ______________________________________________________________________    Interval History     -Multiple calls today from nurse.  Persistent nausea.    -Seen in the afternoon, states that his shortness of breath states he feels about the same. He remains on high flow nasal cannula at FiO2 80% and 45 L, really has not changed-Hated using the BiPAP before, it was hard to get a fit with his beard and he does not want that shaved.  He understands that if he worsens we would have to go back to it.  Really wants to avoid intubation of course which I concur with.Does not report any increased cough, or fever or chills  -Profound nausea today is his main concern..  Occasional retching.  He thinks is due to meds.  States he is passing gas and having bowel movements and no abdominal pain.  does not offer any other new symptoms.    - Discussed with pulmonology via phone  -Will update family    Physical Exam   Vital Signs: Temp: 98.3  F (36.8  C) Temp src: Oral BP: 101/74 Pulse: 115   Resp: 12 SpO2: 91 % O2 Device: High Flow Nasal Cannula (HFNC) Oxygen Delivery: 45 LPM  Weight: 159 lbs 0 oz    Lines, PIV , HFNC, Hep, amiodarone Zosyn, drip.    Constitutional: vs as above and/or per EMR  General:  adult pt lying in bed without acute distress  HEENT: NC/AT,    Neck: w/o JVD, supple  CV :S1S2, w/o obvious murmur  Resp: O2 via HFNC, FiO2 80%, 45lpm rate Spo2 90, chest rise unlabored, similar lung exam with scattered rhonchi.  But no wheezes.  GI:  soft, nontender, nondistended.  Retching on and off during visit  Ext: MAEW, no lower edema or mottling  Skin: no obvious rashes on exposed skin  Lymph: defer  Musculoskeletal: no bony joint deformities  Neuro: gross exam nonfocal, faces symmetric   Psych: calm, pleasant, no obvious confusion on discussion today.  Able to give history.    Data:   Recent Labs   Lab Test 01/15/25  1615 01/15/25  0744 01/15/25  0508  01/14/25  0755 01/14/25  0602 01/11/25  1047 01/11/25  0918   WBC  --   --  10.4  --  11.3*   < > 15.7*   HGB  --   --  11.3*  --  11.0*   < > 13.0*   MCV  --   --  88  --  87   < > 95   PLT  --   --  166  --  159   < > 197   NA  --   --  139  --  140   < > 138   POTASSIUM  --   --  4.4  --  4.6   < > 5.3   CHLORIDE  --   --  100  --  101   < > 101   CO2  --   --  34*  --  31*   < > 25   ANIONGAP  --   --  5*  --  8   < > 12   * 122* 128*   < > 147*   < > 121*   BUN  --   --  45.3*  --  53.2*   < > 56.4*   CR  --   --  1.20*  --  1.39*   < > 2.18*   GFRESTIMATED  --   --  66  --  55*   < > 32*   DAT  --   --  8.6*  --  8.3*   < > 7.9*   MAG  --   --  2.3  --  2.3   < > 2.1   AST  --   --  26  --   --   --  59*   ALT  --   --  63  --   --   --  156*   ALKPHOS  --   --  73  --   --   --  115   PROTTOTAL  --   --  5.4*  --   --   --  5.8*   ALBUMIN  --   --  3.1*  --   --    < > 3.2*   BILITOTAL  --   --  0.5  --   --   --  0.2    < > = values in this interval not displayed.        Medical Decision Making       60 MINUTES SPENT BY ME on the date of service doing chart review, history, exam, documentation & further activities per the note.      Data     I have personally reviewed the following data over the past 24 hrs:    10.4  \   11.3 (L)   / 166     139 100 45.3 (H) /  122 (H)   4.4 34 (H) 1.20 (H) \     INR:  1.25 (H) PTT:  N/A   D-dimer:  N/A Fibrinogen:  N/A       Imaging results reviewed over the past 24 hrs:   No results found for this or any previous visit (from the past 24 hours).    XR CHEST PORT 1 VIEW 1/15/2025                                                      IMPRESSION: Small right and moderate left pleural effusions have increased in size from previous. Increased consolidation/atelectasis left lower lobe. Pulmonary vascular congestion noted.    XR ABDOMEN PORT 1 VIEW : 1/15/2025  IMPRESSION: Negative abdomen. Bowel gas pattern is normal. Nothing for obstruction or free air. No evidence for  renal stones.     XR Chest Port 1 View 1/12/2025  IMPRESSION: Stable cardiomegaly with pulmonary vascular congestion, interstitial pulmonary edema and small bilateral pleural effusions. No large confluent airspace opacities or pneumothorax.    XR Chest Port 1 View 1/11/2025 INDICATION: acute hypercapnia COMPARISON: 7/31/2023   IMPRESSION: Heart size is enlarged. Moderate pulmonary edema pattern has developed in the interval. Dense retrocardiac consolidation may represent superimposed pneumonia or aspiration versus confluent edema. Small to moderate bilateral pleural effusions.  No pneumothorax.       CT CHEST PULMONARY EMBOLISM W CONTRAST  LOCATION: [facility]  DATE: 1/9/2025  IMPRESSION:  1.  Exam is positive for pulmonary emboli. Moderate burden of emboli predominantly involving right lung.  2.  RV/LV ratio greater than 1 suggesting right heart strain.  3.  Small bilateral pleural effusions. Scattered nonspecific small subpleural opacities, can't exclude small peripheral lung infarct.  4.  Emphysema

## 2025-01-15 NOTE — PLAN OF CARE
Goal Outcome Evaluation:      Plan of Care Reviewed With: patient          Outcome Evaluation: Pt's goal is TCU

## 2025-01-15 NOTE — PROGRESS NOTES
.Notified provider about indwelling lamb catheter discussed removal or continued need.    Did provider choose to remove indwelling lamb catheter? No    Provider's lamb indication for keeping indwelling lamb catheter: Acute retention or obstruction.    Is there an order for indwelling lamb catheter? Yes    *If there is a plan to keep lamb catheter in place at discharge daily notification with provider is not necessary, but please add a notation in the treatment team sticky note that the patient will be discharging with the catheter.

## 2025-01-15 NOTE — PROGRESS NOTES
[facility]  Cardiology Progress Note    Date of Service (when I saw the patient): 01/15/2025  Primary Cardiologist: will establish at [facility]        Interval History:   SOB is about the same. No new changes.     ----------------------------------------------------------------------------------------    Assessment:  Keith Gonzalez is a 69 year old male who was admitted on 1/11/2025 with submassive PE and COPD exacerbation. Cardiology was consulted for A fib RVR and mild biV cardiomyopathy noted on TTE.     # Paroxysmal Atrial fibrillation/flutter  -- s/p dccv x2 on admission for flutter, now in a fib  -- remains on Diltiazem 60 mg q6h and PO amiodarone   -- also started on PO metoprolol tartrate 25 mg BID on 1/14  -- CHADSVASC score of 3- on heparin at this time - heme consulted for further guidance given prior multiple DVT hx     # Mild BiV Cardiomyopathy   -- LVEF 46%; mild RV dysfunction and dilation  -- on low dose CCB    # Submassive PE with brendan DVT  -- on heparin   -- IR recommended no intervention    # COPD exacerbation with cor pulmonale     # Acute hypoxic and hypercapnic Resp Failure secondary to PE and COPD    # LAI, improved  -- diuretics held briefly     # Active tobacco dependence     # Hx of testicular cancer    ----------------------------------------------------------------------------------------    Plan:  -- Continue with PO amiodarone   -- Increase metoprolol tartrate to 50 mg BID   -- Decrease diltiazem to 30 mg q6h   -- Resume diuresis in the form of IV furosemide 40 mg BID   -- We will plan for repeat TTE on 1/17   -- cardiology will continue to follow along       ----------------------------------------------------------------------------------------  Physical Exam   Temp: 98.3  F (36.8  C) Temp src: Oral BP: 101/74 Pulse: 115   Resp: 12 SpO2: 91 % O2 Device: High Flow Nasal Cannula (HFNC) Oxygen Delivery: 45 LPM  Vitals:    01/14/25 0600 01/15/25 0600  01/15/25 0816   Weight: 73.1 kg (161 lb 2.5 oz) 72 kg (158 lb 11.7 oz) 72.1 kg (159 lb)     GEN:  NAD. Oxygen per hfnc  HEENT: Mucous membranes moist.  NECK:  + JVD.  C/V:  Irregulary irregular rhythm with fast rate, no murmur, rub or gallop.   RESP: CTA with no wheezing.  GI: Abdomen soft, nontender, nondistended.    EXTREM: 2+ pitting LE edema.   NEURO: Alert and oriented, cooperative.   PSYCH: Normal affect.  SKIN: Warm and dry.   VASC: 2+ radial and dorsalis pedis pulses bilaterally.      Medications   Current Facility-Administered Medications   Medication Dose Route Frequency Provider Last Rate Last Admin    heparin 25,000 units in 0.45% NaCl 250 mL ANTICOAGULANT infusion  0-5,000 Units/hr Intravenous Continuous Shila Israel MD 14 mL/hr at 01/15/25 0837 1,400 Units/hr at 01/15/25 0837    Patient is already receiving anticoagulation with heparin, enoxaparin (LOVENOX), warfarin (COUMADIN)  or other anticoagulant medication   Does not apply Continuous PRN Shila Israel MD        Patient may continue current oral medications   Does not apply Continuous PRN Shila Israel MD         Current Facility-Administered Medications   Medication Dose Route Frequency Provider Last Rate Last Admin    [START ON 1/27/2025] amiodarone (PACERONE) tablet 200 mg  200 mg Oral Daily Rahel Cárdenas PA-C        amiodarone (PACERONE) tablet 400 mg  400 mg Oral BID Rahel Cárdenas PA-C   400 mg at 01/15/25 0855    [START ON 1/21/2025] amiodarone (PACERONE) tablet 400 mg  400 mg Oral Daily Rahel Cárdenas PA-C        diltiazem (CARDIZEM) tablet 60 mg  60 mg Oral Q6H Thania Hennessy MD   60 mg at 01/15/25 0647    doxycycline (VIBRAMYCIN) 100 mg vial to attach to  mL bag  100 mg Intravenous BID Shila Israel  mL/hr at 01/13/25 0835 100 mg at 01/15/25 0849    insulin aspart (NovoLOG) injection (RAPID ACTING)   Subcutaneous TID w/meals Shila Israel, MD   11 Units at 01/14/25 0804    insulin aspart  (NovoLOG) injection (RAPID ACTING)  1-10 Units Subcutaneous TID AC Shila Israel MD   4 Units at 01/14/25 1249    insulin aspart (NovoLOG) injection (RAPID ACTING)  1-7 Units Subcutaneous At Bedtime Shila Israel MD        ipratropium - albuterol 0.5 mg/2.5 mg/3 mL (DUONEB) neb solution 3 mL  3 mL Nebulization Q4H Shila Israel MD   3 mL at 01/15/25 0748    methylPREDNISolone Na Suc (solu-MEDROL) injection 62.5 mg  62.5 mg Intravenous Q6H Shila Israel MD   62.5 mg at 01/15/25 0635    metoprolol tartrate (LOPRESSOR) tablet 25 mg  25 mg Oral BID Rahel Cárdenas PA-C   25 mg at 01/14/25 2329    piperacillin-tazobactam (ZOSYN) 4.5 g vial to attach to  mL bag  4.5 g Intravenous Q6H Shila Israel MD   4.5 g at 01/15/25 0636    sodium chloride (PF) 0.9% PF flush 3 mL  3 mL Intracatheter Q8H Shila Israel MD   3 mL at 01/14/25 2324       Data   Reviewed      Rahel Cárdenas PA-C   1/15/2025  Pager: Donny

## 2025-01-15 NOTE — CONSULTS
[facility] Hematology / Oncology  Progress Note  Name: Keith Gonzalez  :  1955  MRN:  [id number]    --------------------    Assessment / Plan:  Unprovoked, bilateral pulmonary embolism with moderate clot burden and right heart strain.  Atrial fibrillation/atrial flutter secondary to above.  Status post DCCV requiring ongoing medical management.  Bilateral DVTs, unclear acute versus chronic given past history of bilateral DVTs  and .  History of left lower extremity DVT, unprovoked .  Completed some period of anticoagulation with Coumadin before discontinuing due to insurance issues.  History of right lower extremity DVT, unprovoked .  Completed some period of anticoagulation with Coumadin before discontinuing due to insurance issues.  Negative family history for venous thromboembolic disease.  History of testicular cancer treated with orchiectomy and adjuvant radiotherapy .  Cor Pulmonale suspect 2/2 pulmonary hypertension 2/2 COPD.  Biventricular cardiomyopathy.    Recommend indefinite anticoagulation for recurrent venous thromboembolic disease.  Previously, should have remained on indefinite anticoagulation for unprovoked DVTs, but financial coverage and lack of insurance with job loss represented barriers to continued anticoagulation  Choice of anticoagulant likely driven by insurance coverage, cost the patient and potential medication interactions with cardiac medications, but DOAC would certainly be preferable.  No role for hypercoaguable testing as would not change medical management at this time for indefinite AC.  Okay to switch from heparin to oral AC once procedures completed.  Once clinically stable, CT of abdomen and pelvis to rule out recurrent malignancy or vascular compromise from prior pelvic radiation.  Added on testicular cancer markers to AM labs with subsequent, but low suspicion.    Michael Boswell MD    --------------------    Interval History:  Keith  presents for consultation regarding PE.  His history of venous thromboembolic disease dates back to 2004 when he developed an unprovoked left lower extremity DVT extending from the proximal super Yolanda femoral vein to the popliteal vein.  He completed an unknown duration of chronic anticoagulation with Coumadin before self discontinuing as he lost his job and insurance.  In 2060 that experienced the right lower extremity DVT extending from the common femoral vein to the popliteal at which point again he completed an unknown course/duration of chronic anticoagulation with Coumadin again before self discontinuing as he lost his job and insurance.  Of note, he did not have follow-up ultrasounds to document resolution or persistence of these clots.  There is no family history of venous thromboembolic disease.  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  He was smoking at that time as well.  Fast forward to recently when he developed worsening shortness of breath and chest pain.  In [place] emergency room, he was found to have bilateral PEs with moderate clot burden and signs of right heart failure he was.  He was started on heparin.  He unfortunately developed atrial fibrillation and flutter requiring DCCV.  Ultrasounds of the bilateral lower extremities revealed DVTs in both legs extending from the femoral vein to the popliteal to the posterior tibial and peroneal veins.  There is occlusive thrombus in the femoral and popliteal veins.  There was nonocclusive clot in the posterior tibial and peroneal veins.  He has been evaluated for thrombectomy with no plans at this time.  He does have a remote history of testicular cancer in 1993 and adjuvant radiotherapy.    --------------------    Review of Systems:  10 point ROS negative except for that above.    Past Medical / Surgical History:  Past Medical History:    Diagnosis Date    Acute septic pulmonary embolism with acute cor pulmonale (H) 01/11/2025    Cancer (H)     COPD (chronic obstructive pulmonary disease) (H)      Past Surgical History:   Procedure Laterality Date    DAVINCI HERNIORRHAPHY INGUINAL Left 10/28/2021    Procedure: Robotic assisted left inguinal hernia repair with mesh;  Surgeon: Gladys Whitehead MD;  Location: [facility] OR    ESOPHAGOSCOPY, GASTROSCOPY, DUODENOSCOPY (EGD), COMBINED N/A 9/13/2022    Procedure: ESOPHAGOGASTRODUODENOSCOPY, WITH BIOPSY;  Surgeon: Doyle Corbin MD;  Location: [facility] GI    LAPAROSCOPIC LYSIS ADHESIONS N/A 10/28/2021    Procedure: Laparoscopic lysis adhesions;  Surgeon: Gladys Whitehead MD;  Location: [facility] OR     Patient Active Problem List   Diagnosis    Chronic obstructive pulmonary disease (H)    history of VTE, R, L LE's    SI (sacroiliac) joint dysfunction    Lumbar pain    Scoliosis of thoracolumbar spine, unspecified scoliosis type    Age-related osteoporosis without current pathological fracture    History of testicular cancer    Aortic atherosclerosis    History of compression fracture of spine    Gastroesophageal reflux disease with esophagitis without hemorrhage    Kyphosis (acquired) (postural)    Cor pulmonale (H)    Acute septic pulmonary embolism with acute cor pulmonale (H)    Acute respiratory failure with hypoxia (H)    Acute bilateral deep vein thrombosis (DVT) of femoral veins (H)       Family History:  No family history on file.    Social History:  Social History     Tobacco Use    Smoking status: Every Day     Current packs/day: 1.00     Types: Cigarettes    Smokeless tobacco: Never   Vaping Use    Vaping status: Never Used   Substance Use Topics    Alcohol use: No    Drug use: No       Medications / Allergies:  Reviewed in EMR.    --------------------    Physical Exam:  VS: BP (!) 81/62   Pulse 102   Temp 97.4  F (36.3  C) (Oral)   Resp 12   Wt 72.1 kg (159 lb)   SpO2 96%   BMI 22.81 kg/m    GEN: Well  appearing.    Labs / Imaging / Path:  Reviewed CBC, CMP.  Reviewed CTA w/ independent review.  Reviewed BLE US.

## 2025-01-16 ENCOUNTER — APPOINTMENT (OUTPATIENT)
Dept: CARDIOLOGY | Facility: CLINIC | Age: 70
DRG: 175 | End: 2025-01-16
Attending: STUDENT IN AN ORGANIZED HEALTH CARE EDUCATION/TRAINING PROGRAM
Payer: COMMERCIAL

## 2025-01-16 ENCOUNTER — APPOINTMENT (OUTPATIENT)
Dept: OCCUPATIONAL THERAPY | Facility: CLINIC | Age: 70
DRG: 175 | End: 2025-01-16
Attending: INTERNAL MEDICINE
Payer: COMMERCIAL

## 2025-01-16 ENCOUNTER — APPOINTMENT (OUTPATIENT)
Dept: CT IMAGING | Facility: CLINIC | Age: 70
DRG: 175 | End: 2025-01-16
Attending: STUDENT IN AN ORGANIZED HEALTH CARE EDUCATION/TRAINING PROGRAM
Payer: COMMERCIAL

## 2025-01-16 VITALS
WEIGHT: 161.16 LBS | TEMPERATURE: 97.9 F | OXYGEN SATURATION: 91 % | HEART RATE: 116 BPM | DIASTOLIC BLOOD PRESSURE: 78 MMHG | SYSTOLIC BLOOD PRESSURE: 121 MMHG | RESPIRATION RATE: 13 BRPM | BODY MASS INDEX: 23.12 KG/M2

## 2025-01-16 LAB
AFP SERPL-MCNC: 2.3 NG/ML
ANION GAP SERPL CALCULATED.3IONS-SCNC: 6 MMOL/L (ref 7–15)
ATRIAL RATE - MUSE: 113 BPM
BASE EXCESS BLDV CALC-SCNC: 13 MMOL/L (ref -3–3)
BUN SERPL-MCNC: 41.5 MG/DL (ref 8–23)
CALCIUM SERPL-MCNC: 8.6 MG/DL (ref 8.8–10.4)
CHLORIDE SERPL-SCNC: 98 MMOL/L (ref 98–107)
CREAT SERPL-MCNC: 1.07 MG/DL (ref 0.67–1.17)
DIASTOLIC BLOOD PRESSURE - MUSE: NORMAL MMHG
EGFRCR SERPLBLD CKD-EPI 2021: 76 ML/MIN/1.73M2
ERYTHROCYTE [DISTWIDTH] IN BLOOD BY AUTOMATED COUNT: 14.9 % (ref 10–15)
GLUCOSE BLDC GLUCOMTR-MCNC: 183 MG/DL (ref 70–99)
GLUCOSE BLDC GLUCOMTR-MCNC: 215 MG/DL (ref 70–99)
GLUCOSE SERPL-MCNC: 137 MG/DL (ref 70–99)
HCG-TM SERPL-ACNC: <3 IU/L
HCO3 BLDV-SCNC: 40 MMOL/L (ref 21–28)
HCO3 SERPL-SCNC: 36 MMOL/L (ref 22–29)
HCT VFR BLD AUTO: 36.5 % (ref 40–53)
HEMOCCULT STL QL: POSITIVE
HGB BLD-MCNC: 11.4 G/DL (ref 13.3–17.7)
HGB BLD-MCNC: 11.8 G/DL (ref 13.3–17.7)
HGB BLD-MCNC: 12 G/DL (ref 13.3–17.7)
INR PPP: 1.19 (ref 0.85–1.15)
INTERPRETATION ECG - MUSE: NORMAL
LDH SERPL L TO P-CCNC: 225 U/L (ref 0–250)
LVEF ECHO: NORMAL
MAGNESIUM SERPL-MCNC: 2.1 MG/DL (ref 1.7–2.3)
MCH RBC QN AUTO: 27 PG (ref 26.5–33)
MCHC RBC AUTO-ENTMCNC: 31.2 G/DL (ref 31.5–36.5)
MCV RBC AUTO: 87 FL (ref 78–100)
O2/TOTAL GAS SETTING VFR VENT: 25 %
OXYHGB MFR BLDV: 88 % (ref 70–75)
P AXIS - MUSE: NORMAL DEGREES
PCO2 BLDV: 62 MM HG (ref 40–50)
PH BLDV: 7.42 [PH] (ref 7.32–7.43)
PHOSPHATE SERPL-MCNC: 3.3 MG/DL (ref 2.5–4.5)
PLATELET # BLD AUTO: 151 10E3/UL (ref 150–450)
PO2 BLDV: 55 MM HG (ref 25–47)
POTASSIUM SERPL-SCNC: 4.1 MMOL/L (ref 3.4–5.3)
PR INTERVAL - MUSE: NORMAL MS
QRS DURATION - MUSE: 116 MS
QT - MUSE: 366 MS
QTC - MUSE: 502 MS
R AXIS - MUSE: 237 DEGREES
RADIOLOGIST FLAGS: ABNORMAL
RBC # BLD AUTO: 4.22 10E6/UL (ref 4.4–5.9)
SAO2 % BLDV: 89.4 % (ref 70–75)
SODIUM SERPL-SCNC: 140 MMOL/L (ref 135–145)
SYSTOLIC BLOOD PRESSURE - MUSE: NORMAL MMHG
T AXIS - MUSE: 180 DEGREES
UFH PPP CHRO-ACNC: 0.65 IU/ML
VENTRICULAR RATE- MUSE: 113 BPM
WBC # BLD AUTO: 10.2 10E3/UL (ref 4–11)

## 2025-01-16 PROCEDURE — 99222 1ST HOSP IP/OBS MODERATE 55: CPT | Performed by: STUDENT IN AN ORGANIZED HEALTH CARE EDUCATION/TRAINING PROGRAM

## 2025-01-16 PROCEDURE — 82805 BLOOD GASES W/O2 SATURATION: CPT | Performed by: PHYSICIAN ASSISTANT

## 2025-01-16 PROCEDURE — 82105 ALPHA-FETOPROTEIN SERUM: CPT | Performed by: INTERNAL MEDICINE

## 2025-01-16 PROCEDURE — 82565 ASSAY OF CREATININE: CPT | Performed by: INTERNAL MEDICINE

## 2025-01-16 PROCEDURE — 71275 CT ANGIOGRAPHY CHEST: CPT

## 2025-01-16 PROCEDURE — 36415 COLL VENOUS BLD VENIPUNCTURE: CPT | Performed by: PHYSICIAN ASSISTANT

## 2025-01-16 PROCEDURE — 250N000013 HC RX MED GY IP 250 OP 250 PS 637: Performed by: PHYSICIAN ASSISTANT

## 2025-01-16 PROCEDURE — 250N000011 HC RX IP 250 OP 636: Performed by: INTERNAL MEDICINE

## 2025-01-16 PROCEDURE — 250N000009 HC RX 250: Performed by: INTERNAL MEDICINE

## 2025-01-16 PROCEDURE — 99233 SBSQ HOSP IP/OBS HIGH 50: CPT | Mod: 25 | Performed by: PHYSICIAN ASSISTANT

## 2025-01-16 PROCEDURE — 255N000002 HC RX 255 OP 636: Performed by: STUDENT IN AN ORGANIZED HEALTH CARE EDUCATION/TRAINING PROGRAM

## 2025-01-16 PROCEDURE — 85041 AUTOMATED RBC COUNT: CPT | Performed by: INTERNAL MEDICINE

## 2025-01-16 PROCEDURE — 250N000009 HC RX 250: Performed by: STUDENT IN AN ORGANIZED HEALTH CARE EDUCATION/TRAINING PROGRAM

## 2025-01-16 PROCEDURE — 83735 ASSAY OF MAGNESIUM: CPT | Performed by: INTERNAL MEDICINE

## 2025-01-16 PROCEDURE — 999N000157 HC STATISTIC RCP TIME EA 10 MIN

## 2025-01-16 PROCEDURE — 84702 CHORIONIC GONADOTROPIN TEST: CPT | Performed by: INTERNAL MEDICINE

## 2025-01-16 PROCEDURE — 85520 HEPARIN ASSAY: CPT | Performed by: INTERNAL MEDICINE

## 2025-01-16 PROCEDURE — 120N000013 HC R&B IMCU

## 2025-01-16 PROCEDURE — 94640 AIRWAY INHALATION TREATMENT: CPT | Mod: 76

## 2025-01-16 PROCEDURE — 97530 THERAPEUTIC ACTIVITIES: CPT | Mod: GO | Performed by: OCCUPATIONAL THERAPIST

## 2025-01-16 PROCEDURE — 82272 OCCULT BLD FECES 1-3 TESTS: CPT | Performed by: PHYSICIAN ASSISTANT

## 2025-01-16 PROCEDURE — C8929 TTE W OR WO FOL WCON,DOPPLER: HCPCS

## 2025-01-16 PROCEDURE — 85610 PROTHROMBIN TIME: CPT | Performed by: INTERNAL MEDICINE

## 2025-01-16 PROCEDURE — 93010 ELECTROCARDIOGRAM REPORT: CPT | Performed by: INTERNAL MEDICINE

## 2025-01-16 PROCEDURE — 250N000011 HC RX IP 250 OP 636: Performed by: STUDENT IN AN ORGANIZED HEALTH CARE EDUCATION/TRAINING PROGRAM

## 2025-01-16 PROCEDURE — 99233 SBSQ HOSP IP/OBS HIGH 50: CPT | Performed by: PHYSICIAN ASSISTANT

## 2025-01-16 PROCEDURE — 94640 AIRWAY INHALATION TREATMENT: CPT

## 2025-01-16 PROCEDURE — 93005 ELECTROCARDIOGRAM TRACING: CPT

## 2025-01-16 PROCEDURE — 999N000208 ECHOCARDIOGRAM COMPLETE

## 2025-01-16 PROCEDURE — 84100 ASSAY OF PHOSPHORUS: CPT | Performed by: INTERNAL MEDICINE

## 2025-01-16 PROCEDURE — 93306 TTE W/DOPPLER COMPLETE: CPT | Mod: 26 | Performed by: INTERNAL MEDICINE

## 2025-01-16 PROCEDURE — 250N000009 HC RX 250: Performed by: PHYSICIAN ASSISTANT

## 2025-01-16 PROCEDURE — 250N000011 HC RX IP 250 OP 636: Performed by: PHYSICIAN ASSISTANT

## 2025-01-16 PROCEDURE — 85018 HEMOGLOBIN: CPT | Performed by: PHYSICIAN ASSISTANT

## 2025-01-16 PROCEDURE — 36415 COLL VENOUS BLD VENIPUNCTURE: CPT | Performed by: INTERNAL MEDICINE

## 2025-01-16 PROCEDURE — 83615 LACTATE (LD) (LDH) ENZYME: CPT | Performed by: INTERNAL MEDICINE

## 2025-01-16 RX ORDER — DIGOXIN 0.25 MG/ML
125 INJECTION INTRAMUSCULAR; INTRAVENOUS ONCE
Status: COMPLETED | OUTPATIENT
Start: 2025-01-16 | End: 2025-01-16

## 2025-01-16 RX ORDER — IOPAMIDOL 755 MG/ML
63 INJECTION, SOLUTION INTRAVASCULAR ONCE
Status: COMPLETED | OUTPATIENT
Start: 2025-01-16 | End: 2025-01-16

## 2025-01-16 RX ADMIN — IPRATROPIUM BROMIDE AND ALBUTEROL SULFATE 3 ML: .5; 3 SOLUTION RESPIRATORY (INHALATION) at 07:19

## 2025-01-16 RX ADMIN — METHYLPREDNISOLONE SODIUM SUCCINATE 62.5 MG: 125 INJECTION, POWDER, FOR SOLUTION INTRAMUSCULAR; INTRAVENOUS at 18:14

## 2025-01-16 RX ADMIN — DILTIAZEM HYDROCHLORIDE 30 MG: 30 TABLET, FILM COATED ORAL at 12:19

## 2025-01-16 RX ADMIN — SODIUM CHLORIDE 88 ML: 9 INJECTION, SOLUTION INTRAVENOUS at 11:41

## 2025-01-16 RX ADMIN — METHYLPREDNISOLONE SODIUM SUCCINATE 62.5 MG: 125 INJECTION, POWDER, FOR SOLUTION INTRAMUSCULAR; INTRAVENOUS at 12:33

## 2025-01-16 RX ADMIN — BUDESONIDE 0.5 MG: 0.5 INHALANT RESPIRATORY (INHALATION) at 07:19

## 2025-01-16 RX ADMIN — PIPERACILLIN AND TAZOBACTAM 4.5 G: 4; .5 INJECTION, POWDER, FOR SOLUTION INTRAVENOUS at 18:15

## 2025-01-16 RX ADMIN — FUROSEMIDE 40 MG: 10 INJECTION, SOLUTION INTRAMUSCULAR; INTRAVENOUS at 21:17

## 2025-01-16 RX ADMIN — PIPERACILLIN AND TAZOBACTAM 4.5 G: 4; .5 INJECTION, POWDER, FOR SOLUTION INTRAVENOUS at 12:34

## 2025-01-16 RX ADMIN — FUROSEMIDE 40 MG: 10 INJECTION, SOLUTION INTRAMUSCULAR; INTRAVENOUS at 09:05

## 2025-01-16 RX ADMIN — PIPERACILLIN AND TAZOBACTAM 4.5 G: 4; .5 INJECTION, POWDER, FOR SOLUTION INTRAVENOUS at 06:26

## 2025-01-16 RX ADMIN — DIGOXIN 125 MCG: 250 INJECTION, SOLUTION INTRAMUSCULAR; INTRAVENOUS; PARENTERAL at 14:51

## 2025-01-16 RX ADMIN — IPRATROPIUM BROMIDE AND ALBUTEROL SULFATE 3 ML: .5; 3 SOLUTION RESPIRATORY (INHALATION) at 15:02

## 2025-01-16 RX ADMIN — IPRATROPIUM BROMIDE AND ALBUTEROL SULFATE 3 ML: .5; 3 SOLUTION RESPIRATORY (INHALATION) at 20:16

## 2025-01-16 RX ADMIN — IOPAMIDOL 63 ML: 755 INJECTION, SOLUTION INTRAVENOUS at 11:41

## 2025-01-16 RX ADMIN — IPRATROPIUM BROMIDE AND ALBUTEROL SULFATE 3 ML: .5; 3 SOLUTION RESPIRATORY (INHALATION) at 12:37

## 2025-01-16 RX ADMIN — METOPROLOL TARTRATE 50 MG: 50 TABLET, FILM COATED ORAL at 12:21

## 2025-01-16 RX ADMIN — PIPERACILLIN AND TAZOBACTAM 4.5 G: 4; .5 INJECTION, POWDER, FOR SOLUTION INTRAVENOUS at 01:01

## 2025-01-16 RX ADMIN — PERFLUTREN 10 ML: 6.52 INJECTION, SUSPENSION INTRAVENOUS at 14:04

## 2025-01-16 RX ADMIN — AMIODARONE HYDROCHLORIDE 400 MG: 200 TABLET ORAL at 20:10

## 2025-01-16 RX ADMIN — METOCLOPRAMIDE 5 MG: 5 TABLET ORAL at 09:06

## 2025-01-16 RX ADMIN — PANTOPRAZOLE SODIUM 40 MG: 40 INJECTION, POWDER, FOR SOLUTION INTRAVENOUS at 09:05

## 2025-01-16 RX ADMIN — IPRATROPIUM BROMIDE AND ALBUTEROL SULFATE 3 ML: .5; 3 SOLUTION RESPIRATORY (INHALATION) at 03:29

## 2025-01-16 RX ADMIN — AMIODARONE HYDROCHLORIDE 400 MG: 200 TABLET ORAL at 10:22

## 2025-01-16 RX ADMIN — METOPROLOL TARTRATE 2.5 MG: 5 INJECTION INTRAVENOUS at 05:35

## 2025-01-16 RX ADMIN — METHYLPREDNISOLONE SODIUM SUCCINATE 62.5 MG: 125 INJECTION, POWDER, FOR SOLUTION INTRAMUSCULAR; INTRAVENOUS at 06:26

## 2025-01-16 RX ADMIN — PANTOPRAZOLE SODIUM 40 MG: 40 INJECTION, POWDER, FOR SOLUTION INTRAVENOUS at 20:10

## 2025-01-16 RX ADMIN — BUDESONIDE 0.5 MG: 0.5 INHALANT RESPIRATORY (INHALATION) at 20:17

## 2025-01-16 RX ADMIN — DILTIAZEM HYDROCHLORIDE 30 MG: 30 TABLET, FILM COATED ORAL at 04:02

## 2025-01-16 RX ADMIN — HEPARIN SODIUM 1400 UNITS/HR: 10000 INJECTION, SOLUTION INTRAVENOUS at 01:16

## 2025-01-16 RX ADMIN — METOPROLOL TARTRATE 50 MG: 50 TABLET, FILM COATED ORAL at 22:25

## 2025-01-16 RX ADMIN — METHYLPREDNISOLONE SODIUM SUCCINATE 62.5 MG: 125 INJECTION, POWDER, FOR SOLUTION INTRAMUSCULAR; INTRAVENOUS at 01:01

## 2025-01-16 RX ADMIN — HEPARIN SODIUM 1400 UNITS/HR: 10000 INJECTION, SOLUTION INTRAVENOUS at 20:08

## 2025-01-16 ASSESSMENT — ACTIVITIES OF DAILY LIVING (ADL)
ADLS_ACUITY_SCORE: 74
ADLS_ACUITY_SCORE: 78
ADLS_ACUITY_SCORE: 79
ADLS_ACUITY_SCORE: 74
ADLS_ACUITY_SCORE: 79
ADLS_ACUITY_SCORE: 79
ADLS_ACUITY_SCORE: 81
ADLS_ACUITY_SCORE: 79
ADLS_ACUITY_SCORE: 79
ADLS_ACUITY_SCORE: 78
ADLS_ACUITY_SCORE: 74
ADLS_ACUITY_SCORE: 79
ADLS_ACUITY_SCORE: 78
ADLS_ACUITY_SCORE: 79
ADLS_ACUITY_SCORE: 74
ADLS_ACUITY_SCORE: 78
ADLS_ACUITY_SCORE: 74
ADLS_ACUITY_SCORE: 78
ADLS_ACUITY_SCORE: 79
ADLS_ACUITY_SCORE: 79
ADLS_ACUITY_SCORE: 74

## 2025-01-16 NOTE — CONSULTS
[facility] Pulmonary Consult Note    Date of Service: 01/16/25    Assessment and Recommendations:  69M prior DVT, tobacco abuse, GERD, COPD admitted 1/11 w/ bilateral PE, new Aflutter, and DVT. CT also w/ extensive emphysema. Extensive DVT on doppler. Pt is asymptomatic out of proportion to his disease.     - repeat CTPE  - TTE w/ bubble  - wean HFNC as able  - continue budesonide nebs  - continue duonebs  - continue methylprednisolone 62.5mg q6h, taper pending clinical course  - continue empiric pip-tazo    Pulmonary will continue to follow.     CC: acute hypoxemic hypercapnic respiratory failure     Summary:  69M prior DVT, tobacco abuse, GERD, COPD admitted 1/11 w/ bilateral PE, new Aflutter, and DVT. Pt initially in ICU and required BiPAP. Currently on HFNC 45L 60%. He is receiving budesonide nebs, duonebs, methylprednisolone 62.5mg q6h, and pip-tazo. He is on heparin gtt. Pt denies RUBY or SOB at rest. No chest pain or tightness. No wheezing. No cough. No nocturnal cough. No orthopnea or PND.     10 point review of systems negative, aside from that mentioned above    /70   Pulse (!) 128   Temp 97.9  F (36.6  C) (Oral)   Resp 10   Wt 73.1 kg (161 lb 2.5 oz)   SpO2 (!) 89%   BMI 23.12 kg/m    Gen: NAD  HEENT: anicteric, OP clear  Card: RRR  Pulm: diffusely diminished   Abd: soft, NTND  MSK: 2+ b/l LE edema, no acute joint abnormalities  Skin: no obvious rash  Psych: normal affect  Neuro: answering questions appropriately     Labs: personally reviewed    Imaging: personally reviewed    Past Medical History:   Diagnosis Date    Acute septic pulmonary embolism with acute cor pulmonale (H) 01/11/2025    Cancer (H)     COPD (chronic obstructive pulmonary disease) (H)        Past Surgical History:   Procedure Laterality Date    DAVINCI HERNIORRHAPHY INGUINAL Left 10/28/2021    Procedure: Robotic assisted left inguinal hernia repair with mesh;  Surgeon: Gladys Whitehead MD;  Location:  OR     ESOPHAGOSCOPY, GASTROSCOPY, DUODENOSCOPY (EGD), COMBINED N/A 9/13/2022    Procedure: ESOPHAGOGASTRODUODENOSCOPY, WITH BIOPSY;  Surgeon: Doyle Corbin MD;  [facility]: [facility]    LAPAROSCOPIC LYSIS ADHESIONS N/A 10/28/2021    Procedure: Laparoscopic lysis adhesions;  Surgeon: Gladys Whitehead MD;  Location: [facility] OR       FHx: reviewed and non-contributory     Social History     Socioeconomic History    Marital status:      Spouse name: Not on file    Number of children: Not on file    Years of education: Not on file    Highest education level: Not on file   Occupational History    Not on file   Tobacco Use    Smoking status: Every Day     Current packs/day: 1.00     Types: Cigarettes    Smokeless tobacco: Never   Vaping Use    Vaping status: Never Used   Substance and Sexual Activity    Alcohol use: No    Drug use: No    Sexual activity: Not Currently   Other Topics Concern    Parent/sibling w/ CABG, MI or angioplasty before 65F 55M? Not Asked   Social History Narrative    Not on file     Social Drivers of Health     Financial Resource Strain: Unknown (1/11/2025)    Financial Resource Strain     Within the past 12 months, have you or your family members you live with been unable to get utilities (heat, electricity) when it was really needed?: Patient unable to answer   Food Insecurity: Unknown (1/11/2025)    Food Insecurity     Within the past 12 months, did you worry that your food would run out before you got money to buy more?: Patient unable to answer     Within the past 12 months, did the food you bought just not last and you didn t have money to get more?: Patient unable to answer   Transportation Needs: Unknown (1/11/2025)    Transportation Needs     Within the past 12 months, has lack of transportation kept you from medical appointments, getting your medicines, non-medical meetings or appointments, work, or from getting things that you need?: Patient unable to answer   Physical Activity: Not on file    Stress: Not on file   Social Connections: Not on file   Interpersonal Safety: Unknown (1/11/2025)    Interpersonal Safety     Do you feel physically and emotionally safe where you currently live?: Patient unable to answer     Within the past 12 months, have you been hit, slapped, kicked or otherwise physically hurt by someone?: Patient unable to answer     Within the past 12 months, have you been humiliated or emotionally abused in other ways by your partner or ex-partner?: Patient unable to answer   Housing Stability: Unknown (1/11/2025)    Housing Stability     Do you have housing? : Patient unable to answer     Are you worried about losing your housing?: Patient unable to answer       Doyle Salazar MD  Pulmonary and Critical Care Medicine  [facility]

## 2025-01-16 NOTE — CONSULTS
[facility]  Gastroenterology Consultation         Keith Gonzalez  [address]  [facility] [address]  69 year old male    Admission Date/Time: 1/11/2025  Primary Care Provider: [facility]  Referring / Attending Physician:  Woo Fernandez PA-C    We were asked to see the patient in consultation by Woo Fernandez PA-C for evaluation of dark stool on heparin.      CC: Respiratory failure    HPI:  Keith Gonzalez is a 69 year old male with PMH of remote b/l DVT (no cause found per pt), off AC, tobacco abuse, emphysema, COPD, osteoporosis, GERD and testicular cancer (remission) who was admitted as a direct admission to intensive care unit from [place] emergency department for further treatment and evaluation of bilateral pulmonary embolism with acute cor pulmonale, new onset atrial flutter, and extensive lower extremity DVTs.     We were consulted on 1/16/2025 due to a reported large black stool.  This was observed by the nursing staff.  Patient denies any abdominal pain, nausea, vomiting, hematemesis, fever or chills.  He is on high flow oxygen.  He is currently on heparin infusion for his pulmonary embolism.  He also has likely acute COPD exacerbation and healthcare acquired pneumonia per review of his medical record.  He also has new onset paroxysmal atrial fibrillation with RVR, status post cardioversion, along with acute CHF with EF 46%.    ROS: A comprehensive ten point review of systems was negative aside from those in mentioned in the HPI.      PAST MED HX:  I have reviewed this patient's medical history and updated it with pertinent information if needed.   Past Medical History:   Diagnosis Date    Acute septic pulmonary embolism with acute cor pulmonale (H) 01/11/2025    Cancer (H)     COPD (chronic obstructive pulmonary disease) (H)        MEDICATIONS:   Prior to Admission Medications   Prescriptions Last Dose Informant Patient Reported? Taking?   COMBIVENT  RESPIMAT  MCG/ACT inhaler 1/9/2025 at  6:00 PM  No Yes   Sig: INHALE ONE TO TWO  PUFFS INTO THE LUNGS BY MOUTH EVERY 4 HOURS AS NEEDED FOR SHORTNESS OF BREATH, WHEEZING OR COUGH   Patient taking differently: Inhale 1-2 puffs into the lungs every 4 hours as needed for shortness of breath, wheezing or cough. Currently in patient's pocket   alendronate (FOSAMAX) 70 MG tablet 1/5/2025 Morning  No Yes   Sig: TAKE 1 TABLET BY MOUTH EVERY 7 DAYS. TAKE 60 MINUTES BEFORE MORNING MEAL WITH 8 OUNCES OF WATER. REMAIN UPRIGHT FOR 30 MINUTES.   Patient taking differently: Take 70 mg by mouth every 7 days. Sundays   calcium carbonate-vitamin D (OSCAL) 500-5 MG-MCG tablet 1/9/2025 at  6:00 PM  No Yes   Sig: Take 1 tablet by mouth 2 times daily   cyclobenzaprine (FLEXERIL) 5 MG tablet Past Week  No Yes   Sig: TAKE ONE TO TWO TABLETS BY MOUTH TWICE A DAY AS NEEDED FOR MUSCLE SPASMS   Patient taking differently: Take 5 mg by mouth 2 times daily as needed for muscle spasms.   ferrous sulfate (FEROSUL) 325 (65 Fe) MG tablet 1/4/2025  No Yes   Sig: Take 1 tablet (325 mg) by mouth once a week   Patient taking differently: Take 325 mg by mouth every 7 days. Saturdays   fluticasone-salmeterol (ADVAIR) 500-50 MCG/ACT inhaler 1/9/2025 Morning  No Yes   Sig: Inhale 1 puff into the lungs every 12 hours   furosemide (LASIX) 20 MG tablet 1/9/2025 Noon  No Yes   Sig: Take 1 tablet (20 mg) by mouth daily for 5 days.      Facility-Administered Medications: None       ALLERGIES:   Allergies   Allergen Reactions    No Known Drug Allergy        SOCIAL HISTORY:  Social History     Tobacco Use    Smoking status: Every Day     Current packs/day: 1.00     Types: Cigarettes    Smokeless tobacco: Never   Vaping Use    Vaping status: Never Used   Substance Use Topics    Alcohol use: No    Drug use: No       FAMILY HISTORY:  No family history on file.    PHYSICAL EXAM:   Vital Signs with Ranges  Temp: 97.9  F (36.6  C) Temp src: Oral BP: 93/80 Pulse: 119    Resp: 18 SpO2: 91 % O2 Device: High Flow Nasal Cannula (HFNC) Oxygen Delivery: 30 LPM  I/O last 3 completed shifts:  In: -   Out: 3125 [Urine:3075; Emesis/NG output:50]    Constitutional: Alert, oriented, lying in bed in NAD.   Respiratory:  O2 via HFNC, FiO2 80%, 45lpm rate   Cardiovascular:  Heart RRR  GI:  Abdomen soft, NT/ND   Skin/Integumen:  Warm, dry, non-diaphoretic.  MSK: CMS x4 grossly  intact.      ADDITIONAL COMMENTS:   I reviewed the patient's new clinical lab test results.   Recent Labs   Lab Test 01/16/25  0604 01/15/25  0553 01/14/25  0602   WBC 10.2 10.4 11.3*   HGB 11.4* 11.3* 11.0*   MCV 87 88 87    166 159   INR 1.19* 1.25* 1.21*     Recent Labs   Lab Test 01/16/25  0604 01/15/25  0553 01/14/25  0602   POTASSIUM 4.1 4.4 4.6   CHLORIDE 98 100 101   CO2 36* 34* 31*   BUN 41.5* 45.3* 53.2*   ANIONGAP 6* 5* 8     Recent Labs   Lab Test 01/15/25  0553 01/13/25  0407 01/12/25  0533 01/11/25  1640 01/11/25  0918 01/09/25 2055 01/09/25  1916   ALBUMIN 3.1* 3.0* 3.0*  --  3.2*  --  3.7   BILITOTAL 0.5  --   --   --  0.2  --  0.3   ALT 63  --   --   --  156*  --  194*   AST 26  --   --   --  59*  --  98*   PROTEIN  --   --   --  20*  --  Negative  --        I reviewed the patient's new imaging results.        CONSULTATION ASSESSMENT AND PLAN:    Keith Gonzalez is a 69 year old male with PMH of remote b/l DVT (no cause found per pt), off AC, tobacco abuse, emphysema, COPD, osteoporosis, GERD and testicular cancer (remission) who was admitted as a direct admission to intensive care unit from [place] emergency department for further treatment and evaluation of bilateral pulmonary embolism with acute cor pulmonale, new onset atrial flutter, and extensive lower extremity DVTs.     We were consulted on 1/16/2025 due to a reported large black stool.  This was observed by the nursing staff.  Patient denies any abdominal pain, nausea, vomiting, hematemesis, fever or chills.  He is on high flow oxygen.   He is currently on heparin infusion for his pulmonary embolism.  He also has likely acute COPD exacerbation and healthcare acquired pneumonia per review of his medical record.  He also has new onset paroxysmal atrial fibrillation with RVR, status post cardioversion, along with acute CHF with EF 46%.  -- As patient is still on high flow oxygen due to his multifactorial respiratory failure, along with being on heparin drip for his submassive PE, will need to coordinate with primary team and cardiology prior to any GI procedure.  Currently his hemoglobin is stable at 11.4, platelet count 151.  --Continue to monitor hemoglobin, transfuse for less than 7  --Will place on Protonix 40 mg IV twice daily  --Monitor respiratory status and vital signs closely  --N.p.o. after midnight   -- Consider EGD in the coming days pending clinical improvement and coordination regarding heparin, as well as respiratory status  -- We appreciate the consult and will follow.      [facility].  Office: [phone]  Cell: [phone] (Dr. Kidd)

## 2025-01-16 NOTE — PROGRESS NOTES
Hematology/Oncology Follow-up Note  [facility]    Date of Admission:  1/11/2025   Reason for Consult: PE      ASSESSMENT : Keith Gonzalez is a 69 year old year old male who presented this hospitalization as a direct admission from [place] ED for further treatment and evaluation of a bilateral pulmonary embolism. He has a pertinent medical history fo DVT, testicular cancer (hx), COPD. Current problem list includes LAI, respiratory failure, acute heart failure, A fib.       PLAN:  Pharmacy to assist in what anticoagulant is most cost effective to patient. Prefer DOAC. 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 At that time it was determined to remain on anticoagulation indefinitely.   Previously on Coumadin, this was discontinued due to insurance coverage.   01/11/2025 CT Chest positive for pulmonary embolism to the anterior right lower lobe.   On heparin gtt      Testicular cancer- history  Diagnosed at treated in 1990s. S/p orchiectomy and radiation  AFP tumor marker 2.3- WNL  - WNL   HCG tumor marker <3- WNL      DISCUSSION:  Patient is understanding and agreeable to the above plan. All questions were answered    EXAM:  Subjective  Intermittent nausea  Shortness of breath, on HFNC    Objective  GENERAL/CONSTITUTIONAL: No acute distress.  NEUROLOGIC: Alert, oriented, answers questions appropriately.       Labs Reviewed: CBC, CMP, labs as above       35 minutes spent on the date of the encounter doing review of outside records, review of test results, interpretation of tests, patient visit, coordinating care, and documentation     Charmaine JOHANSEN,  CNP  Hematology/Oncology  [facility]     Securely message with [employer]   Pager #[phone]

## 2025-01-16 NOTE — PROGRESS NOTES
[facility]  Cardiology Progress Note    Date of Service (when I saw the patient): 01/16/2025  Primary Cardiologist: will establish at [facility]        Interval History:   Shortness of breath is about the same.  Denies palpitations.  Blood pressure low this morning.    ----------------------------------------------------------------------------------------    Assessment:  Keith Gonzalez is a 69 year old male who was admitted on 1/11/2025 with submassive PE and COPD exacerbation. Cardiology was consulted for A fib RVR and mild biV cardiomyopathy noted on TTE.     # Paroxysmal Atrial fibrillation/flutter  -- s/p dccv x2 on admission for flutter, now in a fib  -- remains on Diltiazem 30 mg q6h and PO amiodarone   -- also started on PO metoprolol tartrate 25 mg BID on 1/14  -- CHADSVASC score of 3- on heparin at this time - heme consulted for further guidance given prior multiple DVT hx   -- good coverage on DOAC per pharm team     # Mild BiV Cardiomyopathy   -- LVEF 46%; mild RV dysfunction and dilation  -- initiated on BB as we are decreasing CCB    # Submassive PE with brendan DVT  -- on heparin   -- IR recommended no intervention  -- heme following  -- should be on apixaban therapy     # COPD exacerbation with cor pulmonale     # Acute hypoxic and hypercapnic Resp Failure secondary to PE and COPD    # LAI, resolved    # Active tobacco dependence     # Hx of testicular cancer    ----------------------------------------------------------------------------------------    Plan:  -- Continue with PO amiodarone and Metroprolol tartrate 50 mg twice daily  - Discontinue diltiazem  - Give IV digoxin 125 mcg once today  -- Continue with IV furosemide 40 mg BID   -- We will plan for repeat TTE once under has better ventricular rate control  -- Cardiology will continue to follow along       ----------------------------------------------------------------------------------------  Physical Exam    Temp: 97.9  F (36.6  C) Temp src: Oral BP: 112/87 Pulse: 113   Resp: 18 SpO2: 93 % O2 Device: High Flow Nasal Cannula (HFNC) Oxygen Delivery: 45 LPM  Vitals:    01/15/25 0600 01/15/25 0816 01/16/25 0640   Weight: 72 kg (158 lb 11.7 oz) 72.1 kg (159 lb) 73.1 kg (161 lb 2.5 oz)     GEN:  NAD. Oxygen per hfnc  HEENT: Mucous membranes moist.  NECK:  + JVD.  C/V:  Irregulary irregular rhythm with fast rate, no murmur, rub or gallop.   RESP: CTA with no wheezing.  GI: Abdomen soft, nontender, nondistended.    EXTREM: 2+ pitting LE edema.   NEURO: Alert and oriented, cooperative.   PSYCH: Normal affect.  SKIN: Warm and dry.   VASC: 2+ radial and dorsalis pedis pulses bilaterally.      Medications   Current Facility-Administered Medications   Medication Dose Route Frequency Provider Last Rate Last Admin    heparin 25,000 units in 0.45% NaCl 250 mL ANTICOAGULANT infusion  0-5,000 Units/hr Intravenous Continuous Shila Israel MD 14 mL/hr at 01/16/25 0116 1,400 Units/hr at 01/16/25 0116    Patient is already receiving anticoagulation with heparin, enoxaparin (LOVENOX), warfarin (COUMADIN)  or other anticoagulant medication   Does not apply Continuous PRN Shila Israel MD        Patient may continue current oral medications   Does not apply Continuous PRN Shila Israel MD         Current Facility-Administered Medications   Medication Dose Route Frequency Provider Last Rate Last Admin    [START ON 1/27/2025] amiodarone (PACERONE) tablet 200 mg  200 mg Oral Daily Rahel Cárdenas PA-C        amiodarone (PACERONE) tablet 400 mg  400 mg Oral BID Rahel Cárdenas PA-C   400 mg at 01/16/25 1022    [START ON 1/21/2025] amiodarone (PACERONE) tablet 400 mg  400 mg Oral Daily Rahel Cárdenas PA-C        budesonide (PULMICORT) neb solution 0.5 mg  0.5 mg Nebulization BID Doyle Salazar MD   0.5 mg at 01/16/25 0719    diltiazem (CARDIZEM) tablet 30 mg  30 mg Oral Q6H Rahel Gaxiola PA-C   30 mg at  01/16/25 1219    furosemide (LASIX) injection 40 mg  40 mg Intravenous BID Rahel Cárdenas PA-C   40 mg at 01/16/25 0905    insulin aspart (NovoLOG) injection (RAPID ACTING)   Subcutaneous TID w/meals Shila Israel MD   3 Units at 01/15/25 1629    insulin aspart (NovoLOG) injection (RAPID ACTING)  1-10 Units Subcutaneous TID AC Shila Israel MD   1 Units at 01/15/25 1629    insulin aspart (NovoLOG) injection (RAPID ACTING)  1-7 Units Subcutaneous At Bedtime Shila Israel MD        ipratropium - albuterol 0.5 mg/2.5 mg/3 mL (DUONEB) neb solution 3 mL  3 mL Nebulization Q4H Shila Israel MD   3 mL at 01/16/25 1237    methylPREDNISolone Na Suc (solu-MEDROL) injection 62.5 mg  62.5 mg Intravenous Q6H Shila Israel MD   62.5 mg at 01/16/25 1233    metoprolol tartrate (LOPRESSOR) tablet 50 mg  50 mg Oral BID Rahel Cárdenas PA-C   50 mg at 01/16/25 1221    pantoprazole (PROTONIX) IV push injection 40 mg  40 mg Intravenous Daily with breakfast Woo Fernandez PA-C   40 mg at 01/16/25 0905    piperacillin-tazobactam (ZOSYN) 4.5 g vial to attach to  mL bag  4.5 g Intravenous Q6H Shila Israel MD   4.5 g at 01/16/25 1234    sodium chloride (PF) 0.9% PF flush 3 mL  3 mL Intracatheter Q8H Shila Israel MD   3 mL at 01/16/25 1234       Data   Reviewed      Rahel Cárdenas PA-C   1/16/2025  Pager: [phone]

## 2025-01-16 NOTE — PLAN OF CARE
"Patient Name: Sol  MRN: [id number]  Date of Admission: 1/11/2025  Reason for Admission: bilateral DVT, PE, acute respiratory failure  Level of Care: [facility]    Vitals:   BP Readings from Last 1 Encounters:   01/16/25 107/69     Pulse Readings from Last 1 Encounters:   01/16/25 (!) 123     Wt Readings from Last 1 Encounters:   01/15/25 72.1 kg (159 lb)     Ht Readings from Last 1 Encounters:   07/08/24 1.778 m (5' 10\")     Estimated body mass index is 22.81 kg/m  as calculated from the following:    Height as of 7/8/24: 1.778 m (5' 10\").    Weight as of this encounter: 72.1 kg (159 lb).  Temp Readings from Last 1 Encounters:   01/16/25 98.3  F (36.8  C) (Axillary)       Pain: Pain goal 0 Pain Rating 0 Effective pain medication/regimen denies pain    CV Surgery Patient: No    Assessment    Resp: Tachycardic, HR 90s-110s, otherwise VSS on HFNC 60% FiO2, 45L. PRN IV metoprolol given x1  Telemetry: afib CVR/RVR  Neuro: A&Ox4  GI/: Regular diet, poor appetite d/t intermittent nausea, PRN compazine given x1. Dunn in place for retention, adequate UO. Incontinent of bowel, -BM, +flatus  Skin/Wounds: Blanchable redness on back/buttocks, mepilex in place. +2 edema BLE, RUE, and scrotum  Lines/Drains: Heparin gtt infusing at 1400 units/hr  Activity: Assist x1 to BSC, not OOB this shift, turn/repo q2h  Sleep: Good  Abnormal Labs: . On K/Phos/Mg protocols, no replacements needed, recheck tomorrow AM.     Aggression Stop Light: Green          Patient Care Plan: Wean oxygen as able.       Goal Outcome Evaluation:      Plan of Care Reviewed With: patient    Overall Patient Progress: improvingOverall Patient Progress: improving    Outcome Evaluation: Oxygen saturation >90% on HFNC 60% FiO2, 45L.      "

## 2025-01-16 NOTE — PROGRESS NOTES
[facility]    Medicine Progress Note - Hospitalist Service    Date of Admission:  1/11/2025    Assessment & Plan     Keith Gonzalez is a 69 year old male with PMH of remote b/l DVT (no cause found per pt), off AC, tobacco abuse, emphysema, COPD, osteoporosis, GERD and testicular cancer (remission) who was admitted as a direct admission to intensive care unit from [place] emergency department for further treatment and evaluation of bilateral pulmonary embolism with acute cor pulmonale, new onset atrial flutter, and extensive lower extremity DVTs.  Critical care was consulted after admission to further assist with acute hypoxic and hypercapnic respiratory failure and to assess patient closely for possible need for intubation.    Acute hypoxic and hypercapnic respiratory failure, multifactorial  Acute Submassive pulmonary embolism w cor pulmonale  Extensive bilateral lower extremity DVTs.  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 Off Ac x yrs   -Patient arrived initially to ICU from [place] ED where he has spent 3 days prior to direct admission awaiting facility on heparin and amiodarone infusion.  -- prev intensivist, vascular and interventional radiology after admission.  --Case discussed with interventional radiology regarding clot burden and PEs who do not recommend any intervention and also recommended heparin infusion.  --Vascular surgery is recommending no intervention at this point for lower extremity extensive DVT and recommending to continue current heparin infusion.  --Patient has been switched from BiPAP to high flow nasal cannula.  --AC- Continue patient on heparin infusion, watch for increased GI bleed sx   - Hem/Onc consulted, **Indefinate AC lifelong, DOAC prefer  --consulted pharmacy liaison re DOAC-   future re discuss w pt re $ cost when more stable and transitioning off heparin  --Cards consulted, following, appreciated  --suspect underlying cor pulmonale with right heart failure secondary to COPD   - stable overall re resp on HFNC o2 at 45lpm, cont heparin for now for PE  -- caution w diuresis (defer to cards)  - am CBC BMP    Acute COPD exacerbation + HCAP  Dx w above  in ICU , see prior notes  1/15- still on 45l HFNC , hard to taper. Consulted and D/w Pulm in detail, likely will take time, ok to stop doxy [id number] tx and if SE risk  1/16- Same HFNC rates pulm saw, added CT chest, same tx as yest. changes   - Cont HFNC, fiO2 80%-->63% 40lpm, likely slow to wean  - Pulm consulted w HFNC needs/PE/PNA, d/w 1/15 will see 1/16  - CXR 1 v- 1/15 w/o incr/new infiltrate, but r pulm congestion signs  - CT Chest w/o- 1/16-stable right PE vs 1/10  - Echo 1/16- EF 55% (improved from 46%), mid-distal later hypokinesia, RV decr fxn, RV dilated, Pulm HTN, IVC full  -Diuresis-as per cardiology.  - Steroids- continues high dose Methpred 62.5 q 6 hrs for now  - Pulm added pulmicort neb 0.5mg BID on 1/15  --ABX-Zosyn q 6 hr (? assoc vomiting)- day 4, goal 5d HCAP per intensivist  ==  COPD exacerbation s/p Azithro x2d->doxycycline x5d- stop 1/15 (? Contrib to SE)   ==-note prior to admit on ceftriaxone in outside hospital.  -- DuoNeb every 4 hours scheduled + prn q 2hr avail for whz/SOB?incr o2 need   - tobacco cessation strongly encouraged, pt concurs  - CT Chest w/ 1/16- see results  - outpt PFT, COPD eval when improved  - am Cbc BMP    New onset paroxysmal atrial fibrillation with RVR, s/p cardioversion  Acute heart failure with reduced EF, Mild biventricular cardiomyopathy  Type II MI, secondary to demand ischemia, concern for cardiogenic shock.  1/15- w/o C, Amio - CXR 1 v- no infiltrate, incr pleural effusions / volume signs,   1/16- Cards managing, added dig, decr metop, stopped dilt, manage lasix   --s/p dccv x2 on  admission for flutter, now in a fib . [id number] 3  --Echo 1/9/25 EF 46%, mild lat hypokinesis.  Mild RV dilate w decr RV fxn  - Echo 1/16- EF 55% (improved), mid-distal later hypokinesia, RV decr fxn, RV dilated, Pulm HTN, full IVC   --Cardiology following, highly appreciate and managing antiarrhythmics/rx/diuretic  --amiodarone gtt. for 48 hrs-->PO,-- card managing PO   --1/12/25 diltiazem 30 mg p.o  QID --> 60 mg p.o.QID- stopped 1/16  - 1/14 started metoprolol 25mg BID  - Digoxin 1/16  - CXR 1 v 1/15 w incr vol signs,  Defer to cards re lasix use   - Lasix - as per cards, resumed 1/16  --Continue to monitor patient on telemetry w persistent RVR  --am CBC BMP, Mag K protocol    Severe nausea, retching ? Med /abx related  1/15- main sx today if profound nausea, retching. Thinks from meds, abx vs amido. Will add Reglan as 1st prn, compazine 2nd, and zofran 3rd (risk re Qtc)  1/16-much improved nausea today.  Reglan very helpful.  - Abx XR 1 v- NO SBP signs, free air  - see above, stop doxy (completed tx)  - ? Related to Amiodarone, but needs as above  - readd PPI 40mg IV QD (lost after ICU)  - trail Reglan 1st prn-very helpful  - compazine as 2nd prn  - zofran last Prn (QT risk)  - am recheck formal ECG Qtc    Dark Stool, r/o GI bleed  1/16- RN report of 1 melanotic dark stool.  In context of history of profound GERD symptoms.  Could have ulcer that is been bleeding now due to heparin use.  Stable BP and stable hemoglobin.  Future EGD when respiratory status is improved.  - for now cont Hep (given massive PE)  - on PPI-increase to twice daily dosing  - consulted GI 1/16, discussed with JIMMY  -Concurs with future EGD, when improved respiratory status  - Hgb q 8hr x 3    Leukocytosis, resolved  Most likely secondary to stress reaction in the setting of cardiac/respiratory issues, patient has been getting treated for possible hospital-acquired pneumonia.   1/14- WBC decr 11.3 on abx.   1/15 and 1/16- WBC 10.4 WNL now,  afebrile  - ABX as above,Zosyn (stop as per ID), stop Doxy 1/15  - CXR 1 v- 1/15- no infiltrate, incr pleural effusions and volume signs,   - am CBC    Acute kidney injury, suspect prerenal. resolved  Hypocalcemia, resolved  Metabolic alkalosis, resolved  Last baseline PTA 1.1, Admit creatinine was up to 2.18, c/w LAI, from PE/ARF nephrology consulted holding diuresis for now, does not seem fluid overloaded.  1/14- Cr decr to 1.39, renal signed off  1/15- Cr decr 1.20, off IVF, off lasix.  Chest x-ray with increased volume signs.  1/16-creatinine 1.07.  S/p CT chest with contrast cards is okay resuming Lasix.  - renal followed, signed off 1/14  -Cardiology managing diuresis at present.  Lasix.    Transaminitis   --LFT minimally elevated alt 156, ast 59 bilirubin is 0.2  --likely related to multifactorial stressors .   --recheck 1/15 shows overall WNL improvement.  Low albumin.     Acute metabolic encephalopathy: Improving  Most likely was secondary to profound hypercapnia and respiratory acidosis, initially was a started on BiPAP now has been switched to high flow nasal cannula  --Continue to monitor, will order physical and Occupational Therapy evaluation.    GERD  --40mg Protonix iv Qd resumed 1/15 w nausea sx     Hx of testicular cancer   --review of record with distant history, in remission     Hx of iron deficiency anemia   --hold ferrous sulfate   --most recent hgb on 1/9/2025 stable 13.1  --awaiting admission cbc results      Osteoporosis  Hx of compression fractures    - APAP prn pain       Tobacco abuse  PTA smoking 4 cigarettes a day.  Quit x 8 months last yr.  Discussed smoking as recurrent DVT contribution risk .  Willing to quit.  No cravings now, declines need for rx now  -Smoking cessation strongly encouraged , and to lower DVT risk  -declines nicotine patch  -Nicotine gum/lozenge added as needed craving  -PCP follow-up  -Declines outpatient smoking cessation meds    Diet: Regular Diet Adult    DVT  Prophylaxis: Heparin infusion  Dunn Catheter: PRESENT, indication: Acute retention or obstruction  Lines: None     Cardiac Monitoring: ACTIVE order. Indication: Tachyarrhythmias, acute (48 hours)  Code Status: Full Code      Clinically Significant Risk Factors               # Hypoalbuminemia: Lowest albumin = 3 g/dL at 1/13/2025  4:07 AM, will monitor as appropriate  # Coagulation Defect: INR = 1.19 (Ref range: 0.85 - 1.15) and/or PTT = N/A, will monitor for bleeding          # Acute Hypoxic Respiratory Failure: Documented O2 saturation < 90%. Continue supplemental oxygen as needed  # Acute Hypercapnic Respiratory Failure: based on arterial blood gas results.  Continue supplemental oxygen and ventilatory support as indicated.  # Acute Hypercapnic Respiratory Failure: based on venous blood gas results.  Continue supplemental oxygen and ventilatory support as indicated.             # Financial/Environmental Concerns: none  # COPD: noted on problem list        Social Drivers of Health    Food Insecurity: Unknown (1/11/2025)    Food Insecurity     Within the past 12 months, did you worry that your food would run out before you got money to buy more?: Patient unable to answer     Within the past 12 months, did the food you bought just not last and you didn t have money to get more?: Patient unable to answer   Housing Stability: Unknown (1/11/2025)    Housing Stability     Do you have housing? : Patient unable to answer     Are you worried about losing your housing?: Patient unable to answer   Tobacco Use: High Risk (1/9/2025)    Patient History     Smoking Tobacco Use: Every Day     Smokeless Tobacco Use: Never   Financial Resource Strain: Unknown (1/11/2025)    Financial Resource Strain     Within the past 12 months, have you or your family members you live with been unable to get utilities (heat, electricity) when it was really needed?: Patient unable to answer   Transportation Needs: Unknown (1/11/2025)     Transportation Needs     Within the past 12 months, has lack of transportation kept you from medical appointments, getting your medicines, non-medical meetings or appointments, work, or from getting things that you need?: Patient unable to answer   Interpersonal Safety: Unknown (1/11/2025)    Interpersonal Safety     Do you feel physically and emotionally safe where you currently live?: Patient unable to answer     Within the past 12 months, have you been hit, slapped, kicked or otherwise physically hurt by someone?: Patient unable to answer     Within the past 12 months, have you been humiliated or emotionally abused in other ways by your partner or ex-partner?: Patient unable to answer          Disposition Plan     Medically Ready for Discharge: Anticipated in 2-4 Days     Time - I spent 50 minutes w greater than 50% spent face to face counseling and including coordination of care    Pt was staffed and discussed in detail w Dr Israel,   who also saw the patient today.The above assessment and plan is the product of our joint decision making, please see their addendum note for any additional details and changes.     D/w Dr Salazar via phone, appreciate recs.    Woo Fernandez PA-C  Hospitalist Service  [facility]  Securely message with [employer] (more info)  Text page via "[employer] Paging/Directory   ______________________________________________________________________    Interval History     Improved nausea.  Slight decrease in FiO2.  CT from pulmonology.  Multiple med changes from cardiology today.  New melanotic stool.    -Having echocardiogram during the visit.   -Patient seen.  States shortness of breath is similar.  FiO2 is decreased to 63% still on 45 L. No CP sx. Updated CT chest subsequently resulted and shows unchanged right anterior lower lobe PE unchanged versus prior.  Small to moderate bilateral effusions  --Profound nausea from yesterday is much better today.  In further  discussion he has had longstanding GERD symptoms. Starting Reglan has helped.    -No fevers chill.  Denies any mood symptoms including anxiety or depression.    -New melanotic stool x 1, frankly black per RN. s.  No actual abdominal symptoms.  Query if he has a chronic ulcer and bleeding unmasked by heparin.  Discussed with GI, both concur future EGD when less O2 needs, at present too high risk to stop heparin but will continue to watch.  Hemoglobin luckily stable    -I updated the patient's son 1/15 p.m. with patient's permission.    Physical Exam   Vital Signs: Temp: 97.9  F (36.6  C) Temp src: Oral BP: 118/75 Pulse: 115   Resp: 14 SpO2: (!) 88 % O2 Device: High Flow Nasal Cannula (HFNC) Oxygen Delivery: 30 LPM  Weight: 161 lbs 2.5 oz    Lines, PIV , HFNC, Hep, amiodarone Zosyn, drip.    Constitutional: vs as above and/or per EMR  General:  adult pt lying in bed without acute distress  HEENT: NC/AT,    Neck: w/o JVD, supple  CV :S1S2, w/o obvious murmur  Resp: O2 via HFNC, FiO2 63%, 45lpm rate Spo2 90, chest rise unlabored, similar lung exam with scattered rhonchi.  But no wheezes.  GI:  soft, nontender, nondistended.  Retching on and off during visit  Ext: MAEW, no lower edema or mottling  Skin: no obvious rashes on exposed skin  Lymph: defer  Musculoskeletal: no bony joint deformities  Neuro: gross exam nonfocal, faces symmetric   Psych: calm, pleasant, no obvious confusion today.      Data:   Recent Labs   Lab Test 01/16/25  1414 01/16/25  1413 01/16/25  0604 01/15/25  0744 01/15/25  0553 01/11/25  1047 01/11/25  0918   WBC  --   --  10.2  --  10.4   < > 15.7*   HGB 11.8*  --  11.4*  --  11.3*   < > 13.0*   MCV  --   --  87  --  88   < > 95   PLT  --   --  151  --  166   < > 197   NA  --   --  140  --  139   < > 138   POTASSIUM  --   --  4.1  --  4.4   < > 5.3   CHLORIDE  --   --  98  --  100   < > 101   CO2  --   --  36*  --  34*   < > 25   ANIONGAP  --   --  6*  --  5*   < > 12   GLC  --  183* 137*   < > 128*    < > 121*   BUN  --   --  41.5*  --  45.3*   < > 56.4*   CR  --   --  1.07  --  1.20*   < > 2.18*   GFRESTIMATED  --   --  76  --  66   < > 32*   DAT  --   --  8.6*  --  8.6*   < > 7.9*   MAG  --   --  2.1  --  2.3   < > 2.1   AST  --   --   --   --  26  --  59*   ALT  --   --   --   --  63  --  156*   ALKPHOS  --   --   --   --  73  --  115   PROTTOTAL  --   --   --   --  5.4*  --  5.8*   ALBUMIN  --   --   --   --  3.1*   < > 3.2*   BILITOTAL  --   --   --   --  0.5  --  0.2    < > = values in this interval not displayed.     Low AFP, low beta-hCG.       Medical Decision Making       60 MINUTES SPENT BY ME on the date of service doing chart review, history, exam, documentation & further activities per the note.      Data     I have personally reviewed the following data over the past 24 hrs:    10.2  \   11.4 (L)   / 151     140 98 41.5 (H) /  137 (H)   4.1 36 (H) 1.07 \     Procal: N/A CRP: N/A Lactic Acid: 1.6       INR:  1.19 (H) PTT:  N/A   D-dimer:  N/A Fibrinogen:  N/A     Ferritin:  N/A % Retic:  N/A LDH:  225       Imaging results reviewed over the past 24 hrs:   Recent Results (from the past 24 hours)   XR Chest Port 1 View    Narrative    EXAM: XR CHEST PORT 1 VIEW  LOCATION: [facility]  DATE: 1/15/2025    INDICATION: ARF on HF o2, r o interval increased in infiltrate  COMPARISON: 01/12/2025      Impression    IMPRESSION: Small right and moderate left pleural effusions have increased in size from previous. Increased consolidation/atelectasis left lower lobe. Pulmonary vascular congestion noted.   XR Abdomen Port 1 View    Narrative    EXAM: XR ABDOMEN PORT 1 VIEW  LOCATION: [facility]  DATE: 1/15/2025    INDICATION: n v, r o new SBO sign  COMPARISON: None.      Impression    IMPRESSION: Negative abdomen. Bowel gas pattern is normal. Nothing for obstruction or free air. No evidence for renal stones.     Echocardiogram 1/16/2025  Interpretation Summary    The left ventricle visual ejection fraction is estimated at 55%.Mid to distal   lateral wall hypokinesia   The right ventricular systolic function is mild to moderately reduced.The   right ventricle is mildly dilated.   There is mild (1+) tricuspid regurgitation.   Pulmonary hypertension- RVSP 34 mm hg +RA.   IVC diameter >2.1 cm collapsing <50% with sniff suggests a high RA pressure   estimated at 15 mmHg or greater.   The rhythm was atrial fibrillation.   Compared to echo dated 1/10/2025 LVEF noted 46% prior study with lateral wall   hypokinesia, sinus rhythm noted in previous study     CT Chest PE w contrast 1/16/2025  IMPRESSION:   1. Positive study for pulmonary emboli to the anterior right lower lobe unchanged from previous.   2.  Small to moderate bilateral effusions with associated compressive atelectasis and/or infiltrate.     XR CHEST PORT 1 VIEW 1/15/2025                                                      IMPRESSION: Small right and moderate left pleural effusions have increased in size from previous. Increased consolidation/atelectasis left lower lobe. Pulmonary vascular congestion noted.    XR ABDOMEN PORT 1 VIEW : 1/15/2025  IMPRESSION: Negative abdomen. Bowel gas pattern is normal. Nothing for obstruction or free air. No evidence for renal stones.     XR Chest Port 1 View 1/12/2025  IMPRESSION: Stable cardiomegaly with pulmonary vascular congestion, interstitial pulmonary edema and small bilateral pleural effusions. No large confluent airspace opacities or pneumothorax.    XR Chest Port 1 View 1/11/2025 INDICATION: acute hypercapnia  IMPRESSION: Heart size is enlarged. Moderate pulmonary edema pattern has developed in the interval. Dense retrocardiac consolidation may represent superimposed pneumonia or aspiration versus confluent edema. Small to moderate bilateral pleural effusions.  No pneumothorax.       CT CHEST PULMONARY EMBOLISM W CONTRAST  LOCATION: [facility]  DATE: 1/9/2025  IMPRESSION:  1.  Exam is positive for pulmonary emboli. Moderate burden of emboli predominantly involving right lung.  2.  RV/LV ratio greater than 1 suggesting right heart strain.  3.  Small bilateral pleural effusions. Scattered nonspecific small subpleural opacities, can't exclude small peripheral lung infarct.  4.  Emphysema

## 2025-01-16 NOTE — CONSULTS
Patient has [employer] through an employer.    Xarelto/Eliquis:  $40/mo. Upon receipt of RX Discharge Pharmacy can provide copay savings card from [employer] or eliquis.com, respectively, to reduce this to $10/mo.    Vijaya Peralta  Pharmacy Technician/Liaison, Discharge Pharmacy   [phone] (voice or text)  [email or web address]  Pharmacy test claims are estimates and may not reflect final costs.   Suggested alternatives aim to be cost-effective but may not be therapeutically equivalent as this consult is informational and does not constitute medical advice.   Clinical decisions should be made by qualified healthcare providers.

## 2025-01-17 ENCOUNTER — APPOINTMENT (OUTPATIENT)
Dept: PHYSICAL THERAPY | Facility: CLINIC | Age: 70
DRG: 175 | End: 2025-01-17
Attending: INTERNAL MEDICINE
Payer: COMMERCIAL

## 2025-01-17 LAB
ANION GAP SERPL CALCULATED.3IONS-SCNC: 9 MMOL/L (ref 7–15)
BUN SERPL-MCNC: 41.4 MG/DL (ref 8–23)
CALCIUM SERPL-MCNC: 8.5 MG/DL (ref 8.8–10.4)
CHLORIDE SERPL-SCNC: 97 MMOL/L (ref 98–107)
CREAT SERPL-MCNC: 1.04 MG/DL (ref 0.67–1.17)
EGFRCR SERPLBLD CKD-EPI 2021: 78 ML/MIN/1.73M2
ERYTHROCYTE [DISTWIDTH] IN BLOOD BY AUTOMATED COUNT: 14.8 % (ref 10–15)
GLUCOSE BLDC GLUCOMTR-MCNC: 134 MG/DL (ref 70–99)
GLUCOSE BLDC GLUCOMTR-MCNC: 136 MG/DL (ref 70–99)
GLUCOSE BLDC GLUCOMTR-MCNC: 145 MG/DL (ref 70–99)
GLUCOSE BLDC GLUCOMTR-MCNC: 145 MG/DL (ref 70–99)
GLUCOSE BLDC GLUCOMTR-MCNC: 192 MG/DL (ref 70–99)
GLUCOSE SERPL-MCNC: 139 MG/DL (ref 70–99)
HCO3 SERPL-SCNC: 37 MMOL/L (ref 22–29)
HCT VFR BLD AUTO: 34.9 % (ref 40–53)
HGB BLD-MCNC: 11.2 G/DL (ref 13.3–17.7)
INR PPP: 1.32 (ref 0.85–1.15)
LACTATE SERPL-SCNC: 1.9 MMOL/L (ref 0.7–2)
MAGNESIUM SERPL-MCNC: 1.8 MG/DL (ref 1.7–2.3)
MCH RBC QN AUTO: 27.3 PG (ref 26.5–33)
MCHC RBC AUTO-ENTMCNC: 32.1 G/DL (ref 31.5–36.5)
MCV RBC AUTO: 85 FL (ref 78–100)
PHOSPHATE SERPL-MCNC: 3.2 MG/DL (ref 2.5–4.5)
PLATELET # BLD AUTO: 158 10E3/UL (ref 150–450)
POTASSIUM SERPL-SCNC: 3.7 MMOL/L (ref 3.4–5.3)
RBC # BLD AUTO: 4.11 10E6/UL (ref 4.4–5.9)
SODIUM SERPL-SCNC: 143 MMOL/L (ref 135–145)
UFH PPP CHRO-ACNC: 0.69 IU/ML
WBC # BLD AUTO: 10.8 10E3/UL (ref 4–11)

## 2025-01-17 PROCEDURE — 250N000011 HC RX IP 250 OP 636: Performed by: INTERNAL MEDICINE

## 2025-01-17 PROCEDURE — 36415 COLL VENOUS BLD VENIPUNCTURE: CPT | Performed by: INTERNAL MEDICINE

## 2025-01-17 PROCEDURE — 84100 ASSAY OF PHOSPHORUS: CPT | Performed by: INTERNAL MEDICINE

## 2025-01-17 PROCEDURE — 250N000009 HC RX 250: Performed by: INTERNAL MEDICINE

## 2025-01-17 PROCEDURE — 94640 AIRWAY INHALATION TREATMENT: CPT | Mod: 76

## 2025-01-17 PROCEDURE — 250N000013 HC RX MED GY IP 250 OP 250 PS 637: Performed by: INTERNAL MEDICINE

## 2025-01-17 PROCEDURE — 85610 PROTHROMBIN TIME: CPT | Performed by: INTERNAL MEDICINE

## 2025-01-17 PROCEDURE — 83735 ASSAY OF MAGNESIUM: CPT | Performed by: INTERNAL MEDICINE

## 2025-01-17 PROCEDURE — 250N000013 HC RX MED GY IP 250 OP 250 PS 637: Performed by: PHYSICIAN ASSISTANT

## 2025-01-17 PROCEDURE — 99233 SBSQ HOSP IP/OBS HIGH 50: CPT | Performed by: PHYSICIAN ASSISTANT

## 2025-01-17 PROCEDURE — 999N000157 HC STATISTIC RCP TIME EA 10 MIN

## 2025-01-17 PROCEDURE — 85520 HEPARIN ASSAY: CPT | Performed by: PHYSICIAN ASSISTANT

## 2025-01-17 PROCEDURE — 36415 COLL VENOUS BLD VENIPUNCTURE: CPT | Performed by: PHYSICIAN ASSISTANT

## 2025-01-17 PROCEDURE — 82310 ASSAY OF CALCIUM: CPT | Performed by: INTERNAL MEDICINE

## 2025-01-17 PROCEDURE — 250N000011 HC RX IP 250 OP 636: Performed by: PHYSICIAN ASSISTANT

## 2025-01-17 PROCEDURE — 250N000009 HC RX 250: Performed by: STUDENT IN AN ORGANIZED HEALTH CARE EDUCATION/TRAINING PROGRAM

## 2025-01-17 PROCEDURE — 97530 THERAPEUTIC ACTIVITIES: CPT | Mod: GP | Performed by: PHYSICAL THERAPIST

## 2025-01-17 PROCEDURE — 99233 SBSQ HOSP IP/OBS HIGH 50: CPT | Performed by: STUDENT IN AN ORGANIZED HEALTH CARE EDUCATION/TRAINING PROGRAM

## 2025-01-17 PROCEDURE — 99232 SBSQ HOSP IP/OBS MODERATE 35: CPT | Mod: FS | Performed by: PHYSICIAN ASSISTANT

## 2025-01-17 PROCEDURE — 250N000009 HC RX 250: Performed by: PHYSICIAN ASSISTANT

## 2025-01-17 PROCEDURE — 120N000013 HC R&B IMCU

## 2025-01-17 PROCEDURE — 85049 AUTOMATED PLATELET COUNT: CPT | Performed by: INTERNAL MEDICINE

## 2025-01-17 PROCEDURE — 94640 AIRWAY INHALATION TREATMENT: CPT

## 2025-01-17 PROCEDURE — 83605 ASSAY OF LACTIC ACID: CPT | Performed by: PHYSICIAN ASSISTANT

## 2025-01-17 RX ORDER — POTASSIUM CHLORIDE 1500 MG/1
20 TABLET, EXTENDED RELEASE ORAL ONCE
Status: COMPLETED | OUTPATIENT
Start: 2025-01-17 | End: 2025-01-17

## 2025-01-17 RX ORDER — ACETAMINOPHEN 325 MG/1
650 TABLET ORAL EVERY 4 HOURS PRN
Status: DISCONTINUED | OUTPATIENT
Start: 2025-01-17 | End: 2025-01-24 | Stop reason: HOSPADM

## 2025-01-17 RX ORDER — DIGOXIN 0.25 MG/ML
125 INJECTION INTRAMUSCULAR; INTRAVENOUS ONCE
Status: COMPLETED | OUTPATIENT
Start: 2025-01-17 | End: 2025-01-17

## 2025-01-17 RX ORDER — MAGNESIUM OXIDE 400 MG/1
400 TABLET ORAL EVERY 4 HOURS
Status: COMPLETED | OUTPATIENT
Start: 2025-01-17 | End: 2025-01-17

## 2025-01-17 RX ORDER — ACETAMINOPHEN 650 MG/1
650 SUPPOSITORY RECTAL EVERY 4 HOURS PRN
Status: DISCONTINUED | OUTPATIENT
Start: 2025-01-17 | End: 2025-01-24 | Stop reason: HOSPADM

## 2025-01-17 RX ADMIN — METOPROLOL TARTRATE 50 MG: 50 TABLET, FILM COATED ORAL at 08:35

## 2025-01-17 RX ADMIN — Medication 400 MG: at 10:41

## 2025-01-17 RX ADMIN — POTASSIUM CHLORIDE 20 MEQ: 1500 TABLET, EXTENDED RELEASE ORAL at 08:35

## 2025-01-17 RX ADMIN — DIGOXIN 125 MCG: 0.25 INJECTION INTRAMUSCULAR; INTRAVENOUS at 10:41

## 2025-01-17 RX ADMIN — PIPERACILLIN AND TAZOBACTAM 4.5 G: 4; .5 INJECTION, POWDER, FOR SOLUTION INTRAVENOUS at 18:10

## 2025-01-17 RX ADMIN — PIPERACILLIN AND TAZOBACTAM 4.5 G: 4; .5 INJECTION, POWDER, FOR SOLUTION INTRAVENOUS at 13:20

## 2025-01-17 RX ADMIN — METHYLPREDNISOLONE SODIUM SUCCINATE 62.5 MG: 125 INJECTION, POWDER, FOR SOLUTION INTRAMUSCULAR; INTRAVENOUS at 06:21

## 2025-01-17 RX ADMIN — IPRATROPIUM BROMIDE AND ALBUTEROL SULFATE 3 ML: .5; 3 SOLUTION RESPIRATORY (INHALATION) at 16:01

## 2025-01-17 RX ADMIN — METHYLPREDNISOLONE SODIUM SUCCINATE 62.5 MG: 125 INJECTION, POWDER, FOR SOLUTION INTRAMUSCULAR; INTRAVENOUS at 13:20

## 2025-01-17 RX ADMIN — PANTOPRAZOLE SODIUM 40 MG: 40 INJECTION, POWDER, FOR SOLUTION INTRAVENOUS at 20:27

## 2025-01-17 RX ADMIN — PIPERACILLIN AND TAZOBACTAM 4.5 G: 4; .5 INJECTION, POWDER, FOR SOLUTION INTRAVENOUS at 00:35

## 2025-01-17 RX ADMIN — IPRATROPIUM BROMIDE AND ALBUTEROL SULFATE 3 ML: .5; 3 SOLUTION RESPIRATORY (INHALATION) at 19:24

## 2025-01-17 RX ADMIN — Medication 400 MG: at 08:35

## 2025-01-17 RX ADMIN — METHYLPREDNISOLONE SODIUM SUCCINATE 62.5 MG: 125 INJECTION, POWDER, FOR SOLUTION INTRAMUSCULAR; INTRAVENOUS at 00:35

## 2025-01-17 RX ADMIN — IPRATROPIUM BROMIDE AND ALBUTEROL SULFATE 3 ML: .5; 3 SOLUTION RESPIRATORY (INHALATION) at 07:49

## 2025-01-17 RX ADMIN — METHYLPREDNISOLONE SODIUM SUCCINATE 62.5 MG: 125 INJECTION, POWDER, FOR SOLUTION INTRAMUSCULAR; INTRAVENOUS at 18:10

## 2025-01-17 RX ADMIN — METOPROLOL TARTRATE 2.5 MG: 5 INJECTION INTRAVENOUS at 18:45

## 2025-01-17 RX ADMIN — HEPARIN SODIUM 1400 UNITS/HR: 10000 INJECTION, SOLUTION INTRAVENOUS at 13:34

## 2025-01-17 RX ADMIN — METOCLOPRAMIDE 5 MG: 5 TABLET ORAL at 06:32

## 2025-01-17 RX ADMIN — PIPERACILLIN AND TAZOBACTAM 4.5 G: 4; .5 INJECTION, POWDER, FOR SOLUTION INTRAVENOUS at 06:21

## 2025-01-17 RX ADMIN — IPRATROPIUM BROMIDE AND ALBUTEROL SULFATE 3 ML: .5; 3 SOLUTION RESPIRATORY (INHALATION) at 00:48

## 2025-01-17 RX ADMIN — PIPERACILLIN AND TAZOBACTAM 4.5 G: 4; .5 INJECTION, POWDER, FOR SOLUTION INTRAVENOUS at 23:50

## 2025-01-17 RX ADMIN — PANTOPRAZOLE SODIUM 40 MG: 40 INJECTION, POWDER, FOR SOLUTION INTRAVENOUS at 08:34

## 2025-01-17 RX ADMIN — ACETAMINOPHEN 650 MG: 325 TABLET, FILM COATED ORAL at 20:52

## 2025-01-17 RX ADMIN — FUROSEMIDE 40 MG: 10 INJECTION, SOLUTION INTRAMUSCULAR; INTRAVENOUS at 20:26

## 2025-01-17 RX ADMIN — METHYLPREDNISOLONE SODIUM SUCCINATE 62.5 MG: 125 INJECTION, POWDER, FOR SOLUTION INTRAMUSCULAR; INTRAVENOUS at 23:51

## 2025-01-17 RX ADMIN — AMIODARONE HYDROCHLORIDE 400 MG: 200 TABLET ORAL at 08:35

## 2025-01-17 RX ADMIN — AMIODARONE HYDROCHLORIDE 400 MG: 200 TABLET ORAL at 20:27

## 2025-01-17 RX ADMIN — BUDESONIDE 0.5 MG: 0.5 INHALANT RESPIRATORY (INHALATION) at 19:25

## 2025-01-17 RX ADMIN — IPRATROPIUM BROMIDE AND ALBUTEROL SULFATE 3 ML: .5; 3 SOLUTION RESPIRATORY (INHALATION) at 23:35

## 2025-01-17 RX ADMIN — FUROSEMIDE 40 MG: 10 INJECTION, SOLUTION INTRAMUSCULAR; INTRAVENOUS at 08:34

## 2025-01-17 RX ADMIN — IPRATROPIUM BROMIDE AND ALBUTEROL SULFATE 3 ML: .5; 3 SOLUTION RESPIRATORY (INHALATION) at 11:10

## 2025-01-17 RX ADMIN — BUDESONIDE 0.5 MG: 0.5 INHALANT RESPIRATORY (INHALATION) at 07:49

## 2025-01-17 RX ADMIN — METOPROLOL TARTRATE 50 MG: 50 TABLET, FILM COATED ORAL at 20:27

## 2025-01-17 ASSESSMENT — ACTIVITIES OF DAILY LIVING (ADL)
ADLS_ACUITY_SCORE: 81
ADLS_ACUITY_SCORE: 81
ADLS_ACUITY_SCORE: 76
ADLS_ACUITY_SCORE: 81
ADLS_ACUITY_SCORE: 81
ADLS_ACUITY_SCORE: 76
ADLS_ACUITY_SCORE: 76
ADLS_ACUITY_SCORE: 81
ADLS_ACUITY_SCORE: 76
ADLS_ACUITY_SCORE: 81
ADLS_ACUITY_SCORE: 76
ADLS_ACUITY_SCORE: 81

## 2025-01-17 NOTE — PROGRESS NOTES
[facility]  Cardiology Progress Note    Date of Service (when I saw the patient): 01/17/2025  Primary Cardiologist: will establish at [facility]        Interval History:   Shortness of breath is about the same.  Denies palpitations.  HR remains elevated.     ----------------------------------------------------------------------------------------    Assessment:  Keith Gonzalez is a 69 year old male who was admitted on 1/11/2025 with submassive PE and COPD exacerbation. Cardiology was consulted for A fib RVR and mild biV cardiomyopathy noted on TTE.     # Paroxysmal Atrial fibrillation/flutter  -- s/p dccv x2 on admission for flutter, now in a fib  -- diltiazem reduced and discontinued on 1/16 and remains on PO amiodarone   -- also started on PO metoprolol tartrate 25 mg BID on 1/14 which has been increased to 50 mg BID  -- CHADSVASC score of 3- on heparin at this time - heme consulted for further guidance given prior multiple DVT hx and recommended DOAC  -- good coverage on DOAC per pharm team     # Mild BiV Cardiomyopathy   -- LVEF 46%; mild RV dysfunction and dilation  -- initiated on BB     # Submassive PE with brendan DVT  -- IR recommended no intervention  -- heme following  -- should be on apixaban therapy     # COPD exacerbation with cor pulmonale     # Acute hypoxic and hypercapnic Resp Failure secondary to PE and COPD    # LAI, resolved    # Active tobacco dependence     # Hx of testicular cancer    ----------------------------------------------------------------------------------------    Plan:  -- Continue with PO amiodarone and Metroprolol tartrate 50 mg twice daily  -- IV digoxin 125 mcg once today  -- Continue with IV furosemide 40 mg BID as he has good urine output   -- We will plan for repeat TTE once under has better ventricular rate control  -- Cardiology will continue to follow along        ----------------------------------------------------------------------------------------  Physical Exam   Temp: 99.3  F (37.4  C) Temp src: Oral BP: 104/70 Pulse: (!) 128   Resp: 15 SpO2: 92 % O2 Device: High Flow Nasal Cannula (HFNC) Oxygen Delivery: 45 LPM  Vitals:    01/15/25 0816 01/16/25 0640 01/17/25 0600   Weight: 72.1 kg (159 lb) 73.1 kg (161 lb 2.5 oz) 72.6 kg (160 lb 0.9 oz)     GEN:  NAD. Oxygen per hfnc  HEENT: Mucous membranes moist.  NECK:  + JVD.  C/V:  Irregulary irregular rhythm with fast rate, no murmur, rub or gallop.   RESP: CTA with no wheezing.  GI: Abdomen soft, nontender, nondistended.    EXTREM: 2+ pitting LE edema.   NEURO: Alert and oriented, cooperative.   PSYCH: Normal affect.  SKIN: Warm and dry.   VASC: 2+ radial and dorsalis pedis pulses bilaterally.      Medications   Current Facility-Administered Medications   Medication Dose Route Frequency Provider Last Rate Last Admin    heparin 25,000 units in 0.45% NaCl 250 mL ANTICOAGULANT infusion  0-5,000 Units/hr Intravenous Continuous Shila Israel MD 14 mL/hr at 01/17/25 0620 1,400 Units/hr at 01/17/25 0620    Patient is already receiving anticoagulation with heparin, enoxaparin (LOVENOX), warfarin (COUMADIN)  or other anticoagulant medication   Does not apply Continuous PRN Shila Israel MD        Patient may continue current oral medications   Does not apply Continuous PRN Shila Israel MD         Current Facility-Administered Medications   Medication Dose Route Frequency Provider Last Rate Last Admin    [START ON 1/27/2025] amiodarone (PACERONE) tablet 200 mg  200 mg Oral Daily Rahel Cárdenas PA-C        amiodarone (PACERONE) tablet 400 mg  400 mg Oral BID Rahel Cárdenas PA-C   400 mg at 01/17/25 0835    [START ON 1/21/2025] amiodarone (PACERONE) tablet 400 mg  400 mg Oral Daily Rahel Cárdenas PA-C        budesonide (PULMICORT) neb solution 0.5 mg  0.5 mg Nebulization BID Doyle Salazar MD   0.5  mg at 01/17/25 0749    furosemide (LASIX) injection 40 mg  40 mg Intravenous BID Rahel Cárdenas PA-C   40 mg at 01/17/25 0834    insulin aspart (NovoLOG) injection (RAPID ACTING)   Subcutaneous TID w/meals Shila Israel MD   2 Units at 01/17/25 0834    insulin aspart (NovoLOG) injection (RAPID ACTING)  1-10 Units Subcutaneous TID AC Shila Israel MD   1 Units at 01/17/25 0833    insulin aspart (NovoLOG) injection (RAPID ACTING)  1-7 Units Subcutaneous At Bedtime Shila Israel MD        ipratropium - albuterol 0.5 mg/2.5 mg/3 mL (DUONEB) neb solution 3 mL  3 mL Nebulization Q4H Shila Israel MD   3 mL at 01/17/25 1110    methylPREDNISolone Na Suc (solu-MEDROL) injection 62.5 mg  62.5 mg Intravenous Q6H Shila Israel MD   62.5 mg at 01/17/25 0621    metoprolol tartrate (LOPRESSOR) tablet 50 mg  50 mg Oral BID Rahel Cárdenas PA-C   50 mg at 01/17/25 0835    pantoprazole (PROTONIX) IV push injection 40 mg  40 mg Intravenous BID Woo Fernandez PA-C   40 mg at 01/17/25 0834    piperacillin-tazobactam (ZOSYN) 4.5 g vial to attach to  mL bag  4.5 g Intravenous Q6H Shila Israel MD   4.5 g at 01/17/25 0621    sodium chloride (PF) 0.9% PF flush 3 mL  3 mL Intracatheter Q8H Shila Israel MD   3 mL at 01/17/25 0621       Data   Reviewed      Rahel Cárdenas PA-C   1/17/2025  Pager: [place]

## 2025-01-17 NOTE — PROVIDER NOTIFICATION
Provider on call-TUCKER DRAPER   Writer notified via [employer] that stool occult results are positive.

## 2025-01-17 NOTE — PROGRESS NOTES
CLINICAL NUTRITION SERVICES - ASSESSMENT NOTE      Malnutrition Status:  Patient does not meet two of the established criteria necessary for diagnosing malnutrition but is at risk for malnutrition  Malnutrition Present on Admission: No    Registered Dietitian Interventions:  Chocolate Magic cup TID  Encouraged intakes        REASON FOR ASSESSMENT  LOS    PMH of remote b/l DVT (no cause found per pt), off AC, tobacco abuse, emphysema, COPD, osteoporosis, GERD and testicular cancer (remission) who was admitted as a direct admission to intensive care unit from [place] emergency department for further treatment and evaluation of bilateral pulmonary embolism with acute cor pulmonale, new onset atrial flutter, and extensive lower extremity DVTs.     SUBJECTIVE INFORMATION  Assessed patient in room.    NUTRITION HISTORY  - pt denied reduced po PTA and reported a UBW in the 140 lbs  - pt declined supplement drinks and Gel+ but was agreeable to Magic cups after telling him they are similar to ice cream cups [employer]ont[employer] used to sell  Home nutrition support plan: n/a    CURRENT NUTRITION ORDERS  Diet: Regular    CURRENT INTAKE/TOLERANCE  Flowsheets show a fair appetite and 1-3 intakes per day of 25-50% the past 3 days vs 100% x2-3 on 1/12-1/13  Pt was encouraged to order a source of protein with each meal and if they find themselves not finishing meals, to start with eating the protein on the plate but he was also educated on the importance of overall calorie intake with labored breathing    LABS  Nutrition-relevant labs: Reviewed    MEDICATIONS  Nutrition-relevant medications:  high sliding scale insulin     ANTHROPOMETRICS  Height: 0 cm (Data Unavailable)  Most Recent Weight: 72.6 kg (160 lb 0.9 oz)  IBW: 73 kg  % IBW: 100%  BMI (kg/m ): Normal BMI  Weight History:   Wt Readings from Last 10 Encounters:   01/17/25 72.6 kg (160 lb 0.9 oz)   01/09/25 67.1 kg (148 lb)   11/13/24 63 kg (139 lb)   09/11/24 61.4 kg (135 lb 6.4  oz)   07/08/24 61.7 kg (136 lb)   06/11/24 61.7 kg (136 lb)   03/07/24 61.7 kg (136 lb)   01/05/24 61.5 kg (135 lb 9.6 oz)   12/13/23 61 kg (134 lb 8 oz)   10/12/23 61.2 kg (135 lb)     Dosing Weight: 73 kg, based on actual wt    ASSESSED NUTRITION NEEDS  Estimated Energy Needs: 8930-3202+ kcals/day (25 - 30 kcals/kg)  Justification:  Maintenance + Increased respiratory needs  Estimated Protein Needs:  grams protein/day (1.2 - 1.5 grams of pro/kg)  Justification: Increased needs  Estimated Fluid Needs: 0647-6094 mL/day (1 mL/kcal)  Justification: Maintenance    SYSTEM FINDINGS    Acute hypoxic and hypercapnic respiratory failure, multifactorial and Acute COPD exacerbation + HCAP   Skin/wounds: reviewed  GI symptoms: reviewed    MALNUTRITION  % Intake: Decreased intake does not meet criteria  % Weight Loss: None noted  Subcutaneous Fat Loss: None observed  Muscle Loss: Wasting of the temples (temporalis muscle): Mild and Clavicles (pectoralis and deltoids): Mild  Fluid Accumulation/Edema:  Mild-moderate  edema  Malnutrition Diagnosis: Patient does not meet two of the established criteria necessary for diagnosing malnutrition but is at risk for malnutrition  Malnutrition Present on Admission: No    NUTRITION DIAGNOSIS  Inadequate oral intake related to increased needs protein-energy as evidenced by sub-optimal intakes (25-50%) the past 3 days, increased respiratory needs, and need for oral nutrition supplements to help pt meet needs    INTERVENTIONS  Encouraged intakes  Medical food supplement therapy    Goals  Patient to consume % of nutritionally adequate meal trays TID, or the equivalent with supplements/snacks.     Monitoring/Evaluation  Progress toward goals will be monitored and evaluated per policy.

## 2025-01-17 NOTE — PROGRESS NOTES
Notified provider about indwelling lamb catheter discussed removal or continued need.    Did provider choose to remove indwelling lamb catheter? No    Provider's lamb indication for keeping indwelling lamb catheter: Acute retention or obstruction.    Is there an order for indwelling lamb catheter? Yes    *If there is a plan to keep lamb catheter in place at discharge daily notification with provider is not necessary, but please add a notation in the treatment team sticky note that the patient will be discharging with the catheter.

## 2025-01-17 NOTE — PLAN OF CARE
Goal Outcome Evaluation:     0700-1900     Patient Name: Sol  MRN: [id number]  Date of Admission: 1/11/2025  Reason for Admission: DVT, PE, pneumonia, COPD exacerbation, CHF, LAI    Level of Care: [facility] [place]  /64   Pulse 101   Temp 98.4  F (36.9  C) (Oral)   Resp 14   Wt 73.1 kg (161 lb 2.5 oz)   SpO2 (!) 89%   BMI 23.12 kg/m      Resp: LS dim/crackles on HFNC 50-62% 45L, unable to wean. Nebs by RT.Congested, non produtive cough. RUBY  Telemetry: Afib RVR 100s-110s, on PO amio, metoprolol, digoxin IV once given per Cards  Neuro: A&Ox4, intact   GI/: +BS, +flatus, x2 loose incontinent BM this shift (one black and sample sent for occult).  AUO thru lamb.PO intake improving, needs encouragement to eat freq small meals. Nausea only   In the morning and Reglan prn helpful    Skin/Wounds: Scattered bruising, blanchable redness to ears, R ear scab, mepis in place.blanchable  Redness to sacrum, lower spine. Edema +1-2 back, flank, arms, hips. +3 BLE.  Lines/Drains:  L PIV x2, lower PIV infusing heparin @1400 units/hr, int abx  Activity: A1 to chairX2, to BSC, deconditioned    Abnormal Labs: HepXA am tomorrow, Hgb 11.8, stable q8h   Aggression Stop Light: Green  Patient Care Plan: cont IV lasix (pleural effusions). Hep gtt. repeat CT chest, ECHO done.   Followed by Cards, Onc/Hemac, GI, Pulmonology.   Encourage activity/OOB as able, pulm hygiene.wean O2

## 2025-01-17 NOTE — PROGRESS NOTES
[facility] Pulmonary Consult Note    Date of Service: 01/17/25    Assessment and Recommendations:  69M prior DVT, tobacco abuse, GERD, COPD admitted 1/11 w/ bilateral PE, new Aflutter, and DVT. CT also w/ extensive emphysema. Extensive DVT on doppler. On repeat CTPE, PE has not progressed. No shunt noted on TTE. Pt is asymptomatic out of proportion to his disease. Degree of hypoxemia somewhat perplexing. Seems to be entirely related to emphysema and new PE.     - wean HFNC as able  - continue budesonide nebs  - continue duonebs  - continue methylprednisolone 62.5mg q6h, taper pending clinical course  - continue empiric pip-tazo    Pulmonary will continue to follow.     CC: acute hypoxemic hypercapnic respiratory failure     Summary:  HFNC 45L 60%. Pt had been weaned to oxymask 10L when I saw him going down for CTPE. Continue to be asymptomatic. No SOB. No chest pain or tightness. No wheezing. No cough.     10 point review of systems negative, aside from that mentioned above    /73   Pulse 107   Temp 98.6  F (37  C) (Oral)   Resp 12   Wt 72.6 kg (160 lb 0.9 oz)   SpO2 90%   BMI 22.97 kg/m    Gen: NAD  HEENT: anicteric, OP clear  Card: RRR  Pulm: diffusely diminished   Abd: soft, NTND  MSK: 2+ b/l LE edema, no acute joint abnormalities  Skin: no obvious rash  Psych: normal affect  Neuro: answering questions appropriately     Labs: personally reviewed    Imaging: personally reviewed    Past Medical History:   Diagnosis Date    Acute septic pulmonary embolism with acute cor pulmonale (H) 01/11/2025    Cancer (H)     COPD (chronic obstructive pulmonary disease) (H)        Past Surgical History:   Procedure Laterality Date    DAVINCI HERNIORRHAPHY INGUINAL Left 10/28/2021    Procedure: Robotic assisted left inguinal hernia repair with mesh;  Surgeon: Gladys Whitehead MD;  Location: [facility] OR    ESOPHAGOSCOPY, GASTROSCOPY, DUODENOSCOPY (EGD), COMBINED N/A 9/13/2022    Procedure:  ESOPHAGOGASTRODUODENOSCOPY, WITH BIOPSY;  Surgeon: Doyle Corbin MD;  Location: [facility] GI    LAPAROSCOPIC LYSIS ADHESIONS N/A 10/28/2021    Procedure: Laparoscopic lysis adhesions;  Surgeon: Gladys Whitehead MD;  Location: [facility] OR       FHx: reviewed and non-contributory     Social History     Socioeconomic History    Marital status:      Spouse name: Not on file    Number of children: Not on file    Years of education: Not on file    Highest education level: Not on file   Occupational History    Not on file   Tobacco Use    Smoking status: Every Day     Current packs/day: 1.00     Types: Cigarettes    Smokeless tobacco: Never   Vaping Use    Vaping status: Never Used   Substance and Sexual Activity    Alcohol use: No    Drug use: No    Sexual activity: Not Currently   Other Topics Concern    Parent/sibling w/ CABG, MI or angioplasty before 65F 55M? Not Asked   Social History Narrative    Not on file     Social Drivers of Health     Financial Resource Strain: Unknown (1/11/2025)    Financial Resource Strain     Within the past 12 months, have you or your family members you live with been unable to get utilities (heat, electricity) when it was really needed?: Patient unable to answer   Food Insecurity: Unknown (1/11/2025)    Food Insecurity     Within the past 12 months, did you worry that your food would run out before you got money to buy more?: Patient unable to answer     Within the past 12 months, did the food you bought just not last and you didn t have money to get more?: Patient unable to answer   Transportation Needs: Unknown (1/11/2025)    Transportation Needs     Within the past 12 months, has lack of transportation kept you from medical appointments, getting your medicines, non-medical meetings or appointments, work, or from getting things that you need?: Patient unable to answer   Physical Activity: Not on file   Stress: Not on file   Social Connections: Not on file   Interpersonal Safety: Unknown  (1/11/2025)    Interpersonal Safety     Do you feel physically and emotionally safe where you currently live?: Patient unable to answer     Within the past 12 months, have you been hit, slapped, kicked or otherwise physically hurt by someone?: Patient unable to answer     Within the past 12 months, have you been humiliated or emotionally abused in other ways by your partner or ex-partner?: Patient unable to answer   Housing Stability: Unknown (1/11/2025)    Housing Stability     Do you have housing? : Patient unable to answer     Are you worried about losing your housing?: Patient unable to answer       Doyle Salazar MD  Pulmonary and Critical Care Medicine  [facility]

## 2025-01-17 NOTE — PLAN OF CARE
"Patient Name: Sol  MRN: [id number]  Date of Admission: 1/11/2025  Reason for Admission: PE, bilateral DVT, respiratory failure, COPD exacerbation, CHF  Level of Care: [facility] [place]    Vitals:   BP Readings from Last 1 Encounters:   01/17/25 109/70     Pulse Readings from Last 1 Encounters:   01/17/25 99     Wt Readings from Last 1 Encounters:   01/16/25 73.1 kg (161 lb 2.5 oz)     Ht Readings from Last 1 Encounters:   07/08/24 1.778 m (5' 10\")     Estimated body mass index is 23.12 kg/m  as calculated from the following:    Height as of 7/8/24: 1.778 m (5' 10\").    Weight as of this encounter: 73.1 kg (161 lb 2.5 oz).  Temp Readings from Last 1 Encounters:   01/17/25 98.6  F (37  C) (Oral)       Pain: denies pain    CV Surgery Patient: No    Assessment    Resp: RUBY and tachycardic, HR 90s-110s, otherwise VSS on HFNC 60% FiO2, 45L.  Telemetry: Afib RVR, HR 90s-110s, on PO amio, metoprolol  Neuro: A&Ox4  GI/: Regular diet, tolerating well. PRN reglan given x1 this AM for nausea. Dunn for retention, scheduled lasix given w/excellent UO. Incontinent of bowel, +2 loose brown stools, stool occult positive-MD aware  Skin/Wounds: Blanchable redness to back, sacrum, buttocks, behind ears-protective dressings in place. +2 edema BLE, L forearm, hips, scrotum.   Lines/Drains: Heparin gtt infusing at 1400 units/hr, Dunn  Activity: Assist x 1 to BSC, not OOB this shift, turn/repo as tolerated, pt able to shift own weight in bed. Pt educated on importance of frequent weight shifting to prevent pressure injuries.   Sleep: Good  Abnormal Labs: Hgb 12.0, 11.2. BG ACHS 183. On K/Phos/mg protocols, K and Mg replaced x1, all recheck tomorrow AM. HepXa within range, recheck tomorrow AM.     Aggression Stop Light: Green          Patient Care Plan: Wean oxygen as able. GI consulted for black stools, future EGD pending improvement in oxygen needs. Pulmonology and CARDS following.       Goal Outcome Evaluation:      Plan of Care Reviewed " With: patient    Overall Patient Progress: improvingOverall Patient Progress: improving    Outcome Evaluation: Oxygen saturation >90% on HFNC 60% FiO2, 45L.

## 2025-01-17 NOTE — PROGRESS NOTES
Care Management Follow Up    Length of Stay (days): 6    Expected Discharge Date: 01/21/2025     Concerns to be Addressed: discharge planning     Patient plan of care discussed at interdisciplinary rounds: Yes    Anticipated Discharge Disposition: Transitional Care              Anticipated Discharge Services:    Anticipated Discharge DME:      Patient/family educated on Medicare website which has current facility and service quality ratings: yes  Education Provided on the Discharge Plan: Yes  Patient/Family in Agreement with the Plan: yes    Referrals Placed by CM/SW:    Private pay costs discussed: Not applicable    Discussed  Partnership in Safe Discharge Planning  document with patient/family: No     Handoff Completed: No, handoff not indicated or clinically appropriate    Additional Information:  Writer reviewed chart and spoke with Dr. Israel, patient continues to be on high flow O2. Care management will follow and once off high flow will send TCU referrals.    Next Steps: Wean off high flow, send referrals    CARINA LOVELL  [facility]  INPATIENT CARE COORDINATION

## 2025-01-17 NOTE — PROGRESS NOTES
[facility]  Gastroenterology Progress Note     Keith Gonzalez MRN# [id number]   YOB: 1955 Age: 69 year old          Assessment and Plan:   Keith Gonzalez is a 69 year old male with PMH of remote b/l DVT (no cause found per pt), off AC, tobacco abuse, emphysema, COPD, osteoporosis, GERD and testicular cancer (remission) who was admitted as a direct admission to intensive care unit from [place] emergency department for further treatment and evaluation of bilateral pulmonary embolism with acute cor pulmonale, new onset atrial flutter, and extensive lower extremity DVTs.      We were consulted on 1/16/2025 due to a reported large black stool.  This was observed by the nursing staff.  Patient denies any abdominal pain, nausea, vomiting, hematemesis, fever or chills.  He is on high flow oxygen.  He is currently on heparin infusion for his pulmonary embolism.  He also has likely acute COPD exacerbation and healthcare acquired pneumonia per review of his medical record.  He also has new onset paroxysmal atrial fibrillation with RVR, status post cardioversion, along with acute CHF with EF 46%.  -- As patient is still on high flow oxygen due to his multifactorial respiratory failure, along with being on heparin drip for his submassive PE, will need to coordinate with primary team and cardiology prior to any GI procedure.  Currently his hemoglobin is stable at 11.4, platelet count 151.  --Stool occult is positive  --Continue to monitor hemoglobin, transfuse for less than 7  --Continue Protonix 40 mg IV twice daily  --Monitor respiratory status and vital signs closely - currently on 45 LPM HFNC and tachycardic  --Ok for reg diet today.  -- Consider EGD in the coming days pending clinical improvement and coordination regarding heparin, as well as respiratory status.  --Will follow    Recent Labs   Lab 01/17/25  0542 01/16/25  2210 01/16/25  1414 01/16/25  0604 01/15/25  0553   HGB  11.2* 12.0* 11.8* 11.4* 11.3*             Interval History:     Pt reports feeling fine, denies any SOB, CP.  Denies Abd pain, N/V.  Had a BM, unable to recall if black/bloody.              Review of Systems:     C: NEGATIVE for fever, chills, change in weight  E/M: NEGATIVE for ear, mouth and throat problems  R: NEGATIVE for significant cough or SOB  CV: NEGATIVE for chest pain, palpitations or peripheral edema             Medications:   I have reviewed this patient's current medications  Current Facility-Administered Medications   Medication Dose Route Frequency Provider Last Rate Last Admin    [START ON 1/27/2025] amiodarone (PACERONE) tablet 200 mg  200 mg Oral Daily Rahel Cárdenas PA-C        amiodarone (PACERONE) tablet 400 mg  400 mg Oral BID Rahel Cárdenas PA-C   400 mg at 01/17/25 0835    [START ON 1/21/2025] amiodarone (PACERONE) tablet 400 mg  400 mg Oral Daily Rahel Cárdenas PA-C        budesonide (PULMICORT) neb solution 0.5 mg  0.5 mg Nebulization BID Doyle Salazar MD   0.5 mg at 01/17/25 0749    furosemide (LASIX) injection 40 mg  40 mg Intravenous BID Rahel Cárdenas PA-C   40 mg at 01/17/25 0834    insulin aspart (NovoLOG) injection (RAPID ACTING)   Subcutaneous TID w/meals Shila Israel MD   2 Units at 01/17/25 0834    insulin aspart (NovoLOG) injection (RAPID ACTING)  1-10 Units Subcutaneous TID AC Shila Israel MD   1 Units at 01/17/25 0833    insulin aspart (NovoLOG) injection (RAPID ACTING)  1-7 Units Subcutaneous At Bedtime Shila Israel MD        ipratropium - albuterol 0.5 mg/2.5 mg/3 mL (DUONEB) neb solution 3 mL  3 mL Nebulization Q4H Shila Israel MD   3 mL at 01/17/25 0749    magnesium oxide (MAG-OX) tablet 400 mg  400 mg Oral Q4H Woo Fernandez PA-C   400 mg at 01/17/25 0835    methylPREDNISolone Na Suc (solu-MEDROL) injection 62.5 mg  62.5 mg Intravenous Q6H Shila Israel MD   62.5 mg at 01/17/25 0621    metoprolol tartrate (LOPRESSOR)  tablet 50 mg  50 mg Oral BID Rahel Cárdenas PA-C   50 mg at 01/17/25 0835    pantoprazole (PROTONIX) IV push injection 40 mg  40 mg Intravenous BID Woo Fernandez PA-C   40 mg at 01/17/25 0834    piperacillin-tazobactam (ZOSYN) 4.5 g vial to attach to  mL bag  4.5 g Intravenous Q6H Shila Israel MD   4.5 g at 01/17/25 0621    sodium chloride (PF) 0.9% PF flush 3 mL  3 mL Intracatheter Q8H Shila Israel MD   3 mL at 01/17/25 0621                  Physical Exam:   Vitals were reviewed  Vital Signs with Ranges  Temp:  [97.9  F (36.6  C)-99.3  F (37.4  C)] 99.3  F (37.4  C)  Pulse:  [] 117  Resp:  [10-21] 21  BP: ()/(39-87) 108/74  FiO2 (%):  [50 %-65 %] 65 %  SpO2:  [88 %-94 %] 91 %  I/O last 3 completed shifts:  In: 1300 [P.O.:700; I.V.:600]  Out: 3850 [Urine:3850]  Constitutional: Alert, oriented, lying in bed in NAD.   Respiratory:  O2 via HFNC, 45lpm rate, Breathing non-labored.  Cardiovascular:  Heart tachycardic 120's  GI:  Abdomen soft, NT/ND   Skin/Integumen:  Warm, dry, non-diaphoretic.  MSK: CMS x4 grossly intact.             Data:   I reviewed the patient's new clinical lab test results.   Recent Labs   Lab Test 01/17/25  0542 01/16/25  2210 01/16/25  1414 01/16/25  0604 01/15/25  0553   WBC 10.8  --   --  10.2 10.4   HGB 11.2* 12.0* 11.8* 11.4* 11.3*   MCV 85  --   --  87 88     --   --  151 166   INR 1.32*  --   --  1.19* 1.25*     Recent Labs   Lab Test 01/17/25  0542 01/16/25  0604 01/15/25  0553   POTASSIUM 3.7 4.1 4.4   CHLORIDE 97* 98 100   CO2 37* 36* 34*   BUN 41.4* 41.5* 45.3*   ANIONGAP 9 6* 5*     Recent Labs   Lab Test 01/15/25  0553 01/13/25  0407 01/12/25  0533 01/11/25  1640 01/11/25  0918 01/09/25 2055 01/09/25  1916   ALBUMIN 3.1* 3.0* 3.0*  --  3.2*  --  3.7   BILITOTAL 0.5  --   --   --  0.2  --  0.3   ALT 63  --   --   --  156*  --  194*   AST 26  --   --   --  59*  --  98*   PROTEIN  --   --   --  20*  --  Negative  --        I reviewed the  patient's new imaging results.    All laboratory data reviewed  All imaging studies reviewed by me.    NIVIA Varela,  1/17/2025  [facility]  Office : [phone]  Cell: [phone] (Dr. Kidd)

## 2025-01-17 NOTE — PROGRESS NOTES
[facility]    Medicine Progress Note - Hospitalist Service    Date of Admission:  1/11/2025    Assessment & Plan     Keith Gonzalez is a 69 year old male with PMH of remote b/l DVT (no cause found per pt), off AC, tobacco abuse, emphysema, COPD, osteoporosis, GERD and testicular cancer (remission) who was admitted as a direct admission to intensive care unit from [facility] department for further treatment and evaluation of bilateral pulmonary embolism with acute cor pulmonale, new onset atrial flutter, and extensive lower extremity DVTs.  Critical care was consulted after admission to further assist with acute hypoxic and hypercapnic respiratory failure and to assess patient closely for possible need for intubation.    Acute hypoxic and hypercapnic respiratory failure, multifactorial  Acute Submassive pulmonary embolism w cor pulmonale  Extensive bilateral lower extremity DVTs.  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 Off Ac x yrs   -Patient arrived initially to ICU from [facility] ED where he has spent 3 days prior to direct admission awaiting facility on heparin and amiodarone infusion.  -- prev intensivist, vascular and interventional radiology after admission.  --Case discussed with interventional radiology regarding clot burden and PEs who do not recommend any intervention and also recommended heparin infusion.  --Vascular surgery is recommending no intervention at this point for lower extremity extensive DVT and recommending to continue current heparin infusion.  --High O2 needs as below  --AC- Continues on heparin infusion, watch for increased GI bleed sx ,--consider DOAC after GI Bleed ruled out /next wk  - Hem/Onc consulted, **Needs Indefinate AC lifelong, w DOAC preferred**  --consulted pharmacy  liaison re DOAC costs, eliquis $142/mo   -  pharm Liason to review w pt again w all meds to see if meets max $2k/yr costs w DOAC & other (+ pt considering change to MNSure)  --Cards consulted, appreciated--suspect underlying cor pulmonale with right heart failure secondary to COPD   - Pulm consulted and following, see below    - am CBC BMP    Acute COPD exacerbation + HCAP  Dx w above  in ICU , see prior notes  1/15- still on 45l HFNC , hard to taper. Consulted and D/w Pulm in detail, likely will take time, ok to stop doxy [id number] tx and if SE risk  1/16- Same HFNC rates pulm saw, added CT chest, same tx as yest. changes   1/17- slightly better, was on OxyMask 10l trial briefly w pulm, to HFNC for my visit, degree of hypoxia poss related to COPD/new PE  - Cont HFNC, fiO2 80%-->63% 40lpm, likely slow to wean. Adjustements to OxyMask per pulm  - Pulm consulted 1/16 w high HFNC needs/PE/PNA,     - CXR 1 v- 1/15 w/o incr/new infiltrate, but r pulm congestion signs  - CT Chest w/o- 1/16-stable right PE vs 1/10  - Echo 1/16- EF 55% (improved from 46%), mid-distal later hypokinesia, RV decr fxn, RV dilated, Pulm HTN, IVC full  - Diuresis-as per cardiology. See below  - Steroids- continues high dose Methpred 62.5 q 6 hrs for now, pulm managing  - added pulmicort neb 0.5mg BID on 1/15 per pulm  --ABX-Zosyn q 6 hr (1/11--present, now >5days DEFER TO PULM when to stop zosyn)  == s/p Azithro x2d->doxycycline x5d and stopped 1/15 (? Contrib to SE) , prior to admit ceftriaxone in outside hospital.  -- DuoNeb every 4 hours scheduled + prn q 2hr avail for whz/SOB?incr o2 need   - CT Chest w/ 1/16- see results  - outpt PFT, COPD eval when improved  - tobacco cessation strongly encouraged, pt concurs partially (but unwilling to throw out cigs in jacket pocket)  - am Cbc BMP    New onset paroxysmal atrial fibrillation with RVR, s/p cardioversion  Acute heart failure with reduced EF, Mild biventricular cardiomyopathy  Type II MI,  secondary to demand ischemia, concern for cardiogenic shock.  1/15- w/o C, Amio - CXR 1 v- no infiltrate, incr pleural effusions / volume signs,   1/16- Cards managing, added dig, decr metop, stopped dilt, manage lasix   1/17- -130s w me, asx, got another dig dose,   --s/p dccv x2 on admission for flutter, now in a fib . [id number]  --Echo 1/9/25 EF 46%, mild lat hypokinesis.  Mild RV dilate w decr RV fxn  - Echo 1/16- EF 55% (improved), mid-distal later hypokinesia, RV decr fxn, RV dilated, Pulm HTN, full IVC   --Cardiology following, highly appreciate and managing antiarrhythmics/rx/diuretic  --amiodarone gtt. for 48 hrs-->PO,-- card managing   --1/12 diltiazem 30 mg p.o  QID --> 60 mg p.o.QID- stopped 1/16  - 1/14 started metoprolol 25mg --> 50 BID  - Digoxin 1/16 and 1/17 per cards  - Lasix - as per cards, resumed 1/16, watch i/o  - CXR 1 v 1/15 w incr vol signs,  Defer to cards re lasix use   --Continue to monitor patient on telemetry w persistent RVR  --am CBC BMP, Mag K protocol    Severe nausea, retching ? Med /abx related. resolved  1/15-profound nausea, retching. Thinks from meds, abx vs amido. add Reglan as 1st prn, compazine 2nd, zofran 3rd (risk re Qtc)  1/16-much improved nausea today.  Reglan very helpful.  1/17- no nausea sx today, meds helping.   - Abx XR 1 v- NO SBP signs, free air  - see above, stop doxy (completed tx)- ? Related to Amiodarone, but needs as above  - PPI incr as below (lost after ICU)  - trail Reglan 1st prn-very helpful  - compazine as 2nd prn  - zofran as last Prn (QT risk)    1/16 Melena, suspected acute GI bleed, + hemoccult. R/o ulcer  Chronic GERD/Dyspepsia. Hx Grade D esophagitis 2022 on EGD  Long standing dyspepsia, PTA PPI. Protonix iv 40mg Qd resumed 1/15 w nausea  . No hx EGD/w/u. Smokes. Hx reflux esophagitis, 1/16-new RN report of 1 melanotic dark stool.  In context of GERD sx, Ddx gastritis/undx ulcer bleeding now due to heparin use.  Stable BP - hemoglobin.   GI consulted - Future EGD in few d as respiratory status improved. Incr PPI. Trend Hgb  1/17- stable GERD sx, PPI BID . Stable hgb, new watery diarrhea x 4 this am, will test for Cdiff if recurs  --[facility] following since 1/16  --- plan for EGD in few days when o2 needs decr to at least mask/more stable  - PTA PPI-increase to twice daily IV  - cont Heparin AC for now (given massive PE-v high risk to stop)- indefinite AC needed  - CBC Qd, stat recheck if more melena     Leukocytosis, resolved  Favor from HCAP and stress reaction in the setting of cardiac/respiratory issues, patient has been getting treated for possible hospital-acquired pneumonia.   1/14- WBC decr 11.3   1/15 and 1/16- WBC 10.4 WNL now, afebrile on abx.   - ABX as above,   - CXR 1 v- 1/15- no infiltrate, incr pleural effusions and volume signs,   - CT Chest 1/16- see notes  - am CBC    Acute kidney injury, suspect prerenal. resolved  Hypocalcemia, resolved  Metabolic alkalosis, resolved  Last baseline PTA 1.1, Admit creatinine was up to 2.18, c/w LAI, from PE/ARF nephrology consulted holding diuresis for now, does not seem fluid overloaded.  1/14- Cr decr to 1.39, renal signed off  1/15- Cr decr 1.20, off IVF, off lasix.  Chest x-ray with increased volume signs.  1/16-creatinine 1.07.  S/p CT chest with contrast cards is okay resuming Lasix.  1/7- Ct stable 1.04, on lasix  - renal followed, but signed off 1/14  - Cardiology managing diuresis at present.  --Lasix.    Transaminitis   --LFT minimally elevated alt 156, ast 59 bilirubin is 0.2--likely related to multifactorial stressors .   --recheck 1/15 shows overall WNL improvement.  Low albumin.     Acute metabolic encephalopathy: Improving  Most likely was secondary to profound hypercapnia and respiratory acidosis, initially was a started on BiPAP now has been switched to high flow nasal cannula  --Continue to monitor, will order physical and Occupational Therapy evaluation.    Hx of testicular  cancer   --review of record with distant history, in remission   - Hem Onc consulted- notes labs neg for recurrent test CA Onc also recs CT chest + abd/pelvis to r/o occult malignancy recurrent **in few days w stable Cr/O2  - 1/16 s/p CT chest (for PE recheck) - w/o mass  - CT chest/Abd/pelvis w con needed per onc- likely in few days when more stable- r/o CA. Consider order on after 1/19  - CA labs neg    Hx of iron deficiency anemia   --hold ferrous sulfate   --most recent hgb on 1/9/2025 stable 13.1  --awaiting admission cbc results      Osteoporosis  Hx of compression fractures    - APAP prn pain       Tobacco abuse  PTA smoking 4 cigarettes a day.  Quit x 8 months last yr.  Discussed smoking as recurrent DVT contribution risk .  Willing to quit.  No cravings now, declines need for rx now  -Smoking cessation strongly encouraged , and to lower DVT risk. He says he will stop, but unwilling to throw out cigs in jacket pocket!  -declines nicotine patch as not needed  -Nicotine gum/lozenge added as needed craving  -PCP follow-up not Declines outpatient smoking cessation meds    Diet: Regular Diet Adult    DVT Prophylaxis: Heparin infusion  Dunn Catheter: PRESENT, indication: Acute retention or obstruction  Lines: None     Cardiac Monitoring: ACTIVE order. Indication: Tachyarrhythmias, acute (48 hours)  Code Status: Full Code      Clinically Significant Risk Factors          # Hypochloremia: Lowest Cl = 97 mmol/L in last 2 days, will monitor as appropriate      # Hypoalbuminemia: Lowest albumin = 3 g/dL at 1/13/2025  4:07 AM, will monitor as appropriate  # Coagulation Defect: INR = 1.32 (Ref range: 0.85 - 1.15) and/or PTT = N/A, will monitor for bleeding          # Acute Hypercapnic Respiratory Failure: based on arterial blood gas results.  Continue supplemental oxygen and ventilatory support as indicated.  # Acute Hypercapnic Respiratory Failure: based on venous blood gas results.  Continue supplemental oxygen and  ventilatory support as indicated.             # Financial/Environmental Concerns: none  # COPD: noted on problem list        Social Drivers of Health    Food Insecurity: Unknown (1/11/2025)    Food Insecurity     Within the past 12 months, did you worry that your food would run out before you got money to buy more?: Patient unable to answer     Within the past 12 months, did the food you bought just not last and you didn t have money to get more?: Patient unable to answer   Housing Stability: Unknown (1/11/2025)    Housing Stability     Do you have housing? : Patient unable to answer     Are you worried about losing your housing?: Patient unable to answer   Tobacco Use: High Risk (1/9/2025)    Patient History     Smoking Tobacco Use: Every Day     Smokeless Tobacco Use: Never   Financial Resource Strain: Unknown (1/11/2025)    Financial Resource Strain     Within the past 12 months, have you or your family members you live with been unable to get utilities (heat, electricity) when it was really needed?: Patient unable to answer   Transportation Needs: Unknown (1/11/2025)    Transportation Needs     Within the past 12 months, has lack of transportation kept you from medical appointments, getting your medicines, non-medical meetings or appointments, work, or from getting things that you need?: Patient unable to answer   Interpersonal Safety: Unknown (1/11/2025)    Interpersonal Safety     Do you feel physically and emotionally safe where you currently live?: Patient unable to answer     Within the past 12 months, have you been hit, slapped, kicked or otherwise physically hurt by someone?: Patient unable to answer     Within the past 12 months, have you been humiliated or emotionally abused in other ways by your partner or ex-partner?: Patient unable to answer          Disposition Plan     Medically Ready for Discharge: Anticipated in 5+ Days    Need to be able to wean O2 at least oxymask to transition out of [facility], if  so, as early as 1/18,  Future EGD as above, and then switching from heparin to DOAC and showing stability    Hospital discharge will likely be to TCU given weakness but expect at least 5 days at this point.  High risk I will need oxygen on discharge    Time - I spent 50 minutes w greater than 50% spent face to face counseling and including coordination of care    Pt was staffed and discussed in detail w Dr Israel, .The above assessment and plan is the product of our joint decision making,       Woo Fernandez PA-C  Hospitalist Service  [facility]  Securely message with [employer] (more info)  Text page via [facility] Paging/Directory   ______________________________________________________________________    Interval History       Seen in the early afternoon    -Overall does not have new symptoms, similar shortness of breath  -Per RN he tried oxy mask earlier pulmonary suggestion but since then is back on a high flow nasal cannula.  -RN reports patient had 4 loose watery bowel movements this morning that were not melanotic appearing.  I asked for C. difficile test given antibiotics however has not had a bowel movement since.  Patient denies any abdominal symptoms at present.  Denying any current nausea, or any abdominal pain.  He does detail that he had an EGD in 2022.  On chart review I saw that he had reflux esophagitis.  - no other new sx per pt    On 1/17 the patient son and female friend are both in the room and were updated in detail with patient's permission.    -Interestingly when I congratulated he has stated he is willing to quit smoking and steps he can take to be most successful, he then mentions he has no desire to smoke and is not worried but he has a pack of cigarettes in his coat pocket in the room.  So I suggested that it is best for his son to take the cigarettes home with him so has not to be a temptation when he has the recurrent desire to smoke.  Patient declines as does not  want to give up said cigarettes, thus query if he is really willing to quit smoking.    Physical Exam   Vital Signs: Temp: 99.3  F (37.4  C) Temp src: Oral BP: 108/74 Pulse: 117   Resp: 21 SpO2: 91 % O2 Device: High Flow Nasal Cannula (HFNC) Oxygen Delivery: 45 LPM  Weight: 160 lbs .86 oz    Lines, PIV , HFNC, Hep, Zosyn, drip.    Constitutional: vs as above and/or per EMR  General:  adult pt lying in bed without acute distress  HEENT: NC/AT,    Neck: w/o JVD, supple  CV :S1S2, w/o obvious murmur  Resp: O2 via HFNC, FiO2 63%, 40 lpm rate Spo2 90, chest rise unlabored, similar lung exam with scattered rhonchi.  But no wheezes.  GI:  soft, nontender, nondistended.  Retching on and off during visit  Ext: MAEW, no lower edema or mottling  Skin: no obvious rashes on exposed skin  Lymph: defer  Musculoskeletal: no bony joint deformities  Neuro: gross exam nonfocal, faces symmetric   Psych: calm, pleasant, no obvious confusion today.      Data:   Recent Labs   Lab Test 01/17/25  0719 01/17/25  0542 01/16/25  2210 01/16/25  1413 01/16/25  0604 01/15/25  0744 01/15/25  0553 01/11/25  1047 01/11/25  0918   WBC  --  10.8  --   --  10.2  --  10.4   < > 15.7*   HGB  --  11.2* 12.0*   < > 11.4*  --  11.3*   < > 13.0*   MCV  --  85  --   --  87  --  88   < > 95   PLT  --  158  --   --  151  --  166   < > 197   NA  --  143  --   --  140  --  139   < > 138   POTASSIUM  --  3.7  --   --  4.1  --  4.4   < > 5.3   CHLORIDE  --  97*  --   --  98  --  100   < > 101   CO2  --  37*  --   --  36*  --  34*   < > 25   ANIONGAP  --  9  --   --  6*  --  5*   < > 12   * 139*  --    < > 137*   < > 128*   < > 121*   BUN  --  41.4*  --   --  41.5*  --  45.3*   < > 56.4*   CR  --  1.04  --   --  1.07  --  1.20*   < > 2.18*   GFRESTIMATED  --  78  --   --  76  --  66   < > 32*   DAT  --  8.5*  --   --  8.6*  --  8.6*   < > 7.9*   MAG  --  1.8  --   --  2.1  --  2.3   < > 2.1   AST  --   --   --   --   --   --  26  --  59*   ALT  --   --   --    --   --   --  63  --  156*   ALKPHOS  --   --   --   --   --   --  73  --  115   PROTTOTAL  --   --   --   --   --   --  5.4*  --  5.8*   ALBUMIN  --   --   --   --   --   --  3.1*   < > 3.2*   BILITOTAL  --   --   --   --   --   --  0.5  --  0.2    < > = values in this interval not displayed.     Low AFP, low beta-hCG.     Medical Decision Making       50 MINUTES SPENT BY ME on the date of service doing chart review, history, exam, documentation & further activities per the note.      Data     I have personally reviewed the following data over the past 24 hrs:    10.8  \   11.2 (L)   / 158     143 97 (L) 41.4 (H) /  145 (H)   3.7 37 (H) 1.04 \     INR:  1.32 (H) PTT:  N/A   D-dimer:  N/A Fibrinogen:  N/A        Recent Imaging results:    Echocardiogram 1/16/2025  Interpretation Summary   The left ventricle visual ejection fraction is estimated at 55%.Mid to distal   lateral wall hypokinesia   The right ventricular systolic function is mild to moderately reduced.The   right ventricle is mildly dilated.   There is mild (1+) tricuspid regurgitation.   Pulmonary hypertension- RVSP 34 mm hg +RA.   IVC diameter >2.1 cm collapsing <50% with sniff suggests a high RA pressure   estimated at 15 mmHg or greater.   The rhythm was atrial fibrillation.   Compared to echo dated 1/10/2025 LVEF noted 46% prior study with lateral wall   hypokinesia, sinus rhythm noted in previous study     CT Chest PE w contrast 1/16/2025  IMPRESSION:   1. Positive study for pulmonary emboli to the anterior right lower lobe unchanged from previous.   2.  Small to moderate bilateral effusions with associated compressive atelectasis and/or infiltrate.     XR CHEST PORT 1 VIEW 1/15/2025                                                      IMPRESSION: Small right and moderate left pleural effusions have increased in size from previous. Increased consolidation/atelectasis left lower lobe. Pulmonary vascular congestion noted.    XR ABDOMEN PORT 1 VIEW :  1/15/2025  IMPRESSION: Negative abdomen. Bowel gas pattern is normal. Nothing for obstruction or free air. No evidence for renal stones.     XR Chest Port 1 View 1/12/2025  IMPRESSION: Stable cardiomegaly with pulmonary vascular congestion, interstitial pulmonary edema and small bilateral pleural effusions. No large confluent airspace opacities or pneumothorax.    XR Chest Port 1 View 1/11/2025 INDICATION: acute hypercapnia  IMPRESSION: Heart size is enlarged. Moderate pulmonary edema pattern has developed in the interval. Dense retrocardiac consolidation may represent superimposed pneumonia or aspiration versus confluent edema. Small to moderate bilateral pleural effusions.  No pneumothorax.       CT CHEST PULMONARY EMBOLISM W CONTRAST  LOCATION: [facility]  DATE: 1/9/2025  IMPRESSION:  1.  Exam is positive for pulmonary emboli. Moderate burden of emboli predominantly involving right lung.  2.  RV/LV ratio greater than 1 suggesting right heart strain.  3.  Small bilateral pleural effusions. Scattered nonspecific small subpleural opacities, can't exclude small peripheral lung infarct.  4.  Emphysema

## 2025-01-18 ENCOUNTER — APPOINTMENT (OUTPATIENT)
Dept: PHYSICAL THERAPY | Facility: CLINIC | Age: 70
DRG: 175 | End: 2025-01-18
Attending: PHYSICIAN ASSISTANT
Payer: COMMERCIAL

## 2025-01-18 LAB
ANION GAP SERPL CALCULATED.3IONS-SCNC: 6 MMOL/L (ref 7–15)
BUN SERPL-MCNC: 40.9 MG/DL (ref 8–23)
CALCIUM SERPL-MCNC: 8.5 MG/DL (ref 8.8–10.4)
CHLORIDE SERPL-SCNC: 95 MMOL/L (ref 98–107)
CREAT SERPL-MCNC: 0.97 MG/DL (ref 0.67–1.17)
EGFRCR SERPLBLD CKD-EPI 2021: 85 ML/MIN/1.73M2
ERYTHROCYTE [DISTWIDTH] IN BLOOD BY AUTOMATED COUNT: 14.8 % (ref 10–15)
FERRITIN SERPL-MCNC: 411 NG/ML (ref 31–409)
GLUCOSE BLDC GLUCOMTR-MCNC: 159 MG/DL (ref 70–99)
GLUCOSE BLDC GLUCOMTR-MCNC: 170 MG/DL (ref 70–99)
GLUCOSE BLDC GLUCOMTR-MCNC: 94 MG/DL (ref 70–99)
GLUCOSE SERPL-MCNC: 138 MG/DL (ref 70–99)
HCO3 SERPL-SCNC: 41 MMOL/L (ref 22–29)
HCT VFR BLD AUTO: 40.1 % (ref 40–53)
HGB BLD-MCNC: 12.5 G/DL (ref 13.3–17.7)
INR PPP: 1.25 (ref 0.85–1.15)
IRON BINDING CAPACITY (ROCHE): 312 UG/DL (ref 240–430)
IRON SATN MFR SERPL: 93 % (ref 15–46)
IRON SERPL-MCNC: 289 UG/DL (ref 61–157)
MAGNESIUM SERPL-MCNC: 1.8 MG/DL (ref 1.7–2.3)
MCH RBC QN AUTO: 26.5 PG (ref 26.5–33)
MCHC RBC AUTO-ENTMCNC: 31.2 G/DL (ref 31.5–36.5)
MCV RBC AUTO: 85 FL (ref 78–100)
PHOSPHATE SERPL-MCNC: 3.4 MG/DL (ref 2.5–4.5)
PLATELET # BLD AUTO: 168 10E3/UL (ref 150–450)
POTASSIUM SERPL-SCNC: 3.1 MMOL/L (ref 3.4–5.3)
POTASSIUM SERPL-SCNC: 3.5 MMOL/L (ref 3.4–5.3)
RBC # BLD AUTO: 4.72 10E6/UL (ref 4.4–5.9)
SODIUM SERPL-SCNC: 142 MMOL/L (ref 135–145)
UFH PPP CHRO-ACNC: 0.74 IU/ML
UFH PPP CHRO-ACNC: >1.1 IU/ML
WBC # BLD AUTO: 10.9 10E3/UL (ref 4–11)

## 2025-01-18 PROCEDURE — 85610 PROTHROMBIN TIME: CPT | Performed by: INTERNAL MEDICINE

## 2025-01-18 PROCEDURE — 84132 ASSAY OF SERUM POTASSIUM: CPT | Performed by: INTERNAL MEDICINE

## 2025-01-18 PROCEDURE — 250N000011 HC RX IP 250 OP 636: Performed by: INTERNAL MEDICINE

## 2025-01-18 PROCEDURE — 250N000009 HC RX 250: Performed by: PHYSICIAN ASSISTANT

## 2025-01-18 PROCEDURE — 250N000013 HC RX MED GY IP 250 OP 250 PS 637: Performed by: INTERNAL MEDICINE

## 2025-01-18 PROCEDURE — 94640 AIRWAY INHALATION TREATMENT: CPT | Mod: 76

## 2025-01-18 PROCEDURE — 97530 THERAPEUTIC ACTIVITIES: CPT | Mod: GP

## 2025-01-18 PROCEDURE — 120N000013 HC R&B IMCU

## 2025-01-18 PROCEDURE — 85041 AUTOMATED RBC COUNT: CPT | Performed by: INTERNAL MEDICINE

## 2025-01-18 PROCEDURE — 83540 ASSAY OF IRON: CPT | Performed by: INTERNAL MEDICINE

## 2025-01-18 PROCEDURE — 99233 SBSQ HOSP IP/OBS HIGH 50: CPT | Performed by: INTERNAL MEDICINE

## 2025-01-18 PROCEDURE — 85520 HEPARIN ASSAY: CPT | Performed by: INTERNAL MEDICINE

## 2025-01-18 PROCEDURE — 84100 ASSAY OF PHOSPHORUS: CPT | Performed by: INTERNAL MEDICINE

## 2025-01-18 PROCEDURE — 999N000157 HC STATISTIC RCP TIME EA 10 MIN

## 2025-01-18 PROCEDURE — 85520 HEPARIN ASSAY: CPT | Performed by: PHYSICIAN ASSISTANT

## 2025-01-18 PROCEDURE — 99232 SBSQ HOSP IP/OBS MODERATE 35: CPT | Performed by: INTERNAL MEDICINE

## 2025-01-18 PROCEDURE — 80048 BASIC METABOLIC PNL TOTAL CA: CPT | Performed by: INTERNAL MEDICINE

## 2025-01-18 PROCEDURE — 83735 ASSAY OF MAGNESIUM: CPT | Performed by: INTERNAL MEDICINE

## 2025-01-18 PROCEDURE — 82728 ASSAY OF FERRITIN: CPT | Performed by: INTERNAL MEDICINE

## 2025-01-18 PROCEDURE — 36415 COLL VENOUS BLD VENIPUNCTURE: CPT | Performed by: INTERNAL MEDICINE

## 2025-01-18 PROCEDURE — 250N000009 HC RX 250: Performed by: STUDENT IN AN ORGANIZED HEALTH CARE EDUCATION/TRAINING PROGRAM

## 2025-01-18 PROCEDURE — 250N000013 HC RX MED GY IP 250 OP 250 PS 637: Performed by: PHYSICIAN ASSISTANT

## 2025-01-18 PROCEDURE — 250N000009 HC RX 250: Performed by: INTERNAL MEDICINE

## 2025-01-18 PROCEDURE — 250N000011 HC RX IP 250 OP 636: Performed by: PHYSICIAN ASSISTANT

## 2025-01-18 PROCEDURE — 97140 MANUAL THERAPY 1/> REGIONS: CPT | Mod: GP

## 2025-01-18 PROCEDURE — 94640 AIRWAY INHALATION TREATMENT: CPT

## 2025-01-18 RX ORDER — METHYLPREDNISOLONE SODIUM SUCCINATE 40 MG/ML
40 INJECTION INTRAMUSCULAR; INTRAVENOUS EVERY 8 HOURS
Status: COMPLETED | OUTPATIENT
Start: 2025-01-18 | End: 2025-01-19

## 2025-01-18 RX ORDER — METHYLPREDNISOLONE SODIUM SUCCINATE 40 MG/ML
40 INJECTION INTRAMUSCULAR; INTRAVENOUS EVERY 12 HOURS
Status: COMPLETED | OUTPATIENT
Start: 2025-01-19 | End: 2025-01-20

## 2025-01-18 RX ORDER — PREDNISONE 20 MG/1
40 TABLET ORAL DAILY
Status: COMPLETED | OUTPATIENT
Start: 2025-01-21 | End: 2025-01-23

## 2025-01-18 RX ORDER — MAGNESIUM OXIDE 400 MG/1
400 TABLET ORAL EVERY 4 HOURS
Status: COMPLETED | OUTPATIENT
Start: 2025-01-18 | End: 2025-01-18

## 2025-01-18 RX ORDER — POTASSIUM CHLORIDE 1500 MG/1
40 TABLET, EXTENDED RELEASE ORAL ONCE
Status: COMPLETED | OUTPATIENT
Start: 2025-01-18 | End: 2025-01-18

## 2025-01-18 RX ORDER — POTASSIUM CHLORIDE 1.5 G/1.58G
20 POWDER, FOR SOLUTION ORAL ONCE
Status: COMPLETED | OUTPATIENT
Start: 2025-01-18 | End: 2025-01-18

## 2025-01-18 RX ADMIN — METHYLPREDNISOLONE SODIUM SUCCINATE 40 MG: 40 INJECTION, POWDER, FOR SOLUTION INTRAMUSCULAR; INTRAVENOUS at 20:07

## 2025-01-18 RX ADMIN — Medication 400 MG: at 10:02

## 2025-01-18 RX ADMIN — Medication 400 MG: at 12:31

## 2025-01-18 RX ADMIN — PANTOPRAZOLE SODIUM 40 MG: 40 INJECTION, POWDER, FOR SOLUTION INTRAVENOUS at 20:07

## 2025-01-18 RX ADMIN — IPRATROPIUM BROMIDE AND ALBUTEROL SULFATE 3 ML: .5; 3 SOLUTION RESPIRATORY (INHALATION) at 22:57

## 2025-01-18 RX ADMIN — METHYLPREDNISOLONE SODIUM SUCCINATE 62.5 MG: 125 INJECTION, POWDER, FOR SOLUTION INTRAMUSCULAR; INTRAVENOUS at 12:31

## 2025-01-18 RX ADMIN — AMIODARONE HYDROCHLORIDE 400 MG: 200 TABLET ORAL at 20:04

## 2025-01-18 RX ADMIN — METOPROLOL TARTRATE 50 MG: 50 TABLET, FILM COATED ORAL at 20:04

## 2025-01-18 RX ADMIN — PANTOPRAZOLE SODIUM 40 MG: 40 INJECTION, POWDER, FOR SOLUTION INTRAVENOUS at 10:02

## 2025-01-18 RX ADMIN — IPRATROPIUM BROMIDE AND ALBUTEROL SULFATE 3 ML: .5; 3 SOLUTION RESPIRATORY (INHALATION) at 11:34

## 2025-01-18 RX ADMIN — POTASSIUM CHLORIDE 20 MEQ: 1.5 POWDER, FOR SOLUTION ORAL at 15:55

## 2025-01-18 RX ADMIN — METHYLPREDNISOLONE SODIUM SUCCINATE 62.5 MG: 125 INJECTION, POWDER, FOR SOLUTION INTRAMUSCULAR; INTRAVENOUS at 05:07

## 2025-01-18 RX ADMIN — BUDESONIDE 0.5 MG: 0.5 INHALANT RESPIRATORY (INHALATION) at 07:47

## 2025-01-18 RX ADMIN — BUDESONIDE 0.5 MG: 0.5 INHALANT RESPIRATORY (INHALATION) at 19:25

## 2025-01-18 RX ADMIN — FUROSEMIDE 40 MG: 10 INJECTION, SOLUTION INTRAMUSCULAR; INTRAVENOUS at 20:06

## 2025-01-18 RX ADMIN — IPRATROPIUM BROMIDE AND ALBUTEROL SULFATE 3 ML: .5; 3 SOLUTION RESPIRATORY (INHALATION) at 15:13

## 2025-01-18 RX ADMIN — FUROSEMIDE 40 MG: 10 INJECTION, SOLUTION INTRAMUSCULAR; INTRAVENOUS at 10:02

## 2025-01-18 RX ADMIN — IPRATROPIUM BROMIDE AND ALBUTEROL SULFATE 3 ML: .5; 3 SOLUTION RESPIRATORY (INHALATION) at 07:47

## 2025-01-18 RX ADMIN — APIXABAN 10 MG: 5 TABLET, FILM COATED ORAL at 20:04

## 2025-01-18 RX ADMIN — METOPROLOL TARTRATE 50 MG: 50 TABLET, FILM COATED ORAL at 10:02

## 2025-01-18 RX ADMIN — APIXABAN 10 MG: 5 TABLET, FILM COATED ORAL at 12:31

## 2025-01-18 RX ADMIN — AMIODARONE HYDROCHLORIDE 400 MG: 200 TABLET ORAL at 10:02

## 2025-01-18 RX ADMIN — PIPERACILLIN AND TAZOBACTAM 4.5 G: 4; .5 INJECTION, POWDER, FOR SOLUTION INTRAVENOUS at 12:31

## 2025-01-18 RX ADMIN — IPRATROPIUM BROMIDE AND ALBUTEROL SULFATE 3 ML: .5; 3 SOLUTION RESPIRATORY (INHALATION) at 19:25

## 2025-01-18 RX ADMIN — POTASSIUM CHLORIDE 40 MEQ: 1500 TABLET, EXTENDED RELEASE ORAL at 10:02

## 2025-01-18 RX ADMIN — PIPERACILLIN AND TAZOBACTAM 4.5 G: 4; .5 INJECTION, POWDER, FOR SOLUTION INTRAVENOUS at 05:07

## 2025-01-18 RX ADMIN — PIPERACILLIN AND TAZOBACTAM 4.5 G: 4; .5 INJECTION, POWDER, FOR SOLUTION INTRAVENOUS at 17:32

## 2025-01-18 ASSESSMENT — ACTIVITIES OF DAILY LIVING (ADL)
ADLS_ACUITY_SCORE: 79
ADLS_ACUITY_SCORE: 81
ADLS_ACUITY_SCORE: 81
ADLS_ACUITY_SCORE: 79
ADLS_ACUITY_SCORE: 79
ADLS_ACUITY_SCORE: 81
ADLS_ACUITY_SCORE: 79
ADLS_ACUITY_SCORE: 81
ADLS_ACUITY_SCORE: 81
ADLS_ACUITY_SCORE: 77
ADLS_ACUITY_SCORE: 81
ADLS_ACUITY_SCORE: 79
ADLS_ACUITY_SCORE: 77
ADLS_ACUITY_SCORE: 68
ADLS_ACUITY_SCORE: 79
ADLS_ACUITY_SCORE: 81
ADLS_ACUITY_SCORE: 74
ADLS_ACUITY_SCORE: 76
ADLS_ACUITY_SCORE: 81
ADLS_ACUITY_SCORE: 68

## 2025-01-18 NOTE — PROGRESS NOTES
[facility]    Medicine Progress Note - Hospitalist Service    Date of Admission:  1/11/2025    Assessment & Plan     Hospital cumulative summary    Keith Gonzalez is a 69 year old male with PMH of remote b/l DVT (no cause found per pt), off AC, tobacco abuse, emphysema, COPD, osteoporosis, GERD and testicular cancer (remission) who was admitted as a direct admission to intensive care unit from [place] emergency department for further treatment and evaluation of bilateral pulmonary embolism with acute cor pulmonale, new onset atrial flutter, and extensive lower extremity DVTs.  Patient was initially admitted to ICU due to the concern for possible need for intubation due to ongoing acute hypoxic and hypercapnic respiratory failure.  Patient did not require any intubation and was eventually transferred to [facility] for further management.  His hospitalization is complicated by persistent requirement of oxygen with HFNC, requiring anticoagulation for pulmonary embolism, tenuous cardiac status with alternating atrial flutter/A-fib and possible melena from underlying gastritis/esophagitis.  No EGD has been done so far.  Patient has been followed up with pulmonary , cardiology and gastroenterology.       Acute hypoxic and hypercapnic respiratory failure, multifactorial  Most likely secondary to combination of pulmonary embolism with cor pulmonale, possible COPD exacerbation with tenuous cardiac status with difficult to control atrial flutter/atrial fibrillation  --Manage underlying conditions, as above (see below)      Acute Submassive pulmonary embolism w cor pulmonale  Extensive bilateral lower extremity DVTs.  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 Off Ac x yrs     -- Initially arrived to ICU on heparin and amiodarone  infusion  -- Case reviewed with IR and vascular surgery, no interventions were recommended  -- Patient continued on heparin infusion  -- Patient was switched from BiPAP to high flow nasal cannula, continues to require 45 L HFNC  -- Evaluated by oncology, recommending lifelong anticoagulation  -- Pharmacy liaison consulted, patient agreeable with Eliquis cost  -- Cardiology switched heparin infusion to Eliquis on 01/18/2025  -- Continue to wean patient off oxygen, see below  -- Discontinue Dunn catheter, increase mobilization    Acute COPD exacerbation  Healthcare acquired pneumonia:  Leukocytosis: Resolved    Initially required BiPAP, now has been switched to high flow nasal cannula  Pulmonary and RT following  Patient has completed azithromycin course  01/16: CT chest repeated, again showing PE unchanged from previous, small to moderate bilateral effusion with associated compressive atelectasis and infiltrates was seen.      -- Continue to wean patient off HFNC as able  --Initially was started on IV Zosyn X 5 days for possible aspiration pneumonia per intensivist recommendation  --Patient was febrile on 01/15, chest x-ray showed increased and new infiltrates  --Pulmonary consulted 01/16  -- Plan to continue IV Zosyn X 7 days to complete course for possible HCAP and aspiration pneumonia  -- Continue Pulmicort 0.5 mg nebulizer twice daily  -- Continue IV diuresis Lasix 40 mg IV twice daily  -- Continue DuoNeb every 4 hours scheduled  -- Patient has been receiving IV Solu-Medrol 62.5 mg every 6 hours since admission, will decrease to 40 mg IV every 8 hours for 24 hours, will decrease to every 12 hours for 24 hours and then will switch to oral prednisone 40 mg p.o. daily  --Patient will need further tapering of prednisone, tapering can be further reviewed with pulmonary also on Monday, 01/20/2025  -- Echocardiogram completed, see below  -- Patient will benefit from outpatient pulmonary follow-up with PFT  -- Tobacco  cessation strongly encouraged      Paroxysmal atrial fibrillation/atrial flutter: S/p DCCV x 2 on admission, now in A-fib  Initially on amiodarone infusion in ICU, has been switched to p.o. amiodarone  Also received diltiazem which was later stopped considering decreased EF  Has been started on p.o. metoprolol  [id number] score of 3  Cardiology has been following closely  Acute heart failure with reduced EF  Echo 1/9/25  EF 46%, mild lat hypokinesis.  Mild RV dilate w decr RV fxn  Echo 1/16/25 EF 55% (improved), mid-distal later hypokinesia, RV decr fxn, RV dilated, Pulm HTN, full IVC   Mild biventricular cardiomyopathy.  Type II NSTEMI, secondary to supply demand ischemia in the picture of A-fib and heart failure along with PE    -- Continue to monitor patient closely on telemetry.  -- Continue Amiodarone 400 mg p.o. twice daily with tapering course per cardiology.  -- Patient has also received digoxin on 01/16 and 01/17, no further digoxin ordered  -- Cardiology has discontinued IV heparin and has started patient on Eliquis  -- Patient was monitored on heparin infusion due to the probability of GI bleed  -- Discussed with bedside nursing, patient did not have any melanotic stools in last 24 hours, heparin infusion has already been stopped and patient has already received 10 mg of Eliquis dose  -- Will continue to monitor patient very closely for any signs and symptoms of melanotic stool if any concerns for bleeding will hold Eliquis.  -- Continue patient on Lasix 40 mg twice daily, diuresis per cardiology, check BMP in a.m.  -- Mag oxide 400 mg every 4 hours for 2 doses.  -- Metoprolol tartrate 50 mg p.o. twice daily, cards slowly adjusting beta-blocker while monitoring heart rate closely.  -- Continue on electrolyte protocols    Possible melena 01/16, positive Hemoccult  Chronic GERD/dyspepsia  History of grade D esophagitis on EGD in 2022  Severe nausea resolved    Patient has longstanding history of  dyspepsia, on PPI PTA.  Longstanding history of smoking which increases his risk of reflux esophagitis  01/16 bedside RN reported 1 melanotic dark stool  Hemoccult positive  Hemoglobin has been stable, no further signs and symptoms of melanotic stools    --[facility] following since 01/16  --Plan for EGD in next few days versus outpatient depending upon clinical status.  --Continue twice daily IV PPI  --Would have preferred to continue patient on heparin infusion to monitor closely but patient has already been switched to Eliquis this morning by cardiology  --Considering patient does not have any signs and symptoms of melanotic stools and hemoglobin has been stable will not switch back to heparin infusion and will continue to monitor patient very closely on Eliquis for any signs and symptoms of GI bleed, discussed with bedside nursing.  --Continue antinausea medications.       Acute kidney injury, suspect prerenal. resolved  Hypocalcemia, resolved  Metabolic alkalosis, resolved  Last baseline PTA 1.1, Admit creatinine was up to 2.18, c/w LAI, from PE/ARF nephrology consulted holding diuresis for now, does not seem fluid overloaded.  1/14- Cr decr to 1.39, renal signed off  1/15- Cr decr 1.20, off IVF, off lasix.  Chest x-ray with increased volume signs.  1/16-creatinine 1.07.  S/p CT chest with contrast cards is okay resuming Lasix.  1/7- Ct stable 1.04, on lasix    -- Renal followed, but signed off 1/14  -- Continue to monitor patient BMP closely while patient is getting diuresis with cardiology.       Transaminitis   --LFT minimally elevated alt 156, ast 59 bilirubin is 0.2--likely related to multifactorial stressors .   --Recheck 1/15 shows overall WNL improvement.  Low albumin.      Acute metabolic encephalopathy: Improving  Most likely was secondary to profound hypercapnia and respiratory acidosis, initially was a started on BiPAP now has been switched to high flow nasal cannula  --Continue to monitor, will  order physical and Occupational Therapy evaluation.    Hx of testicular cancer   --review of record with distant history, in remission   - Hem Onc consulted- notes labs neg for recurrent test CA Onc also recs CT chest + abd/pelvis to r/o occult malignancy recurrent **in few days w stable Cr/O2    -- 1/16 s/p CT chest (for PE recheck) - w/o mass  -- Oncology has recommended CT abdomen pelvis to be done before discharge to rule out any concern for recurrence of testicular cancer.  -- CA labs neg     Hx of iron deficiency anemia   --hold ferrous sulfate   -- Will check iron profile with ferritin in a.m., if low will administer IV Venofer     Osteoporosis  Hx of compression fractures    - APAP prn pain     Tobacco abuse  PTA smoking 4 cigarettes a day.  Quit x 8 months last yr.  Discussed smoking as recurrent DVT contribution risk .  Willing to quit.  No cravings now, declines need for rx now  --Smoking cessation strongly encouraged , and to lower DVT risk. He says he will stop, but unwilling to throw out cigs in jacket pocket!  --declines nicotine patch as not needed  --Nicotine gum/lozenge added as needed craving  --PCP follow-up not Declines outpatient smoking cessation meds        Diet: Regular Diet Adult  Snacks/Supplements Adult: Magic Cup; With Meals    DVT Prophylaxis: DOAC  Dunn Catheter: PRESENT, indication: Acute retention or obstruction  Lines: None     Cardiac Monitoring: ACTIVE order. Indication: Acute decompensated heart failure (48 hours)  Code Status: Full Code      Clinically Significant Risk Factors          # Hypochloremia: Lowest Cl = 97 mmol/L in last 2 days, will monitor as appropriate      # Hypoalbuminemia: Lowest albumin = 3 g/dL at 1/13/2025  4:07 AM, will monitor as appropriate  # Coagulation Defect: INR = 1.25 (Ref range: 0.85 - 1.15) and/or PTT = N/A, will monitor for bleeding        # Acute Hypercapnic Respiratory Failure: based on arterial blood gas results.  Continue supplemental oxygen  and ventilatory support as indicated.  # Acute Hypercapnic Respiratory Failure: based on venous blood gas results.  Continue supplemental oxygen and ventilatory support as indicated.             # Financial/Environmental Concerns: none  # COPD: noted on problem list        Social Drivers of Health    Food Insecurity: Unknown (1/11/2025)    Food Insecurity     Within the past 12 months, did you worry that your food would run out before you got money to buy more?: Patient unable to answer     Within the past 12 months, did the food you bought just not last and you didn t have money to get more?: Patient unable to answer   Housing Stability: Unknown (1/11/2025)    Housing Stability     Do you have housing? : Patient unable to answer     Are you worried about losing your housing?: Patient unable to answer   Tobacco Use: High Risk (1/9/2025)    Patient History     Smoking Tobacco Use: Every Day     Smokeless Tobacco Use: Never   Financial Resource Strain: Unknown (1/11/2025)    Financial Resource Strain     Within the past 12 months, have you or your family members you live with been unable to get utilities (heat, electricity) when it was really needed?: Patient unable to answer   Transportation Needs: Unknown (1/11/2025)    Transportation Needs     Within the past 12 months, has lack of transportation kept you from medical appointments, getting your medicines, non-medical meetings or appointments, work, or from getting things that you need?: Patient unable to answer   Interpersonal Safety: Unknown (1/11/2025)    Interpersonal Safety     Do you feel physically and emotionally safe where you currently live?: Patient unable to answer     Within the past 12 months, have you been hit, slapped, kicked or otherwise physically hurt by someone?: Patient unable to answer     Within the past 12 months, have you been humiliated or emotionally abused in other ways by your partner or ex-partner?: Patient unable to answer           Disposition Plan     Medically Ready for Discharge: Anticipated in 2-4 Days      Shila Israel MD  Hospitalist Service  [facility]  Securely message with [employer] (more info)  Text page via [employer] Paging/Directory   ______________________________________________________________________    Interval History     Patient was seen and examined, sitting in bed, still requiring high flow nasal cannula.  Discussed with patient regarding starting to decrease his IV steroids as he has been on high-dose steroid for 1 week.  Plan to continue IV antibiotics for another 48 hours.  Discussed with patient regarding monitoring his bowel movements to make sure patient does not have any active GI bleed.  We discussed about importance of staying abstinent from smoking.  Patient is aware cardiology is managing his cardiac medications including diuresis.      Physical Exam   Vital Signs: Temp: 98  F (36.7  C) Temp src: Oral BP: 129/88 Pulse: 114   Resp: 22 SpO2: 95 % O2 Device: High Flow Nasal Cannula (HFNC) Oxygen Delivery: 45 LPM  Weight: 160 lbs .86 oz    Physical Exam  Vitals and nursing note reviewed.   Constitutional:       General: He is not in acute distress.     Appearance: He is well-developed.      Comments: Looks chronically ill, wearing high flow nasal cannula, not using accessory muscles   HENT:      Head: Normocephalic and atraumatic.   Eyes:      Pupils: Pupils are equal, round, and reactive to light.   Neck:      Thyroid: No thyromegaly.   Cardiovascular:      Rate and Rhythm: Normal rate and regular rhythm.      Heart sounds: Normal heart sounds.   Pulmonary:      Effort: Pulmonary effort is normal. No respiratory distress.      Breath sounds: Normal breath sounds.      Comments: Rhonchi in bases, no significant wheezing  Abdominal:      General: Bowel sounds are normal. There is no distension.      Palpations: Abdomen is soft.   Musculoskeletal:         General: No tenderness. Normal range of  motion.      Cervical back: Normal range of motion and neck supple.   Skin:     General: Skin is warm and dry.   Neurological:      Mental Status: He is alert and oriented to person, place, and time.   Psychiatric:         Behavior: Behavior normal.           Medical Decision Making       65 MINUTES SPENT BY ME on the date of service doing chart review, history, exam, documentation & further activities per the note.      Data     I have personally reviewed the following data over the past 24 hrs:    10.9  \   12.5 (L)   / 168     142 95 (L) 40.9 (H) /  170 (H)   3.5 41 (H) 0.97 \     Procal: N/A CRP: N/A Lactic Acid: 1.9       INR:  1.25 (H) PTT:  N/A   D-dimer:  N/A Fibrinogen:  N/A       Imaging results reviewed over the past 24 hrs:   No results found for this or any previous visit (from the past 24 hours).  Recent Labs   Lab 01/18/25  1404 01/18/25  1211 01/18/25  0719 01/18/25  0221 01/17/25  0719 01/17/25  0542 01/16/25  2210 01/16/25  1413 01/16/25  0604 01/15/25  0744 01/15/25  0553 01/13/25  0417 01/13/25  0407   WBC  --   --  10.9  --   --  10.8  --   --  10.2  --  10.4   < > 16.7*   HGB  --   --  12.5*  --   --  11.2* 12.0*   < > 11.4*  --  11.3*   < > 10.9*   MCV  --   --  85  --   --  85  --   --  87  --  88   < > 87   PLT  --   --  168  --   --  158  --   --  151  --  166   < > 178   INR  --   --  1.25*  --   --  1.32*  --   --  1.19*  --  1.25*   < > 1.21*   NA  --   --  142  --   --  143  --   --  140  --  139   < > 133*   POTASSIUM 3.5  --  3.1*  --   --  3.7  --   --  4.1  --  4.4   < > 4.8   CHLORIDE  --   --  95*  --   --  97*  --   --  98  --  100   < > 98   CO2  --   --  41*  --   --  37*  --   --  36*  --  34*   < > 27   BUN  --   --  40.9*  --   --  41.4*  --   --  41.5*  --  45.3*   < > 63.5*   CR  --   --  0.97  --   --  1.04  --   --  1.07  --  1.20*   < > 1.82*   ANIONGAP  --   --  6*  --   --  9  --   --  6*  --  5*   < > 8   DAT  --   --  8.5*  --   --  8.5*  --   --  8.6*  --  8.6*   <  > 7.6*   GLC  --  170* 138* 159*   < > 139*  --    < > 137*   < > 128*   < > 156*   ALBUMIN  --   --   --   --   --   --   --   --   --   --  3.1*  --  3.0*   PROTTOTAL  --   --   --   --   --   --   --   --   --   --  5.4*  --   --    BILITOTAL  --   --   --   --   --   --   --   --   --   --  0.5  --   --    ALKPHOS  --   --   --   --   --   --   --   --   --   --  73  --   --    ALT  --   --   --   --   --   --   --   --   --   --  63  --   --    AST  --   --   --   --   --   --   --   --   --   --  26  --   --     < > = values in this interval not displayed.

## 2025-01-18 NOTE — PROGRESS NOTES
Assessment and Plan:     Keith Gonzalez is a 69 year old male who was admitted on 1/11/2025 with submassive PE and COPD exacerbation. Cardiology was consulted for A fib RVR and mild biV cardiomyopathy noted on TTE.      Paroxysmal Atrial fibrillation/flutter-- s/p dccv x2 on admission for flutter, now in a fib  -- diltiazem discontinued on 1/16 and remains on PO amiodarone   -- on PO metoprolol tartrate 50 mg BID.  Heart rates remain high  -- CHADSVASC score of 3 - heme recommended lifelong DOAC, on IV heparin currently.  -- good coverage on DOAC per pharm team      Mild BiV Cardiomyopathy   -- LVEF 46%; mild RV dysfunction and dilation  -- initiated on BB      Submassive PE with brendan DVT  -- IR recommended no intervention     COPD exacerbation with cor pulmonale     Acute hypoxic and hypercapnic Resp Failure secondary to PE and COPD, still with hypoxia requiring high flow nasal cannula oxygen with oxygen saturation at 92%.    LAI, resolved    Active tobacco dependence     Hx of testicular cancer     ----------------------------------------------------------------------------------------     Plan:  -- Continue with PO amiodarone loading   -- Increase Metroprolol tartrate 75 mg twice daily  -- Discontinue IV heparin, start apixaban for acute PE dosing, 10 mg p.o. twice daily for 1 week then 5 mg p.o. twice daily indefinitely  -- Continue with IV furosemide 40 mg BID as he has good urine output and renal function remains normal.  -- We will plan for repeat TTE once under has better ventricular rate control  -- Plan to transition to oral diuretics, repeat TTE, and discharge after oxygen requirements have improved           DONTRELL ROY MD        Interval History:     doing well; no cp, sob, n/v/d, or abd pain.          Medications:     Current Facility-Administered Medications   Medication Dose Route Frequency Provider Last Rate Last Admin    [START ON 1/27/2025] amiodarone (PACERONE) tablet 200 mg  200 mg  Oral Daily Rahel Cárdenas PA-C        amiodarone (PACERONE) tablet 400 mg  400 mg Oral BID Rahel Cárdenas PA-C   400 mg at 01/18/25 1002    [START ON 1/21/2025] amiodarone (PACERONE) tablet 400 mg  400 mg Oral Daily Rahel Cárdenas PA-C        apixaban ANTICOAGULANT (ELIQUIS) tablet 10 mg  10 mg Oral BID Dave Glover MD        Followed by    [START ON 1/25/2025] apixaban ANTICOAGULANT (ELIQUIS) tablet 5 mg  5 mg Oral BID Dave Glover MD        budesonide (PULMICORT) neb solution 0.5 mg  0.5 mg Nebulization BID Doyle Salazar MD   0.5 mg at 01/18/25 0747    furosemide (LASIX) injection 40 mg  40 mg Intravenous BID Rahel Cárdenas PA-C   40 mg at 01/18/25 1002    insulin aspart (NovoLOG) injection (RAPID ACTING)   Subcutaneous TID w/meals Shila Israel MD   6 Units at 01/18/25 1001    insulin aspart (NovoLOG) injection (RAPID ACTING)  1-10 Units Subcutaneous TID AC Shila Israel MD   1 Units at 01/17/25 0833    insulin aspart (NovoLOG) injection (RAPID ACTING)  1-7 Units Subcutaneous At Bedtime Shila Israel MD        ipratropium - albuterol 0.5 mg/2.5 mg/3 mL (DUONEB) neb solution 3 mL  3 mL Nebulization Q4H Shila Israel MD   3 mL at 01/18/25 1134    magnesium oxide (MAG-OX) tablet 400 mg  400 mg Oral Q4H Shila Israel MD   400 mg at 01/18/25 1002    methylPREDNISolone Na Suc (solu-MEDROL) injection 62.5 mg  62.5 mg Intravenous Q6H Shila Israel MD   62.5 mg at 01/18/25 0507    metoprolol tartrate (LOPRESSOR) tablet 50 mg  50 mg Oral BID Rahel Cárdenas PA-C   50 mg at 01/18/25 1002    pantoprazole (PROTONIX) IV push injection 40 mg  40 mg Intravenous BID Woo Fernandez PA-C   40 mg at 01/18/25 1002    piperacillin-tazobactam (ZOSYN) 4.5 g vial to attach to  mL bag  4.5 g Intravenous Q6H Shila Israel MD   4.5 g at 01/18/25 0507    sodium chloride (PF) 0.9% PF flush 3 mL  3 mL Intracatheter Q8H Shlia Israel MD   3 mL at 01/18/25 0507             Physical Exam:   Blood pressure 118/71, pulse 101, temperature 98.7  F (37.1  C), temperature source Oral, resp. rate 20, weight 72.6 kg (160 lb 0.9 oz), SpO2 92%.  Vitals:    01/11/25 1330 01/13/25 0348 01/14/25 0600 01/15/25 0600   Weight: 67.8 kg (149 lb 7.6 oz) 68.1 kg (150 lb 2.1 oz) 73.1 kg (161 lb 2.5 oz) 72 kg (158 lb 11.7 oz)    01/15/25 0816 01/16/25 0640 01/17/25 0600   Weight: 72.1 kg (159 lb) 73.1 kg (161 lb 2.5 oz) 72.6 kg (160 lb 0.9 oz)         Intake/Output Summary (Last 24 hours) at 1/18/2025 1142  Last data filed at 1/18/2025 1023  Gross per 24 hour   Intake 720 ml   Output 3975 ml   Net -3255 ml       01/13 0700 - 01/18 0659  In: 3600 [P.O.:3000; I.V.:600]  Out: 83564 [Urine:83490]  Net: -24169    Exam:  GENERAL APPEARANCE ADULT: Alert, in no acute distress  RESP: lungs clear to auscultation   CV: regular rate and rhythm, no murmur, rub, or gallop  ABDOMEN: normal bowel sounds, soft, nontender, no hepatosplenomegaly or other masses  EXTREMITIES: Edema 1+         Data:   LABS (Last four results)  CMP  Recent Labs   Lab 01/18/25  0719 01/18/25  0221 01/17/25  2116 01/17/25  1721 01/17/25  0719 01/17/25  0542 01/16/25  1413 01/16/25  0604 01/15/25  0744 01/15/25  0553 01/13/25  0417 01/13/25  0407 01/12/25  0744 01/12/25  0533     --   --   --   --  143  --  140  --  139   < > 133*  --  140   POTASSIUM 3.1*  --   --   --   --  3.7  --  4.1  --  4.4   < > 4.8  --  5.0   CHLORIDE 95*  --   --   --   --  97*  --  98  --  100   < > 98  --  101   CO2 41*  --   --   --   --  37*  --  36*  --  34*   < > 27  --  28   ANIONGAP 6*  --   --   --   --  9  --  6*  --  5*   < > 8  --  11   * 159* 192* 134*   < > 139*   < > 137*   < > 128*   < > 156*   < > 155*   BUN 40.9*  --   --   --   --  41.4*  --  41.5*  --  45.3*   < > 63.5*  --  61.9*   CR 0.97  --   --   --   --  1.04  --  1.07  --  1.20*   < > 1.82*  --  1.90*   GFRESTIMATED 85  --   --   --   --  78  --  76  --  66   < > 40*  --  38*   DAT  "8.5*  --   --   --   --  8.5*  --  8.6*  --  8.6*   < > 7.6*  --  8.0*   MAG 1.8  --   --   --   --  1.8  --  2.1  --  2.3   < > 2.1  --  2.0   PHOS 3.4  --   --   --   --  3.2  --  3.3  --  3.6   < > 3.5  --  4.0   PROTTOTAL  --   --   --   --   --   --   --   --   --  5.4*  --   --   --   --    ALBUMIN  --   --   --   --   --   --   --   --   --  3.1*  --  3.0*  --  3.0*   BILITOTAL  --   --   --   --   --   --   --   --   --  0.5  --   --   --   --    ALKPHOS  --   --   --   --   --   --   --   --   --  73  --   --   --   --    AST  --   --   --   --   --   --   --   --   --  26  --   --   --   --    ALT  --   --   --   --   --   --   --   --   --  63  --   --   --   --     < > = values in this interval not displayed.     CBC  Recent Labs   Lab 01/18/25  0719 01/17/25  0542 01/16/25  2210 01/16/25  1414 01/16/25  0604 01/15/25  0553   WBC 10.9 10.8  --   --  10.2 10.4   RBC 4.72 4.11*  --   --  4.22* 4.22*   HGB 12.5* 11.2* 12.0* 11.8* 11.4* 11.3*   HCT 40.1 34.9*  --   --  36.5* 37.0*   MCV 85 85  --   --  87 88   MCH 26.5 27.3  --   --  27.0 26.8   MCHC 31.2* 32.1  --   --  31.2* 30.5*   RDW 14.8 14.8  --   --  14.9 15.1*    158  --   --  151 166     INR  Recent Labs   Lab 01/18/25  0719 01/17/25  0542 01/16/25  0604 01/15/25  0553   INR 1.25* 1.32* 1.19* 1.25*     TROPONINS No results found for: \"TROPI\", \"TROPONIN\", \"TROPR\", \"TROPN\"                                                                                                            DONTRELL ROY MD   "

## 2025-01-18 NOTE — PLAN OF CARE
"Goal Outcome Evaluation:    Patient Name: Sol  MRN: [id number]  Date of Admission: 1/11/2025  Reason for Admission: bilateral PE & DVT, respiratory failure  Level of Care: [facility] [place]    Vitals:   BP Readings from Last 1 Encounters:   01/18/25 112/81     Pulse Readings from Last 1 Encounters:   01/18/25 109     Wt Readings from Last 1 Encounters:   01/17/25 72.6 kg (160 lb 0.9 oz)     Ht Readings from Last 1 Encounters:   07/08/24 1.778 m (5' 10\")     Estimated body mass index is 22.97 kg/m  as calculated from the following:    Height as of 7/8/24: 1.778 m (5' 10\").    Weight as of this encounter: 72.6 kg (160 lb 0.9 oz).  Temp Readings from Last 1 Encounters:   01/18/25 98.7  F (37.1  C) (Oral)       Pain: Pain goal Denies pain/nausea    CV Surgery Patient: No    Assessment    Resp: VSS on HFNC at 50% FiO2, 35L, RUBY. LS diminished. Infrequent congested cough. Scheduled nebs  Telemetry: Afib RVR. Cards following  Neuro: A/O x 4, forgetful  Diet: Tolerating regular diet, improved appetite. BG ACHS  GI/: Dunn removed this morning, AUOP using urinal at bedside. BM x 3, non-melanotic   Skin/Wounds: Blanchable redness to back, face, coccyx/sacrum, behind ears. Protective dressings in place. +2-3 edema to BLE, lymphedema wraps in place.   Lines/Drains: PIV x 2 SL. Intermittent abx  Activity: A x 2 [place] steady, weak & unsteady on feet  Sleep: good  Abnormal Labs: K/Mg replaced, recheck in AM    Aggression Stop Light: Green          Patient Care Plan: Encourage activity, wean O2 as able. Pulmonology,cards,GI, hem/oc following. Started eliquis today 1/18. Monitor very closely for s/s of melanotic stools. EGD in the next few days pending improvement in clinical status      "

## 2025-01-18 NOTE — PROVIDER NOTIFICATION
01/18/25 1706   Critical Test Results/Notification   Critical Lab Result (Lab Name and Value) Heparin Xa >1.10   What Time Did The Lab Notify You? 1705   Provider Notified yes   Date of Provider Notification 01/18/25   Time of Provider Notification 1706   Mechanism of Provider notification page   What Provider Did You Notify? MD Israel   Response no orders were obtained, but call back

## 2025-01-18 NOTE — PROVIDER NOTIFICATION
MD Notification    Notified Person: MD    Notified Person Name: Dr. Villavicencio     Notification Date/Time: 1-17 at 2038    Notification Interaction: Vocera     Purpose of Notification: Pt has a 100.5 temp and I was wondering if I could get PRN tyl     Orders Received: PRN Tyl     Comments:

## 2025-01-18 NOTE — PLAN OF CARE
"Goal Outcome Evaluation:  Patient Name: Sol  MRN: [id number]  Date of Admission: 1/11/2025  Reason for Admission: Bilateral PE and A.fib RVR  [place] [facility] [place]:   BP Readings from Last 1 Encounters:   01/18/25 116/87     Pulse Readings from Last 1 Encounters:   01/18/25 97     Wt Readings from Last 1 Encounters:   01/17/25 72.6 kg (160 lb 0.9 oz)     Ht Readings from Last 1 Encounters:   07/08/24 1.778 m (5' 10\")     Estimated body mass index is 22.97 kg/m  as calculated from the following:    Height as of 7/8/24: 1.778 m (5' 10\").    Weight as of this encounter: 72.6 kg (160 lb 0.9 oz).  Temp Readings from Last 1 Encounters:   01/18/25 98.2  F (36.8  C) (Oral)       Pain: Pt denies pain     CV Surgery Patient: No    Assessment    Resp: LSC but diminished, NC highflow at 45 LPM, intermittent cough   Telemetry: A fib RVR, A.fib CVR   Neuro: Aox4, forgetful, fever around 2100 on the 17th and given one dose of tyl   GI/: Dunn, BM-, BS+, flatus   Skin/Wounds: scattered bruising, BL LE edema, R FA rash   Lines/Drains: L PIV w heparin at 1400, R PIV SL   Activity: A2 WGB   Sleep: Good   Abnormal Labs: Hep XA recheck at 0600, K, Mg, P protocol, lactic of 1.9     Aggression Stop Light: Green          Patient Care Plan: Monitor, wean O2, increase physical activity      "

## 2025-01-18 NOTE — PLAN OF CARE
"Goal Outcome Evaluation:    Patient Name: Sol  MRN: [id number]  Date of Admission: 1/11/2025  Reason for Admission: PE, bilateral DVT, respiratory failure  Level of Care: [facility] [place]    Vitals:   BP Readings from Last 1 Encounters:   01/17/25 109/75     Pulse Readings from Last 1 Encounters:   01/17/25 (!) 123     Wt Readings from Last 1 Encounters:   01/17/25 72.6 kg (160 lb 0.9 oz)     Ht Readings from Last 1 Encounters:   07/08/24 1.778 m (5' 10\")     Estimated body mass index is 22.97 kg/m  as calculated from the following:    Height as of 7/8/24: 1.778 m (5' 10\").    Weight as of this encounter: 72.6 kg (160 lb 0.9 oz).  Temp Readings from Last 1 Encounters:   01/17/25 98.7  F (37.1  C) (Oral)       Pain: Pain goal Denies pain/nausea    CV Surgery Patient: No    Assessment    Resp: VSS on 10L oxymask. HFNC for most of the day at 60% FiO2, 45L. RUBY. LS clear/diminished  Telemetry: Afib RVR. One time dose of digoxin given. PRN metoprolol given x 1  Cardiology following  Diet: Regular diet, initially very poor appetite, improved throughout shift  BG ACHS  Neuro: A/O x 4, forgetful  GI/: Dunn in place w/ AUOP. Multiple loose BM's early in the day. Incontinent of bowel  Skin/Wounds: Blanchable redness to back, sacrum, buttocks, behind ears, protective dressings in place. +2-3 edema to BLE  Lines/Drains: PIV x 2 infusing heparin gtt at 1,400 units/hr. Xa recheck in AM  Intermittent antibiotics  Activity: A x 2 [place] steady. PT/OT. Feels weaker today vs. yesterday  Sleep: good  Abnormal Labs: K/Mg replaced, recheck in AM    Aggression Stop Light: Green          Patient Care Plan: Encourage activity, wean O2 as able. Pulmonology, cards, GI, hem/onc following. Monitor HR      "

## 2025-01-19 ENCOUNTER — APPOINTMENT (OUTPATIENT)
Dept: PHYSICAL THERAPY | Facility: CLINIC | Age: 70
DRG: 175 | End: 2025-01-19
Attending: INTERNAL MEDICINE
Payer: COMMERCIAL

## 2025-01-19 LAB
ANION GAP SERPL CALCULATED.3IONS-SCNC: 8 MMOL/L (ref 7–15)
BUN SERPL-MCNC: 35 MG/DL (ref 8–23)
CALCIUM SERPL-MCNC: 8.6 MG/DL (ref 8.8–10.4)
CHLORIDE SERPL-SCNC: 95 MMOL/L (ref 98–107)
CREAT SERPL-MCNC: 0.84 MG/DL (ref 0.67–1.17)
EGFRCR SERPLBLD CKD-EPI 2021: >90 ML/MIN/1.73M2
ERYTHROCYTE [DISTWIDTH] IN BLOOD BY AUTOMATED COUNT: 14.7 % (ref 10–15)
GLUCOSE BLDC GLUCOMTR-MCNC: 101 MG/DL (ref 70–99)
GLUCOSE BLDC GLUCOMTR-MCNC: 143 MG/DL (ref 70–99)
GLUCOSE BLDC GLUCOMTR-MCNC: 144 MG/DL (ref 70–99)
GLUCOSE BLDC GLUCOMTR-MCNC: 181 MG/DL (ref 70–99)
GLUCOSE BLDC GLUCOMTR-MCNC: 182 MG/DL (ref 70–99)
GLUCOSE SERPL-MCNC: 120 MG/DL (ref 70–99)
HCO3 SERPL-SCNC: 40 MMOL/L (ref 22–29)
HCT VFR BLD AUTO: 42.1 % (ref 40–53)
HGB BLD-MCNC: 13.4 G/DL (ref 13.3–17.7)
INR PPP: 1.61 (ref 0.85–1.15)
LACTATE SERPL-SCNC: 2.3 MMOL/L (ref 0.7–2)
LACTATE SERPL-SCNC: 2.3 MMOL/L (ref 0.7–2)
MAGNESIUM SERPL-MCNC: 1.7 MG/DL (ref 1.7–2.3)
MCH RBC QN AUTO: 26.9 PG (ref 26.5–33)
MCHC RBC AUTO-ENTMCNC: 31.8 G/DL (ref 31.5–36.5)
MCV RBC AUTO: 84 FL (ref 78–100)
PHOSPHATE SERPL-MCNC: 2.8 MG/DL (ref 2.5–4.5)
PLATELET # BLD AUTO: 141 10E3/UL (ref 150–450)
POTASSIUM SERPL-SCNC: 3.2 MMOL/L (ref 3.4–5.3)
POTASSIUM SERPL-SCNC: 3.3 MMOL/L (ref 3.4–5.3)
POTASSIUM SERPL-SCNC: 3.9 MMOL/L (ref 3.4–5.3)
RBC # BLD AUTO: 4.99 10E6/UL (ref 4.4–5.9)
SODIUM SERPL-SCNC: 143 MMOL/L (ref 135–145)
WBC # BLD AUTO: 15.3 10E3/UL (ref 4–11)

## 2025-01-19 PROCEDURE — 83605 ASSAY OF LACTIC ACID: CPT | Performed by: INTERNAL MEDICINE

## 2025-01-19 PROCEDURE — 250N000009 HC RX 250: Performed by: PHYSICIAN ASSISTANT

## 2025-01-19 PROCEDURE — 250N000009 HC RX 250: Performed by: INTERNAL MEDICINE

## 2025-01-19 PROCEDURE — 82565 ASSAY OF CREATININE: CPT | Performed by: INTERNAL MEDICINE

## 2025-01-19 PROCEDURE — 250N000011 HC RX IP 250 OP 636: Performed by: INTERNAL MEDICINE

## 2025-01-19 PROCEDURE — 250N000013 HC RX MED GY IP 250 OP 250 PS 637: Performed by: INTERNAL MEDICINE

## 2025-01-19 PROCEDURE — 99233 SBSQ HOSP IP/OBS HIGH 50: CPT | Performed by: INTERNAL MEDICINE

## 2025-01-19 PROCEDURE — 83735 ASSAY OF MAGNESIUM: CPT | Performed by: INTERNAL MEDICINE

## 2025-01-19 PROCEDURE — 250N000013 HC RX MED GY IP 250 OP 250 PS 637: Performed by: PHYSICIAN ASSISTANT

## 2025-01-19 PROCEDURE — 84132 ASSAY OF SERUM POTASSIUM: CPT | Performed by: INTERNAL MEDICINE

## 2025-01-19 PROCEDURE — 120N000013 HC R&B IMCU

## 2025-01-19 PROCEDURE — 94640 AIRWAY INHALATION TREATMENT: CPT

## 2025-01-19 PROCEDURE — 97140 MANUAL THERAPY 1/> REGIONS: CPT | Mod: GP | Performed by: PHYSICAL THERAPIST

## 2025-01-19 PROCEDURE — 999N000157 HC STATISTIC RCP TIME EA 10 MIN

## 2025-01-19 PROCEDURE — 94640 AIRWAY INHALATION TREATMENT: CPT | Mod: 76

## 2025-01-19 PROCEDURE — 250N000011 HC RX IP 250 OP 636: Performed by: PHYSICIAN ASSISTANT

## 2025-01-19 PROCEDURE — 97530 THERAPEUTIC ACTIVITIES: CPT | Mod: GP | Performed by: PHYSICAL THERAPIST

## 2025-01-19 PROCEDURE — 36415 COLL VENOUS BLD VENIPUNCTURE: CPT | Performed by: INTERNAL MEDICINE

## 2025-01-19 PROCEDURE — 250N000009 HC RX 250: Performed by: STUDENT IN AN ORGANIZED HEALTH CARE EDUCATION/TRAINING PROGRAM

## 2025-01-19 PROCEDURE — 84100 ASSAY OF PHOSPHORUS: CPT | Performed by: INTERNAL MEDICINE

## 2025-01-19 PROCEDURE — 85610 PROTHROMBIN TIME: CPT | Performed by: INTERNAL MEDICINE

## 2025-01-19 PROCEDURE — 99232 SBSQ HOSP IP/OBS MODERATE 35: CPT | Performed by: INTERNAL MEDICINE

## 2025-01-19 PROCEDURE — 85041 AUTOMATED RBC COUNT: CPT | Performed by: INTERNAL MEDICINE

## 2025-01-19 RX ORDER — METOPROLOL TARTRATE 25 MG/1
25 TABLET, FILM COATED ORAL ONCE
Status: COMPLETED | OUTPATIENT
Start: 2025-01-19 | End: 2025-01-19

## 2025-01-19 RX ORDER — MAGNESIUM SULFATE HEPTAHYDRATE 40 MG/ML
2 INJECTION, SOLUTION INTRAVENOUS ONCE
Status: COMPLETED | OUTPATIENT
Start: 2025-01-19 | End: 2025-01-19

## 2025-01-19 RX ORDER — POTASSIUM CHLORIDE 1.5 G/1.58G
40 POWDER, FOR SOLUTION ORAL ONCE
Status: COMPLETED | OUTPATIENT
Start: 2025-01-19 | End: 2025-01-19

## 2025-01-19 RX ORDER — METOPROLOL TARTRATE 100 MG/1
100 TABLET ORAL 2 TIMES DAILY
Status: DISCONTINUED | OUTPATIENT
Start: 2025-01-19 | End: 2025-01-22

## 2025-01-19 RX ORDER — POTASSIUM CHLORIDE 1500 MG/1
40 TABLET, EXTENDED RELEASE ORAL ONCE
Status: COMPLETED | OUTPATIENT
Start: 2025-01-19 | End: 2025-01-19

## 2025-01-19 RX ADMIN — PIPERACILLIN AND TAZOBACTAM 4.5 G: 4; .5 INJECTION, POWDER, FOR SOLUTION INTRAVENOUS at 11:54

## 2025-01-19 RX ADMIN — AMIODARONE HYDROCHLORIDE 400 MG: 200 TABLET ORAL at 20:06

## 2025-01-19 RX ADMIN — METOPROLOL TARTRATE 25 MG: 25 TABLET, FILM COATED ORAL at 11:53

## 2025-01-19 RX ADMIN — METHYLPREDNISOLONE SODIUM SUCCINATE 40 MG: 40 INJECTION, POWDER, FOR SOLUTION INTRAMUSCULAR; INTRAVENOUS at 04:28

## 2025-01-19 RX ADMIN — APIXABAN 10 MG: 5 TABLET, FILM COATED ORAL at 08:35

## 2025-01-19 RX ADMIN — IPRATROPIUM BROMIDE AND ALBUTEROL SULFATE 3 ML: .5; 3 SOLUTION RESPIRATORY (INHALATION) at 11:08

## 2025-01-19 RX ADMIN — POTASSIUM CHLORIDE 40 MEQ: 1.5 POWDER, FOR SOLUTION ORAL at 15:19

## 2025-01-19 RX ADMIN — IPRATROPIUM BROMIDE AND ALBUTEROL SULFATE 3 ML: .5; 3 SOLUTION RESPIRATORY (INHALATION) at 08:24

## 2025-01-19 RX ADMIN — BUDESONIDE 0.5 MG: 0.5 INHALANT RESPIRATORY (INHALATION) at 08:24

## 2025-01-19 RX ADMIN — BUDESONIDE 0.5 MG: 0.5 INHALANT RESPIRATORY (INHALATION) at 20:18

## 2025-01-19 RX ADMIN — PANTOPRAZOLE SODIUM 40 MG: 40 INJECTION, POWDER, FOR SOLUTION INTRAVENOUS at 08:35

## 2025-01-19 RX ADMIN — ACETAMINOPHEN 650 MG: 325 TABLET, FILM COATED ORAL at 11:53

## 2025-01-19 RX ADMIN — METHYLPREDNISOLONE SODIUM SUCCINATE 40 MG: 40 INJECTION, POWDER, FOR SOLUTION INTRAMUSCULAR; INTRAVENOUS at 20:05

## 2025-01-19 RX ADMIN — METOPROLOL TARTRATE 50 MG: 50 TABLET, FILM COATED ORAL at 08:35

## 2025-01-19 RX ADMIN — IPRATROPIUM BROMIDE AND ALBUTEROL SULFATE 3 ML: .5; 3 SOLUTION RESPIRATORY (INHALATION) at 20:18

## 2025-01-19 RX ADMIN — POTASSIUM CHLORIDE 40 MEQ: 1500 TABLET, EXTENDED RELEASE ORAL at 08:35

## 2025-01-19 RX ADMIN — METHYLPREDNISOLONE SODIUM SUCCINATE 40 MG: 40 INJECTION, POWDER, FOR SOLUTION INTRAMUSCULAR; INTRAVENOUS at 11:54

## 2025-01-19 RX ADMIN — PANTOPRAZOLE SODIUM 40 MG: 40 INJECTION, POWDER, FOR SOLUTION INTRAVENOUS at 20:06

## 2025-01-19 RX ADMIN — MAGNESIUM SULFATE HEPTAHYDRATE 2 G: 40 INJECTION, SOLUTION INTRAVENOUS at 08:35

## 2025-01-19 RX ADMIN — METOPROLOL TARTRATE 100 MG: 100 TABLET, FILM COATED ORAL at 20:06

## 2025-01-19 RX ADMIN — IPRATROPIUM BROMIDE AND ALBUTEROL SULFATE 3 ML: .5; 3 SOLUTION RESPIRATORY (INHALATION) at 15:07

## 2025-01-19 RX ADMIN — APIXABAN 10 MG: 5 TABLET, FILM COATED ORAL at 20:06

## 2025-01-19 RX ADMIN — PIPERACILLIN AND TAZOBACTAM 4.5 G: 4; .5 INJECTION, POWDER, FOR SOLUTION INTRAVENOUS at 17:52

## 2025-01-19 RX ADMIN — FUROSEMIDE 40 MG: 10 INJECTION, SOLUTION INTRAMUSCULAR; INTRAVENOUS at 20:06

## 2025-01-19 RX ADMIN — FUROSEMIDE 40 MG: 10 INJECTION, SOLUTION INTRAMUSCULAR; INTRAVENOUS at 08:34

## 2025-01-19 RX ADMIN — PIPERACILLIN AND TAZOBACTAM 4.5 G: 4; .5 INJECTION, POWDER, FOR SOLUTION INTRAVENOUS at 00:09

## 2025-01-19 RX ADMIN — PIPERACILLIN AND TAZOBACTAM 4.5 G: 4; .5 INJECTION, POWDER, FOR SOLUTION INTRAVENOUS at 05:47

## 2025-01-19 RX ADMIN — PIPERACILLIN AND TAZOBACTAM 4.5 G: 4; .5 INJECTION, POWDER, FOR SOLUTION INTRAVENOUS at 23:40

## 2025-01-19 RX ADMIN — AMIODARONE HYDROCHLORIDE 400 MG: 200 TABLET ORAL at 08:35

## 2025-01-19 ASSESSMENT — ACTIVITIES OF DAILY LIVING (ADL)
ADLS_ACUITY_SCORE: 75
ADLS_ACUITY_SCORE: 74
ADLS_ACUITY_SCORE: 70
ADLS_ACUITY_SCORE: 73
ADLS_ACUITY_SCORE: 69
ADLS_ACUITY_SCORE: 69
ADLS_ACUITY_SCORE: 74
ADLS_ACUITY_SCORE: 74
ADLS_ACUITY_SCORE: 69
ADLS_ACUITY_SCORE: 74
ADLS_ACUITY_SCORE: 73
ADLS_ACUITY_SCORE: 75
ADLS_ACUITY_SCORE: 74
ADLS_ACUITY_SCORE: 73
ADLS_ACUITY_SCORE: 69
ADLS_ACUITY_SCORE: 74

## 2025-01-19 NOTE — PROGRESS NOTES
Assessment and Plan:     Keith Gonzalez is a 69 year old male who was admitted on 1/11/2025 with submassive PE and COPD exacerbation. Cardiology was consulted for A fib RVR and mild biV cardiomyopathy noted on TTE.      Paroxysmal Atrial fibrillation/flutter-- s/p dccv x2 on admission for flutter, now in a fib  -- diltiazem discontinued on 1/16 and remains on PO amiodarone   -- on PO metoprolol tartrate 50 mg BID.  Heart rates remain high  -- CHADSVASC score of 3 - heme recommended lifelong DOAC, on IV heparin currently.  -- good coverage on DOAC per pharm team      Mild BiV Cardiomyopathy   -- LVEF 46%; mild RV dysfunction and dilation  -- initiated on BB      Submassive PE with brendan DVT  -- IR recommended no intervention     COPD exacerbation with cor pulmonale     Acute hypoxic and hypercapnic Resp Failure secondary to PE and COPD, still requiring high flow nasal cannula oxygen.  To wean that down soon.    LAI, resolved    Active tobacco dependence     Hx of testicular cancer     ----------------------------------------------------------------------------------------     Plan:  -- Continue with PO amiodarone loading   -- Increase Metroprolol tartrate 100 mg twice daily  -- Continue apixaban for treatment of DVT/PE and stroke prevention with atrial fibrillation  -- Excellent diuresis continues with another 2.7 L net negative yesterday and 16.5 L net negative for the hospitalization.    -- Continue with IV furosemide 40 mg BID as he has good urine output and renal function remains normal.  -- We will plan for repeat TTE once under has better ventricular rate control, possibly tomorrow  -- Plan to transition to oral diuretics, repeat TTE, and discharge after oxygen requirements have improved           DONTRELL ROY MD        Interval History:     doing well; no cp, sob, n/v/d, or abd pain.  Still on high flow nasal cannula oxygen but oxygen saturation levels are 97% while awake.          Medications:      Current Facility-Administered Medications   Medication Dose Route Frequency Provider Last Rate Last Admin    [START ON 1/27/2025] amiodarone (PACERONE) tablet 200 mg  200 mg Oral Daily Rahel Cárdenas PA-C        amiodarone (PACERONE) tablet 400 mg  400 mg Oral BID Rahel Cárdenas PA-C   400 mg at 01/19/25 0835    [START ON 1/21/2025] amiodarone (PACERONE) tablet 400 mg  400 mg Oral Daily Rahel Cárdenas PA-C        apixaban ANTICOAGULANT (ELIQUIS) tablet 10 mg  10 mg Oral BID Dave Glover MD   10 mg at 01/19/25 0835    Followed by    [START ON 1/25/2025] apixaban ANTICOAGULANT (ELIQUIS) tablet 5 mg  5 mg Oral BID Dave Glover MD        budesonide (PULMICORT) neb solution 0.5 mg  0.5 mg Nebulization BID Doyle Salazar MD   0.5 mg at 01/19/25 0824    furosemide (LASIX) injection 40 mg  40 mg Intravenous BID Rahel Cárdenas PA-C   40 mg at 01/19/25 0834    insulin aspart (NovoLOG) injection (RAPID ACTING)   Subcutaneous TID w/meals Shila Israel MD   9 Units at 01/19/25 0851    insulin aspart (NovoLOG) injection (RAPID ACTING)  1-10 Units Subcutaneous TID AC Shila Irsael MD   2 Units at 01/19/25 0850    insulin aspart (NovoLOG) injection (RAPID ACTING)  1-7 Units Subcutaneous At Bedtime Shila Israel MD        ipratropium - albuterol 0.5 mg/2.5 mg/3 mL (DUONEB) neb solution 3 mL  3 mL Nebulization Q4H Shila Israel MD   3 mL at 01/19/25 0824    methylPREDNISolone sodium succinate (SOLU-MEDROL) injection 40 mg  40 mg Intravenous Q8H Shila Israel MD   40 mg at 01/19/25 0428    Followed by    methylPREDNISolone sodium succinate (SOLU-MEDROL) injection 40 mg  40 mg Intravenous Q12H Shila Israel MD        metoprolol tartrate (LOPRESSOR) tablet 50 mg  50 mg Oral BID Rahel Cárdenas PA-C   50 mg at 01/19/25 0835    pantoprazole (PROTONIX) IV push injection 40 mg  40 mg Intravenous BID Woo Fernandez PA-C   40 mg at 01/19/25 0835    piperacillin-tazobactam  (ZOSYN) 4.5 g vial to attach to  mL bag  4.5 g Intravenous Q6H Shila Israel MD   4.5 g at 01/19/25 0547    [START ON 1/21/2025] predniSONE (DELTASONE) tablet 40 mg  40 mg Oral Daily Shila Israel MD        sodium chloride (PF) 0.9% PF flush 3 mL  3 mL Intracatheter Q8H Shila Israel MD   3 mL at 01/19/25 0428            Physical Exam:   Blood pressure (!) 125/107, pulse 107, temperature 97.7  F (36.5  C), temperature source Oral, resp. rate 11, weight 72.6 kg (160 lb 0.9 oz), SpO2 97%.  Vitals:    01/11/25 1330 01/13/25 0348 01/14/25 0600 01/15/25 0600   Weight: 67.8 kg (149 lb 7.6 oz) 68.1 kg (150 lb 2.1 oz) 73.1 kg (161 lb 2.5 oz) 72 kg (158 lb 11.7 oz)    01/15/25 0816 01/16/25 0640 01/17/25 0600   Weight: 72.1 kg (159 lb) 73.1 kg (161 lb 2.5 oz) 72.6 kg (160 lb 0.9 oz)         Intake/Output Summary (Last 24 hours) at 1/18/2025 1142  Last data filed at 1/18/2025 1023  Gross per 24 hour   Intake 720 ml   Output 3975 ml   Net -3255 ml       01/14 0700 - 01/19 0659  In: 4380 [P.O.:3780; I.V.:600]  Out: 46234 [Urine:53888]  Net: -29507    Exam:  GENERAL APPEARANCE ADULT: Alert, in no acute distress  RESP: lungs clear to auscultation   CV: regular rate and rhythm, no murmur, rub, or gallop  ABDOMEN: normal bowel sounds, soft, nontender, no hepatosplenomegaly or other masses  EXTREMITIES: Edema 1+         Data:   LABS (Last four results)  CMP  Recent Labs   Lab 01/19/25  0809 01/19/25  0713 01/19/25  0150 01/18/25  2239 01/18/25  1404 01/18/25  1211 01/18/25  0719 01/17/25  0719 01/17/25  0542 01/16/25  1413 01/16/25  0604 01/15/25  0744 01/15/25  0553 01/13/25  0417 01/13/25  0407   NA  --  143  --   --   --   --  142  --  143  --  140  --  139   < > 133*   POTASSIUM  --  3.3*  --   --  3.5  --  3.1*  --  3.7  --  4.1  --  4.4   < > 4.8   CHLORIDE  --  95*  --   --   --   --  95*  --  97*  --  98  --  100   < > 98   CO2  --  40*  --   --   --   --  41*  --  37*  --  36*  --  34*   < > 27   ANIONGAP  --  8  --    "--   --   --  6*  --  9  --  6*  --  5*   < > 8   * 120* 144* 94  --    < > 138*   < > 139*   < > 137*   < > 128*   < > 156*   BUN  --  35.0*  --   --   --   --  40.9*  --  41.4*  --  41.5*  --  45.3*   < > 63.5*   CR  --  0.84  --   --   --   --  0.97  --  1.04  --  1.07  --  1.20*   < > 1.82*   GFRESTIMATED  --  >90  --   --   --   --  85  --  78  --  76  --  66   < > 40*   DAT  --  8.6*  --   --   --   --  8.5*  --  8.5*  --  8.6*  --  8.6*   < > 7.6*   MAG  --  1.7  --   --   --   --  1.8  --  1.8  --  2.1  --  2.3   < > 2.1   PHOS  --  2.8  --   --   --   --  3.4  --  3.2  --  3.3  --  3.6   < > 3.5   PROTTOTAL  --   --   --   --   --   --   --   --   --   --   --   --  5.4*  --   --    ALBUMIN  --   --   --   --   --   --   --   --   --   --   --   --  3.1*  --  3.0*   BILITOTAL  --   --   --   --   --   --   --   --   --   --   --   --  0.5  --   --    ALKPHOS  --   --   --   --   --   --   --   --   --   --   --   --  73  --   --    AST  --   --   --   --   --   --   --   --   --   --   --   --  26  --   --    ALT  --   --   --   --   --   --   --   --   --   --   --   --  63  --   --     < > = values in this interval not displayed.     CBC  Recent Labs   Lab 01/19/25  0713 01/18/25  0719 01/17/25  0542 01/16/25  2210 01/16/25  1414 01/16/25  0604   WBC 15.3* 10.9 10.8  --   --  10.2   RBC 4.99 4.72 4.11*  --   --  4.22*   HGB 13.4 12.5* 11.2* 12.0*   < > 11.4*   HCT 42.1 40.1 34.9*  --   --  36.5*   MCV 84 85 85  --   --  87   MCH 26.9 26.5 27.3  --   --  27.0   MCHC 31.8 31.2* 32.1  --   --  31.2*   RDW 14.7 14.8 14.8  --   --  14.9   * 168 158  --   --  151    < > = values in this interval not displayed.     INR  Recent Labs   Lab 01/19/25  0713 01/18/25  0719 01/17/25  0542 01/16/25  0604   INR 1.61* 1.25* 1.32* 1.19*     TROPONINS No results found for: \"TROPI\", \"TROPONIN\", \"TROPR\", \"TROPN\"                                                                                                       "      DONTRELL ROY MD

## 2025-01-19 NOTE — PLAN OF CARE
"Goal Outcome Evaluation:      Plan of Care Reviewed With: patient    Overall Patient Progress: improvingOverall Patient Progress: improving  Patient Name: Sol  MRN: [id number]  Date of Admission: 1/11/2025  Reason for Admission: PEs and A.fib   [place] [facility] [place]itals:   BP Readings from Last 1 Encounters:   01/19/25 101/65     Pulse Readings from Last 1 Encounters:   01/19/25 106     Wt Readings from Last 1 Encounters:   01/17/25 72.6 kg (160 lb 0.9 oz)     Ht Readings from Last 1 Encounters:   07/08/24 1.778 m (5' 10\")     Estimated body mass index is 22.97 kg/m  as calculated from the following:    Height as of 7/8/24: 1.778 m (5' 10\").    Weight as of this encounter: 72.6 kg (160 lb 0.9 oz).  Temp Readings from Last 1 Encounters:   01/19/25 98.3  F (36.8  C) (Oral)       Pain: Pt denies pain     CV Surgery Patient: No    Assessment    Resp: LSC but diminished, High flow NC at 35 LPM at 50%  Telemetry: A.Fib RVR and at times CVR   Neuro: Aox4, forgetful   GI/: Regular diet, external cath w good UO, Bm x2  Skin/Wounds: BL LE edema, scattered bruising, redness to coccyx   Lines/Drains: R PIV SL, L PIV SL   Activity: A2 WGB   Sleep: Poor   Abnormal Labs: K, Mg, P protocol     Aggression Stop Light: Green          Patient Care Plan: Monitor, wean O2, increase physical activity         "

## 2025-01-19 NOTE — PROGRESS NOTES
[facility]    Medicine Progress Note - Hospitalist Service    Date of Admission:  1/11/2025    Assessment & Plan     Hospital cumulative summary    Keith Gonzalez is a 69 year old male with PMH of remote b/l DVT (no cause found per pt), off AC, tobacco abuse, emphysema, COPD, osteoporosis, GERD and testicular cancer (remission) who was admitted as a direct admission to intensive care unit from [place] emergency department for further treatment and evaluation of bilateral pulmonary embolism with acute cor pulmonale, new onset atrial flutter, and extensive lower extremity DVTs.  Patient was initially admitted to ICU due to the concern for possible need for intubation due to ongoing acute hypoxic and hypercapnic respiratory failure.  Patient did not require any intubation and was eventually transferred to [facility] for further management.  His hospitalization is complicated by persistent requirement of oxygen with HFNC, requiring anticoagulation for pulmonary embolism, tenuous cardiac status with alternating atrial flutter/A-fib and possible melena from underlying gastritis/esophagitis.  No EGD has been done so far.  Patient has been followed up with pulmonary , cardiology and gastroenterology.       Acute hypoxic and hypercapnic respiratory failure, multifactorial  Most likely secondary to combination of pulmonary embolism with cor pulmonale, possible COPD exacerbation with tenuous cardiac status with difficult to control atrial flutter/atrial fibrillation  --Manage underlying conditions, as above (see below)      Acute Submassive pulmonary embolism w cor pulmonale  Extensive bilateral lower extremity DVTs.  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 Off Ac x yrs     -- Initially arrived to ICU on heparin and amiodarone  infusion  -- Case reviewed with IR and vascular surgery, no interventions were recommended  -- Patient continued on heparin infusion  -- Patient was switched from BiPAP to high flow nasal cannula, weaned down to 35 L HFNC  -- Evaluated by oncology, recommending lifelong anticoagulation  -- Pharmacy liaison consulted, patient agreeable with Eliquis cost  -- Cardiology switched heparin infusion to Eliquis on 01/18/2025  -- Continue to wean patient off oxygen, see below  -- Discontinued Dunn catheter, increase mobilization    Acute COPD exacerbation  Healthcare acquired pneumonia:  Leukocytosis: Resolved, again elevated this morning 01/19/2025    Initially required BiPAP, now has been switched to high flow nasal cannula  Pulmonary and RT following  Patient has completed azithromycin course  01/16: CT chest repeated, again showing PE unchanged from previous, small to moderate bilateral effusion with associated compressive atelectasis and infiltrates was seen.      -- Continue to wean patient off HFNC as able  -- Initially was started on IV Zosyn X 5 days for possible aspiration pneumonia per intensivist recommendation  -- Patient was febrile on 01/15, chest x-ray showed increased and new infiltrates  -- Pulmonary consulted 01/16  -- Plan to continue IV Zosyn X 7 days to complete course for possible HCAP and aspiration pneumonia  -- Continue Pulmicort 0.5 mg nebulizer twice daily  -- Continue IV diuresis Lasix 40 mg IV twice daily  -- Continue DuoNeb every 4 hours scheduled  -- Patient has been receiving IV Solu-Medrol 62.5 mg every 6 hours since admission, started to taper over the weekend ( decrease to 40 mg IV every 8 hours for 24 hours, decrease to every 12 hours for 24 hours and then will switch to oral prednisone 40 mg p.o. daily )  -- First day of oral prednisone on 01/21, please assess for further taper  --Prednisone tapering can be further reviewed with pulmonary also on Monday, 01/20/2025  -- Pulmo were  following this week , expecting they will follow up on Monday   -- Echocardiogram completed, see below  -- Patient will benefit from outpatient pulmonary follow-up with PFT  -- Tobacco cessation strongly encouraged      Paroxysmal atrial fibrillation/atrial flutter: S/p DCCV x 2 on admission, now in A-fib  Initially on amiodarone infusion in ICU, has been switched to p.o. amiodarone  Also received diltiazem which was later stopped considering decreased EF  Has been started on p.o. metoprolol  [id number] score of 3  Cardiology has been following closely  Acute heart failure with reduced EF  Echo 1/9/25  EF 46%, mild lat hypokinesis.  Mild RV dilate w decr RV fxn  Echo 1/16/25 EF 55% (improved), mid-distal later hypokinesia, RV decr fxn, RV dilated, Pulm HTN, full IVC   Mild biventricular cardiomyopathy.  Type II NSTEMI, secondary to supply demand ischemia in the picture of A-fib and heart failure along with PE    -- Continue to monitor patient closely on telemetry.  -- Continue Amiodarone 400 mg p.o. twice daily with tapering course per cardiology.  -- Patient has also received digoxin on 01/16 and 01/17, no further digoxin ordered  -- Cardiology discontinued IV heparin , started patient on Eliquis on 01/18  -- Patient was monitored on heparin infusion due to the probability of GI bleed  -- Will continue to monitor patient very closely for any signs and symptoms of melanotic stool if any concerns for bleeding will hold Eliquis.  -- Continue patient on Lasix 40 mg twice daily, diuresis per cardiology, check BMP in a.m.  -- Metoprolol tartrate 50 mg p.o. twice daily, cards slowly adjusting beta-blocker , increasing to 75 mg po BID while monitoring heart rate closely.  -- Continue on electrolyte protocols  -- Lactic acid checked , stable at 2.3 while patient was tachycardic with HR in 120,s , do not expect any worsening infection or worsening sepsis at this time, most likely sec to ongoing RVR , will recheck LA in  AM    Possible melena 01/16, positive Hemoccult  Chronic GERD/dyspepsia  History of grade D esophagitis on EGD in 2022  Severe nausea resolved    Patient has longstanding history of dyspepsia, on PPI PTA.  Longstanding history of smoking which increases his risk of reflux esophagitis  01/16 bedside RN reported 1 melanotic dark stool  Hemoccult positive  Hemoglobin has been stable, no further signs and symptoms of melanotic stools    --[facility] following since 01/16  --Plan for EGD in next few days versus outpatient depending upon clinical status.  --Continue twice daily IV PPI  --Would have preferred to continue patient on heparin infusion to monitor closely but patient has already been switched to Eliquis by cardiology on 01/18  --Considering patient does not have any signs and symptoms of melanotic stools and hemoglobin has been stable will not switch back to heparin infusion and will continue to monitor patient very closely on Eliquis for any signs and symptoms of GI bleed, discussed with bedside nursing.  --Continue antinausea medications.       Acute kidney injury, suspect prerenal. resolved  Hypocalcemia, resolved  Metabolic alkalosis, resolved  Last baseline PTA 1.1, Admit creatinine was up to 2.18, c/w LAI, from PE/ARF nephrology consulted holding diuresis for now, does not seem fluid overloaded.  1/14- Cr decr to 1.39, renal signed off  1/15- Cr decr 1.20, off IVF, off lasix.  Chest x-ray with increased volume signs.  1/16-creatinine 1.07.  S/p CT chest with contrast cards is okay resuming Lasix.  1/7- Ct stable 1.04, on lasix    -- Renal followed, but signed off 1/14  -- Continue to monitor patient BMP closely while patient is getting diuresis with cardiology.       Transaminitis   --LFT minimally elevated alt 156, ast 59 bilirubin is 0.2--likely related to multifactorial stressors .   --Recheck 1/15 shows overall WNL improvement.  Low albumin.      Acute metabolic encephalopathy: Improving  Most likely  was secondary to profound hypercapnia and respiratory acidosis, initially was a started on BiPAP now has been switched to high flow nasal cannula  --Continue to monitor, will order physical and Occupational Therapy evaluation.    Hx of testicular cancer   --review of record with distant history, in remission   -- Hem Onc consulted- notes labs neg for recurrent test CA Onc also recs CT chest + abd/pelvis to r/o occult malignancy recurrent **in few days w stable Cr/O2    -- 1/16 s/p CT chest (for PE recheck) - w/o mass  -- Oncology has recommended CT abdomen pelvis to be done before discharge to rule out any concern for recurrence of testicular cancer.  -- CA labs neg     Hx of iron deficiency anemia   --hold ferrous sulfate   -- checked Iron profile , currently no signs of Iron deficiency , Hgb stable and improving      Osteoporosis  Hx of compression fractures    - APAP prn pain     Tobacco abuse  PTA smoking 4 cigarettes a day.  Quit x 8 months last yr.  Discussed smoking as recurrent DVT contribution risk .  Willing to quit.  No cravings now, declines need for rx now  --Smoking cessation strongly encouraged , and to lower DVT risk. He says he will stop, but unwilling to throw out cigs in jacket pocket!  --declines nicotine patch as not needed  --Nicotine gum/lozenge added as needed craving  --PCP follow-up not Declines outpatient smoking cessation meds        Diet: Regular Diet Adult  Snacks/Supplements Adult: Magic Cup; With Meals    DVT Prophylaxis: DOAC  Dunn Catheter: Not present  Lines: None     Cardiac Monitoring: ACTIVE order. Indication: Acute decompensated heart failure (48 hours)  Code Status: Full Code      Clinically Significant Risk Factors        # Hypokalemia: Lowest K = 3.1 mmol/L in last 2 days, will replace as needed   # Hypochloremia: Lowest Cl = 95 mmol/L in last 2 days, will monitor as appropriate      # Hypoalbuminemia: Lowest albumin = 3 g/dL at 1/13/2025  4:07 AM, will monitor as  appropriate    # Coagulation Defect: INR = 1.61 (Ref range: 0.85 - 1.15) and/or PTT = N/A, will monitor for bleeding        # Acute Hypercapnic Respiratory Failure: based on arterial blood gas results.  Continue supplemental oxygen and ventilatory support as indicated.  # Acute Hypercapnic Respiratory Failure: based on venous blood gas results.  Continue supplemental oxygen and ventilatory support as indicated.             # Financial/Environmental Concerns: none  # COPD: noted on problem list        Social Drivers of Health    Food Insecurity: Unknown (1/11/2025)    Food Insecurity     Within the past 12 months, did you worry that your food would run out before you got money to buy more?: Patient unable to answer     Within the past 12 months, did the food you bought just not last and you didn t have money to get more?: Patient unable to answer   Housing Stability: Unknown (1/11/2025)    Housing Stability     Do you have housing? : Patient unable to answer     Are you worried about losing your housing?: Patient unable to answer   Tobacco Use: High Risk (1/9/2025)    Patient History     Smoking Tobacco Use: Every Day     Smokeless Tobacco Use: Never   Financial Resource Strain: Unknown (1/11/2025)    Financial Resource Strain     Within the past 12 months, have you or your family members you live with been unable to get utilities (heat, electricity) when it was really needed?: Patient unable to answer   Transportation Needs: Unknown (1/11/2025)    Transportation Needs     Within the past 12 months, has lack of transportation kept you from medical appointments, getting your medicines, non-medical meetings or appointments, work, or from getting things that you need?: Patient unable to answer   Interpersonal Safety: Unknown (1/11/2025)    Interpersonal Safety     Do you feel physically and emotionally safe where you currently live?: Patient unable to answer     Within the past 12 months, have you been hit, slapped,  kicked or otherwise physically hurt by someone?: Patient unable to answer     Within the past 12 months, have you been humiliated or emotionally abused in other ways by your partner or ex-partner?: Patient unable to answer          Disposition Plan     Medically Ready for Discharge: Anticipated in 2-4 Days      Shila Israel MD  Hospitalist Service  [facility]  Securely message with [employer] (more info)  Text page via [facility] Paging/Directory   ______________________________________________________________________    Interval History     Patient was seen and examined, sitting in bed, eating-nasal cannula.  Discussed with patient regarding continuous tapering down of IV steroids.  Patient continues to have RVR with heart rate in 120s, discussed with patient.  Cardiology is adjusting his beta-blockers daily, so far he is able to tolerate beta-blocker in the presence of his underlying COPD.  Patient has been tolerating Eliquis well, no signs of melanotic stools  Patient does have slightly elevated leukocyte count we will continue to monitor closely for any signs of infection.      Physical Exam   Vital Signs: Temp: 99  F (37.2  C) Temp src: Oral BP: (!) 120/90 Pulse: 107   Resp: 15 SpO2: 94 % O2 Device: High Flow Nasal Cannula (HFNC) Oxygen Delivery: 35 LPM  Weight: 160 lbs .86 oz    Physical Exam  Vitals and nursing note reviewed.   Constitutional:       General: He is not in acute distress.     Appearance: He is well-developed.      Comments: Looks chronically ill, wearing high flow nasal cannula, not using accessory muscles   HENT:      Head: Normocephalic and atraumatic.   Eyes:      Pupils: Pupils are equal, round, and reactive to light.   Neck:      Thyroid: No thyromegaly.   Cardiovascular:      Rate and Rhythm: Normal rate and regular rhythm.      Heart sounds: Normal heart sounds.   Pulmonary:      Effort: Pulmonary effort is normal. No respiratory distress.      Breath sounds: Normal  breath sounds.      Comments: Rhonchi in bases, no significant wheezing  Abdominal:      General: Bowel sounds are normal. There is no distension.      Palpations: Abdomen is soft.   Musculoskeletal:         General: No tenderness. Normal range of motion.      Cervical back: Normal range of motion and neck supple.   Skin:     General: Skin is warm and dry.   Neurological:      Mental Status: He is alert and oriented to person, place, and time.   Psychiatric:         Behavior: Behavior normal.           Medical Decision Making       52 MINUTES SPENT BY ME on the date of service doing chart review, history, exam, documentation & further activities per the note.      Data     I have personally reviewed the following data over the past 24 hrs:    15.3 (H)  \   13.4   / 141 (L)     143 95 (L) 35.0 (H) /  143 (H)   3.2 (L) 40 (H) 0.84 \     Procal: N/A CRP: N/A Lactic Acid: 2.3 (H)       INR:  1.61 (H) PTT:  N/A   D-dimer:  N/A Fibrinogen:  N/A     Ferritin:  N/A % Retic:  N/A LDH:  N/A       Imaging results reviewed over the past 24 hrs:   No results found for this or any previous visit (from the past 24 hours).  Recent Labs   Lab 01/19/25  1227 01/19/25  1133 01/19/25  0809 01/19/25  0713 01/18/25  2239 01/18/25  1404 01/18/25  1211 01/18/25  0719 01/17/25  0719 01/17/25  0542 01/15/25  0744 01/15/25  0553 01/13/25  0417 01/13/25  0407   WBC  --   --   --  15.3*  --   --   --  10.9  --  10.8   < > 10.4   < > 16.7*   HGB  --   --   --  13.4  --   --   --  12.5*  --  11.2*   < > 11.3*   < > 10.9*   MCV  --   --   --  84  --   --   --  85  --  85   < > 88   < > 87   PLT  --   --   --  141*  --   --   --  168  --  158   < > 166   < > 178   INR  --   --   --  1.61*  --   --   --  1.25*  --  1.32*   < > 1.25*   < > 1.21*   NA  --   --   --  143  --   --   --  142  --  143   < > 139   < > 133*   POTASSIUM 3.2*  --   --  3.3*  --  3.5  --  3.1*  --  3.7   < > 4.4   < > 4.8   CHLORIDE  --   --   --  95*  --   --   --  95*  --   97*   < > 100   < > 98   CO2  --   --   --  40*  --   --   --  41*  --  37*   < > 34*   < > 27   BUN  --   --   --  35.0*  --   --   --  40.9*  --  41.4*   < > 45.3*   < > 63.5*   CR  --   --   --  0.84  --   --   --  0.97  --  1.04   < > 1.20*   < > 1.82*   ANIONGAP  --   --   --  8  --   --   --  6*  --  9   < > 5*   < > 8   DAT  --   --   --  8.6*  --   --   --  8.5*  --  8.5*   < > 8.6*   < > 7.6*   GLC  --  143* 181* 120*   < >  --    < > 138*   < > 139*   < > 128*   < > 156*   ALBUMIN  --   --   --   --   --   --   --   --   --   --   --  3.1*  --  3.0*   PROTTOTAL  --   --   --   --   --   --   --   --   --   --   --  5.4*  --   --    BILITOTAL  --   --   --   --   --   --   --   --   --   --   --  0.5  --   --    ALKPHOS  --   --   --   --   --   --   --   --   --   --   --  73  --   --    ALT  --   --   --   --   --   --   --   --   --   --   --  63  --   --    AST  --   --   --   --   --   --   --   --   --   --   --  26  --   --     < > = values in this interval not displayed.

## 2025-01-19 NOTE — PLAN OF CARE
"Goal Outcome Evaluation:    Patient Name: Sol  MRN: [id number]  Date of Admission: 1/11/2025  Reason for Admission: Bilateral PE, DVT & Afib RVR  Level of Care: [facility] [place] [facility]    Vitals:   BP Readings from Last 1 Encounters:   01/19/25 97/73     Pulse Readings from Last 1 Encounters:   01/19/25 107     Wt Readings from Last 1 Encounters:   01/17/25 72.6 kg (160 lb 0.9 oz)     Ht Readings from Last 1 Encounters:   07/08/24 1.778 m (5' 10\")     Estimated body mass index is 22.97 kg/m  as calculated from the following:    Height as of 7/8/24: 1.778 m (5' 10\").    Weight as of this encounter: 72.6 kg (160 lb 0.9 oz).  Temp Readings from Last 1 Encounters:   01/19/25 97.7  F (36.5  C) (Oral)       Pain: Pain goal Denies pain/nausea    CV Surgery Patient: No    Assessment    Resp: VSS on 7L oxymask. RUBY, LS diminished, scheduled nebs  Telemetry: Afib RVR  Neuro: A/O x 4, slight forgetfulness  Diet: Tolerating regular diet, BG ACHS  GI/: External catheter, AUOP. BM x 2  Skin/Wounds: Blanchable redness to back, face, coccyx/sacrum, behind ears. Protective dressings in place. +2-3 BLE edema, lymphedema wraps in place  Lines/Drains: PIV x 2 SL, intermittent abx  Activity: A x 2 GB/W, pivot. Unsteady and weak  Sleep: good  Abnormal Labs: K/Mg replaced, recheck K at 1937  WBC 15.3, INR 1.61  Sepsis BPA fired at noon, lactic 2.3. MD aware    Aggression Stop Light: Green          Patient Care Plan: Encourage activity, wean O2 as able. Monitor stools for s/s of melena. Metoprolol increased per cards, possible TTE tomorrow       "

## 2025-01-20 ENCOUNTER — APPOINTMENT (OUTPATIENT)
Dept: OCCUPATIONAL THERAPY | Facility: CLINIC | Age: 70
DRG: 175 | End: 2025-01-20
Attending: INTERNAL MEDICINE
Payer: COMMERCIAL

## 2025-01-20 ENCOUNTER — APPOINTMENT (OUTPATIENT)
Dept: PHYSICAL THERAPY | Facility: CLINIC | Age: 70
DRG: 175 | End: 2025-01-20
Attending: INTERNAL MEDICINE
Payer: COMMERCIAL

## 2025-01-20 LAB
ANION GAP SERPL CALCULATED.3IONS-SCNC: 3 MMOL/L (ref 7–15)
BASE EXCESS BLDV CALC-SCNC: 22.8 MMOL/L (ref -3–3)
BUN SERPL-MCNC: 35.1 MG/DL (ref 8–23)
CALCIUM SERPL-MCNC: 8.4 MG/DL (ref 8.8–10.4)
CHLORIDE SERPL-SCNC: 95 MMOL/L (ref 98–107)
CREAT SERPL-MCNC: 0.92 MG/DL (ref 0.67–1.17)
EGFRCR SERPLBLD CKD-EPI 2021: >90 ML/MIN/1.73M2
ERYTHROCYTE [DISTWIDTH] IN BLOOD BY AUTOMATED COUNT: 14.7 % (ref 10–15)
GLUCOSE BLDC GLUCOMTR-MCNC: 116 MG/DL (ref 70–99)
GLUCOSE BLDC GLUCOMTR-MCNC: 148 MG/DL (ref 70–99)
GLUCOSE BLDC GLUCOMTR-MCNC: 79 MG/DL (ref 70–99)
GLUCOSE BLDC GLUCOMTR-MCNC: 83 MG/DL (ref 70–99)
GLUCOSE BLDC GLUCOMTR-MCNC: 97 MG/DL (ref 70–99)
GLUCOSE BLDC GLUCOMTR-MCNC: 99 MG/DL (ref 70–99)
GLUCOSE SERPL-MCNC: 99 MG/DL (ref 70–99)
HCO3 BLDV-SCNC: 52 MMOL/L (ref 21–28)
HCO3 SERPL-SCNC: 47 MMOL/L (ref 22–29)
HCT VFR BLD AUTO: 42 % (ref 40–53)
HGB BLD-MCNC: 12.8 G/DL (ref 13.3–17.7)
LACTATE SERPL-SCNC: 1 MMOL/L (ref 0.7–2)
MAGNESIUM SERPL-MCNC: 1.9 MG/DL (ref 1.7–2.3)
MCH RBC QN AUTO: 26.7 PG (ref 26.5–33)
MCHC RBC AUTO-ENTMCNC: 30.5 G/DL (ref 31.5–36.5)
MCV RBC AUTO: 88 FL (ref 78–100)
O2/TOTAL GAS SETTING VFR VENT: 6 %
OXYHGB MFR BLDV: 51 % (ref 70–75)
PCO2 BLDV: 78 MM HG (ref 40–50)
PH BLDV: 7.43 [PH] (ref 7.32–7.43)
PHOSPHATE SERPL-MCNC: 3.1 MG/DL (ref 2.5–4.5)
PLATELET # BLD AUTO: 147 10E3/UL (ref 150–450)
PO2 BLDV: 29 MM HG (ref 25–47)
POTASSIUM SERPL-SCNC: 4 MMOL/L (ref 3.4–5.3)
RBC # BLD AUTO: 4.79 10E6/UL (ref 4.4–5.9)
SAO2 % BLDV: 51.4 % (ref 70–75)
SODIUM SERPL-SCNC: 145 MMOL/L (ref 135–145)
WBC # BLD AUTO: 17.6 10E3/UL (ref 4–11)

## 2025-01-20 PROCEDURE — 97535 SELF CARE MNGMENT TRAINING: CPT | Mod: GO | Performed by: OCCUPATIONAL THERAPIST

## 2025-01-20 PROCEDURE — 97530 THERAPEUTIC ACTIVITIES: CPT | Mod: GP | Performed by: PHYSICAL THERAPIST

## 2025-01-20 PROCEDURE — 97140 MANUAL THERAPY 1/> REGIONS: CPT | Mod: GP | Performed by: PHYSICAL THERAPIST

## 2025-01-20 PROCEDURE — 85041 AUTOMATED RBC COUNT: CPT | Performed by: INTERNAL MEDICINE

## 2025-01-20 PROCEDURE — 82565 ASSAY OF CREATININE: CPT | Performed by: INTERNAL MEDICINE

## 2025-01-20 PROCEDURE — 250N000013 HC RX MED GY IP 250 OP 250 PS 637: Performed by: PHYSICIAN ASSISTANT

## 2025-01-20 PROCEDURE — 82805 BLOOD GASES W/O2 SATURATION: CPT | Performed by: STUDENT IN AN ORGANIZED HEALTH CARE EDUCATION/TRAINING PROGRAM

## 2025-01-20 PROCEDURE — 250N000013 HC RX MED GY IP 250 OP 250 PS 637: Performed by: INTERNAL MEDICINE

## 2025-01-20 PROCEDURE — 250N000011 HC RX IP 250 OP 636: Performed by: PHYSICIAN ASSISTANT

## 2025-01-20 PROCEDURE — 94640 AIRWAY INHALATION TREATMENT: CPT | Mod: 76

## 2025-01-20 PROCEDURE — 94640 AIRWAY INHALATION TREATMENT: CPT

## 2025-01-20 PROCEDURE — 250N000013 HC RX MED GY IP 250 OP 250 PS 637: Performed by: STUDENT IN AN ORGANIZED HEALTH CARE EDUCATION/TRAINING PROGRAM

## 2025-01-20 PROCEDURE — 99232 SBSQ HOSP IP/OBS MODERATE 35: CPT | Mod: FS | Performed by: PHYSICIAN ASSISTANT

## 2025-01-20 PROCEDURE — 36415 COLL VENOUS BLD VENIPUNCTURE: CPT | Performed by: STUDENT IN AN ORGANIZED HEALTH CARE EDUCATION/TRAINING PROGRAM

## 2025-01-20 PROCEDURE — 250N000011 HC RX IP 250 OP 636: Performed by: INTERNAL MEDICINE

## 2025-01-20 PROCEDURE — 36415 COLL VENOUS BLD VENIPUNCTURE: CPT | Performed by: INTERNAL MEDICINE

## 2025-01-20 PROCEDURE — 97116 GAIT TRAINING THERAPY: CPT | Mod: GP | Performed by: PHYSICAL THERAPIST

## 2025-01-20 PROCEDURE — 250N000009 HC RX 250: Performed by: PHYSICIAN ASSISTANT

## 2025-01-20 PROCEDURE — 84100 ASSAY OF PHOSPHORUS: CPT | Performed by: INTERNAL MEDICINE

## 2025-01-20 PROCEDURE — 99233 SBSQ HOSP IP/OBS HIGH 50: CPT | Performed by: STUDENT IN AN ORGANIZED HEALTH CARE EDUCATION/TRAINING PROGRAM

## 2025-01-20 PROCEDURE — 250N000009 HC RX 250: Performed by: STUDENT IN AN ORGANIZED HEALTH CARE EDUCATION/TRAINING PROGRAM

## 2025-01-20 PROCEDURE — 999N000157 HC STATISTIC RCP TIME EA 10 MIN

## 2025-01-20 PROCEDURE — 83605 ASSAY OF LACTIC ACID: CPT | Performed by: INTERNAL MEDICINE

## 2025-01-20 PROCEDURE — 250N000009 HC RX 250: Performed by: INTERNAL MEDICINE

## 2025-01-20 PROCEDURE — 120N000013 HC R&B IMCU

## 2025-01-20 PROCEDURE — 83735 ASSAY OF MAGNESIUM: CPT | Performed by: INTERNAL MEDICINE

## 2025-01-20 RX ORDER — MAGNESIUM OXIDE 400 MG/1
400 TABLET ORAL EVERY 4 HOURS
Status: COMPLETED | OUTPATIENT
Start: 2025-01-20 | End: 2025-01-20

## 2025-01-20 RX ORDER — POTASSIUM CHLORIDE 1500 MG/1
20 TABLET, EXTENDED RELEASE ORAL 2 TIMES DAILY
Status: DISCONTINUED | OUTPATIENT
Start: 2025-01-20 | End: 2025-01-21

## 2025-01-20 RX ADMIN — METOPROLOL TARTRATE 100 MG: 100 TABLET, FILM COATED ORAL at 20:42

## 2025-01-20 RX ADMIN — BUDESONIDE 0.5 MG: 0.5 INHALANT RESPIRATORY (INHALATION) at 08:23

## 2025-01-20 RX ADMIN — APIXABAN 10 MG: 5 TABLET, FILM COATED ORAL at 08:26

## 2025-01-20 RX ADMIN — IPRATROPIUM BROMIDE AND ALBUTEROL SULFATE 3 ML: .5; 3 SOLUTION RESPIRATORY (INHALATION) at 20:22

## 2025-01-20 RX ADMIN — PIPERACILLIN AND TAZOBACTAM 4.5 G: 4; .5 INJECTION, POWDER, FOR SOLUTION INTRAVENOUS at 05:20

## 2025-01-20 RX ADMIN — METHYLPREDNISOLONE SODIUM SUCCINATE 40 MG: 40 INJECTION, POWDER, FOR SOLUTION INTRAMUSCULAR; INTRAVENOUS at 08:26

## 2025-01-20 RX ADMIN — Medication 400 MG: at 08:26

## 2025-01-20 RX ADMIN — METOPROLOL TARTRATE 100 MG: 100 TABLET, FILM COATED ORAL at 08:26

## 2025-01-20 RX ADMIN — PIPERACILLIN AND TAZOBACTAM 4.5 G: 4; .5 INJECTION, POWDER, FOR SOLUTION INTRAVENOUS at 23:24

## 2025-01-20 RX ADMIN — PANTOPRAZOLE SODIUM 40 MG: 40 INJECTION, POWDER, FOR SOLUTION INTRAVENOUS at 08:26

## 2025-01-20 RX ADMIN — PIPERACILLIN AND TAZOBACTAM 4.5 G: 4; .5 INJECTION, POWDER, FOR SOLUTION INTRAVENOUS at 18:49

## 2025-01-20 RX ADMIN — FUROSEMIDE 40 MG: 10 INJECTION, SOLUTION INTRAMUSCULAR; INTRAVENOUS at 20:42

## 2025-01-20 RX ADMIN — BUDESONIDE 0.5 MG: 0.5 INHALANT RESPIRATORY (INHALATION) at 20:22

## 2025-01-20 RX ADMIN — IPRATROPIUM BROMIDE AND ALBUTEROL SULFATE 3 ML: .5; 3 SOLUTION RESPIRATORY (INHALATION) at 11:24

## 2025-01-20 RX ADMIN — AMIODARONE HYDROCHLORIDE 400 MG: 200 TABLET ORAL at 20:43

## 2025-01-20 RX ADMIN — POTASSIUM CHLORIDE 20 MEQ: 1500 TABLET, EXTENDED RELEASE ORAL at 20:43

## 2025-01-20 RX ADMIN — Medication 400 MG: at 12:12

## 2025-01-20 RX ADMIN — AMIODARONE HYDROCHLORIDE 400 MG: 200 TABLET ORAL at 08:26

## 2025-01-20 RX ADMIN — PANTOPRAZOLE SODIUM 40 MG: 40 INJECTION, POWDER, FOR SOLUTION INTRAVENOUS at 20:42

## 2025-01-20 RX ADMIN — IPRATROPIUM BROMIDE AND ALBUTEROL SULFATE 3 ML: .5; 3 SOLUTION RESPIRATORY (INHALATION) at 08:23

## 2025-01-20 RX ADMIN — IPRATROPIUM BROMIDE AND ALBUTEROL SULFATE 3 ML: .5; 3 SOLUTION RESPIRATORY (INHALATION) at 14:53

## 2025-01-20 RX ADMIN — FUROSEMIDE 40 MG: 10 INJECTION, SOLUTION INTRAMUSCULAR; INTRAVENOUS at 08:26

## 2025-01-20 RX ADMIN — PIPERACILLIN AND TAZOBACTAM 4.5 G: 4; .5 INJECTION, POWDER, FOR SOLUTION INTRAVENOUS at 12:12

## 2025-01-20 RX ADMIN — APIXABAN 10 MG: 5 TABLET, FILM COATED ORAL at 20:43

## 2025-01-20 ASSESSMENT — ACTIVITIES OF DAILY LIVING (ADL)
ADLS_ACUITY_SCORE: 73
ADLS_ACUITY_SCORE: 71
ADLS_ACUITY_SCORE: 73

## 2025-01-20 NOTE — PLAN OF CARE
"Goal Outcome Evaluation:      Plan of Care Reviewed With: patient    Overall Patient Progress: improvingOverall Patient Progress: improving  Patient Name: Sol  MRN: [id number]  Date of Admission: 1/11/2025  Reason for Admission: PEs w A.fib RVR   [place] [facility]     Vitals:   BP Readings from Last 1 Encounters:   01/20/25 100/63     Pulse Readings from Last 1 Encounters:   01/20/25 88     Wt Readings from Last 1 Encounters:   01/17/25 72.6 kg (160 lb 0.9 oz)     Ht Readings from Last 1 Encounters:   07/08/24 1.778 m (5' 10\")     Estimated body mass index is 22.97 kg/m  as calculated from the following:    Height as of 7/8/24: 1.778 m (5' 10\").    Weight as of this encounter: 72.6 kg (160 lb 0.9 oz).  Temp Readings from Last 1 Encounters:   01/20/25 97.8  F (36.6  C) (Oral)       Pain: Pt denies pain      CV Surgery Patient: No    Assessment    Resp: LSC but diminished, 4L oxymask   Telemetry: Goyo RVR and sometimes CVR  Neuro: Aox4, forgetful   GI/: regular diet, good UO, Bmx3, external male cath  Skin/Wounds: BL LE edema, Scattered bruising, Redness to coccyx   Lines/Drains: L PIV SL, R PIV SL   Activity: A2 WBG   Sleep: Good   Abnormal Labs: Mg, K, P protocol     Aggression Stop Light: Green          Patient Care Plan: Monitor, wean O2, increase physical activity, Possible EGD when O2 needs decreased and pt is stable   "

## 2025-01-20 NOTE — PROVIDER NOTIFICATION
01/20/25 0700   Critical Test Results/Notification   Critical Lab Result (Lab Name and Value) Chemistry Co2 47   What Time Did The Lab Notify You? 0726   Provider Notified yes   Date of Provider Notification 01/20/25   Time of Provider Notification 0726   Mechanism of Provider notification page   What Provider Did You Notify? MD moore   Response aware

## 2025-01-20 NOTE — PROGRESS NOTES
[facility]    Medicine Progress Note - Hospitalist Service    Date of Admission:  1/11/2025    Assessment & Plan     Hospital cumulative summary    Keith Gonzalez is a 69 year old male with PMH of remote b/l DVT (no cause found per pt), off AC, tobacco abuse, emphysema, COPD, osteoporosis, GERD and testicular cancer (remission) who was admitted as a direct admission to intensive care unit from [facility] emergency department for further treatment and evaluation of bilateral pulmonary embolism with acute cor pulmonale, new onset atrial flutter, and extensive lower extremity DVTs.  Patient was initially admitted to ICU due to the concern for possible need for intubation due to ongoing acute hypoxic and hypercapnic respiratory failure.  Patient did not require any intubation and was eventually transferred to [facility] [place] [facility] for further management.  His hospitalization is complicated by persistent requirement of oxygen with HFNC, requiring anticoagulation for pulmonary embolism, tenuous cardiac status with alternating atrial flutter/A-fib and possible melena from underlying gastritis/esophagitis.  No EGD has been done so far.  Patient has been followed up with pulmonary , cardiology and gastroenterology.       Acute hypoxic and hypercapnic respiratory failure, multifactorial  Most likely secondary to combination of pulmonary embolism with cor pulmonale, possible COPD exacerbation with tenuous cardiac status with difficult to control atrial flutter/atrial fibrillation  --Manage underlying conditions, as above (see below)      Acute Submassive pulmonary embolism w cor pulmonale  Extensive bilateral lower extremity DVTs.  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 Off Ac x yrs     -- Initially arrived to ICU on heparin and amiodarone  infusion  -- Case reviewed with IR and vascular surgery, no interventions were recommended  -- Patient continued on heparin infusion  -- Patient was switched from BiPAP to high flow nasal cannula, Now weaned down to 6L of supplemental O2.   -- Evaluated by oncology, recommending lifelong anticoagulation  -- Pharmacy liaison consulted, patient agreeable with Eliquis cost  -- Cardiology switched heparin infusion to Eliquis on 01/18/2025  -- Continue to wean patient off oxygen, see below    Acute COPD exacerbation  Healthcare acquired pneumonia:  Leukocytosis: Resolved, again elevated this morning 01/19/2025    Initially required BiPAP, then HFNC, now on 6L of supplemental O2 via oxymask   Pulmonary and RT following  Patient has completed azithromycin course  01/16: CT chest repeated, again showing PE unchanged from previous, small to moderate bilateral effusion with associated compressive atelectasis and infiltrates was seen.      -- Continue to wean patient off O2 as needed, suspect pt may need chronic O2 given known COPD  -- Initially was started on IV Zosyn X 5 days for possible aspiration pneumonia per intensivist recommendation  -- Patient was febrile on 01/15, chest x-ray showed increased and new infiltrates  -- Pulmonary consulted 01/16  -- Plan to continue IV Zosyn X 7 days to complete course for possible HCAP and aspiration pneumonia  -- Continue Pulmicort 0.5 mg nebulizer twice daily  -- Continue IV diuresis Lasix 40 mg IV twice daily  -- Continue DuoNeb every 4 hours scheduled  -- Patient has been receiving IV Solu-Medrol 62.5 mg every 6 hours since admission, started to taper over the weekend ( decrease to 40 mg IV every 8 hours for 24 hours, decrease to every 12 hours for 24 hours and then will switch to oral prednisone 40 mg p.o. daily )  -- First day of oral prednisone on 01/21, Will consider prolonged taper pending pulmonary recommendations  -- Patient will benefit from outpatient pulmonary follow-up  with PFT  -- Tobacco cessation strongly encouraged      Paroxysmal atrial fibrillation/atrial flutter: S/p DCCV x 2 on admission, now in A-fib  Initially on amiodarone infusion in ICU, has been switched to p.o. amiodarone  Also received diltiazem which was later stopped considering decreased EF  Has been started on p.o. metoprolol  [id number] score of 3  Cardiology has been following closely  Acute heart failure with reduced EF  Echo 1/9/25  EF 46%, mild lat hypokinesis.  Mild RV dilated w decr RV fxn  Echo 1/16/25 EF 55% (improved), mid-distal later hypokinesia, RV decr fxn, RV dilated, Pulm HTN, full IVC   Mild biventricular cardiomyopathy.  Type II NSTEMI, secondary to supply demand ischemia in the picture of A-fib and heart failure along with PE    -- Continue to monitor patient closely on telemetry.  -- Continue Amiodarone 400 mg p.o. twice daily with tapering course per cardiology.  -- Patient has also received digoxin on 01/16 and 01/17, no further digoxin ordered  -- Cardiology discontinued IV heparin , started patient on Eliquis on 01/18  -- Patient was monitored on heparin infusion due to the probability of GI bleed  -- Will continue to monitor patient very closely for any signs and symptoms of melanotic stool if any concerns for bleeding will hold Eliquis.  -- Continue patient on Lasix 40 mg twice daily, diuresis per cardiology, check BMP in a.m.  -- Metoprolol tartrate 50 mg p.o. twice daily, cards slowly adjusting beta-blocker , increasing to 75 mg po BID while monitoring heart rate closely.  -- Continue on electrolyte protocols    Possible melena 01/16, positive Hemoccult  Chronic GERD/dyspepsia  History of grade D esophagitis on EGD in 2022  Severe nausea resolved    Patient has longstanding history of dyspepsia, on PPI PTA.  Longstanding history of smoking which increases his risk of reflux esophagitis  01/16 bedside RN reported 1 melanotic dark stool  Hemoccult positive  Hemoglobin has been  stable, no further signs and symptoms of melanotic stools    --[facility] following since 01/16  --Plan for EGD in next few days versus outpatient depending upon clinical status.  --Continue twice daily IV PPI  --Would have preferred to continue patient on heparin infusion to monitor closely but patient has already been switched to Eliquis by cardiology on 01/18  --Considering patient does not have any signs and symptoms of melanotic stools and hemoglobin has been stable will not switch back to heparin infusion and will continue to monitor patient very closely on Eliquis for any signs and symptoms of GI bleed, discussed with bedside nursing.  --Continue antinausea medications.       Acute kidney injury, suspect prerenal. resolved  Hypocalcemia, resolved  Metabolic alkalosis, resolved  Last baseline PTA 1.1, Admit creatinine was up to 2.18, c/w LAI, from PE/ARF nephrology consulted holding diuresis for now, does not seem fluid overloaded.  1/14- Cr decr to 1.39, renal signed off  1/15- Cr decr 1.20, off IVF, off lasix.  Chest x-ray with increased volume signs.  1/16-creatinine 1.07.  S/p CT chest with contrast cards is okay resuming Lasix.  1/7- Ct stable 1.04, on lasix    -- Renal followed, but signed off 1/14  -- Continue to monitor patient BMP closely while patient is getting diuresis with cardiology.    Leukocytosis, suspect steroid induced   WBC up to 17.6 on 1/20. Suspect related to steroids. On abx for pneumonia, no new source of infection suspected at this time   - Monitor      Transaminitis   --LFT minimally elevated alt 156, ast 59 bilirubin is 0.2--likely related to multifactorial stressors .   --Recheck 1/15 shows overall WNL improvement.  Low albumin.      Acute metabolic encephalopathy: Improving  Most likely was secondary to profound hypercapnia and respiratory acidosis, initially was a started on BiPAP now has been switched to high flow nasal cannula  --Continue to monitor, will order physical and  Occupational Therapy evaluation.    Hx of testicular cancer   --review of record with distant history, in remission   -- Hem Onc consulted- notes labs neg for recurrent test CA Onc also recs CT chest + abd/pelvis to r/o occult malignancy recurrent **in few days w stable Cr/O2    -- 1/16 s/p CT chest (for PE recheck) - w/o mass  -- Oncology has recommended CT abdomen pelvis to be done before discharge to rule out any concern for recurrence of testicular cancer.  -- CA labs neg     Hx of iron deficiency anemia   --hold ferrous sulfate   -- checked Iron profile, currently no signs of Iron deficiency , Hgb stable and improving      Osteoporosis  Hx of compression fractures    - APAP prn pain     Tobacco abuse  PTA smoking 4 cigarettes a day.  Quit x 8 months last yr.  Discussed smoking as recurrent DVT contribution risk .  Willing to quit.  No cravings now, declines need for rx now  --Smoking cessation strongly encouraged , and to lower DVT risk. He says he will stop, but unwilling to throw out cigs in jacket pocket!  --declines nicotine patch as not needed  --Nicotine gum/lozenge added as needed craving  --PCP follow-up not Declines outpatient smoking cessation meds        Diet: Regular Diet Adult  Snacks/Supplements Adult: Magic Cup; With Meals    DVT Prophylaxis: DOAC  Dunn Catheter: Not present  Lines: None     Cardiac Monitoring: ACTIVE order. Indication: Tachyarrhythmias, acute (48 hours)  Code Status: Full Code      Clinically Significant Risk Factors        # Hypokalemia: Lowest K = 3.2 mmol/L in last 2 days, will replace as needed   # Hypochloremia: Lowest Cl = 95 mmol/L in last 2 days, will monitor as appropriate      # Hypoalbuminemia: Lowest albumin = 3 g/dL at 1/13/2025  4:07 AM, will monitor as appropriate    # Coagulation Defect: INR = 1.61 (Ref range: 0.85 - 1.15) and/or PTT = N/A, will monitor for bleeding        # Acute Hypercapnic Respiratory Failure: based on arterial blood gas results.  Continue  supplemental oxygen and ventilatory support as indicated.  # Acute Hypercapnic Respiratory Failure: based on venous blood gas results.  Continue supplemental oxygen and ventilatory support as indicated.             # Financial/Environmental Concerns: none  # COPD: noted on problem list        Social Drivers of Health    Food Insecurity: Unknown (1/11/2025)    Food Insecurity     Within the past 12 months, did you worry that your food would run out before you got money to buy more?: Patient unable to answer     Within the past 12 months, did the food you bought just not last and you didn t have money to get more?: Patient unable to answer   Housing Stability: Unknown (1/11/2025)    Housing Stability     Do you have housing? : Patient unable to answer     Are you worried about losing your housing?: Patient unable to answer   Tobacco Use: High Risk (1/9/2025)    Patient History     Smoking Tobacco Use: Every Day     Smokeless Tobacco Use: Never   Financial Resource Strain: Unknown (1/11/2025)    Financial Resource Strain     Within the past 12 months, have you or your family members you live with been unable to get utilities (heat, electricity) when it was really needed?: Patient unable to answer   Transportation Needs: Unknown (1/11/2025)    Transportation Needs     Within the past 12 months, has lack of transportation kept you from medical appointments, getting your medicines, non-medical meetings or appointments, work, or from getting things that you need?: Patient unable to answer   Interpersonal Safety: Unknown (1/11/2025)    Interpersonal Safety     Do you feel physically and emotionally safe where you currently live?: Patient unable to answer     Within the past 12 months, have you been hit, slapped, kicked or otherwise physically hurt by someone?: Patient unable to answer     Within the past 12 months, have you been humiliated or emotionally abused in other ways by your partner or ex-partner?: Patient unable to  answer          Disposition Plan     Medically Ready for Discharge: Anticipated in 2-4 Days      Evelia Mireles MD  Hospitalist Service  [facility]  Securely message with [employer] (more info)  Text page via [employer] Paging/Directory   ______________________________________________________________________    Interval History   Pt doing well today. Off HFNC and transitioned to 6L of oxymask. Was up with PT earlier. Denies shortness of breath or chest pain. LE edema noted. Legs wrapped. No melena noted over the past few days.       Physical Exam   Vital Signs: Temp: 97.7  F (36.5  C) Temp src: Axillary BP: 107/64 Pulse: 101   Resp: 10 SpO2: 98 % O2 Device: Oxymask Oxygen Delivery: 6 LPM  Weight: 160 lbs .86 oz    Constitutional: Awake, alert, cooperative, no apparent distress.  Eyes: Conjunctiva and pupils examined and normal.  HEENT: Moist mucous membranes, normal dentition.  Respiratory: Clear to auscultation bilaterally, no crackles or wheezing.  Cardiovascular: Regular rate and rhythm, normal S1 and S2, and no murmur noted.  GI: Soft, non-distended, non-tender, normal bowel sounds.  Skin: No rashes, no cyanosis, b/l LE edema.  Musculoskeletal: No joint swelling, erythema or tenderness.  Neurologic: Cranial nerves 2-12 intact, normal strength and sensation.  Psychiatric: Alert, oriented to person, place and time, no obvious anxiety or depression.        Medical Decision Making       52 MINUTES SPENT BY ME on the date of service doing chart review, history, exam, documentation & further activities per the note.      Data     I have personally reviewed the following data over the past 24 hrs:    17.6 (H)  \   12.8 (L)   / 147 (L)     145 95 (L) 35.1 (H) /  79   4.0 47 (HH) 0.92 \     Procal: N/A CRP: N/A Lactic Acid: 1.0         Imaging results reviewed over the past 24 hrs:   No results found for this or any previous visit (from the past 24 hours).  Recent Labs   Lab 01/20/25  5163  01/20/25  0752 01/20/25  0650 01/19/25  2219 01/19/25  1953 01/19/25  1706 01/19/25  1227 01/19/25  0809 01/19/25  0713 01/18/25  1211 01/18/25  0719 01/17/25  0719 01/17/25  0542 01/15/25  0744 01/15/25  0553   WBC  --   --  17.6*  --   --   --   --   --  15.3*  --  10.9  --  10.8   < > 10.4   HGB  --   --  12.8*  --   --   --   --   --  13.4  --  12.5*  --  11.2*   < > 11.3*   MCV  --   --  88  --   --   --   --   --  84  --  85  --  85   < > 88   PLT  --   --  147*  --   --   --   --   --  141*  --  168  --  158   < > 166   INR  --   --   --   --   --   --   --   --  1.61*  --  1.25*  --  1.32*   < > 1.25*   NA  --   --  145  --   --   --   --   --  143  --  142  --  143   < > 139   POTASSIUM  --   --  4.0  --  3.9  --  3.2*  --  3.3*   < > 3.1*  --  3.7   < > 4.4   CHLORIDE  --   --  95*  --   --   --   --   --  95*  --  95*  --  97*   < > 100   CO2  --   --  47*  --   --   --   --   --  40*  --  41*  --  37*   < > 34*   BUN  --   --  35.1*  --   --   --   --   --  35.0*  --  40.9*  --  41.4*   < > 45.3*   CR  --   --  0.92  --   --   --   --   --  0.84  --  0.97  --  1.04   < > 1.20*   ANIONGAP  --   --  3*  --   --   --   --   --  8  --  6*  --  9   < > 5*   DAT  --   --  8.4*  --   --   --   --   --  8.6*  --  8.5*  --  8.5*   < > 8.6*   GLC 79 83 99   < >  --    < >  --    < > 120*   < > 138*   < > 139*   < > 128*   ALBUMIN  --   --   --   --   --   --   --   --   --   --   --   --   --   --  3.1*   PROTTOTAL  --   --   --   --   --   --   --   --   --   --   --   --   --   --  5.4*   BILITOTAL  --   --   --   --   --   --   --   --   --   --   --   --   --   --  0.5   ALKPHOS  --   --   --   --   --   --   --   --   --   --   --   --   --   --  73   ALT  --   --   --   --   --   --   --   --   --   --   --   --   --   --  63   AST  --   --   --   --   --   --   --   --   --   --   --   --   --   --  26    < > = values in this interval not displayed.

## 2025-01-20 NOTE — PROGRESS NOTES
Care Management Follow Up    Length of Stay (days): 9    Expected Discharge Date: 01/22/2025     Concerns to be Addressed: discharge planning     Patient plan of care discussed at interdisciplinary rounds: Yes    Anticipated Discharge Disposition: Transitional Care              Anticipated Discharge Services:    Anticipated Discharge DME:      Patient/family educated on Medicare website which has current facility and service quality ratings: yes  Education Provided on the Discharge Plan: Yes  Patient/Family in Agreement with the Plan: yes    Referrals Placed by CM/SW:    Private pay costs discussed: Not applicable    Discussed  Partnership in Safe Discharge Planning  document with patient/family: No     Handoff Completed: No, handoff not indicated or clinically appropriate    Additional Information:  SW sent TCU referrals via [facility].     Next Steps: Secure bed    CARINA Flowers    [facility]

## 2025-01-20 NOTE — PROGRESS NOTES
[facility]  Cardiology Progress Note  Date of Service: 01/20/2025  Primary Cardiologist: Dr. Cesar    Assessment & Plan    Keith Gonzalez is a 69 year old male with past medical history significant for emphysema, COPD, history of DVT, GERD, testicular cancer remission admitted on 1/11/2025 with bilateral PE, cor pulmonale, new onset atrial flutter and extensive lower extremity DVTs.    Assessment:  Paroxysmal Atrial fibrillation/flutter-- s/p dccv x2 on admission for flutter, now in a fib  - diltiazem discontinued on 1/16 and on PO amio loading  - on lopressor 100 mg bid with HR ~100-120 not unexpected given PE and volume overload  -CHADSVASC score of 3 - heme recommended lifelong DOAC, on IV heparin currently.     Mild BiV Cardiomyopathy   - LVEF 46%; mild RV dysfunction and dilation  - initiated on BB   -Markedly hypervolemic, admit weight 148 pounds, current weight unknown, most recent weight was 1/17/2025.  This was 160 pounds.     Submassive PE with brendan DVT  - IR recommended no intervention  - on Eliquis      COPD exacerbation with cor pulmonale      Acute hypoxic and hypercapnic Resp Failure secondary to PE and COPD, still requiring O2 but demand down from peak of 45 lpm high flow, now down to 5-7 lpm and sating well.       LAI, resolved     Active tobacco dependence      Hx of testicular cancer    Plan:   Continue IV diuresis, start Kdur 20 meq bid while on IV diuretic  Expect HR in the 100-120s, will consider dccv when he's closer to euvolemia if we can't control HR, currently asymptomatic  Repeat TTE when HR controlled maybe Wednesday?  DAILY WEIGHT PLEASE!  We'll continue to follow with you      Brenna Dow PA-C  [facility]  Pager: [phone]     Interval History   Feels better, continues to have significant edema.  Relates that he did not feel that badly prior to coming and notes he had DVT and then that improved but he did not think that would add on to his lungs.  Urinating  well, legs improving.  Plans to move near his son in [place] on in the near term.    [facility] [place]    Physical Exam   Temp: 97.7  F (36.5  C) Temp src: Axillary BP: 107/64 Pulse: 101   Resp: 10 SpO2: 98 % O2 Device: Oxymask Oxygen Delivery: 6 LPM  Vitals:    01/15/25 0816 01/16/25 0640 01/17/25 0600   Weight: 72.1 kg (159 lb) 73.1 kg (161 lb 2.5 oz) 72.6 kg (160 lb 0.9 oz)     Well-developed well-nourished gentleman who is in no acute distress on oxygen by nasal cannula.  Heart is irregularly irregular and tachycardic.  Lungs are markedly diminished bilaterally to the middle lobes, clear in the upper lobes.  2+ pitting edema to the thighs.      Medications   Current Facility-Administered Medications   Medication Dose Route Frequency Provider Last Rate Last Admin    Patient is already receiving anticoagulation with heparin, enoxaparin (LOVENOX), warfarin (COUMADIN)  or other anticoagulant medication   Does not apply Continuous PRN Shila Israel MD        Patient may continue current oral medications   Does not apply Continuous PRN Shila Israel MD         Current Facility-Administered Medications   Medication Dose Route Frequency Provider Last Rate Last Admin    [START ON 1/27/2025] amiodarone (PACERONE) tablet 200 mg  200 mg Oral Daily Rahel Cárdenas PA-C        amiodarone (PACERONE) tablet 400 mg  400 mg Oral BID Rahel Cárdenas PA-C   400 mg at 01/20/25 0826    [START ON 1/21/2025] amiodarone (PACERONE) tablet 400 mg  400 mg Oral Daily Rahel Cárdenas PA-C        apixaban ANTICOAGULANT (ELIQUIS) tablet 10 mg  10 mg Oral BID Dave Glover MD   10 mg at 01/20/25 0826    Followed by    [START ON 1/25/2025] apixaban ANTICOAGULANT (ELIQUIS) tablet 5 mg  5 mg Oral BID Dave Glover MD        budesonide (PULMICORT) neb solution 0.5 mg  0.5 mg Nebulization BID Doyle Salazar MD   0.5 mg at 01/20/25 0823    furosemide (LASIX) injection 40 mg  40 mg Intravenous BID  Rahel Cárdenas PA-C   40 mg at 01/20/25 0826    insulin aspart (NovoLOG) injection (RAPID ACTING)   Subcutaneous TID w/meals Shila Israel MD   8 Units at 01/20/25 1221    insulin aspart (NovoLOG) injection (RAPID ACTING)  1-10 Units Subcutaneous TID AC Shila Israel MD   2 Units at 01/19/25 1739    insulin aspart (NovoLOG) injection (RAPID ACTING)  1-7 Units Subcutaneous At Bedtime Shila Israel MD        ipratropium - albuterol 0.5 mg/2.5 mg/3 mL (DUONEB) neb solution 3 mL  3 mL Nebulization Q4H Shila Israel MD   3 mL at 01/20/25 1124    metoprolol tartrate (LOPRESSOR) tablet 100 mg  100 mg Oral BID Dave Glover MD   100 mg at 01/20/25 0826    pantoprazole (PROTONIX) IV push injection 40 mg  40 mg Intravenous BID Woo Fernandez PA-C   40 mg at 01/20/25 0826    piperacillin-tazobactam (ZOSYN) 4.5 g vial to attach to  mL bag  4.5 g Intravenous Q6H Shila Israel MD   4.5 g at 01/20/25 1212    [START ON 1/21/2025] predniSONE (DELTASONE) tablet 40 mg  40 mg Oral Daily Shila Israel MD        sodium chloride (PF) 0.9% PF flush 3 mL  3 mL Intracatheter Q8H Shila Israel MD   3 mL at 01/20/25 1318       Data   Last 24 hours labs reviewed     Tele: afib [phone]    Echo:   1. The left ventricle is normal in size. Biplane LVEF is 46%. Mild lateral  hypokinesis.  2. The right ventricle is mildly dilated. The right ventricular systolic  function is mild to moderately reduced.  3. No valve disease.  4. Mild TR. RVSP 30mmHg.

## 2025-01-21 ENCOUNTER — APPOINTMENT (OUTPATIENT)
Dept: OCCUPATIONAL THERAPY | Facility: CLINIC | Age: 70
DRG: 175 | End: 2025-01-21
Attending: INTERNAL MEDICINE
Payer: COMMERCIAL

## 2025-01-21 ENCOUNTER — APPOINTMENT (OUTPATIENT)
Dept: PHYSICAL THERAPY | Facility: CLINIC | Age: 70
DRG: 175 | End: 2025-01-21
Attending: INTERNAL MEDICINE
Payer: COMMERCIAL

## 2025-01-21 PROBLEM — I27.20 PULMONARY HYPERTENSION (H): Status: ACTIVE | Noted: 2025-01-21

## 2025-01-21 PROBLEM — Z72.0 CONTINUOUS TOBACCO ABUSE: Status: ACTIVE | Noted: 2025-01-21

## 2025-01-21 LAB
ANION GAP SERPL CALCULATED.3IONS-SCNC: 2 MMOL/L (ref 7–15)
ATRIAL RATE - MUSE: 65 BPM
BUN SERPL-MCNC: 34.5 MG/DL (ref 8–23)
CALCIUM SERPL-MCNC: 8.4 MG/DL (ref 8.8–10.4)
CHLORIDE SERPL-SCNC: 94 MMOL/L (ref 98–107)
CREAT SERPL-MCNC: 0.85 MG/DL (ref 0.67–1.17)
CRP SERPL-MCNC: <3 MG/L
DIASTOLIC BLOOD PRESSURE - MUSE: NORMAL MMHG
EGFRCR SERPLBLD CKD-EPI 2021: >90 ML/MIN/1.73M2
ERYTHROCYTE [DISTWIDTH] IN BLOOD BY AUTOMATED COUNT: 14.8 % (ref 10–15)
GLUCOSE BLDC GLUCOMTR-MCNC: 124 MG/DL (ref 70–99)
GLUCOSE BLDC GLUCOMTR-MCNC: 140 MG/DL (ref 70–99)
GLUCOSE BLDC GLUCOMTR-MCNC: 155 MG/DL (ref 70–99)
GLUCOSE BLDC GLUCOMTR-MCNC: 78 MG/DL (ref 70–99)
GLUCOSE BLDC GLUCOMTR-MCNC: 88 MG/DL (ref 70–99)
GLUCOSE SERPL-MCNC: 85 MG/DL (ref 70–99)
HCO3 SERPL-SCNC: 47 MMOL/L (ref 22–29)
HCT VFR BLD AUTO: 39.1 % (ref 40–53)
HGB BLD-MCNC: 12.1 G/DL (ref 13.3–17.7)
INTERPRETATION ECG - MUSE: NORMAL
LACTATE SERPL-SCNC: 1.3 MMOL/L (ref 0.7–2)
MAGNESIUM SERPL-MCNC: 1.8 MG/DL (ref 1.7–2.3)
MCH RBC QN AUTO: 26.8 PG (ref 26.5–33)
MCHC RBC AUTO-ENTMCNC: 30.9 G/DL (ref 31.5–36.5)
MCV RBC AUTO: 87 FL (ref 78–100)
P AXIS - MUSE: 77 DEGREES
PHOSPHATE SERPL-MCNC: 2.9 MG/DL (ref 2.5–4.5)
PLATELET # BLD AUTO: 124 10E3/UL (ref 150–450)
POTASSIUM SERPL-SCNC: 3.6 MMOL/L (ref 3.4–5.3)
PR INTERVAL - MUSE: 156 MS
QRS DURATION - MUSE: 122 MS
QT - MUSE: 460 MS
QTC - MUSE: 478 MS
R AXIS - MUSE: 140 DEGREES
RBC # BLD AUTO: 4.52 10E6/UL (ref 4.4–5.9)
SODIUM SERPL-SCNC: 143 MMOL/L (ref 135–145)
SYSTOLIC BLOOD PRESSURE - MUSE: NORMAL MMHG
T AXIS - MUSE: 80 DEGREES
VENTRICULAR RATE- MUSE: 65 BPM
WBC # BLD AUTO: 13.1 10E3/UL (ref 4–11)

## 2025-01-21 PROCEDURE — 94640 AIRWAY INHALATION TREATMENT: CPT | Mod: 76

## 2025-01-21 PROCEDURE — 99232 SBSQ HOSP IP/OBS MODERATE 35: CPT | Mod: FS | Performed by: PHYSICIAN ASSISTANT

## 2025-01-21 PROCEDURE — 250N000012 HC RX MED GY IP 250 OP 636 PS 637: Performed by: INTERNAL MEDICINE

## 2025-01-21 PROCEDURE — 120N000013 HC R&B IMCU

## 2025-01-21 PROCEDURE — 97116 GAIT TRAINING THERAPY: CPT | Mod: GP | Performed by: PHYSICAL THERAPIST

## 2025-01-21 PROCEDURE — 250N000009 HC RX 250: Performed by: PHYSICIAN ASSISTANT

## 2025-01-21 PROCEDURE — 250N000013 HC RX MED GY IP 250 OP 250 PS 637: Performed by: INTERNAL MEDICINE

## 2025-01-21 PROCEDURE — 99233 SBSQ HOSP IP/OBS HIGH 50: CPT | Performed by: INTERNAL MEDICINE

## 2025-01-21 PROCEDURE — 250N000009 HC RX 250: Performed by: INTERNAL MEDICINE

## 2025-01-21 PROCEDURE — 97535 SELF CARE MNGMENT TRAINING: CPT | Mod: GO | Performed by: OCCUPATIONAL THERAPIST

## 2025-01-21 PROCEDURE — 83735 ASSAY OF MAGNESIUM: CPT | Performed by: INTERNAL MEDICINE

## 2025-01-21 PROCEDURE — 250N000013 HC RX MED GY IP 250 OP 250 PS 637: Performed by: PHYSICIAN ASSISTANT

## 2025-01-21 PROCEDURE — 99232 SBSQ HOSP IP/OBS MODERATE 35: CPT | Performed by: STUDENT IN AN ORGANIZED HEALTH CARE EDUCATION/TRAINING PROGRAM

## 2025-01-21 PROCEDURE — 250N000011 HC RX IP 250 OP 636: Performed by: INTERNAL MEDICINE

## 2025-01-21 PROCEDURE — 97530 THERAPEUTIC ACTIVITIES: CPT | Mod: GP | Performed by: PHYSICAL THERAPIST

## 2025-01-21 PROCEDURE — 999N000157 HC STATISTIC RCP TIME EA 10 MIN

## 2025-01-21 PROCEDURE — 82435 ASSAY OF BLOOD CHLORIDE: CPT | Performed by: INTERNAL MEDICINE

## 2025-01-21 PROCEDURE — 97140 MANUAL THERAPY 1/> REGIONS: CPT | Mod: GP | Performed by: PHYSICAL THERAPIST

## 2025-01-21 PROCEDURE — 86140 C-REACTIVE PROTEIN: CPT | Performed by: INTERNAL MEDICINE

## 2025-01-21 PROCEDURE — 97530 THERAPEUTIC ACTIVITIES: CPT | Mod: GO | Performed by: OCCUPATIONAL THERAPIST

## 2025-01-21 PROCEDURE — 83605 ASSAY OF LACTIC ACID: CPT | Performed by: INTERNAL MEDICINE

## 2025-01-21 PROCEDURE — 250N000011 HC RX IP 250 OP 636: Performed by: PHYSICIAN ASSISTANT

## 2025-01-21 PROCEDURE — 84100 ASSAY OF PHOSPHORUS: CPT | Performed by: INTERNAL MEDICINE

## 2025-01-21 PROCEDURE — 93010 ELECTROCARDIOGRAM REPORT: CPT | Performed by: INTERNAL MEDICINE

## 2025-01-21 PROCEDURE — 36415 COLL VENOUS BLD VENIPUNCTURE: CPT | Performed by: INTERNAL MEDICINE

## 2025-01-21 PROCEDURE — 250N000009 HC RX 250: Performed by: STUDENT IN AN ORGANIZED HEALTH CARE EDUCATION/TRAINING PROGRAM

## 2025-01-21 PROCEDURE — 85027 COMPLETE CBC AUTOMATED: CPT | Performed by: INTERNAL MEDICINE

## 2025-01-21 PROCEDURE — 93005 ELECTROCARDIOGRAM TRACING: CPT

## 2025-01-21 PROCEDURE — 94640 AIRWAY INHALATION TREATMENT: CPT

## 2025-01-21 RX ORDER — PANTOPRAZOLE SODIUM 40 MG/1
40 TABLET, DELAYED RELEASE ORAL
Status: DISCONTINUED | OUTPATIENT
Start: 2025-01-22 | End: 2025-01-24 | Stop reason: HOSPADM

## 2025-01-21 RX ORDER — AMIODARONE HYDROCHLORIDE 200 MG/1
200 TABLET ORAL DAILY
Status: DISCONTINUED | OUTPATIENT
Start: 2025-01-28 | End: 2025-01-24 | Stop reason: HOSPADM

## 2025-01-21 RX ORDER — MAGNESIUM OXIDE 400 MG/1
400 TABLET ORAL EVERY 4 HOURS
Status: COMPLETED | OUTPATIENT
Start: 2025-01-21 | End: 2025-01-21

## 2025-01-21 RX ORDER — FORMOTEROL FUMARATE 20 UG/2ML
20 SOLUTION RESPIRATORY (INHALATION) EVERY 12 HOURS
Status: DISCONTINUED | OUTPATIENT
Start: 2025-01-21 | End: 2025-01-24 | Stop reason: HOSPADM

## 2025-01-21 RX ORDER — PREDNISONE 10 MG/1
10 TABLET ORAL DAILY
Status: DISCONTINUED | OUTPATIENT
Start: 2025-02-03 | End: 2025-01-24 | Stop reason: HOSPADM

## 2025-01-21 RX ORDER — PREDNISONE 20 MG/1
20 TABLET ORAL DAILY
Status: DISCONTINUED | OUTPATIENT
Start: 2025-01-29 | End: 2025-01-24 | Stop reason: HOSPADM

## 2025-01-21 RX ORDER — POTASSIUM CHLORIDE 1500 MG/1
20 TABLET, EXTENDED RELEASE ORAL ONCE
Status: COMPLETED | OUTPATIENT
Start: 2025-01-21 | End: 2025-01-21

## 2025-01-21 RX ADMIN — APIXABAN 10 MG: 5 TABLET, FILM COATED ORAL at 20:31

## 2025-01-21 RX ADMIN — POTASSIUM CHLORIDE 20 MEQ: 1500 TABLET, EXTENDED RELEASE ORAL at 05:15

## 2025-01-21 RX ADMIN — PANTOPRAZOLE SODIUM 40 MG: 40 INJECTION, POWDER, FOR SOLUTION INTRAVENOUS at 08:33

## 2025-01-21 RX ADMIN — IPRATROPIUM BROMIDE AND ALBUTEROL SULFATE 3 ML: .5; 3 SOLUTION RESPIRATORY (INHALATION) at 20:46

## 2025-01-21 RX ADMIN — FUROSEMIDE 40 MG: 10 INJECTION, SOLUTION INTRAMUSCULAR; INTRAVENOUS at 08:31

## 2025-01-21 RX ADMIN — POTASSIUM CHLORIDE 20 MEQ: 1500 TABLET, EXTENDED RELEASE ORAL at 08:31

## 2025-01-21 RX ADMIN — PREDNISONE 40 MG: 20 TABLET ORAL at 08:30

## 2025-01-21 RX ADMIN — IPRATROPIUM BROMIDE AND ALBUTEROL SULFATE 3 ML: .5; 3 SOLUTION RESPIRATORY (INHALATION) at 07:21

## 2025-01-21 RX ADMIN — Medication 400 MG: at 05:15

## 2025-01-21 RX ADMIN — BUDESONIDE 0.5 MG: 0.5 INHALANT RESPIRATORY (INHALATION) at 20:46

## 2025-01-21 RX ADMIN — IPRATROPIUM BROMIDE AND ALBUTEROL SULFATE 3 ML: .5; 3 SOLUTION RESPIRATORY (INHALATION) at 11:04

## 2025-01-21 RX ADMIN — AMIODARONE HYDROCHLORIDE 400 MG: 200 TABLET ORAL at 08:30

## 2025-01-21 RX ADMIN — BUDESONIDE 0.5 MG: 0.5 INHALANT RESPIRATORY (INHALATION) at 07:21

## 2025-01-21 RX ADMIN — METOPROLOL TARTRATE 100 MG: 100 TABLET, FILM COATED ORAL at 08:30

## 2025-01-21 RX ADMIN — APIXABAN 10 MG: 5 TABLET, FILM COATED ORAL at 08:31

## 2025-01-21 RX ADMIN — METOPROLOL TARTRATE 100 MG: 100 TABLET, FILM COATED ORAL at 20:31

## 2025-01-21 RX ADMIN — PIPERACILLIN AND TAZOBACTAM 4.5 G: 4; .5 INJECTION, POWDER, FOR SOLUTION INTRAVENOUS at 05:05

## 2025-01-21 RX ADMIN — Medication 400 MG: at 08:30

## 2025-01-21 RX ADMIN — IPRATROPIUM BROMIDE AND ALBUTEROL SULFATE 3 ML: .5; 3 SOLUTION RESPIRATORY (INHALATION) at 15:34

## 2025-01-21 ASSESSMENT — ACTIVITIES OF DAILY LIVING (ADL)
ADLS_ACUITY_SCORE: 71

## 2025-01-21 NOTE — PROGRESS NOTES
Care Management Follow Up    Length of Stay (days): 10    Expected Discharge Date: 01/23/2025     Concerns to be Addressed: discharge planning     Patient plan of care discussed at interdisciplinary rounds: Yes    Anticipated Discharge Disposition: Transitional Care       Anticipated Discharge Services:    Anticipated Discharge DME:      Patient/family educated on Medicare website which has current facility and service quality ratings: yes  Education Provided on the Discharge Plan: Yes  Patient/Family in Agreement with the Plan: yes    Referrals Placed by CM/SW: Post Acute Facilities  Private pay costs discussed: Not applicable    Discussed  Partnership in Safe Discharge Planning  document with patient/family: No     Handoff Completed: No, handoff not indicated or clinically appropriate    Additional Information:  RANDALL spoke with Malcolm De Los [facility]. She will assess for a bed for Thursday. Voicemail left for Malcolm Ornelas to determine status of referral. Additional referrals sent.     Next Steps: Secure TCU bed. SW following for discharge planning.     SHELDON Hernadez, LICSW  [phone] Desk phone  [phone] Cell/text (Preferred)

## 2025-01-21 NOTE — PLAN OF CARE
"Patient Name: Sol  MRN: [id number]  Date of Admission: 1/11/2025  Reason for Admission: acute hypoxic and hypercapnic respiratory failure, acute PE, BLE DVTs, acute COPD  Level of Care: [facility] [place]    Vitals:   BP Readings from Last 1 Encounters:  01/20/25 : 103/80    Pulse Readings from Last 1 Encounters:  01/20/25 : 110    Wt Readings from Last 1 Encounters:  01/17/25 : 72.6 kg (160 lb 0.9 oz)    Ht Readings from Last 1 Encounters:  07/08/24 : 1.778 m (5' 10\")    Estimated body mass index is 22.97 kg/m  as calculated from the following:    Height as of 7/8/24: 1.778 m (5' 10\").    Weight as of this encounter: 72.6 kg (160 lb 0.9 oz).  Temp Readings from Last 1 Encounters:  01/20/25 : 98.3  F (36.8  C) (Oral)      Pain:denies    CV Surgery Patient: No    Assessment    Resp: shallow at times, RUBY, reported no SOB  Telemetry: afib RVR/CVR  Neuro: intact  GI/: incontinent at times  Skin/Wounds: scatterd bruising, blanchable redness to bottom  Lines/Drains: PIV SL with intemittent abx  Activity: A1/GB/W  Abnormal Labs: see chart, CO2 high     Aggression Stop Light: Green          Patient Care Plan: wean O2, pulmonary toilet, mobilize   "

## 2025-01-21 NOTE — PLAN OF CARE
"Patient Name: Keith Gonzalez  MRN: [id number]  Date of Admission: 1/11/2025  Reason for Admission: Acute septic pulmonary embolism  [place] [facility] [place] [facility] [place]    Vitals:   BP Readings from Last 1 Encounters:   01/21/25 105/83     Pulse Readings from Last 1 Encounters:   01/21/25 96     Wt Readings from Last 1 Encounters:   01/21/25 65.2 kg (143 lb 11.2 oz)     Ht Readings from Last 1 Encounters:   07/08/24 1.778 m (5' 10\")     Estimated body mass index is 20.62 kg/m  as calculated from the following:    Height as of 7/8/24: 1.778 m (5' 10\").    Weight as of this encounter: 65.2 kg (143 lb 11.2 oz).  Temp Readings from Last 1 Encounters:   01/21/25 97.6  F (36.4  C) (Oral)     Pain: denies pain    CV Surgery Patient: No    Assessment    Resp: 4L oxymask. RUBY. IS use encouraged.   Telemetry: Afib CVR-RVR  Neuro: A&Ox4. Forgetful at times.   GI/: Adequate UOP via external catheter. BM x2. + BS and flatus. Tolerating regular diet.   Skin/Wounds: Scattered bruising. Blanchable redness to coccyx. Bilateral leg wraps.  Lines/Drains: R & L PIVs SL. Intermittent abx.   Activity: A1 GBW.  Sleep: Able to rest between cares.  Abnormal Labs: K, mag, and phos protocols run, replacements and rechecks ordered. Sepsis BPA fired - lactate 1.3. Chemistry CO2 critical at 47, MD aware.    Aggression Stop Light: Green          Patient Care Plan: Progressing   "

## 2025-01-21 NOTE — PROGRESS NOTES
[facility]  Cardiology Progress Note  Date of Service: 01/21/2025  Primary Cardiologist: Dr. Cesar    Assessment & Plan    Keith Gonzalez is a 69 year old male with past medical history significant for emphysema, COPD, history of DVT, GERD, testicular cancer remission admitted on 1/11/2025 with bilateral PE, cor pulmonale, new onset atrial flutter and extensive lower extremity DVTs.    Assessment:  Paroxysmal Atrial fibrillation/flutter-- s/p dccv x2 on admission for flutter, now in a fib  - diltiazem discontinued on 1/16 and on PO amio loading  - on lopressor 100 mg bid with HR ~100-120 not unexpected given PE and volume overload  -CHADSVASC score of 3 - heme recommended lifelong DOAC  - converted to sinus rhythm spontaneously this am     Mild BiV Cardiomyopathy   - LVEF 46%; mild RV dysfunction and dilation  - initiated on BB   - admit weight 148 pounds, current weight 143, dry wt likely high 130s     Submassive PE with brendan DVT  - IR recommended no intervention  - on Eliquis      COPD exacerbation with cor pulmonale      Acute hypoxic and hypercapnic Resp Failure secondary to PE and COPD, still requiring O2 but demand down from peak of 45 lpm high flow, now down to 5-7 lpm and sating well.       LAI, resolved     Active tobacco dependence      Hx of testicular cancer    Plan:   Echo to reassess EF in sinus, if EF <50% will consider adding additional meds for cm and switching to toprol  Hold IV diuretics today, will consider po tomorrow pending renal function etc.    Will arrange f/u in [place] based on echo findings.      Brenna Dow PA-C  [facility]  Pager: [phone]     Interval History   Feels the same.  Relates that he did not feel that badly prior to coming in.  Denies chest pain.  Notes that legs are better he thinks.    [facility] [place]    Physical Exam   Temp: 97.9  F (36.6  C) Temp src: Oral BP: 104/89 Pulse: 113   Resp: 14 SpO2: 94 % O2 Device: Oxymask Oxygen  Delivery: 5 LPM  Vitals:    01/16/25 0640 01/17/25 0600 01/21/25 0500   Weight: 73.1 kg (161 lb 2.5 oz) 72.6 kg (160 lb 0.9 oz) 65.2 kg (143 lb 11.2 oz)     Well-developed well-nourished gentleman who is in no acute distress on oxygen by nasal cannula.  Heart is irregularly irregular and tachycardic.  Lungs diminished bilaterally right middle lobe left lower lobe.  Edema improving now just 1+ pitting edema to the lower side.      Medications   Current Facility-Administered Medications   Medication Dose Route Frequency Provider Last Rate Last Admin    Patient is already receiving anticoagulation with heparin, enoxaparin (LOVENOX), warfarin (COUMADIN)  or other anticoagulant medication   Does not apply Continuous PRN Shila Israel MD        Patient may continue current oral medications   Does not apply Continuous PRN Shila Israel MD         Current Facility-Administered Medications   Medication Dose Route Frequency Provider Last Rate Last Admin    [START ON 1/27/2025] amiodarone (PACERONE) tablet 200 mg  200 mg Oral Daily Rahel Cárdenas PA-C        amiodarone (PACERONE) tablet 400 mg  400 mg Oral Daily Rahel Cárdenas PA-C   400 mg at 01/21/25 0830    apixaban ANTICOAGULANT (ELIQUIS) tablet 10 mg  10 mg Oral BID Dave Glover MD   10 mg at 01/21/25 0831    Followed by    [START ON 1/25/2025] apixaban ANTICOAGULANT (ELIQUIS) tablet 5 mg  5 mg Oral BID Dave Glover MD        budesonide (PULMICORT) neb solution 0.5 mg  0.5 mg Nebulization BID Doyle Salazar MD   0.5 mg at 01/21/25 0721    [Held by provider] furosemide (LASIX) injection 40 mg  40 mg Intravenous BID Rahel Cárdenas PA-C   40 mg at 01/21/25 0831    insulin aspart (NovoLOG) injection (RAPID ACTING)  1-10 Units Subcutaneous TID AC Shila Israel MD   1 Units at 01/21/25 1153    insulin aspart (NovoLOG) injection (RAPID ACTING)  1-7 Units Subcutaneous At Bedtime Shila Israel MD        ipratropium - albuterol 0.5 mg/2.5  mg/3 mL (DUONEB) neb solution 3 mL  3 mL Nebulization Q4H Shila Israel MD   3 mL at 01/21/25 1104    metoprolol tartrate (LOPRESSOR) tablet 100 mg  100 mg Oral BID Dave Glover MD   100 mg at 01/21/25 0830    pantoprazole (PROTONIX) IV push injection 40 mg  40 mg Intravenous BID Woo Fernandez PA-C   40 mg at 01/21/25 0833    potassium chloride chester ER (KLOR-CON M20) CR tablet 20 mEq  20 mEq Oral BID Brenna Dow PA-C   20 mEq at 01/21/25 0831    predniSONE (DELTASONE) tablet 40 mg  40 mg Oral Daily Shila Israel MD   40 mg at 01/21/25 0830    sodium chloride (PF) 0.9% PF flush 3 mL  3 mL Intracatheter Q8H Shila Israel MD   3 mL at 01/21/25 0505       Data   Last 24 hours labs reviewed     Tele: afib -130, converted to sinus this am    Echo:   1. The left ventricle is normal in size. Biplane LVEF is 46%. Mild lateral  hypokinesis.  2. The right ventricle is mildly dilated. The right ventricular systolic  function is mild to moderately reduced.  3. No valve disease.  4. Mild TR. RVSP 30mmHg.

## 2025-01-21 NOTE — PROGRESS NOTES
[facility]    Medicine Progress Note - Hospitalist Service    Date of Admission:  1/11/2025    Assessment & Plan     Hospital cumulative summary    Keith Gonzalez is a 69 year old male with PMH of remote b/l DVT (no cause found per pt), off AC, tobacco abuse, emphysema, COPD, osteoporosis, GERD and testicular cancer (remission) who was admitted as a direct admission to intensive care unit from [place] emergency department for further treatment and evaluation of bilateral pulmonary embolism with acute cor pulmonale, new onset atrial flutter, and extensive lower extremity DVTs.  Patient was initially admitted to ICU due to the concern for possible need for intubation due to ongoing acute hypoxic and hypercapnic respiratory failure.  Patient did not require any intubation and was eventually transferred to [facility] [place] for further management.  His hospitalization is complicated by persistent requirement of oxygen with HFNC, requiring anticoagulation for pulmonary embolism, tenuous cardiac status with alternating atrial flutter/A-fib and possible melena from underlying gastritis/esophagitis.  No EGD has been done so far.  Patient has been followed up with pulmonary , cardiology and gastroenterology.       Acute hypoxic and hypercapnic respiratory failure, multifactorial  Most likely secondary to combination of pulmonary embolism with cor pulmonale, possible COPD exacerbation with tenuous cardiac status with difficult to control atrial flutter/atrial fibrillation  --Manage underlying conditions, as above (see below)      Acute Submassive pulmonary embolism w cor pulmonale  Extensive bilateral lower extremity DVTs.  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 Off Ac x yrs     -- Initially arrived to ICU on heparin and amiodarone  infusion  -- Case reviewed with IR and vascular surgery, no interventions were recommended  -- Patient continued on heparin infusion  -- Patient was switched from BiPAP to high flow nasal cannula, Now weaned down to 4-6L of supplemental O2.   -- Evaluated by oncology, recommending lifelong anticoagulation  -- Pharmacy liaison consulted, patient agreeable with Eliquis cost  -- Cardiology switched heparin infusion to Eliquis on 01/18/2025  -- Continue to wean patient off oxygen, see below    Acute COPD exacerbation  Healthcare acquired pneumonia:  Leukocytosis: Resolved, again elevated this morning 01/19/2025  Initially required BiPAP, then HFNC, now on 4-6L of supplemental O2 via oxymask   Pulmonary and RT following  Patient has completed azithromycin course  01/16: CT chest repeated, again showing PE unchanged from previous, small to moderate bilateral effusion with associated compressive atelectasis and infiltrates was seen.  -- Continue to wean patient off O2 as needed, suspect pt may need chronic O2 given known COPD  -- Initially was started on IV Zosyn X 5 days for possible aspiration pneumonia per intensivist recommendation  -- Patient was febrile on 01/15, chest x-ray showed increased and new infiltrates  -- Pulmonary consulted 01/16  -- Plan to continue IV Zosyn X 7 days to complete course for possible HCAP and aspiration pneumonia  -- Continue Pulmicort 0.5 mg nebulizer twice daily  -- Diuresis per cardiology, holding on 1/21   -- Continue DuoNeb every 4 hours scheduled  -- Patient treated with IV Solu-Medrol initially, now on 40mg po prednisone daily, continue for now will consider prolonged steroid taper at discharge.   -- Patient will benefit from outpatient pulmonary follow-up with PFT  -- Tobacco cessation strongly encouraged      Paroxysmal atrial fibrillation/atrial flutter: S/p DCCV x 2 on admission, now in A-fib  Initially on amiodarone infusion in ICU, has been switched to p.o. amiodarone  Also  received diltiazem which was later stopped considering decreased EF  Has been started on p.o. metoprolol  [id number] score of 3  Cardiology has been following closely  Acute heart failure with reduced EF  Echo 1/9/25  EF 46%, mild lat hypokinesis.  Mild RV dilated w decr RV fxn  Echo 1/16/25 EF 55% (improved), mid-distal later hypokinesia, RV decr fxn, RV dilated, Pulm HTN, full IVC   Mild biventricular cardiomyopathy.  Type II NSTEMI, secondary to supply demand ischemia in the picture of A-fib and heart failure along with PE  -- Continue to monitor patient closely on telemetry.  -- Continue Amiodarone 400 mg p.o. twice daily with tapering course per cardiology.  -- Patient has also received digoxin on 01/16 and 01/17, no further digoxin ordered  -- Cardiology discontinued IV heparin , started patient on Eliquis on 01/18  -- Diuresis per cardiology   -- Metoprolol tartrate 100 mg p.o. twice daily  -- Continue on electrolyte protocols    * 1/21 pt converted to sinus rhythm with HRs in the 60s     Possible melena 01/16, positive Hemoccult  Chronic GERD/dyspepsia  History of grade D esophagitis on EGD in 2022  Severe nausea resolved  Patient has longstanding history of dyspepsia, on PPI PTA.  Longstanding history of smoking which increases his risk of reflux esophagitis  01/16 bedside RN reported 1 melanotic dark stool  Hemoccult positive  Hemoglobin has been stable, no further signs and symptoms of melanotic stools  --[facility] following since 01/16. Given stability on anticogulation, no plans for inpatient EGD. Can be consider as outpatient if needed.   --Switch to PO pantoprazole daily  --Continue antinausea medications.    Acute kidney injury, suspect prerenal. resolved  Hypocalcemia, resolved  Metabolic alkalosis  Last baseline PTA 1.1, Admit creatinine was up to 2.18, c/w LAI, from PE/ARF. Creatinine now improved to 0.85.   -- Renal followed, but signed off 1/14  -- Continue to monitor patient BMP closely while  patient is getting diuresis with cardiology.    Leukocytosis, suspect steroid induced   WBC up to 17.6 on 1/20. Suspect related to steroids. On abx for pneumonia, no new source of infection suspected at this time   - Monitor      Transaminitis   --LFT minimally elevated alt 156, ast 59 bilirubin is 0.2--likely related to multifactorial stressors .   --Recheck 1/15 shows overall WNL improvement.  Low albumin.      Acute metabolic encephalopathy: Improving  Most likely was secondary to profound hypercapnia and respiratory acidosis, initially was a started on BiPAP now has been switched to high flow nasal cannula  --Continue to monitor, will order physical and Occupational Therapy evaluation.    Hx of testicular cancer   --review of record with distant history, in remission   -- Hem Onc consulted- notes labs neg for recurrent test CA Onc also recs CT chest + abd/pelvis to r/o occult malignancy recurrent **in few days w stable Cr/O2    -- 1/16 s/p CT chest (for PE recheck) - w/o mass  -- Oncology has recommended CT abdomen pelvis to be done before discharge to rule out any concern for recurrence of testicular cancer.  -- CA labs neg     Hx of iron deficiency anemia   --hold ferrous sulfate   --checked Iron profile, currently no signs of Iron deficiency , Hgb stable and improving      Osteoporosis  Hx of compression fractures    - APAP prn pain     Tobacco abuse  PTA smoking 4 cigarettes a day.  Quit x 8 months last yr.  Discussed smoking as recurrent DVT contribution risk .  Willing to quit.  No cravings now, declines need for rx now  --Smoking cessation strongly encouraged , and to lower DVT risk. He says he will stop, but unwilling to throw out cigs in jacket pocket!  --declines nicotine patch as not needed  --Nicotine gum/lozenge added as needed craving  --PCP follow-up not Declines outpatient smoking cessation meds        Diet: Regular Diet Adult  Snacks/Supplements Adult: Magic Cup; With Meals    DVT Prophylaxis:  DOAC  Dunn Catheter: Not present  Lines: None     Cardiac Monitoring: ACTIVE order. Indication: Tachyarrhythmias, acute (48 hours)  Code Status: Full Code      Clinically Significant Risk Factors          # Hypochloremia: Lowest Cl = 94 mmol/L in last 2 days, will monitor as appropriate      # Hypoalbuminemia: Lowest albumin = 3 g/dL at 1/13/2025  4:07 AM, will monitor as appropriate    # Coagulation Defect: INR = 1.61 (Ref range: 0.85 - 1.15) and/or PTT = N/A, will monitor for bleeding  # Thrombocytopenia: Lowest platelets = 124 in last 2 days, will monitor for bleeding       # Acute Hypercapnic Respiratory Failure: based on arterial blood gas results.  Continue supplemental oxygen and ventilatory support as indicated.  # Acute Hypercapnic Respiratory Failure: based on venous blood gas results.  Continue supplemental oxygen and ventilatory support as indicated.             # Financial/Environmental Concerns: none  # COPD: noted on problem list        Social Drivers of Health    Food Insecurity: Unknown (1/11/2025)    Food Insecurity     Within the past 12 months, did you worry that your food would run out before you got money to buy more?: Patient unable to answer     Within the past 12 months, did the food you bought just not last and you didn t have money to get more?: Patient unable to answer   Housing Stability: Unknown (1/11/2025)    Housing Stability     Do you have housing? : Patient unable to answer     Are you worried about losing your housing?: Patient unable to answer   Tobacco Use: High Risk (1/21/2025)    Patient History     Smoking Tobacco Use: Every Day     Smokeless Tobacco Use: Never   Financial Resource Strain: Unknown (1/11/2025)    Financial Resource Strain     Within the past 12 months, have you or your family members you live with been unable to get utilities (heat, electricity) when it was really needed?: Patient unable to answer   Transportation Needs: Unknown (1/11/2025)    Transportation  Needs     Within the past 12 months, has lack of transportation kept you from medical appointments, getting your medicines, non-medical meetings or appointments, work, or from getting things that you need?: Patient unable to answer   Interpersonal Safety: Unknown (1/11/2025)    Interpersonal Safety     Do you feel physically and emotionally safe where you currently live?: Patient unable to answer     Within the past 12 months, have you been hit, slapped, kicked or otherwise physically hurt by someone?: Patient unable to answer     Within the past 12 months, have you been humiliated or emotionally abused in other ways by your partner or ex-partner?: Patient unable to answer          Disposition Plan     Medically Ready for Discharge: Anticipated in 2-4 Days      Evelia Mireles MD  Hospitalist Service  [facility]  Securely message with [employer] (more info)  Text page via [facility] Paging/Directory   ______________________________________________________________________    Interval History   Pt doing well today. Off HFNC and transitioned to 6L of oxymask. Feels fine. Converted back to sinus rhythm. Does not feel much different with normal sinus rhythm. No pain.       Physical Exam   Vital Signs: Temp: 97.6  F (36.4  C) Temp src: Oral BP: (!) 125/95 Pulse: 66   Resp: 17 SpO2: 90 % O2 Device: Oxymask Oxygen Delivery: 4 LPM  Weight: 143 lbs 11.2 oz    Constitutional: Awake, alert, cooperative, no apparent distress.  Eyes: Conjunctiva and pupils examined and normal.  HEENT: Moist mucous membranes, normal dentition.  Respiratory: Clear to auscultation bilaterally, no crackles or wheezing.  Cardiovascular: Regular rate and rhythm, normal S1 and S2, and no murmur noted.  GI: Soft, non-distended, non-tender, normal bowel sounds.  Skin: No rashes, no cyanosis, b/l LE edema.  Musculoskeletal: No joint swelling, erythema or tenderness.  Neurologic: Cranial nerves 2-12 intact, normal strength and  sensation.  Psychiatric: Alert, oriented to person, place and time, no obvious anxiety or depression.        Medical Decision Making       52 MINUTES SPENT BY ME on the date of service doing chart review, history, exam, documentation & further activities per the note.      Data     I have personally reviewed the following data over the past 24 hrs:    13.1 (H)  \   12.1 (L)   / 124 (L)     143 94 (L) 34.5 (H) /  140 (H)   3.6 47 (HH) 0.85 \     Procal: N/A CRP: N/A Lactic Acid: 1.3         Imaging results reviewed over the past 24 hrs:   No results found for this or any previous visit (from the past 24 hours).  Recent Labs   Lab 01/21/25  1133 01/21/25  0753 01/21/25  0423 01/20/25  0752 01/20/25  0650 01/19/25  2219 01/19/25  1953 01/19/25  0809 01/19/25  0713 01/18/25  1211 01/18/25  0719 01/17/25  0719 01/17/25  0542 01/15/25  0744 01/15/25  0553   WBC  --   --  13.1*  --  17.6*  --   --   --  15.3*  --  10.9  --  10.8   < > 10.4   HGB  --   --  12.1*  --  12.8*  --   --   --  13.4  --  12.5*  --  11.2*   < > 11.3*   MCV  --   --  87  --  88  --   --   --  84  --  85  --  85   < > 88   PLT  --   --  124*  --  147*  --   --   --  141*  --  168  --  158   < > 166   INR  --   --   --   --   --   --   --   --  1.61*  --  1.25*  --  1.32*   < > 1.25*   NA  --   --  143  --  145  --   --   --  143  --  142  --  143   < > 139   POTASSIUM  --   --  3.6  --  4.0  --  3.9   < > 3.3*   < > 3.1*  --  3.7   < > 4.4   CHLORIDE  --   --  94*  --  95*  --   --   --  95*  --  95*  --  97*   < > 100   CO2  --   --  47*  --  47*  --   --   --  40*  --  41*  --  37*   < > 34*   BUN  --   --  34.5*  --  35.1*  --   --   --  35.0*  --  40.9*  --  41.4*   < > 45.3*   CR  --   --  0.85  --  0.92  --   --   --  0.84  --  0.97  --  1.04   < > 1.20*   ANIONGAP  --   --  2*  --  3*  --   --   --  8  --  6*  --  9   < > 5*   DAT  --   --  8.4*  --  8.4*  --   --   --  8.6*  --  8.5*  --  8.5*   < > 8.6*   * 78 85   < > 99   < >  --     < > 120*   < > 138*   < > 139*   < > 128*   ALBUMIN  --   --   --   --   --   --   --   --   --   --   --   --   --   --  3.1*   PROTTOTAL  --   --   --   --   --   --   --   --   --   --   --   --   --   --  5.4*   BILITOTAL  --   --   --   --   --   --   --   --   --   --   --   --   --   --  0.5   ALKPHOS  --   --   --   --   --   --   --   --   --   --   --   --   --   --  73   ALT  --   --   --   --   --   --   --   --   --   --   --   --   --   --  63   AST  --   --   --   --   --   --   --   --   --   --   --   --   --   --  26    < > = values in this interval not displayed.

## 2025-01-22 ENCOUNTER — APPOINTMENT (OUTPATIENT)
Dept: OCCUPATIONAL THERAPY | Facility: CLINIC | Age: 70
DRG: 175 | End: 2025-01-22
Attending: INTERNAL MEDICINE
Payer: COMMERCIAL

## 2025-01-22 ENCOUNTER — APPOINTMENT (OUTPATIENT)
Dept: PHYSICAL THERAPY | Facility: CLINIC | Age: 70
DRG: 175 | End: 2025-01-22
Attending: INTERNAL MEDICINE
Payer: COMMERCIAL

## 2025-01-22 ENCOUNTER — APPOINTMENT (OUTPATIENT)
Dept: CARDIOLOGY | Facility: CLINIC | Age: 70
DRG: 175 | End: 2025-01-22
Attending: PHYSICIAN ASSISTANT
Payer: COMMERCIAL

## 2025-01-22 LAB
ANION GAP SERPL CALCULATED.3IONS-SCNC: <1 MMOL/L (ref 7–15)
BUN SERPL-MCNC: 29.5 MG/DL (ref 8–23)
CALCIUM SERPL-MCNC: 8.7 MG/DL (ref 8.8–10.4)
CHLORIDE SERPL-SCNC: 96 MMOL/L (ref 98–107)
CREAT SERPL-MCNC: 0.75 MG/DL (ref 0.67–1.17)
EGFRCR SERPLBLD CKD-EPI 2021: >90 ML/MIN/1.73M2
ERYTHROCYTE [DISTWIDTH] IN BLOOD BY AUTOMATED COUNT: 15.1 % (ref 10–15)
GLUCOSE BLDC GLUCOMTR-MCNC: 118 MG/DL (ref 70–99)
GLUCOSE BLDC GLUCOMTR-MCNC: 120 MG/DL (ref 70–99)
GLUCOSE BLDC GLUCOMTR-MCNC: 122 MG/DL (ref 70–99)
GLUCOSE BLDC GLUCOMTR-MCNC: 142 MG/DL (ref 70–99)
GLUCOSE BLDC GLUCOMTR-MCNC: 159 MG/DL (ref 70–99)
GLUCOSE BLDC GLUCOMTR-MCNC: 183 MG/DL (ref 70–99)
GLUCOSE BLDC GLUCOMTR-MCNC: 243 MG/DL (ref 70–99)
GLUCOSE BLDC GLUCOMTR-MCNC: 87 MG/DL (ref 70–99)
GLUCOSE BLDC GLUCOMTR-MCNC: 99 MG/DL (ref 70–99)
GLUCOSE SERPL-MCNC: 101 MG/DL (ref 70–99)
HCO3 SERPL-SCNC: 45 MMOL/L (ref 22–29)
HCT VFR BLD AUTO: 35.5 % (ref 40–53)
HGB BLD-MCNC: 11 G/DL (ref 13.3–17.7)
LVEF ECHO: NORMAL
MAGNESIUM SERPL-MCNC: 1.8 MG/DL (ref 1.7–2.3)
MCH RBC QN AUTO: 27 PG (ref 26.5–33)
MCHC RBC AUTO-ENTMCNC: 31 G/DL (ref 31.5–36.5)
MCV RBC AUTO: 87 FL (ref 78–100)
PHOSPHATE SERPL-MCNC: 2.6 MG/DL (ref 2.5–4.5)
PLATELET # BLD AUTO: 109 10E3/UL (ref 150–450)
POTASSIUM SERPL-SCNC: 4 MMOL/L (ref 3.4–5.3)
RBC # BLD AUTO: 4.07 10E6/UL (ref 4.4–5.9)
SODIUM SERPL-SCNC: 141 MMOL/L (ref 135–145)
WBC # BLD AUTO: 10.9 10E3/UL (ref 4–11)

## 2025-01-22 PROCEDURE — 250N000009 HC RX 250: Performed by: STUDENT IN AN ORGANIZED HEALTH CARE EDUCATION/TRAINING PROGRAM

## 2025-01-22 PROCEDURE — 999N000208 ECHOCARDIOGRAM LIMITED

## 2025-01-22 PROCEDURE — 255N000002 HC RX 255 OP 636: Performed by: PHYSICIAN ASSISTANT

## 2025-01-22 PROCEDURE — 250N000013 HC RX MED GY IP 250 OP 250 PS 637: Performed by: PHYSICIAN ASSISTANT

## 2025-01-22 PROCEDURE — 82565 ASSAY OF CREATININE: CPT | Performed by: INTERNAL MEDICINE

## 2025-01-22 PROCEDURE — 83735 ASSAY OF MAGNESIUM: CPT | Performed by: INTERNAL MEDICINE

## 2025-01-22 PROCEDURE — 99232 SBSQ HOSP IP/OBS MODERATE 35: CPT | Mod: 25 | Performed by: PHYSICIAN ASSISTANT

## 2025-01-22 PROCEDURE — 250N000013 HC RX MED GY IP 250 OP 250 PS 637: Performed by: INTERNAL MEDICINE

## 2025-01-22 PROCEDURE — 94640 AIRWAY INHALATION TREATMENT: CPT

## 2025-01-22 PROCEDURE — 94640 AIRWAY INHALATION TREATMENT: CPT | Mod: 76

## 2025-01-22 PROCEDURE — 93321 DOPPLER ECHO F-UP/LMTD STD: CPT | Mod: 26 | Performed by: INTERNAL MEDICINE

## 2025-01-22 PROCEDURE — 120N000001 HC R&B MED SURG/OB

## 2025-01-22 PROCEDURE — 93325 DOPPLER ECHO COLOR FLOW MAPG: CPT | Mod: 26 | Performed by: INTERNAL MEDICINE

## 2025-01-22 PROCEDURE — 250N000012 HC RX MED GY IP 250 OP 636 PS 637: Performed by: INTERNAL MEDICINE

## 2025-01-22 PROCEDURE — 93308 TTE F-UP OR LMTD: CPT | Mod: 26 | Performed by: INTERNAL MEDICINE

## 2025-01-22 PROCEDURE — 36415 COLL VENOUS BLD VENIPUNCTURE: CPT | Performed by: INTERNAL MEDICINE

## 2025-01-22 PROCEDURE — 250N000013 HC RX MED GY IP 250 OP 250 PS 637: Performed by: STUDENT IN AN ORGANIZED HEALTH CARE EDUCATION/TRAINING PROGRAM

## 2025-01-22 PROCEDURE — 97530 THERAPEUTIC ACTIVITIES: CPT | Mod: GP

## 2025-01-22 PROCEDURE — 97530 THERAPEUTIC ACTIVITIES: CPT | Mod: GO | Performed by: OCCUPATIONAL THERAPIST

## 2025-01-22 PROCEDURE — 84100 ASSAY OF PHOSPHORUS: CPT | Performed by: INTERNAL MEDICINE

## 2025-01-22 PROCEDURE — 999N000157 HC STATISTIC RCP TIME EA 10 MIN

## 2025-01-22 PROCEDURE — 85014 HEMATOCRIT: CPT | Performed by: INTERNAL MEDICINE

## 2025-01-22 PROCEDURE — 250N000009 HC RX 250: Performed by: INTERNAL MEDICINE

## 2025-01-22 PROCEDURE — 99232 SBSQ HOSP IP/OBS MODERATE 35: CPT | Performed by: STUDENT IN AN ORGANIZED HEALTH CARE EDUCATION/TRAINING PROGRAM

## 2025-01-22 PROCEDURE — 97140 MANUAL THERAPY 1/> REGIONS: CPT | Mod: GP

## 2025-01-22 RX ORDER — MAGNESIUM OXIDE 400 MG/1
400 TABLET ORAL EVERY 4 HOURS
Status: COMPLETED | OUTPATIENT
Start: 2025-01-22 | End: 2025-01-22

## 2025-01-22 RX ORDER — IPRATROPIUM BROMIDE AND ALBUTEROL SULFATE 2.5; .5 MG/3ML; MG/3ML
3 SOLUTION RESPIRATORY (INHALATION) 2 TIMES DAILY
Status: DISCONTINUED | OUTPATIENT
Start: 2025-01-22 | End: 2025-01-23

## 2025-01-22 RX ADMIN — BUDESONIDE 0.5 MG: 0.5 INHALANT RESPIRATORY (INHALATION) at 08:47

## 2025-01-22 RX ADMIN — FORMOTEROL FUMARATE DIHYDRATE 20 MCG: 20 SOLUTION RESPIRATORY (INHALATION) at 21:07

## 2025-01-22 RX ADMIN — Medication 400 MG: at 06:38

## 2025-01-22 RX ADMIN — APIXABAN 10 MG: 5 TABLET, FILM COATED ORAL at 20:15

## 2025-01-22 RX ADMIN — IPRATROPIUM BROMIDE AND ALBUTEROL SULFATE 3 ML: .5; 3 SOLUTION RESPIRATORY (INHALATION) at 21:08

## 2025-01-22 RX ADMIN — Medication 400 MG: at 10:26

## 2025-01-22 RX ADMIN — BUDESONIDE 0.5 MG: 0.5 INHALANT RESPIRATORY (INHALATION) at 21:08

## 2025-01-22 RX ADMIN — PANTOPRAZOLE SODIUM 40 MG: 40 TABLET, DELAYED RELEASE ORAL at 06:38

## 2025-01-22 RX ADMIN — UMECLIDINIUM 1 PUFF: 62.5 AEROSOL, POWDER ORAL at 10:26

## 2025-01-22 RX ADMIN — PERFLUTREN 10 ML: 6.52 INJECTION, SUSPENSION INTRAVENOUS at 15:13

## 2025-01-22 RX ADMIN — IPRATROPIUM BROMIDE AND ALBUTEROL SULFATE 3 ML: .5; 3 SOLUTION RESPIRATORY (INHALATION) at 08:47

## 2025-01-22 RX ADMIN — METOPROLOL TARTRATE 75 MG: 50 TABLET, FILM COATED ORAL at 20:15

## 2025-01-22 RX ADMIN — IPRATROPIUM BROMIDE AND ALBUTEROL SULFATE 3 ML: .5; 3 SOLUTION RESPIRATORY (INHALATION) at 03:21

## 2025-01-22 RX ADMIN — AMIODARONE HYDROCHLORIDE 400 MG: 200 TABLET ORAL at 08:29

## 2025-01-22 RX ADMIN — APIXABAN 10 MG: 5 TABLET, FILM COATED ORAL at 08:29

## 2025-01-22 RX ADMIN — PREDNISONE 40 MG: 20 TABLET ORAL at 08:29

## 2025-01-22 RX ADMIN — METOPROLOL TARTRATE 100 MG: 100 TABLET, FILM COATED ORAL at 08:28

## 2025-01-22 ASSESSMENT — ACTIVITIES OF DAILY LIVING (ADL)
ADLS_ACUITY_SCORE: 71

## 2025-01-22 NOTE — PROGRESS NOTES
[facility]  Cardiology Progress Note  Date of Service: 01/22/2025  Primary Cardiologist: Dr. Cesar    Assessment & Plan    Keith Gonzalez is a 69 year old male with past medical history significant for emphysema, COPD, history of DVT, GERD, testicular cancer remission admitted on 1/11/2025 with bilateral PE, cor pulmonale, new onset atrial flutter and extensive lower extremity DVTs.    Assessment:  Paroxysmal Atrial fibrillation/flutter-- s/p dccv x2 on admission for flutter, now in a fib  - diltiazem discontinued on 1/16 and on PO amio loading  - on lopressor 100 mg bid with HR ~100-120 not unexpected given PE and volume overload  -CHADSVASC score of 3 - heme recommended lifelong DOAC  - converted to sinus rhythm spontaneously this am     Mild BiV Cardiomyopathy   - LVEF 46%; mild RV dysfunction and dilation with echo done while in afib with rvr, possibly underestimated  - initiated on BB   - admit weight 148 pounds, current weight 143, dry wt likely high 130s     Submassive PE with brendan DVT  - IR recommended no intervention  - on Eliquis      COPD exacerbation with cor pulmonale      Acute hypoxic and hypercapnic Resp Failure secondary to PE and COPD, still requiring O2 but demand down from peak of 45 lpm high flow, now down to 5-7 lpm and sating well.       LAI, resolved     Active tobacco dependence      Hx of testicular cancer    Plan:   Echo to reassess EF in sinus, if EF <50% will consider adding additional meds for cm and switching to toprol  Continue to hold diuretics.    Plan on amiodarone short term 2-3 months in the hopes that afib was driven by PE and he can maintain sinus rhythm off of it   Will arrange f/u in [place] based on echo findings.    Possible discharge to TCU tomorrow.      Brenna Dow PA-C  [facility]  Pager: [phone]     Interval History   States he feels fine.  Again, he notes that he didn't know how sick he was when he arrived.  Agreeable to TCU.      [facility]    Physical Exam   Temp: 97.7  F (36.5  C) Temp src: Oral BP: 114/64 Pulse: 54   Resp: (!) 23 SpO2: 95 % O2 Device: Nasal cannula Oxygen Delivery: 2 LPM  Vitals:    01/17/25 0600 01/21/25 0500 01/22/25 0400   Weight: 72.6 kg (160 lb 0.9 oz) 65.2 kg (143 lb 11.2 oz) 64.6 kg (142 lb 6.4 oz)     Well-developed well-nourished gentleman who is in no acute distress on oxygen by nasal cannula.  Heart is irregularly irregular and tachycardic.  Lungs diminished bilaterally right middle lobe left lower lobe.  Edema improving now just 1+ pitting edema to the lower side.      Medications   Current Facility-Administered Medications   Medication Dose Route Frequency Provider Last Rate Last Admin    Patient is already receiving anticoagulation with heparin, enoxaparin (LOVENOX), warfarin (COUMADIN)  or other anticoagulant medication   Does not apply Continuous PRN Shila Israel MD        Patient may continue current oral medications   Does not apply Continuous PRN Shila Israel MD         Current Facility-Administered Medications   Medication Dose Route Frequency Provider Last Rate Last Admin    [START ON 1/28/2025] amiodarone (PACERONE) tablet 200 mg  200 mg Oral Daily More, Brenna Peterson PA-C        amiodarone (PACERONE) tablet 400 mg  400 mg Oral Daily More, Brenna Peterson PA-C   400 mg at 01/22/25 0829    apixaban ANTICOAGULANT (ELIQUIS) tablet 10 mg  10 mg Oral BID Dave Glover MD   10 mg at 01/22/25 0829    Followed by    [START ON 1/25/2025] apixaban ANTICOAGULANT (ELIQUIS) tablet 5 mg  5 mg Oral BID Dave Glover MD        budesonide (PULMICORT) neb solution 0.5 mg  0.5 mg Nebulization BID Doyle Salazar MD   0.5 mg at 01/22/25 0847    formoterol (PERFOROMIST) neb solution 20 mcg  20 mcg Nebulization Q12H Polo Purcell MD        insulin aspart (NovoLOG) injection (RAPID ACTING)  1-10 Units Subcutaneous TID AC Shila Israel MD   1 Units at 01/21/25 1600    insulin  aspart (NovoLOG) injection (RAPID ACTING)  1-7 Units Subcutaneous At Bedtime Shila Israel MD        ipratropium - albuterol 0.5 mg/2.5 mg/3 mL (DUONEB) neb solution 3 mL  3 mL Nebulization BID Shila Israel MD        metoprolol tartrate (LOPRESSOR) tablet 75 mg  75 mg Oral BID Evelia Mireles MD        pantoprazole (PROTONIX) EC tablet 40 mg  40 mg Oral QAM AC Evelia Mireles MD   40 mg at 01/22/25 0638    [START ON 1/24/2025] predniSONE (DELTASONE) tablet 30 mg  30 mg Oral Daily Polo Purcell MD        Followed by    [START ON 1/29/2025] predniSONE (DELTASONE) tablet 20 mg  20 mg Oral Daily Polo Purcell MD        Followed by    [START ON 2/3/2025] predniSONE (DELTASONE) tablet 10 mg  10 mg Oral Daily Polo Purcell MD        predniSONE (DELTASONE) tablet 40 mg  40 mg Oral Daily Shila Israel MD   40 mg at 01/22/25 0829    umeclidinium (INCRUSE ELLIPTA) 62.5 MCG/ACT inhaler 1 puff  1 puff Inhalation Daily Polo Purcell MD   1 puff at 01/22/25 1026       Data   Last 24 hours labs reviewed     Tele: afib -130, converted to sinus this am    Echo:   1. The left ventricle is normal in size. Biplane LVEF is 46%. Mild lateral  hypokinesis.  2. The right ventricle is mildly dilated. The right ventricular systolic  function is mild to moderately reduced.  3. No valve disease.  4. Mild TR. RVSP 30mmHg.

## 2025-01-22 NOTE — PROGRESS NOTES
[facility] Pulmonary Consult Note    Date of Service: 01/21/25    Assessment and Recommendations:  69M prior DVT, tobacco abuse, GERD, COPD admitted 1/11 w/ bilateral PE, new Aflutter, and DVT.   CT also w/ extensive emphysema. 2018 spirometry with moderate-severe obstruction.   Extensive DVT on doppler. On repeat CTPE, PE has not progressed. No shunt noted on TTE.   Hypoxia has improved with a combination of steroids, A/C, and diuresis  CO2 now rising up 2/2 metabolic alkalosis from diuresis.     - continue budesonide nebs, adding fomoterol and Incruse. Send home on this or similar regimen. Unsure of his ability to work with handheld inhalers 2/2 pulmonary status  - continue duonebs- considering transitioning to PRN soon as pt continues to improve  - Transitioned to prednisone 40mg already for 2d; added taper plan  - Would stop antibiotics  - Diuresis per cardiology- agree with holding considering signs of metabolic alkalosis.   -Did briefly discuss with cardiology attending regarding Afib management- amiodarone would not be best long term treatment due to pt's underlying lung disease and risk for pulmonary toxicity   -Titrate FiO2 for spO2 goal >89% both with rest and exertion    - On day before discharge, would perform overnight oximetry study to assess for nocturnal O2 needs  - On day of discharge, would perform 6MW/exertional O2 study to assess O2 needs with exertion  - Would have follow up with gen pulmonology with full PFTs, 6MW. At this time, can assess appropriateness to transition to handheld inhalers.   -In setting of chronic CO2 retention, would refer to sleep medicine as outpt for potential nocturnal Bipap prescription and titration of settings   -Smoking cessation teaching/emphasis     Will sign off. Please contact with questions or concerns.     CC: acute hypoxemic hypercapnic respiratory failure     Interval HPI:  Weaned from HFNC to lower flow O2 devices on 1/19.  Today on 2LPM O2 while  at rest during interview.     BP 93/54   Pulse 63   Temp 97.8  F (36.6  C) (Oral)   Resp 16   Wt 65.2 kg (143 lb 11.2 oz)   SpO2 95%   BMI 20.62 kg/m    Gen: NAD  HEENT: anicteric, OP clear  Card: RR  Pulm: diffusely diminished, no wheezing, rales  Abd: soft, NTND  MSK: 2+ b/l LE edema, no acute joint abnormalities  Skin: no obvious rash  Psych: normal affect  Neuro: answering questions appropriately     Labs: personally reviewed    Imaging: personally reviewed Ct Chest from 1/16/2025    PMH/PSH, FH, SH reviewed.       Polo Purcell MD  [facility],  of Medicine  Pulmonary/Critical Care Medicine  January 21, 2025

## 2025-01-22 NOTE — PLAN OF CARE
"Patient Name: Keith Gonzalez  MRN: [id number]  Date of Admission: 1/11/2025  Reason for Admission: Acute septic pulmonary embolism  Level of Care: [facility]    Vitals:   BP Readings from Last 1 Encounters:   01/22/25 117/66     Pulse Readings from Last 1 Encounters:   01/22/25 54     Wt Readings from Last 1 Encounters:   01/22/25 64.6 kg (142 lb 6.4 oz)     Ht Readings from Last 1 Encounters:   07/08/24 1.778 m (5' 10\")     Estimated body mass index is 20.43 kg/m  as calculated from the following:    Height as of 7/8/24: 1.778 m (5' 10\").    Weight as of this encounter: 64.6 kg (142 lb 6.4 oz).  Temp Readings from Last 1 Encounters:   01/22/25 98  F (36.7  C) (Oral)     Pain: denies pain    CV Surgery Patient: No    Assessment    Resp: 2L oxymask. RUBY. IS use encouraged.   Telemetry: SR-SB  Neuro: A&Ox4. Forgetful at times.   GI/: Adequate UOP. BM x1. + BS and flatus. Tolerating regular diet.  Skin/Wounds: Scattered bruising. Blanchable redness to coccyx. Compression stockings.  Lines/Drains: R & L PIVs SL.   Activity: A1 GBW  Sleep: Able to rest between cares.  Abnormal Labs: Electrolyte protocols run, replacements and rechecks ordered.    Aggression Stop Light: Green          Patient Care Plan: Progressing   "

## 2025-01-22 NOTE — PROGRESS NOTES
Care Management Follow Up    Length of Stay (days): 11    Expected Discharge Date: 01/23/2025     Concerns to be Addressed: discharge planning     Patient plan of care discussed at interdisciplinary rounds: Yes    Anticipated Discharge Disposition: Transitional Care       Anticipated Discharge Services:    Anticipated Discharge DME:      Patient/family educated on Medicare website which has current facility and service quality ratings: yes  Education Provided on the Discharge Plan: Yes  Patient/Family in Agreement with the Plan: yes    Referrals Placed by CM/SW: Post Acute Facilities  Private pay costs discussed: Not applicable    Discussed  Partnership in Safe Discharge Planning  document with patient/family: No     Handoff Completed: No, handoff not indicated or clinically appropriate    Additional Information:  No accepting TCU yet. [facility] declined due to high acuity. [facility] declined due to not accepting [facility]. [facility] is full. [facility] is full. Additional referrals sent.     Next Steps: Secure TCU bed. SW following.     SHELDON Hernadez, LICSW  [phone] Desk phone  [phone] Cell/text (Preferred)  [facility]

## 2025-01-22 NOTE — PROGRESS NOTES
[facility]    Medicine Progress Note - Hospitalist Service    Date of Admission:  1/11/2025    Assessment & Plan     Hospital cumulative summary    Keith Gonzalez is a 69 year old male with PMH of remote b/l DVT (no cause found per pt), off AC, tobacco abuse, emphysema, COPD, osteoporosis, GERD and testicular cancer (remission) who was admitted as a direct admission to intensive care unit from [place] emergency department for further treatment and evaluation of bilateral pulmonary embolism with acute cor pulmonale, new onset atrial flutter, and extensive lower extremity DVTs.  Patient was initially admitted to ICU due to the concern for possible need for intubation due to ongoing acute hypoxic and hypercapnic respiratory failure.  Patient did not require any intubation and was eventually [place] [facility] [place].  His hospitalization is complicated by persistent requirement of oxygen with HFNC, requiring anticoagulation for pulmonary embolism, tenuous cardiac status with alternating atrial flutter/A-fib and possible melena from underlying gastritis/esophagitis.  No EGD has been done so far.  Patient has been followed up with pulmonary , cardiology and gastroenterology.       Acute hypoxic and hypercapnic respiratory failure, multifactorial  Most likely secondary to combination of pulmonary embolism with cor pulmonale, possible COPD exacerbation with tenuous cardiac status with difficult to control atrial flutter/atrial fibrillation  --Manage underlying conditions, as above (see below)    Acute Submassive pulmonary embolism w cor pulmonale  Extensive bilateral lower extremity DVTs.  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 Off Ac x yrs     -- Initially arrived to ICU on heparin and amiodarone infusion  --  Case reviewed with IR and vascular surgery, no interventions were recommended  -- Patient continued on heparin infusion  -- Patient was switched from BiPAP to high flow nasal cannula, Now weaned down to 2L of supplemental O2.   -- Evaluated by oncology, recommending lifelong anticoagulation  -- Pharmacy liaison consulted, patient agreeable with Eliquis cost  -- Cardiology switched heparin infusion to Eliquis on 01/18/2025  -- Continue to wean patient off oxygen, see below    Acute COPD exacerbation  Healthcare acquired pneumonia:  Leukocytosis: Resolved, again elevated this morning 01/19/2025  Initially required BiPAP, then HFNC, now on 4-6L of supplemental O2 via oxymask   Pulmonary and RT following  Patient has completed azithromycin course  01/16: CT chest repeated, again showing PE unchanged from previous, small to moderate bilateral effusion with associated compressive atelectasis and infiltrates was seen.  -- Continue to wean patient off O2 as needed, suspect pt may need chronic O2 given known COPD. Would titrate to spO2 goal of >89% with rest and exertion  -- Completed IV Zosyn X 7 days for possible aspiration pneumonia   -- Continue budesonide nebs, adding fomoterol and Incruse. Send home on this or similar regimen.   -- Diuresis per cardiology, holding on 1/21   -- Continue DuoNeb BID  -- Patient treated with IV Solu-Medrol initially, now on 40mg po prednisone daily, continue continue with prednisone taper per pulm  -- Overnight oximetry   -- On day of discharge, would perform 6MW/exertional O2 study to assess O2 needs with exertion  -- Would have follow up with gen pulmonology with full PFTs, 6MW. At this time, can assess appropriateness to transition to handheld inhalers.   -- In setting of chronic CO2 retention, would refer to sleep medicine as outpt for potential nocturnal Bipap prescription and titration of settings   -- Tobacco cessation strongly encouraged    Paroxysmal atrial fibrillation/atrial  flutter: S/p DCCV x 2 on admission, now in A-fib  Initially on amiodarone infusion in ICU, has been switched to p.o. amiodarone  Also received diltiazem which was later stopped considering decreased EF  Has been started on p.o. metoprolol  [id number] score of 3  Cardiology has been following closely  Acute heart failure with reduced EF  Echo 1/9/25  EF 46%, mild lat hypokinesis.  Mild RV dilated w decr RV fxn  Echo 1/16/25 EF 55% (improved), mid-distal later hypokinesia, RV decr fxn, RV dilated, Pulm HTN, full IVC   Mild biventricular cardiomyopathy.  Type II NSTEMI, secondary to supply demand ischemia in the picture of A-fib and heart failure along with PE  -- Continue to monitor patient closely on telemetry.  -- Continue Amiodarone 400 mg p.o. twice daily with tapering course per cardiology.  -- Patient has also received digoxin on 01/16 and 01/17, no further digoxin ordered  -- Cardiology discontinued IV heparin , started patient on Eliquis on 01/18  -- Diuresis per cardiology   -- Metoprolol tartrate 75 mg p.o. twice daily   -- Continue on electrolyte protocols  * 1/21 pt converted to sinus rhythm with HRs in the 60s     Possible melena 01/16, positive Hemoccult  Chronic GERD/dyspepsia  History of grade D esophagitis on EGD in 2022  Severe nausea resolved  Patient has longstanding history of dyspepsia, on PPI PTA.  Longstanding history of smoking which increases his risk of reflux esophagitis  01/16 bedside RN reported 1 melanotic dark stool  Hemoccult positive  Hemoglobin has been stable, no further signs and symptoms of melanotic stools  --[facility] following since 01/16. Given stability on anticogulation, no plans for inpatient EGD. Can be consider as outpatient if needed.   --Switch to PO pantoprazole daily  --Continue antinausea medications.    Acute kidney injury, suspect prerenal. resolved  Hypocalcemia, resolved  Metabolic alkalosis  Last baseline PTA 1.1, Admit creatinine was up to 2.18, c/w LAI, from  PE/ARF. Creatinine now improved to 0.85.   -- Renal followed, but signed off 1/14  -- Continue to monitor patient BMP closely while patient is getting diuresis with cardiology.    Leukocytosis, suspect steroid induced   WBC up to 17.6 on 1/20. Suspect related to steroids. On abx for pneumonia, no new source of infection suspected at this time   - Monitor      Transaminitis   --LFT minimally elevated alt 156, ast 59 bilirubin is 0.2--likely related to multifactorial stressors .   --Recheck 1/15 shows overall WNL improvement.  Low albumin.      Acute metabolic encephalopathy: Improving  Most likely was secondary to profound hypercapnia and respiratory acidosis, initially was a started on BiPAP now has been switched to high flow nasal cannula  --Continue to monitor, will order physical and Occupational Therapy evaluation.    Hx of testicular cancer   --review of record with distant history, in remission   -- Hem Onc consulted- notes labs neg for recurrent test CA Onc also recs CT chest + abd/pelvis to r/o occult malignancy recurrent **in few days w stable Cr/O2    -- 1/16 s/p CT chest (for PE recheck) - w/o mass  -- Oncology has recommended CT abdomen pelvis to be done before discharge to rule out any concern for recurrence of testicular cancer.  -- CA labs neg     Hx of iron deficiency anemia   --hold ferrous sulfate   --checked Iron profile, currently no signs of Iron deficiency , Hgb stable and improving      Osteoporosis  Hx of compression fractures    - APAP prn pain     Tobacco abuse  PTA smoking 4 cigarettes a day.  Quit x 8 months last yr.  Discussed smoking as recurrent DVT contribution risk .  Willing to quit.  No cravings now, declines need for rx now  --Smoking cessation strongly encouraged , and to lower DVT risk. He says he will stop, but unwilling to throw out cigs in jacket pocket!  --declines nicotine patch as not needed  --Nicotine gum/lozenge added as needed craving  --PCP follow-up not Declines  outpatient smoking cessation meds        Diet: Regular Diet Adult  Snacks/Supplements Adult: Magic Cup; With Meals    DVT Prophylaxis: DOAC  Dunn Catheter: Not present  Lines: None     Cardiac Monitoring: ACTIVE order. Indication: Tachyarrhythmias, acute (48 hours)  Code Status: Full Code      Clinically Significant Risk Factors          # Hypochloremia: Lowest Cl = 94 mmol/L in last 2 days, will monitor as appropriate      # Hypoalbuminemia: Lowest albumin = 3 g/dL at 1/13/2025  4:07 AM, will monitor as appropriate     # Thrombocytopenia: Lowest platelets = 109 in last 2 days, will monitor for bleeding       # Acute Hypercapnic Respiratory Failure: based on arterial blood gas results.  Continue supplemental oxygen and ventilatory support as indicated.  # Acute Hypercapnic Respiratory Failure: based on venous blood gas results.  Continue supplemental oxygen and ventilatory support as indicated.             # Financial/Environmental Concerns: none  # COPD: noted on problem list        Social Drivers of Health    Food Insecurity: Unknown (1/11/2025)    Food Insecurity     Within the past 12 months, did you worry that your food would run out before you got money to buy more?: Patient unable to answer     Within the past 12 months, did the food you bought just not last and you didn t have money to get more?: Patient unable to answer   Housing Stability: Unknown (1/11/2025)    Housing Stability     Do you have housing? : Patient unable to answer     Are you worried about losing your housing?: Patient unable to answer   Tobacco Use: High Risk (1/21/2025)    Patient History     Smoking Tobacco Use: Every Day     Smokeless Tobacco Use: Never   Financial Resource Strain: Unknown (1/11/2025)    Financial Resource Strain     Within the past 12 months, have you or your family members you live with been unable to get utilities (heat, electricity) when it was really needed?: Patient unable to answer   Transportation Needs:  Unknown (1/11/2025)    Transportation Needs     Within the past 12 months, has lack of transportation kept you from medical appointments, getting your medicines, non-medical meetings or appointments, work, or from getting things that you need?: Patient unable to answer   Interpersonal Safety: Unknown (1/11/2025)    Interpersonal Safety     Do you feel physically and emotionally safe where you currently live?: Patient unable to answer     Within the past 12 months, have you been hit, slapped, kicked or otherwise physically hurt by someone?: Patient unable to answer     Within the past 12 months, have you been humiliated or emotionally abused in other ways by your partner or ex-partner?: Patient unable to answer          Disposition Plan     Medically Ready for Discharge: Anticipated in 2-4 Days      Evelia Mireles MD  Hospitalist Service  [facility]  Securely message with [employer] (more info)  Text page via [facility] Paging/Directory   ______________________________________________________________________    Interval History   Pt doing well today. Now on 2L of O2 Feels fine. Converted back to sinus rhythm. On other acute issues.       Physical Exam   Vital Signs: Temp: 98.2  F (36.8  C) Temp src: Oral BP: (!) 142/71 Pulse: 60   Resp: 22 SpO2: 92 % O2 Device: Nasal cannula Oxygen Delivery: 2 LPM  Weight: 142 lbs 6.4 oz    Constitutional: Awake, alert, cooperative, no apparent distress.  Eyes: Conjunctiva and pupils examined and normal.  HEENT: Moist mucous membranes, normal dentition.  Respiratory: Clear to auscultation bilaterally, no crackles or wheezing.  Cardiovascular: Regular rate and rhythm, normal S1 and S2, and no murmur noted.  GI: Soft, non-distended, non-tender, normal bowel sounds.  Skin: No rashes, no cyanosis, legs wrapped  Musculoskeletal: No joint swelling, erythema or tenderness.  Neurologic: Cranial nerves 2-12 intact, normal strength and sensation.  Psychiatric: Alert,  oriented to person, place and time, no obvious anxiety or depression.        Medical Decision Making       52 MINUTES SPENT BY ME on the date of service doing chart review, history, exam, documentation & further activities per the note.      Data     I have personally reviewed the following data over the past 24 hrs:    10.9  \   11.0 (L)   / 109 (L)     141 96 (L) 29.5 (H) /  99   4.0 45 (H) 0.75 \     Procal: N/A CRP: N/A Lactic Acid: N/A         Imaging results reviewed over the past 24 hrs:   No results found for this or any previous visit (from the past 24 hours).  Recent Labs   Lab 01/22/25  1057 01/22/25  0637 01/22/25  0556 01/21/25  0753 01/21/25  0423 01/20/25  0752 01/20/25  0650 01/19/25  0809 01/19/25  0713 01/18/25  1211 01/18/25  0719 01/17/25  0719 01/17/25  0542   WBC  --   --  10.9  --  13.1*  --  17.6*  --  15.3*  --  10.9  --  10.8   HGB  --   --  11.0*  --  12.1*  --  12.8*  --  13.4  --  12.5*  --  11.2*   MCV  --   --  87  --  87  --  88  --  84  --  85  --  85   PLT  --   --  109*  --  124*  --  147*  --  141*  --  168  --  158   INR  --   --   --   --   --   --   --   --  1.61*  --  1.25*  --  1.32*   NA  --   --  141  --  143  --  145  --  143  --  142  --  143   POTASSIUM  --   --  4.0  --  3.6  --  4.0   < > 3.3*   < > 3.1*  --  3.7   CHLORIDE  --   --  96*  --  94*  --  95*  --  95*  --  95*  --  97*   CO2  --   --  45*  --  47*  --  47*  --  40*  --  41*  --  37*   BUN  --   --  29.5*  --  34.5*  --  35.1*  --  35.0*  --  40.9*  --  41.4*   CR  --   --  0.75  --  0.85  --  0.92  --  0.84  --  0.97  --  1.04   ANIONGAP  --   --  <1*  --  2*  --  3*  --  8  --  6*  --  9   DAT  --   --  8.7*  --  8.4*  --  8.4*  --  8.6*  --  8.5*  --  8.5*   GLC 99 87 101*   < > 85   < > 99   < > 120*   < > 138*   < > 139*    < > = values in this interval not displayed.

## 2025-01-22 NOTE — PLAN OF CARE
"Patient Name: Sol  MRN: [id number]  Date of Admission: 1/11/2025  Reason for Admission: PE  Level of Care: [facility] [place]    Vitals:   BP Readings from Last 1 Encounters:  01/21/25 : 111/63    Pulse Readings from Last 1 Encounters:  01/21/25 : 64    Wt Readings from Last 1 Encounters:  01/21/25 : 65.2 kg (143 lb 11.2 oz)    Ht Readings from Last 1 Encounters:  07/08/24 : 1.778 m (5' 10\")    Estimated body mass index is 20.62 kg/m  as calculated from the following:    Height as of 7/8/24: 1.778 m (5' 10\").    Weight as of this encounter: 65.2 kg (143 lb 11.2 oz).  Temp Readings from Last 1 Encounters:  01/21/25 : 97.6  F (36.4  C) (Oral)      Pain: denies    CV Surgery Patient: No    Assessment    Resp: denies SOB, RUBY, 2-3L oxymask   Telemetry: NSR, converted @ 1120  Neuro: intact  GI/: continent, can be incontinent   Skin/Wounds: meplix on back, edema BLE- lymph wraps   Lines/Drains: PIV SL  Activity: A1/GB/W  Abnormal Labs: see chart    Aggression Stop Light: Green          Patient Care Plan: ambulation, wean O2  "

## 2025-01-22 NOTE — PLAN OF CARE
Shift Summary [id number]:    VSS on 2L O2 NC. A/Ox4. Denies pain. LS dim, RUBY. IS at bedside, self-administered. Tele: SB/SR, denies CP. Metoprolol dose decreased. Voiding adequately. + BM this shift, +BS and passing flatus. K and Phos WNL, Mag replaced per protocol, all rechecks in AM. Echo completed this shift (see results). Tolerating reg diet. Up A1 walker GB. Consulted teams: Cards. Discharge tbd. Care plan updated.     See flowsheet for full assessment. See MAR for meds given.    Damir Paul, RN  5:07 PM    Goal Outcome Evaluation:      Plan of Care Reviewed With: patient    Overall Patient Progress: improving    Outcome Evaluation: IMC status discontinued. Pulmonary hygiene encouraged.

## 2025-01-23 ENCOUNTER — APPOINTMENT (OUTPATIENT)
Dept: CT IMAGING | Facility: CLINIC | Age: 70
DRG: 175 | End: 2025-01-23
Attending: STUDENT IN AN ORGANIZED HEALTH CARE EDUCATION/TRAINING PROGRAM
Payer: COMMERCIAL

## 2025-01-23 ENCOUNTER — APPOINTMENT (OUTPATIENT)
Dept: PHYSICAL THERAPY | Facility: CLINIC | Age: 70
DRG: 175 | End: 2025-01-23
Attending: INTERNAL MEDICINE
Payer: COMMERCIAL

## 2025-01-23 VITALS
WEIGHT: 143.6 LBS | BODY MASS INDEX: 20.6 KG/M2 | SYSTOLIC BLOOD PRESSURE: 101 MMHG | HEART RATE: 58 BPM | RESPIRATION RATE: 16 BRPM | TEMPERATURE: 98.2 F | OXYGEN SATURATION: 95 % | DIASTOLIC BLOOD PRESSURE: 57 MMHG

## 2025-01-23 PROBLEM — I48.0 PAROXYSMAL ATRIAL FIBRILLATION (H): Status: ACTIVE | Noted: 2025-01-23

## 2025-01-23 LAB
ANION GAP SERPL CALCULATED.3IONS-SCNC: 5 MMOL/L (ref 7–15)
BUN SERPL-MCNC: 25.3 MG/DL (ref 8–23)
CALCIUM SERPL-MCNC: 8.9 MG/DL (ref 8.8–10.4)
CHLORIDE SERPL-SCNC: 98 MMOL/L (ref 98–107)
CREAT SERPL-MCNC: 0.67 MG/DL (ref 0.67–1.17)
EGFRCR SERPLBLD CKD-EPI 2021: >90 ML/MIN/1.73M2
ERYTHROCYTE [DISTWIDTH] IN BLOOD BY AUTOMATED COUNT: 14.9 % (ref 10–15)
GLUCOSE BLDC GLUCOMTR-MCNC: 117 MG/DL (ref 70–99)
GLUCOSE BLDC GLUCOMTR-MCNC: 134 MG/DL (ref 70–99)
GLUCOSE BLDC GLUCOMTR-MCNC: 154 MG/DL (ref 70–99)
GLUCOSE BLDC GLUCOMTR-MCNC: 89 MG/DL (ref 70–99)
GLUCOSE SERPL-MCNC: 92 MG/DL (ref 70–99)
HCO3 SERPL-SCNC: 36 MMOL/L (ref 22–29)
HCT VFR BLD AUTO: 38.2 % (ref 40–53)
HGB BLD-MCNC: 11.9 G/DL (ref 13.3–17.7)
MAGNESIUM SERPL-MCNC: 1.9 MG/DL (ref 1.7–2.3)
MCH RBC QN AUTO: 27.1 PG (ref 26.5–33)
MCHC RBC AUTO-ENTMCNC: 31.2 G/DL (ref 31.5–36.5)
MCV RBC AUTO: 87 FL (ref 78–100)
PHOSPHATE SERPL-MCNC: 2.7 MG/DL (ref 2.5–4.5)
PLATELET # BLD AUTO: 129 10E3/UL (ref 150–450)
POTASSIUM SERPL-SCNC: 4.8 MMOL/L (ref 3.4–5.3)
RBC # BLD AUTO: 4.39 10E6/UL (ref 4.4–5.9)
SODIUM SERPL-SCNC: 139 MMOL/L (ref 135–145)
WBC # BLD AUTO: 13.6 10E3/UL (ref 4–11)

## 2025-01-23 PROCEDURE — 250N000011 HC RX IP 250 OP 636: Performed by: STUDENT IN AN ORGANIZED HEALTH CARE EDUCATION/TRAINING PROGRAM

## 2025-01-23 PROCEDURE — 94640 AIRWAY INHALATION TREATMENT: CPT

## 2025-01-23 PROCEDURE — 120N000001 HC R&B MED SURG/OB

## 2025-01-23 PROCEDURE — 999N000157 HC STATISTIC RCP TIME EA 10 MIN

## 2025-01-23 PROCEDURE — 250N000013 HC RX MED GY IP 250 OP 250 PS 637: Performed by: PHYSICIAN ASSISTANT

## 2025-01-23 PROCEDURE — 250N000009 HC RX 250: Performed by: INTERNAL MEDICINE

## 2025-01-23 PROCEDURE — 74177 CT ABD & PELVIS W/CONTRAST: CPT

## 2025-01-23 PROCEDURE — 250N000013 HC RX MED GY IP 250 OP 250 PS 637: Performed by: STUDENT IN AN ORGANIZED HEALTH CARE EDUCATION/TRAINING PROGRAM

## 2025-01-23 PROCEDURE — 84100 ASSAY OF PHOSPHORUS: CPT | Performed by: INTERNAL MEDICINE

## 2025-01-23 PROCEDURE — 250N000013 HC RX MED GY IP 250 OP 250 PS 637: Performed by: INTERNAL MEDICINE

## 2025-01-23 PROCEDURE — 250N000009 HC RX 250: Performed by: STUDENT IN AN ORGANIZED HEALTH CARE EDUCATION/TRAINING PROGRAM

## 2025-01-23 PROCEDURE — 85041 AUTOMATED RBC COUNT: CPT | Performed by: INTERNAL MEDICINE

## 2025-01-23 PROCEDURE — 82565 ASSAY OF CREATININE: CPT | Performed by: INTERNAL MEDICINE

## 2025-01-23 PROCEDURE — 97140 MANUAL THERAPY 1/> REGIONS: CPT | Mod: GP

## 2025-01-23 PROCEDURE — 99232 SBSQ HOSP IP/OBS MODERATE 35: CPT | Mod: FS | Performed by: PHYSICIAN ASSISTANT

## 2025-01-23 PROCEDURE — 99232 SBSQ HOSP IP/OBS MODERATE 35: CPT | Performed by: STUDENT IN AN ORGANIZED HEALTH CARE EDUCATION/TRAINING PROGRAM

## 2025-01-23 PROCEDURE — 250N000012 HC RX MED GY IP 250 OP 636 PS 637: Performed by: INTERNAL MEDICINE

## 2025-01-23 PROCEDURE — 36415 COLL VENOUS BLD VENIPUNCTURE: CPT | Performed by: INTERNAL MEDICINE

## 2025-01-23 PROCEDURE — 83735 ASSAY OF MAGNESIUM: CPT | Performed by: INTERNAL MEDICINE

## 2025-01-23 RX ORDER — IPRATROPIUM BROMIDE AND ALBUTEROL SULFATE 2.5; .5 MG/3ML; MG/3ML
3 SOLUTION RESPIRATORY (INHALATION) EVERY 4 HOURS PRN
Status: DISCONTINUED | OUTPATIENT
Start: 2025-01-23 | End: 2025-01-24 | Stop reason: HOSPADM

## 2025-01-23 RX ORDER — METOPROLOL TARTRATE 50 MG
50 TABLET ORAL 2 TIMES DAILY
Status: DISCONTINUED | OUTPATIENT
Start: 2025-01-23 | End: 2025-01-23

## 2025-01-23 RX ORDER — MAGNESIUM OXIDE 400 MG/1
400 TABLET ORAL EVERY 4 HOURS
Status: COMPLETED | OUTPATIENT
Start: 2025-01-23 | End: 2025-01-23

## 2025-01-23 RX ORDER — FUROSEMIDE 20 MG/1
20 TABLET ORAL DAILY
Status: DISCONTINUED | OUTPATIENT
Start: 2025-01-23 | End: 2025-01-24 | Stop reason: HOSPADM

## 2025-01-23 RX ORDER — IOPAMIDOL 755 MG/ML
70 INJECTION, SOLUTION INTRAVASCULAR ONCE
Status: COMPLETED | OUTPATIENT
Start: 2025-01-23 | End: 2025-01-23

## 2025-01-23 RX ORDER — METOPROLOL TARTRATE 50 MG
50 TABLET ORAL 2 TIMES DAILY
Status: DISCONTINUED | OUTPATIENT
Start: 2025-01-23 | End: 2025-01-24 | Stop reason: HOSPADM

## 2025-01-23 RX ADMIN — PANTOPRAZOLE SODIUM 40 MG: 40 TABLET, DELAYED RELEASE ORAL at 06:11

## 2025-01-23 RX ADMIN — METOPROLOL TARTRATE 50 MG: 50 TABLET, FILM COATED ORAL at 11:32

## 2025-01-23 RX ADMIN — AMIODARONE HYDROCHLORIDE 400 MG: 200 TABLET ORAL at 09:38

## 2025-01-23 RX ADMIN — Medication 400 MG: at 08:26

## 2025-01-23 RX ADMIN — FORMOTEROL FUMARATE DIHYDRATE 20 MCG: 20 SOLUTION RESPIRATORY (INHALATION) at 08:40

## 2025-01-23 RX ADMIN — METOPROLOL TARTRATE 50 MG: 50 TABLET, FILM COATED ORAL at 21:44

## 2025-01-23 RX ADMIN — BUDESONIDE 0.5 MG: 0.5 INHALANT RESPIRATORY (INHALATION) at 08:40

## 2025-01-23 RX ADMIN — UMECLIDINIUM 1 PUFF: 62.5 AEROSOL, POWDER ORAL at 09:38

## 2025-01-23 RX ADMIN — PREDNISONE 40 MG: 20 TABLET ORAL at 08:26

## 2025-01-23 RX ADMIN — APIXABAN 10 MG: 5 TABLET, FILM COATED ORAL at 08:26

## 2025-01-23 RX ADMIN — IOPAMIDOL 70 ML: 755 INJECTION, SOLUTION INTRAVENOUS at 13:19

## 2025-01-23 RX ADMIN — IPRATROPIUM BROMIDE AND ALBUTEROL SULFATE 3 ML: .5; 3 SOLUTION RESPIRATORY (INHALATION) at 08:40

## 2025-01-23 RX ADMIN — FUROSEMIDE 20 MG: 20 TABLET ORAL at 11:32

## 2025-01-23 RX ADMIN — APIXABAN 10 MG: 5 TABLET, FILM COATED ORAL at 21:43

## 2025-01-23 RX ADMIN — Medication 400 MG: at 13:11

## 2025-01-23 RX ADMIN — SODIUM CHLORIDE 60 ML: 9 INJECTION, SOLUTION INTRAVENOUS at 13:19

## 2025-01-23 ASSESSMENT — ACTIVITIES OF DAILY LIVING (ADL)
ADLS_ACUITY_SCORE: 70
ADLS_ACUITY_SCORE: 73
ADLS_ACUITY_SCORE: 70
ADLS_ACUITY_SCORE: 71
ADLS_ACUITY_SCORE: 70
ADLS_ACUITY_SCORE: 71
ADLS_ACUITY_SCORE: 70
ADLS_ACUITY_SCORE: 71
ADLS_ACUITY_SCORE: 70
ADLS_ACUITY_SCORE: 71
ADLS_ACUITY_SCORE: 73
ADLS_ACUITY_SCORE: 71
ADLS_ACUITY_SCORE: 70
ADLS_ACUITY_SCORE: 71

## 2025-01-23 NOTE — PLAN OF CARE
Shift Summary [id number]:    VSS on 2L O2 NC. A/Ox4. Denies pain. LS dim, RUBY. IS at bedside, self-administered. Tele: SB, denies CP. Metoprolol dose decreased by cards. Voiding adequately. + BM this shift, +BS and passing flatus. Mepilex on spine and coccyx for prevention. Compression stockings put on by PT. K and Phos WNL, Mag replaced per protocol, all rechecks in AM. Tolerating reg diet. Up A1 walker GB. Ambulated in kent. Consulted teams: Cards. Discharge to TCU pending bed availability. Care plan updated.     See flowsheet for full assessment. See MAR for meds given.    Damir Paul, RN  5:49 PM    Goal Outcome Evaluation:      Plan of Care Reviewed With: patient    Overall Patient Progress: no change    Outcome Evaluation: Pulmonary hygiene encouraged, on 2L O2 NC.

## 2025-01-23 NOTE — PROGRESS NOTES
Care Management Follow Up    Length of Stay (days): 12    Expected Discharge Date: 01/24/2025     Concerns to be Addressed: discharge planning     Patient plan of care discussed at interdisciplinary rounds: Yes    Anticipated Discharge Disposition: Transitional Care        Anticipated Discharge Services:    Anticipated Discharge DME:      Patient/family educated on Medicare website which has current facility and service quality ratings: yes  Education Provided on the Discharge Plan: Yes  Patient/Family in Agreement with the Plan: yes    Referrals Placed by CM/SW: Post Acute Facilities, Transportation  Private pay costs discussed: transportation costs    Discussed  Partnership in Safe Discharge Planning  document with patient/family: No     Handoff Completed: No, handoff not indicated or clinically appropriate    Additional Information:  After conversing with the liaison for [facility], aMlcolm Ornelas can accept pt. RANDALL updated pt who is in agreement. [facility] the [facility]. [facility] ride set for  Friday between 3080-2055 in anticipation the prior-auth is in place by then. Pt notified he will get a bill for the cost of the ride. MD updated.     Next Steps: Coordinate discharge to TCU once prior-auth in place. RANDALL following for discharge planning.     SHELDON Hernadez, LICSW  [phone] Desk phone  [phone] Cell/text (Preferred)  [facility]    Addendum: Facility has received prior-auth. Ride time moved to  between 0748-1005. Facility and MD notified of ride time.

## 2025-01-23 NOTE — PROVIDER NOTIFICATION
"Dr Mireles paged [phone]: \"Pt HR still in the 50s (51-58 since being in the room). did you want any adjustments to metoprolol or amio? i know hold parameters are in for HR of 50 but just want to make sure since he has been more consistent in the 50s. i can also page cards to make adjustments\"    Addendum: Brenna GONZÁLES w/ cards paged. Metoprolol dose decreased. Since pt did not receive 75mg this AM, ok to give 50mg now.   "

## 2025-01-23 NOTE — PROGRESS NOTES
CLINICAL NUTRITION SERVICES - REASSESSMENT NOTE     Malnutrition Status:    % Intake: No decreased intake noted  % Weight Loss: None noted  Subcutaneous Fat Loss: Orbital: Mild  Muscle Loss: Wasting of the temples (temporalis muscle): Mild and Clavicles (pectoralis and deltoids): Mild  Fluid Accumulation/Edema: Mild  Malnutrition Diagnosis: Moderate malnutrition in the context of chronic illness  Malnutrition Present on Admission: Unable to assess    Registered Dietitian Interventions:  - Continue magic cup TID w/ meals      SUBJECTIVE INFORMATION  Assessed patient in room.    CURRENT NUTRITION ORDERS  Diet: Regular + Magic Cup w/ meals TID    CURRENT INTAKE/TOLERANCE  - Pt reports great appetite. Flowsheets show  % of meals for the past several days.   - He loves the magic cup, already ate one with his breakfast.     NEW FINDINGS  Weight: 65.1 kg - stable or slightly elevated from weight trends PTA.   Skin/wounds: no PI  GI symptoms: Daily BM 2-5x (soft / formed)  Nutrition-relevant labs: Reviewed  Nutrition-relevant medications:  Lasix 20 mg daily, High sliding scale insulin    MALNUTRITION  % Intake: No decreased intake noted  % Weight Loss: None noted  Subcutaneous Fat Loss: Orbital: Mild  Muscle Loss: Wasting of the temples (temporalis muscle): Mild and Clavicles (pectoralis and deltoids): Mild  Fluid Accumulation/Edema: Mild  Malnutrition Diagnosis: Moderate malnutrition in the context of chronic illness  Malnutrition Present on Admission: Unable to assess    EVALUATION OF THE PROGRESS TOWARD GOALS   Previous Goals  Patient to consume % of nutritionally adequate meal trays TID, or the equivalent with supplements/snacks.   Evaluation: Met    Previous Nutrition Diagnosis  Inadequate oral intake related to increased needs protein-energy as evidenced by sub-optimal intakes (25-50%) the past 3 days, increased respiratory needs, and need for oral nutrition supplements to help pt meet needs   Evaluation:  Improving    NUTRITION DIAGNOSIS  Predicted inadequate nutrient intake (energy/protein)  related to prolonged admission, respiratory needs, low fat/muscle mass.     INTERVENTIONS  No changes    Goals  Patient to consume % of nutritionally adequate meal trays TID, or the equivalent with supplements/snacks.     Monitoring/Evaluation      Progress toward goals will be monitored and evaluated per policy.    Naheed Colon RD, LD  Pager: [phone]  Available on etrigg

## 2025-01-23 NOTE — PLAN OF CARE
Goal Outcome Evaluation:      Plan of Care Reviewed With: patient    Overall Patient Progress: improvingOverall Patient Progress: improving     Shift: [id number]    Orientation: A&O x4. Calm  Vitals/Tele: VSS on 2L NC. Tele-SB. Denies pain  IV Access/drains: PIV SL  Diet: Reg  Mobility: Ax1 GBW  GI/: Cont B/B  Wound/Skin: Blanchable redness to the sacrum,scattered bruises  Consults:Cardiology/SW  Discharge Plan: Pending TCU  Other info:BS check,O2 study done overnight.

## 2025-01-23 NOTE — PROGRESS NOTES
[facility]    Medicine Progress Note - Hospitalist Service    Date of Admission:  1/11/2025    Assessment & Plan     Hospital cumulative summary    Keith Gonzalez is a 69 year old male with PMH of remote b/l DVT (no cause found per pt), off AC, tobacco abuse, emphysema, COPD, osteoporosis, GERD and testicular cancer (remission) who was admitted as a direct admission to intensive care unit from [facility] emergency department for further treatment and evaluation of bilateral pulmonary embolism with acute cor pulmonale, new onset atrial flutter, and extensive lower extremity DVTs.  Patient was initially admitted to ICU due to the concern for possible need for intubation due to ongoing acute hypoxic and hypercapnic respiratory failure.  Patient did not require any intubation and was eventually [place] [facility] [place].  His hospitalization was complicated by persistent requirement of oxygen with HFNC, requiring anticoagulation for pulmonary embolism, tenuous cardiac status with alternating atrial flutter/A-fib and possible melena from underlying gastritis/esophagitis. Ultimately, pts oxygen requirements have improved dramatically. He is now on 2L of supplemental O2. He converted to NSR on 1/21 and has remained in sinus rhythm since that time. There has been no overt s/s of bleed after starting eliquis. Pt is now medically stable for discharge from the hospital. We are awaiting TCU placement.      Acute hypoxic and hypercapnic respiratory failure, multifactorial  Most likely secondary to combination of pulmonary embolism with cor pulmonale, possible COPD exacerbation with tenuous cardiac status with difficult to control atrial flutter/atrial fibrillation  --Manage underlying conditions, as above (see below)    Acute Submassive pulmonary embolism w cor pulmonale  Extensive bilateral lower extremity DVTs.  Hx remote DVTs, LLE Dvt 2004, RLE Dvt 2006,  off AC x yrs prior  PE w 3rd  lifetime DVT, no past cause.off warfarin for yrs (cost issue at some time)  hx Dvt x 2, 2004/2006, pt state no w/u & cause unknown to him.  -- Initially arrived to ICU on heparin and amiodarone infusion with high O2 requirements   -- Case reviewed with IR and vascular surgery, no interventions were recommended  -- Patient continued on heparin infusion  -- Patient was switched from BiPAP to high flow nasal cannula, Now subsequently weaned down to 2L of supplemental O2 and stable.   -- Evaluated by oncology, recommending lifelong anticoagulation  -- Pharmacy liaison consulted, patient agreeable with Eliquis cost  -- Cardiology switched heparin infusion to Eliquis on 01/18/2025    Acute COPD exacerbation  Healthcare acquired pneumonia:  Leukocytosis: Resolved, again elevated this morning 01/19/2025  Initially required BiPAP, then HFNC  01/16: CT chest repeated, again showing PE unchanged from previous, small to moderate bilateral effusion with associated compressive atelectasis and infiltrates was seen.  -- Patient has completed course of Azithromycin and Zosyn for treatment of CAP/aspiration pneumonia  -- Respiratory status markedly improved. Now stable on 2L of supplemental O2.   -- Continue to wean patient off O2 as needed, suspect pt may need chronic O2 given known COPD. Would titrate to spO2 goal of >89% with rest and exertion  -- Continue budesonide nebs, adding fomoterol and Incruse. Send home on this or similar regimen.   -- Continue diuresis as noted below.    -- Continue DuoNebs PRN   -- Patient treated with IV Solu-Medrol initially, now on 40mg po prednisone daily, continue continue with prednisone taper   - Prednisone 30mg 1/24-1/28, 20mg 1/29-2/2, 10mg 2/3-2/7  -- Overnight oximetry   -- On day of discharge, would perform 6MW/exertional O2 study to assess O2 needs with exertion  -- Would have follow up with gen pulmonology with full PFTs, 6MW. At this time, can assess appropriateness to transition to  handheld inhalers.   -- In setting of chronic CO2 retention, would refer to sleep medicine as outpt for potential nocturnal Bipap prescription and titration of settings   -- Tobacco cessation strongly encouraged    Paroxysmal atrial fibrillation/atrial flutter: S/p DCCV x 2 on admission, now in A-fib  Initially on amiodarone infusion in ICU, has been switched to p.o. amiodarone and p.o. metoprolol  [id number] score of 3  * 1/21 pt converted to sinus rhythm with HRs in the 60s   Acute heart failure with reduced EF  Echo 1/9/25  EF 46%, mild lat hypokinesis.  Mild RV dilated w decr RV fxn  Echo 1/16/25 EF 55% (improved), mid-distal later hypokinesia, RV decr fxn, RV dilated, Pulm HTN, full IVC   Mild biventricular cardiomyopathy.  Type II NSTEMI, secondary to supply demand ischemia in the picture of A-fib and heart failure along with PE  -- Continue Amiodarone 400 mg p.o. daily (1/21-1/27), then 200mg daily 1/28 onward. Cardiology recommending 1-3 month course.   -- Continue Eliquis indefinitely  -- Continue with lasix 20mg daily, weigh daily, call  [phone] with wt gain of 3# overnight or 5# in 1 week, or worsening peripheral edema.    -- Metoprolol tartrate 50 mg p.o. twice daily   -- BMP in 1 week after discharge  -- Cardiology f/u arranged in 1 month with ECG prior.     Possible melena 01/16, positive Hemoccult  Chronic GERD/dyspepsia  History of grade D esophagitis on EGD in 2022  Severe nausea resolved  Patient has longstanding history of dyspepsia, on PPI PTA.  Longstanding history of smoking which increases his risk of reflux esophagitis  01/16 bedside RN reported 1 melanotic dark stool. Hemoccult positive  Hemoglobin has been stable, no further signs and symptoms of melanotic stools  -- [facility] consulted 01/16. Given stability on anticogulation, no plans for inpatient EGD. Can be consider as outpatient if needed.   -- Switch to PO pantoprazole daily  -- Continue antinausea medications.    Acute kidney  injury, suspect prerenal. resolved  Hypocalcemia, resolved  Metabolic alkalosis  Last baseline PTA 1.1, Admit creatinine was up to 2.18, c/w LAI, from PE/ARF. Creatinine now improved to 0.67.   -- Continue to monitor patient BMP periodically    Leukocytosis, suspect steroid induced   WBC up to 17.6 on 1/20. Suspect related to steroids. On abx for pneumonia, no new source of infection suspected at this time   - Monitor      Transaminitis, resolved  --LFT minimally elevated alt 156, ast 59 bilirubin is 0.2--likely related to multifactorial stressors .   - No further monitoring required     Acute metabolic encephalopathy, resolved  Most likely was secondary to profound hypercapnia and respiratory acidosis, initially was a started on BiPAP. Mental status now back to baseline.     Hx of testicular cancer   -- review of record with distant history, in remission   -- Hem Onc consulted- notes labs neg for recurrent test CA Onc also recs CT chest + abd/pelvis to r/o occult malignancy recurrence  -- 1/16 s/p CT chest (for PE recheck) - w/o mass  -- Oncology has recommended CT abdomen pelvis to be done before discharge to rule out any concern for recurrence of testicular cancer. Will obtain this today 1/23.  -- CA labs neg     Hx of iron deficiency anemia   --hold ferrous sulfate   --checked Iron profile, currently no signs of Iron deficiency , Hgb stable in the 11-12 range     Osteoporosis  Hx of compression fractures    - APAP prn pain     Tobacco abuse  PTA smoking 4 cigarettes a day.  Quit x 8 months last yr.  Discussed smoking as recurrent DVT contribution risk .  Willing to quit.  No cravings now, declines need for rx now  --Smoking cessation strongly encouraged , and to lower DVT risk. He says he will stop, but unwilling to throw out cigs in jacket pocket  --declines nicotine patch as not needed  --Nicotine gum/lozenge added as needed craving  --PCP follow-up not Declines outpatient smoking cessation meds        Diet:  Regular Diet Adult  Snacks/Supplements Adult: Magic Cup; With Meals    DVT Prophylaxis: DOAC  Dunn Catheter: Not present  Lines: None     Cardiac Monitoring: ACTIVE order. Indication: Tachyarrhythmias, acute (48 hours)  Code Status: Full Code      Clinically Significant Risk Factors          # Hypochloremia: Lowest Cl = 96 mmol/L in last 2 days, will monitor as appropriate      # Hypoalbuminemia: Lowest albumin = 3 g/dL at 1/13/2025  4:07 AM, will monitor as appropriate     # Thrombocytopenia: Lowest platelets = 109 in last 2 days, will monitor for bleeding       # Acute Hypercapnic Respiratory Failure: based on arterial blood gas results.  Continue supplemental oxygen and ventilatory support as indicated.  # Acute Hypercapnic Respiratory Failure: based on venous blood gas results.  Continue supplemental oxygen and ventilatory support as indicated.             # Financial/Environmental Concerns: none  # COPD: noted on problem list        Social Drivers of Health    Food Insecurity: Unknown (1/11/2025)    Food Insecurity     Within the past 12 months, did you worry that your food would run out before you got money to buy more?: Patient unable to answer     Within the past 12 months, did the food you bought just not last and you didn t have money to get more?: Patient unable to answer   Housing Stability: Unknown (1/11/2025)    Housing Stability     Do you have housing? : Patient unable to answer     Are you worried about losing your housing?: Patient unable to answer   Tobacco Use: High Risk (1/21/2025)    Patient History     Smoking Tobacco Use: Every Day     Smokeless Tobacco Use: Never   Financial Resource Strain: Unknown (1/11/2025)    Financial Resource Strain     Within the past 12 months, have you or your family members you live with been unable to get utilities (heat, electricity) when it was really needed?: Patient unable to answer   Transportation Needs: Unknown (1/11/2025)    Transportation Needs      Within the past 12 months, has lack of transportation kept you from medical appointments, getting your medicines, non-medical meetings or appointments, work, or from getting things that you need?: Patient unable to answer   Interpersonal Safety: Unknown (1/11/2025)    Interpersonal Safety     Do you feel physically and emotionally safe where you currently live?: Patient unable to answer     Within the past 12 months, have you been hit, slapped, kicked or otherwise physically hurt by someone?: Patient unable to answer     Within the past 12 months, have you been humiliated or emotionally abused in other ways by your partner or ex-partner?: Patient unable to answer          Disposition Plan     Medically Ready for Discharge: Anticipated in 2-4 Days      Evelia Mireles MD  Hospitalist Service  [facility]  Securely message with [employer] (more info)  Text page via [facility] Paging/Directory   ______________________________________________________________________    Interval History   Pt doing well today. Now on 2L of O2 Feels fine. Converted back to sinus rhythm. No other acute issues. Eating and drinking well. Planning on getting up to walk today. Will obtain CT abdomen/pelvis today per previous oncology recommendations.       Physical Exam   Vital Signs: Temp: 97.7  F (36.5  C) Temp src: Oral BP: 139/68 Pulse: 56   Resp: 16 SpO2: 96 % O2 Device: Nasal cannula Oxygen Delivery: 2 LPM  Weight: 143 lbs 9.6 oz    Constitutional: Awake, alert, cooperative, no apparent distress.  Eyes: Conjunctiva and pupils examined and normal.  HEENT: Moist mucous membranes, normal dentition.  Respiratory: Clear to auscultation bilaterally, no crackles or wheezing.  Cardiovascular: Regular rate and rhythm, normal S1 and S2, and no murmur noted.  GI: Soft, non-distended, non-tender, normal bowel sounds.  Skin: No rashes, no cyanosis, No LE edema.   Musculoskeletal: No joint swelling, erythema or  tenderness.  Neurologic: Cranial nerves 2-12 intact, normal strength and sensation.  Psychiatric: Alert, oriented to person, place and time, no obvious anxiety or depression.      Medical Decision Making       52 MINUTES SPENT BY ME on the date of service doing chart review, history, exam, documentation & further activities per the note.      Data     I have personally reviewed the following data over the past 24 hrs:    13.6 (H)  \   11.9 (L)   / 129 (L)     139 98 25.3 (H) /  92   4.8 36 (H) 0.67 \       Imaging results reviewed over the past 24 hrs:   Recent Results (from the past 24 hours)   Echocardiogram Limited   Result Value    LVEF  55-60%    Narrative    [id number]  [id number]  [id number]  [id number]^CHRISTIAN^DALLIN^PAULETTE LLOYD     [facility]  Echocardiography Laboratory  [address] [place] [address]     Name: PUNEET MARTINEZ  MRN: [id number]  : 1955  Study Date: 2025 01:42 PM  Age: 69 yrs  Gender: Male  Patient Location: [facility]  Reason For Study: Heart Failure  Ordering Physician: DALLIN GONZALES  Referring Physician: ALINE SPENCE  Performed By: Jeet Guerrero     BSA: 1.8 m2  Height: 70 in  Weight: 143 lb  HR: 59  BP: 104/89 mmHg  ______________________________________________________________________________  Procedure  Limited Echocardiogram with two-dimensional, color and spectral Doppler.  ______________________________________________________________________________  Interpretation Summary     The visual ejection fraction is 55-60%.  Normal left ventricular wall motion  Moderate (46-55mmHg) pulmonary hypertension is present.  Contrast was used without apparent complications. Compared to prior study,  changes are noted.  ______________________________________________________________________________  Left Ventricle  The left ventricle is normal in size. There is normal left ventricular wall  thickness. The visual ejection fraction is 55-60%. Normal left  ventricular  wall motion.     Right Ventricle  The right ventricle is mildly dilated. The right ventricular systolic function  is normal.     Tricuspid Valve  Normal tricuspid valve. There is mild (1+) tricuspid regurgitation. Moderate  (46-55mmHg) pulmonary hypertension is present.     Pulmonic Valve  Normal pulmonic valve. There is trace pulmonic valvular regurgitation.     Vessels  The aortic root is normal size. Dilation of the inferior vena cava is present  with abnormal respiratory variation in diameter.     Pericardium  There is no pericardial effusion.     Rhythm  Sinus rhythm was noted.  ______________________________________________________________________________  MMode/2D Measurements & Calculations  IVSd: 0.97 cm  LVIDd: 4.8 cm  LVIDs: 2.8 cm  LVPWd: 1.0 cm  LVPWs: 0.56 cm  FS: 41.9 %  LV mass(C)d: 170.8 grams  LV mass(C)dI: 94.3 grams/m2  EF Biplane: 58.6 %  RWT: 0.42  TAPSE: 3.3 cm     Doppler Measurements & Calculations  TV V2 max: 324.0 cm/sec  TV max P.0 mmHg     TR max lui: 311.5 cm/sec  TR max P.9 mmHg     ______________________________________________________________________________  Report approved by: Patrick Adame MD on 2025 03:20 PM           Recent Labs   Lab 25  0551 25  0142 25  2125 25  0637 25  0556 25  0753 25  0423 25  0809 25  0713 25  0752 25  0719 25  0719 25  0542   WBC 13.6*  --   --   --  10.9  --  13.1*   < > 15.3*  --  10.9  --  10.8   HGB 11.9*  --   --   --  11.0*  --  12.1*   < > 13.4  --  12.5*  --  11.2*   MCV 87  --   --   --  87  --  87   < > 84  --  85  --  85   *  --   --   --  109*  --  124*   < > 141*  --  168  --  158   INR  --   --   --   --   --   --   --   --  1.61*  --  1.25*  --  1.32*     --   --   --  141  --  143   < > 143  --  142  --  143   POTASSIUM 4.8  --   --   --  4.0  --  3.6   < > 3.3*   < > 3.1*  --  3.7   CHLORIDE 98  --   --   --  96*  --  94*    < > 95*  --  95*  --  97*   CO2 36*  --   --   --  45*  --  47*   < > 40*  --  41*  --  37*   BUN 25.3*  --   --   --  29.5*  --  34.5*   < > 35.0*  --  40.9*  --  41.4*   CR 0.67  --   --   --  0.75  --  0.85   < > 0.84  --  0.97  --  1.04   ANIONGAP 5*  --   --   --  <1*  --  2*   < > 8  --  6*  --  9   DAT 8.9  --   --   --  8.7*  --  8.4*   < > 8.6*  --  8.5*  --  8.5*   GLC 92 154* 122*   < > 101*   < > 85   < > 120*   < > 138*   < > 139*    < > = values in this interval not displayed.

## 2025-01-23 NOTE — PROGRESS NOTES
[facility]  Cardiology Progress Note  Date of Service: 01/23/2025  Primary Cardiologist: Dr. Cesar    Assessment & Plan    Keith Gonzalez is a 69 year old male with past medical history significant for emphysema, COPD, history of DVT, GERD, testicular cancer remission admitted on 1/11/2025 with bilateral PE, cor pulmonale, new onset atrial flutter and extensive lower extremity DVTs.    Assessment:  Paroxysmal Atrial fibrillation/flutter-- s/p dccv x2 on admission for flutter  - diltiazem discontinued on 1/16 and on PO amio loading  -CHADSVASC score of 3 - heme recommended lifelong DOAC  - converted to sinus rhythm spontaneously on amio and bb, HR in the 50s now, no pauses     Mild BiV Cardiomyopathy   - LVEF 46%; mild RV dysfunction and dilation with echo done while in afib with rvr, repeat echo 1/22/24 in sinus rhythm showed normal EF with elevated pulm pressures at ~50 consistent with both longstanding pulm diease and or acute PE  - initiated on BB   - admit weight 148 pounds, current weight 143, dry wt 143- no more edema     Submassive PE with brendan DVT  - IR recommended no intervention  - on Eliquis      COPD exacerbation with cor pulmonale, cor pulmonale resolving      Acute hypoxic and hypercapnic Resp Failure secondary to PE and COPD, still requiring O2 but demand down from peak of 45 lpm high flow, now down to 2 lpm     LAI, resolved     Active tobacco dependence, complete cessation recommended      Hx of testicular cancer    Plan:   Decrease metoprolol to 50 mg bid   Continue amiodarone at 200 mg daily, given underlying lung dz, plan is for this to be short term as PE likely triggered afib.   Would consider discontinuation at 1 month clinic visit if maintaining sinus rhythm  Continue eliquis long term regardless or rhythm given hx of recurrent dvt and PE  Lasix 20 mg daily starting today, weigh daily, call  [phone] with wt gain of 3# overnight or 5# in 1 week, or worsening  peripheral edema.    BMP in 1 week.    Cardiology f/u arranged in 1 month with ECG prior.    Cardiology will sign off, please call with questions or concerns.      Brenna Dow PA-C  [facility]  Pager: [phone]     Interval History   Feels ok, O2 demands down.  Denies sob or cp.      [facility] [place]    Physical Exam   Temp: 97.7  F (36.5  C) Temp src: Oral BP: 122/74 Pulse: 55   Resp: 16 SpO2: 96 % O2 Device: Nasal cannula Oxygen Delivery: 2 LPM  Vitals:    01/21/25 0500 01/22/25 0400 01/23/25 0500   Weight: 65.2 kg (143 lb 11.2 oz) 64.6 kg (142 lb 6.4 oz) 65.1 kg (143 lb 9.6 oz)     Well-developed well-nourished gentleman who is in no acute distress on oxygen by nasal cannula.  Heart is RRR without murmur rub or gallop.  Lungs clear on rt, diminished on L.  Edema resolved.        Medications   Current Facility-Administered Medications   Medication Dose Route Frequency Provider Last Rate Last Admin    Patient is already receiving anticoagulation with heparin, enoxaparin (LOVENOX), warfarin (COUMADIN)  or other anticoagulant medication   Does not apply Continuous PRN Shila Israel MD        Patient may continue current oral medications   Does not apply Continuous PRN Shila Israel MD         Current Facility-Administered Medications   Medication Dose Route Frequency Provider Last Rate Last Admin    [START ON 1/28/2025] amiodarone (PACERONE) tablet 200 mg  200 mg Oral Daily Brenna Dow PA-C        amiodarone (PACERONE) tablet 400 mg  400 mg Oral Daily Brenna Dow PA-C   400 mg at 01/23/25 0938    apixaban ANTICOAGULANT (ELIQUIS) tablet 10 mg  10 mg Oral BID Dave Glover MD   10 mg at 01/23/25 0826    Followed by    [START ON 1/25/2025] apixaban ANTICOAGULANT (ELIQUIS) tablet 5 mg  5 mg Oral BID Dave Glover MD        budesonide (PULMICORT) neb solution 0.5 mg  0.5 mg Nebulization BID Doyle Salazar MD   0.5 mg at 01/23/25 0840    formoterol (PERFOROMIST) neb  solution 20 mcg  20 mcg Nebulization Q12H Polo Purcell MD   20 mcg at 01/23/25 0840    furosemide (LASIX) tablet 20 mg  20 mg Oral Daily Brenna Dow PA-C        insulin aspart (NovoLOG) injection (RAPID ACTING)  1-10 Units Subcutaneous TID AC Shila Israel MD   1 Units at 01/22/25 1619    insulin aspart (NovoLOG) injection (RAPID ACTING)  1-7 Units Subcutaneous At Bedtime Shila Israel MD        ipratropium - albuterol 0.5 mg/2.5 mg/3 mL (DUONEB) neb solution 3 mL  3 mL Nebulization BID Shila Israel MD   3 mL at 01/23/25 0840    magnesium oxide (MAG-OX) tablet 400 mg  400 mg Oral Q4H Kassidy Vasquez MD   400 mg at 01/23/25 0826    metoprolol tartrate (LOPRESSOR) tablet 50 mg  50 mg Oral BID Paloma, Brenna Peterson PA-C        pantoprazole (PROTONIX) EC tablet 40 mg  40 mg Oral QAM AC Evelia Mireles MD   40 mg at 01/23/25 0611    [START ON 1/24/2025] predniSONE (DELTASONE) tablet 30 mg  30 mg Oral Daily Polo Purcell MD        Followed by    [START ON 1/29/2025] predniSONE (DELTASONE) tablet 20 mg  20 mg Oral Daily Polo Purcell MD        Followed by    [START ON 2/3/2025] predniSONE (DELTASONE) tablet 10 mg  10 mg Oral Daily Polo Purcell MD        umeclidinium (INCRUSE ELLIPTA) 62.5 MCG/ACT inhaler 1 puff  1 puff Inhalation Daily Polo Purcell MD   1 puff at 01/23/25 0938       Data   Last 24 hours labs reviewed     Tele: sinus    Echo:   1. The left ventricle is normal in size. Biplane LVEF is 46%. Mild lateral  hypokinesis.  2. The right ventricle is mildly dilated. The right ventricular systolic  function is mild to moderately reduced.  3. No valve disease.  4. Mild TR. RVSP 30mmHg.

## 2025-01-24 ENCOUNTER — APPOINTMENT (OUTPATIENT)
Dept: OCCUPATIONAL THERAPY | Facility: CLINIC | Age: 70
DRG: 175 | End: 2025-01-24
Attending: INTERNAL MEDICINE
Payer: COMMERCIAL

## 2025-01-24 ENCOUNTER — APPOINTMENT (OUTPATIENT)
Dept: PHYSICAL THERAPY | Facility: CLINIC | Age: 70
DRG: 175 | End: 2025-01-24
Attending: INTERNAL MEDICINE
Payer: COMMERCIAL

## 2025-01-24 ENCOUNTER — LAB REQUISITION (OUTPATIENT)
Dept: LAB | Facility: CLINIC | Age: 70
End: 2025-01-24

## 2025-01-24 VITALS
SYSTOLIC BLOOD PRESSURE: 127 MMHG | WEIGHT: 143.6 LBS | HEART RATE: 55 BPM | TEMPERATURE: 97.9 F | BODY MASS INDEX: 20.6 KG/M2 | DIASTOLIC BLOOD PRESSURE: 69 MMHG | RESPIRATION RATE: 20 BRPM | OXYGEN SATURATION: 95 %

## 2025-01-24 DIAGNOSIS — Z11.1 ENCOUNTER FOR SCREENING FOR RESPIRATORY TUBERCULOSIS: ICD-10-CM

## 2025-01-24 LAB
ANION GAP SERPL CALCULATED.3IONS-SCNC: 3 MMOL/L (ref 7–15)
BUN SERPL-MCNC: 25.8 MG/DL (ref 8–23)
CALCIUM SERPL-MCNC: 8.7 MG/DL (ref 8.8–10.4)
CHLORIDE SERPL-SCNC: 98 MMOL/L (ref 98–107)
CREAT SERPL-MCNC: 0.63 MG/DL (ref 0.67–1.17)
EGFRCR SERPLBLD CKD-EPI 2021: >90 ML/MIN/1.73M2
ERYTHROCYTE [DISTWIDTH] IN BLOOD BY AUTOMATED COUNT: 15 % (ref 10–15)
GLUCOSE BLDC GLUCOMTR-MCNC: 100 MG/DL (ref 70–99)
GLUCOSE BLDC GLUCOMTR-MCNC: 84 MG/DL (ref 70–99)
GLUCOSE SERPL-MCNC: 79 MG/DL (ref 70–99)
HCO3 SERPL-SCNC: 34 MMOL/L (ref 22–29)
HCT VFR BLD AUTO: 36.6 % (ref 40–53)
HGB BLD-MCNC: 11.4 G/DL (ref 13.3–17.7)
MAGNESIUM SERPL-MCNC: 1.9 MG/DL (ref 1.7–2.3)
MCH RBC QN AUTO: 27.1 PG (ref 26.5–33)
MCHC RBC AUTO-ENTMCNC: 31.1 G/DL (ref 31.5–36.5)
MCV RBC AUTO: 87 FL (ref 78–100)
PHOSPHATE SERPL-MCNC: 2.9 MG/DL (ref 2.5–4.5)
PLATELET # BLD AUTO: 125 10E3/UL (ref 150–450)
POTASSIUM SERPL-SCNC: 4.7 MMOL/L (ref 3.4–5.3)
RBC # BLD AUTO: 4.21 10E6/UL (ref 4.4–5.9)
SODIUM SERPL-SCNC: 135 MMOL/L (ref 135–145)
WBC # BLD AUTO: 12.6 10E3/UL (ref 4–11)

## 2025-01-24 PROCEDURE — 36415 COLL VENOUS BLD VENIPUNCTURE: CPT | Performed by: INTERNAL MEDICINE

## 2025-01-24 PROCEDURE — 83735 ASSAY OF MAGNESIUM: CPT | Performed by: INTERNAL MEDICINE

## 2025-01-24 PROCEDURE — 97140 MANUAL THERAPY 1/> REGIONS: CPT | Mod: GP

## 2025-01-24 PROCEDURE — 94640 AIRWAY INHALATION TREATMENT: CPT

## 2025-01-24 PROCEDURE — 97530 THERAPEUTIC ACTIVITIES: CPT | Mod: GO | Performed by: OCCUPATIONAL THERAPIST

## 2025-01-24 PROCEDURE — 97535 SELF CARE MNGMENT TRAINING: CPT | Mod: GO | Performed by: OCCUPATIONAL THERAPIST

## 2025-01-24 PROCEDURE — 99239 HOSP IP/OBS DSCHRG MGMT >30: CPT | Performed by: STUDENT IN AN ORGANIZED HEALTH CARE EDUCATION/TRAINING PROGRAM

## 2025-01-24 PROCEDURE — 250N000013 HC RX MED GY IP 250 OP 250 PS 637: Performed by: PHYSICIAN ASSISTANT

## 2025-01-24 PROCEDURE — 85041 AUTOMATED RBC COUNT: CPT | Performed by: INTERNAL MEDICINE

## 2025-01-24 PROCEDURE — 84100 ASSAY OF PHOSPHORUS: CPT | Performed by: INTERNAL MEDICINE

## 2025-01-24 PROCEDURE — 250N000009 HC RX 250: Performed by: STUDENT IN AN ORGANIZED HEALTH CARE EDUCATION/TRAINING PROGRAM

## 2025-01-24 PROCEDURE — 250N000013 HC RX MED GY IP 250 OP 250 PS 637: Performed by: INTERNAL MEDICINE

## 2025-01-24 PROCEDURE — 250N000012 HC RX MED GY IP 250 OP 636 PS 637: Performed by: INTERNAL MEDICINE

## 2025-01-24 PROCEDURE — 999N000157 HC STATISTIC RCP TIME EA 10 MIN

## 2025-01-24 PROCEDURE — 250N000013 HC RX MED GY IP 250 OP 250 PS 637: Performed by: STUDENT IN AN ORGANIZED HEALTH CARE EDUCATION/TRAINING PROGRAM

## 2025-01-24 PROCEDURE — 250N000009 HC RX 250: Performed by: INTERNAL MEDICINE

## 2025-01-24 PROCEDURE — 84132 ASSAY OF SERUM POTASSIUM: CPT | Performed by: INTERNAL MEDICINE

## 2025-01-24 RX ORDER — FUROSEMIDE 20 MG/1
20 TABLET ORAL DAILY
DISCHARGE
Start: 2025-01-24

## 2025-01-24 RX ORDER — PANTOPRAZOLE SODIUM 40 MG/1
40 TABLET, DELAYED RELEASE ORAL
DISCHARGE
Start: 2025-01-25 | End: 2025-02-03

## 2025-01-24 RX ORDER — CARBOXYMETHYLCELLULOSE SODIUM 5 MG/ML
1 SOLUTION/ DROPS OPHTHALMIC
DISCHARGE
Start: 2025-01-24

## 2025-01-24 RX ORDER — AMIODARONE HYDROCHLORIDE 200 MG/1
200 TABLET ORAL DAILY
DISCHARGE
Start: 2025-01-28

## 2025-01-24 RX ORDER — ACETAMINOPHEN 325 MG/1
650 TABLET ORAL EVERY 4 HOURS PRN
DISCHARGE
Start: 2025-01-24

## 2025-01-24 RX ORDER — PREDNISONE 10 MG/1
TABLET ORAL
Qty: 27 TABLET | Refills: 0 | Status: SHIPPED | OUTPATIENT
Start: 2025-01-25 | End: 2025-02-08

## 2025-01-24 RX ORDER — BUDESONIDE 0.5 MG/2ML
0.5 INHALANT ORAL 2 TIMES DAILY
DISCHARGE
Start: 2025-01-24

## 2025-01-24 RX ORDER — FERROUS SULFATE 325(65) MG
325 TABLET ORAL
DISCHARGE
Start: 2025-01-24

## 2025-01-24 RX ORDER — METOPROLOL TARTRATE 25 MG/1
25 TABLET, FILM COATED ORAL 2 TIMES DAILY
DISCHARGE
Start: 2025-01-24

## 2025-01-24 RX ORDER — METFORMIN HYDROCHLORIDE 500 MG/1
500 TABLET, EXTENDED RELEASE ORAL
DISCHARGE
Start: 2025-01-24

## 2025-01-24 RX ORDER — CYCLOBENZAPRINE HCL 5 MG
5 TABLET ORAL 2 TIMES DAILY PRN
DISCHARGE
Start: 2025-01-24

## 2025-01-24 RX ORDER — FORMOTEROL FUMARATE 20 UG/2ML
20 SOLUTION RESPIRATORY (INHALATION) EVERY 12 HOURS
DISCHARGE
Start: 2025-01-24

## 2025-01-24 RX ORDER — AMIODARONE HYDROCHLORIDE 400 MG/1
400 TABLET ORAL DAILY
DISCHARGE
Start: 2025-01-25 | End: 2025-01-27

## 2025-01-24 RX ORDER — METOPROLOL TARTRATE 50 MG
50 TABLET ORAL 2 TIMES DAILY
DISCHARGE
Start: 2025-01-24 | End: 2025-01-24

## 2025-01-24 RX ORDER — ALENDRONATE SODIUM 70 MG/1
70 TABLET ORAL
DISCHARGE
Start: 2025-01-24

## 2025-01-24 RX ORDER — IPRATROPIUM BROMIDE AND ALBUTEROL SULFATE 2.5; .5 MG/3ML; MG/3ML
3 SOLUTION RESPIRATORY (INHALATION) EVERY 4 HOURS PRN
DISCHARGE
Start: 2025-01-24

## 2025-01-24 RX ADMIN — PANTOPRAZOLE SODIUM 40 MG: 40 TABLET, DELAYED RELEASE ORAL at 06:39

## 2025-01-24 RX ADMIN — AMIODARONE HYDROCHLORIDE 400 MG: 200 TABLET ORAL at 08:50

## 2025-01-24 RX ADMIN — UMECLIDINIUM 1 PUFF: 62.5 AEROSOL, POWDER ORAL at 08:54

## 2025-01-24 RX ADMIN — FUROSEMIDE 20 MG: 20 TABLET ORAL at 08:50

## 2025-01-24 RX ADMIN — APIXABAN 10 MG: 5 TABLET, FILM COATED ORAL at 08:50

## 2025-01-24 RX ADMIN — PREDNISONE 30 MG: 10 TABLET ORAL at 08:50

## 2025-01-24 RX ADMIN — FORMOTEROL FUMARATE DIHYDRATE 20 MCG: 20 SOLUTION RESPIRATORY (INHALATION) at 07:31

## 2025-01-24 RX ADMIN — BUDESONIDE 0.5 MG: 0.5 INHALANT RESPIRATORY (INHALATION) at 07:31

## 2025-01-24 ASSESSMENT — ACTIVITIES OF DAILY LIVING (ADL)
ADLS_ACUITY_SCORE: 71
ADLS_ACUITY_SCORE: 66
ADLS_ACUITY_SCORE: 71
ADLS_ACUITY_SCORE: 67
ADLS_ACUITY_SCORE: 71

## 2025-01-24 NOTE — DISCHARGE SUMMARY
[facility]  Hospitalist Discharge Summary      Date of Admission:  1/11/2025  Date of Discharge:  1/24/2025  Discharging Provider: Evelia Mireles MD  Discharge Service: Hospitalist Service    Discharge Diagnoses   Acute hypoxic and hypercapnic respiratory failure, multifactorial   Acute Submassive pulmonary embolism w cor pulmonale  Extensive bilateral lower extremity DVTs  Hx remote DVTs, LLE Dvt 2004, RLE Dvt 2006,  off AC x yrs prior  Acute COPD exacerbation  Healthcare acquired pneumonia  Leukocytosis: Resolved, again elevated this morning 01/19/2025  Paroxysmal atrial fibrillation/atrial flutter   Acute heart failure with reduced EF   Mild biventricular cardiomyopathy  Type II NSTEMI   Possible melena 01/16, positive Hemoccult  Chronic GERD/dyspepsia  History of grade D esophagitis on EGD in 2022  Severe nausea resolved  Acute kidney injury, suspect prerenal. resolved  Hypocalcemia, resolved  Metabolic alkalosis  Leukocytosis, suspect steroid induced   Transaminitis, resolved   Acute metabolic encephalopathy, resolved   Hx of testicular cancer   Hx of iron deficiency anemia   Osteoporosis  Hx of compression fractures   Tobacco abuse     Clinically Significant Risk Factors     # Moderate Malnutrition: based on nutrition assessment      Follow-ups Needed After Discharge   Follow-up with TCU physician: Recommend BMP and CBC in one week   Follow-up with Cardiology in 1 month with ECG prior   Follow-up with Pulmonary and sleep medicine. Referrals placed     Discharge Disposition   Discharged to short-term care facility  Condition at discharge: Stable    Hospital Course   Hospital cumulative summary    Keith Gonzalez is a 69 year old male with PMH of remote b/l DVT (no cause found per pt), off AC, tobacco abuse, emphysema, COPD, osteoporosis, GERD and testicular cancer (remission) who was admitted as a direct admission to intensive care unit from [place] emergency department for  further treatment and evaluation of bilateral pulmonary embolism with acute cor pulmonale, new onset atrial flutter, and extensive lower extremity DVTs.  Patient was initially admitted to ICU due to the concern for possible need for intubation due to ongoing acute hypoxic and hypercapnic respiratory failure.  Patient did not require any intubation and was eventually transferred to [facility] for further management.  His hospitalization was complicated by persistent hypoxemia, tenuous cardiac status with alternating atrial flutter/A-fib and possible melena from underlying gastritis/esophagitis. Ultimately, pts oxygen requirements have improved dramatically. He is now on 2L of supplemental O2. He converted to NSR on 1/21 and has remained in sinus rhythm since that time. There has been no overt s/s of bleed after starting eliquis. Pt is now medically stable for discharge from the hospital to TCU.     Acute hypoxic and hypercapnic respiratory failure, multifactorial, improved  Most likely secondary to combination of pulmonary embolism with cor pulmonale, possible COPD exacerbation with tenuous cardiac status with difficult to control atrial flutter/atrial fibrillation  --Manage underlying conditions, (see below)    Acute Submassive pulmonary embolism w cor pulmonale  Extensive bilateral lower extremity DVTs.  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  * Initially arrived to ICU on heparin and amiodarone infusion with high O2 requirements. Case reviewed with IR and vascular surgery, no interventions were recommended  * Pt ultimately able to wean from BiPAP/HFNC to 2L of supplemental O2 by nasal cannula  - Pt will require lifelong anticoagulation for 3rd lifetime VTE event. He has been started on Eliquis (he will have 1 more 10mg dose this evening -1/24, then  starting 1/25, he will switch to 5mg BID indefinitely)     Acute COPD exacerbation  Healthcare acquired pneumonia:  Leukocytosis: Resolved, again elevated this morning 01/19/2025  Initially required BiPAP, then HFNC  01/16: CT chest showing PE, small to moderate bilateral effusion with associated compressive atelectasis and infiltrates was seen.  * Patient did complete course of Azithromycin and Zosyn for treatment of CAP/aspiration pneumonia  * Respiratory status markedly improved. Now stable on 2L of supplemental O2.   -- Continue to wean patient off O2 as needed, suspect pt may need chronic O2 given known COPD. Would titrate to spO2 goal of >89% with rest and exertion  -- Continue budesonide nebs, fomoterol and Incruse.    -- Continue DuoNebs PRN   -- Continue prednisone taper   - Prednisone 30mg 1/24-1/28, 20mg 1/29-2/2, 10mg 2/3-2/7, then off.   -- Follow up with gen pulmonology (referral placed) with full PFTs, 6MW. At this time, can assess appropriateness to transition to handheld inhalers.   -- In setting of chronic CO2 retention, will refer to sleep medicine (referral placed) as outpt for potential nocturnal Bipap prescription and titration of settings   -- Tobacco cessation strongly encouraged    Paroxysmal atrial fibrillation/atrial flutter: S/p DCCV x 2 on admission, remained in A fib  Initially on amiodarone infusion in ICU, has been switched to p.o. amiodarone and p.o. metoprolol  [id number]-SLCt score of 3  * 1/21 pt converted to sinus rhythm with HRs in the 50s-60s   Acute heart failure with reduced EF  Mild biventricular cardiomyopathy.  Echo 1/9/25  EF 46%, mild lat hypokinesis.  Mild RV dilated w decr RV fxn  Echo 1/16/25 EF 55% (improved), mid-distal later hypokinesia, RV decr fxn, RV dilated, Pulm HTN, full IVC   Type II NSTEMI, secondary to supply demand ischemia in the picture of A-fib and heart failure along with PE  -- Continue Amiodarone 400 mg p.o. daily (1/21-1/27), then 200mg daily 1/28  onward. Cardiology recommending 1-3 month course.   -- Continue Eliquis indefinitely  -- Continue with lasix 20mg daily, weigh daily, call  [phone] with wt gain of 3# overnight or 5# in 1 week, or worsening peripheral edema.    -- Metoprolol tartrate 25 mg p.o. twice daily (hold for HR <55)  -- BMP in 1 week after discharge  -- Cardiology f/u arranged in 1 month with ECG prior.     Possible melena 01/16, positive Hemoccult  Chronic GERD/dyspepsia  History of grade D esophagitis on EGD in 2022  Severe nausea resolved  Patient has longstanding history of dyspepsia, on PPI PTA.  Longstanding history of smoking which increases his risk of reflux esophagitis  01/16 bedside RN reported 1 melanotic dark stool. Hemoccult positive  Hemoglobin has been stable, no further signs and symptoms of melanotic stools  -- [facility] consulted 01/16. Given stability on anticogulation, no plans for inpatient EGD. Can be consider as outpatient if needed.   -- Continue PO pantoprazole daily    Acute kidney injury, suspect prerenal. resolved  Hypocalcemia, resolved  Metabolic alkalosis  Last baseline PTA 1.1, Admit creatinine was up to 2.18, c/w LAI, from PE/ARF. Creatinine now improved to 0.67.   -- Continue to monitor patient BMP periodically    Leukocytosis, suspect steroid induced   WBC up to 17.6 on 1/20. Suspect related to steroids. On abx for pneumonia, no new source of infection suspected at this time   - Monitor      Transaminitis, resolved  --LFT minimally elevated alt 156, ast 59 bilirubin is 0.2--likely related to multifactorial stressors .   - No further monitoring required     Acute metabolic encephalopathy, resolved  Most likely was secondary to profound hypercapnia and respiratory acidosis, initially was a started on BiPAP. Mental status now back to baseline.     Hx of testicular cancer    * review of record with distant history, in remission   * Hem Onc consulted- notes labs neg for recurrent test CA Onc also recs CT  chest + abd/pelvis to r/o occult malignancy recurrence  * 1/16 s/p CT chest (for PE recheck) - w/o mass  * 1/24 s/p CT abdomen/pelvis - no active malignancy in the abdomen or pelvis     Infrarenal abdominal aortic aneurysm   CT on 1/24 showed mildly aneurysmal infrarenal abdominal aorta measuring 3.6 x 3.8 cm. Please see follow-up guidelines.   - Aorta size: 3.0 cm to 3.9 cm: Surveillance imaging at 3-year intervals. Defer surveillance to PCP.      Hx of iron deficiency anemia   --hold ferrous sulfate   --checked Iron profile, currently no signs of Iron deficiency , Hgb stable in the 11-12 range     Osteoporosis  Hx of compression fractures    - APAP prn pain     Tobacco abuse  PTA smoking 4 cigarettes a day.  Quit x 8 months last yr.  Discussed smoking as recurrent DVT contribution risk .  Willing to quit.  No cravings now, declines need for rx now  --Smoking cessation strongly encouraged , and to lower DVT risk. He says he will stop, but unwilling to throw out cigs in jacket pocket  --declines nicotine patch as not needed  --Nicotine gum/lozenge added as needed craving  --PCP follow-up not Declines outpatient smoking cessation meds    Consultations This Hospital Stay   PHYSICAL THERAPY ADULT IP CONSULT  OCCUPATIONAL THERAPY ADULT IP CONSULT  VASCULAR SURGERY IP CONSULT  CARDIOLOGY IP CONSULT  PHARMACY IP CONSULT  INTERVENTIONAL RADIOLOGY ADULT/PEDS IP CONSULT  INTENSIVIST IP CONSULT  RESPIRATORY CARE IP CONSULT  NEPHROLOGY IP CONSULT  CARE MANAGEMENT / SOCIAL WORK IP CONSULT  PHARMACY LIAISON FOR MEDICATION COVERAGE CONSULT  HEMATOLOGY & ONCOLOGY IP CONSULT  CARE MANAGEMENT / SOCIAL WORK IP CONSULT  PULMONARY IP CONSULT  PHARMACY LIAISON FOR MEDICATION COVERAGE CONSULT  GASTROENTEROLOGY IP CONSULT  LYMPHEDEMA THERAPY IP CONSULT  SMOKING CESSATION PROGRAM IP CONSULT  UROLOGY IP CONSULT    Code Status   Full Code    Time Spent on this Encounter   IEvelia MD, personally saw the patient today and spent  greater than 30 minutes discharging this patient.       Evelia Mireles MD  [facility]  [address] [place] [address]  Phone: [phone]  ______________________________________________________________________    Physical Exam   Vital Signs: Temp: 98.1  F (36.7  C) Temp src: Oral BP: (!) 151/69 Pulse: 59   Resp: 20 SpO2: 92 % O2 Device: Nasal cannula Oxygen Delivery: 2 LPM  Weight: 143 lbs 9.6 oz  Constitutional: Awake, alert, cooperative, no apparent distress.  Eyes: Conjunctiva and pupils examined and normal.  HEENT: Moist mucous membranes, normal dentition.  Respiratory: Clear to auscultation bilaterally, no crackles or wheezing.  Cardiovascular: Regular rate and rhythm, normal S1 and S2, and no murmur noted.  GI: Soft, non-distended, non-tender, normal bowel sounds.  Skin: No rashes, no cyanosis, No LE edema.   Musculoskeletal: No joint swelling, erythema or tenderness.  Neurologic: Cranial nerves 2-12 intact, normal strength and sensation.  Psychiatric: Alert, oriented to person, place and time, no obvious anxiety or depression.    Primary Care Physician   [facility]    Discharge Orders      Primary Care - Care Coordination Referral      Follow-Up with Cardiology JIMMY      EKG 12-lead complete w/read  (to be scheduled)       Significant Results and Procedures   Most Recent 3 CBC's:  Recent Labs   Lab Test 01/24/25  0644 01/23/25  0551 01/22/25  0556   WBC 12.6* 13.6* 10.9   HGB 11.4* 11.9* 11.0*   MCV 87 87 87   * 129* 109*     Most Recent 3 BMP's:  Recent Labs   Lab Test 01/24/25  0714 01/24/25  0644 01/23/25  2105 01/23/25  1156 01/23/25  0551 01/22/25  0637 01/22/25  0556   NA  --  135  --   --  139  --  141   POTASSIUM  --  4.7  --   --  4.8  --  4.0   CHLORIDE  --  98  --   --  98  --  96*   CO2  --  34*  --   --  36*  --  45*   BUN  --  25.8*  --   --  25.3*  --  29.5*   CR  --  0.63*  --   --  0.67  --  0.75   ANIONGAP  --  3*  --   --  5*  --   <1*   DAT  --  8.7*  --   --  8.9  --  8.7*   GLC 84 79 117*   < > 92   < > 101*    < > = values in this interval not displayed.   ,   Results for orders placed or performed during the hospital encounter of 01/11/25   XR Chest Port 1 View    Narrative    EXAM: XR CHEST PORT 1 VIEW  LOCATION: [facility]  DATE: 1/11/2025    INDICATION: acute hypercapnia  COMPARISON: 7/31/2023      Impression    IMPRESSION: Heart size is enlarged. Moderate pulmonary edema pattern has developed in the interval. Dense retrocardiac consolidation may represent superimposed pneumonia or aspiration versus confluent edema. Small to moderate bilateral pleural effusions.   No pneumothorax.   XR Chest Port 1 View    Narrative    EXAM: XR CHEST PORT 1 VIEW  LOCATION: [facility]  DATE: 1/12/2025    INDICATION: hypoxemia  COMPARISON: 1/11/2025      Impression    IMPRESSION: Stable cardiomegaly with pulmonary vascular congestion, interstitial pulmonary edema and small bilateral pleural effusions. No large confluent airspace opacities or pneumothorax.   XR Chest Port 1 View    Narrative    EXAM: XR CHEST PORT 1 VIEW  LOCATION: [facility]  DATE: 1/15/2025    INDICATION: ARF on HF o2, r o interval increased in infiltrate  COMPARISON: 01/12/2025      Impression    IMPRESSION: Small right and moderate left pleural effusions have increased in size from previous. Increased consolidation/atelectasis left lower lobe. Pulmonary vascular congestion noted.   XR Abdomen Port 1 View    Narrative    EXAM: XR ABDOMEN PORT 1 VIEW  LOCATION: [facility]  DATE: 1/15/2025    INDICATION: n v, r o new SBO sign  COMPARISON: None.      Impression    IMPRESSION: Negative abdomen. Bowel gas pattern is normal. Nothing for obstruction or free air. No evidence for renal stones.   CT Chest Pulmonary Embolism w Contrast     Value    Radiologist flags Pulmonary embolism. (AA)     Narrative    EXAM: CT CHEST PULMONARY EMBOLISM WITH CONTRAST  LOCATION: [facility]  DATE: 01/16/2025    INDICATION: Evaluate pulmonary embolism; Male sex; No prior imaging in the last 24 hours; Pulmonary Embolism Rule Out Criteria (PERC) score > 0; Revised [place] Score (RGS) not >= 11; No D dimer result available; D dimer not ordered.  COMPARISON: None.  TECHNIQUE: CT chest pulmonary angiogram during arterial phase injection of IV contrast. Multiplanar reformats and MIP reconstructions were performed. Dose reduction techniques were used.   CONTRAST: 63 mL Isovue 370.    FINDINGS:  ANGIOGRAM CHEST: Positive study for pulmonary emboli to the anterior right lower lobe fourth order branch. Overall clot burden is low. Thoracic aorta is negative for dissection. Right heart is enlarged, of uncertain chronicity.    LUNGS AND PLEURA: Small to moderate bilateral effusions and associated compressive atelectasis versus less likely infiltrate. Areas of emphysema evident.    MEDIASTINUM/AXILLAE: No discrete adenopathy. Normal caliber thoracic aorta.    CORONARY ARTERY CALCIFICATION: Mild.    UPPER ABDOMEN: No acute findings.    MUSCULOSKELETAL: No frankly destructive bony lesions.      Impression    IMPRESSION:  1.  Positive study for pulmonary emboli to the anterior right lower lobe unchanged from previous.  2.  Small to moderate bilateral effusions with associated compressive atelectasis and/or infiltrate.      [Critical Result: Pulmonary embolism.]    Finding was identified on 01/16/2025, 12:06 PM CST.     1.  Mrs. Salas the nurse for Dr. Salazar was contacted by me on 01/16/2025, 12:09 PM CST and verbalized understanding of the critical result.      CT Abdomen Pelvis w Contrast    Narrative    EXAM: CT ABDOMEN PELVIS W CONTRAST  LOCATION: [facility]  DATE: 1/23/2025    INDICATION: malignancy eval. Pulmonary emboli. History of testicular cancer.  COMPARISON: CT of the abdomen  and pelvis on 2/2/2022 and chest CT on 1/16/2025  TECHNIQUE: CT scan of the abdomen and pelvis was performed following injection of IV contrast. Multiplanar reformats were obtained. Dose reduction techniques were used.  CONTRAST: 70mL Isovue 370    FINDINGS:   LOWER CHEST: Emphysema. Partly visualized small bilateral pleural effusions, decreased since 1/16/2025. Improved opacities in the bilateral lung bases may be due to atelectasis and pneumonia. Small hiatal hernia.    HEPATOBILIARY: Few stable small hypodense lesions in the liver, unchanged February 2022, consistent with benign cysts and hemangiomas. No new lesions in the liver. No calcified gallstones or biliary ductal dilatation.    PANCREAS: Normal.    SPLEEN: Normal.    ADRENAL GLANDS: Normal.    KIDNEYS/BLADDER: No calculi, hydronephrosis or renal masses. A few foci of gas within the urinary bladder lumen likely related to recent instrumentation.    BOWEL: The visualized loops of small bowel and colon are normal in caliber. Large amount of formed stool in the colon. Mild colonic diverticulosis.    LYMPH NODES: No lymphadenopathy in the abdomen and pelvis.    VASCULATURE: Extensive aortobiiliac atherosclerotic calcifications. Fusiform infrarenal abdominal aortic aneurysm measuring 3.8 x 3.6 cm, previously measuring 3.6 x 3.2 cm in 2022.    PELVIC ORGANS: Mild prostatomegaly with the prostate gland measuring 4.7 x 4.4 cm. Right orchiectomy. No pelvic masses. No free fluid or fluid collections.    MUSCULOSKELETAL: Degenerative changes in the spine. No suspicious lesions in the bones. Stable chronic wedge compression deformities at the upper endplate of L1 and L2 and lower endplate of L4.       Impression    IMPRESSION:   1.  No active malignancy in the abdomen and pelvis.  2.  Decreased small bilateral pleural effusions and bibasilar atelectasis/consolidation.  3.  Mildly aneurysmal infrarenal abdominal aorta measuring 3.6 x 3.8 cm. Please see follow-up  guidelines.  4.  Foci of gas in the urinary bladder related to recent instrumentation.  5.  Mild prostatomegaly.    REFERENCE:  [facility]S practice guidelines on the care of patients with an abdominal aortic aneurysm. Raghavendra BOYCE. J Vasc Surg, 2018. PMID: [id number].    Aorta size: 3.0 cm to 3.9 cm: Surveillance imaging at 3-year intervals.   Echocardiogram Complete     Value    LVEF  55%    Narrative    [id number]  [id number]  [id number]  [id number]^YUMIKO^KEN^BEVERLY     [facility]  Echocardiography Laboratory  [address] [place] [id number]     Name: PUNEET MARTINEZ  MRN: [id number]  : 1955  Study Date: 2025 01:38 PM  Age: 69 yrs  Gender: Male  Patient Location: [facility]  Reason For Study: Dyspnea  Ordering Physician: KEN CALDERON  Referring Physician: ALINE SPENCE  Performed By: Jun Pringle     BSA: 1.9 m2  Height: 70 in  Weight: 161 lb  HR: 127  BP: 111/66 mmHg  ______________________________________________________________________________  Procedure  Echocardiogram with two-dimensional, color and spectral Doppler. Definity (NDC  #03244-288) given intravenously.  ______________________________________________________________________________  Interpretation Summary     The left ventricle visual ejection fraction is estimated at 55%.Mid to distal  lateral wall hypokinesia  The right ventricular systolic function is mild to moderately reduced.The  right ventricle is mildly dilated.  There is mild (1+) tricuspid regurgitation.  Pulmonary hypertension- RVSP 34 mm hg +RA.  IVC diameter >2.1 cm collapsing <50% with sniff suggests a high RA pressure  estimated at 15 mmHg or greater.  The rhythm was atrial fibrillation.     Compared to echo dated 1/10/2025 LVEF noted 46% prior study with lateral wall  hypokinesia, sinus rhythm noted in previous study     ______________________________________________________________________________  Left Ventricle  The left ventricle is  normal in size. There is normal left ventricular wall  thickness. Diastolic Doppler findings (E/E' ratio and/or other parameters)  suggest left ventricular filling pressures are indeterminate. The visual  ejection fraction is estimated at 55%. Mid to distal lateral wall hypokinesia.     Right Ventricle  The right ventricle is mildly dilated. The right ventricular systolic function  is mild to moderately reduced.     Atria  The left atrium is mildly dilated. The right atrium is moderately dilated.  There is no color Doppler evidence of an atrial shunt.     Mitral Valve  There is trace mitral regurgitation.     Tricuspid Valve  There is mild (1+) tricuspid regurgitation. The right ventricular systolic  pressure is approximated at 34.4 mmHg plus the right atrial pressure.  Pulmonary hypertension.     Aortic Valve  There is mild trileaflet aortic sclerosis. No continuous-wave Doppler  available through aortic valve but visually aortic valve leaflets appear to  open well.     Pulmonic Valve  There is trace pulmonic valvular regurgitation. There is no pulmonic valvular  stenosis.     Vessels  The aortic root is normal size. 3.9 cm. Normal size ascending aorta. IVC  diameter >2.1 cm collapsing <50% with sniff suggests a high RA pressure  estimated at 15 mmHg or greater.     Pericardium  There is no pericardial effusion. Large left pleural effusion.     Rhythm  The rhythm was atrial fibrillation.  ______________________________________________________________________________  MMode/2D Measurements & Calculations  IVSd: 1.1 cm     LVIDd: 4.4 cm  LVIDs: 3.5 cm  LVPWd: 1.0 cm  FS: 21.0 %  LV mass(C)d: 159.2 grams  LV mass(C)dI: 83.6 grams/m2  Ao root diam: 3.7 cm  asc Aorta Diam: 3.2 cm  LVOT diam: 2.0 cm  LVOT area: 3.1 cm2  Ao root diam index Ht(cm/m): 2.1  Ao root diam index BSA (cm/m2): 1.9  Asc Ao diam index BSA (cm/m2): 1.7  Asc Ao diam index Ht(cm/m): 1.8  LA Volume (BP): 58.8 ml     LA Volume Index (BP): 30.9 ml/m2  RV  Base: 3.5 cm  RWT: 0.47  TAPSE: 1.5 cm     Doppler Measurements & Calculations  MV E max gonzalo: 123.0 cm/sec  MV dec slope: 740.4 cm/sec2  MV dec time: 0.17 sec  PA acc time: 0.10 sec  TR max gonzalo: 293.2 cm/sec  TR max P.4 mmHg  E/E' av.9  Lateral E/e': 10.8  Medial E/e': 13.0  RV S Gonzalo: 10.6 cm/sec     ______________________________________________________________________________  Report approved by: John Quinteros MD on 2025 03:32 PM         Echocardiogram Limited     Value    LVEF  55-60%    Narrative    [id number]  [id number]  [id number]  [id number]^CHRISTIAN^DALLIN^PAULETTE LLOYD     [facility]  Echocardiography Laboratory  [address] [place] [address]     Name: PUNEET MARTINEZ  MRN: [id number]  : 1955  Study Date: 2025 01:42 PM  Age: 69 yrs  Gender: Male  Patient Location: [facility]  Reason For Study: Heart Failure  Ordering Physician: DALLIN GONZALES  Referring Physician: ALINE SPENCE  Performed By: Jeet Guerrero     BSA: 1.8 m2  Height: 70 in  Weight: 143 lb  HR: 59  BP: 104/89 mmHg  ______________________________________________________________________________  Procedure  Limited Echocardiogram with two-dimensional, color and spectral Doppler.  ______________________________________________________________________________  Interpretation Summary     The visual ejection fraction is 55-60%.  Normal left ventricular wall motion  Moderate (46-55mmHg) pulmonary hypertension is present.  Contrast was used without apparent complications. Compared to prior study,  changes are noted.  ______________________________________________________________________________  Left Ventricle  The left ventricle is normal in size. There is normal left ventricular wall  thickness. The visual ejection fraction is 55-60%. Normal left ventricular  wall motion.     Right Ventricle  The right ventricle is mildly dilated. The right ventricular systolic function  is normal.     Tricuspid  Valve  Normal tricuspid valve. There is mild (1+) tricuspid regurgitation. Moderate  (46-55mmHg) pulmonary hypertension is present.     Pulmonic Valve  Normal pulmonic valve. There is trace pulmonic valvular regurgitation.     Vessels  The aortic root is normal size. Dilation of the inferior vena cava is present  with abnormal respiratory variation in diameter.     Pericardium  There is no pericardial effusion.     Rhythm  Sinus rhythm was noted.  ______________________________________________________________________________  MMode/2D Measurements & Calculations  IVSd: 0.97 cm  LVIDd: 4.8 cm  LVIDs: 2.8 cm  LVPWd: 1.0 cm  LVPWs: 0.56 cm  FS: 41.9 %  LV mass(C)d: 170.8 grams  LV mass(C)dI: 94.3 grams/m2  EF Biplane: 58.6 %  RWT: 0.42  TAPSE: 3.3 cm     Doppler Measurements & Calculations  TV V2 max: 324.0 cm/sec  TV max P.0 mmHg     TR max lui: 311.5 cm/sec  TR max P.9 mmHg     ______________________________________________________________________________  Report approved by: Patrick Adame MD on 2025 03:20 PM             Discharge Medications   Current Discharge Medication List        START taking these medications    Details   acetaminophen (TYLENOL) 325 MG tablet Take 2 tablets (650 mg) by mouth every 4 hours as needed for mild pain.    Associated Diagnoses: Scoliosis of thoracolumbar spine, unspecified scoliosis type      !! amiodarone (PACERONE) 200 MG tablet Take 1 tablet (200 mg) by mouth daily.    Associated Diagnoses: Paroxysmal atrial fibrillation (H)      !! amiodarone (PACERONE) 400 MG tablet Take 1 tablet (400 mg) by mouth daily for 2 days.    Associated Diagnoses: Paroxysmal atrial fibrillation (H)      !! apixaban ANTICOAGULANT (ELIQUIS) 5 MG tablet Take 2 tablets (10 mg) by mouth once for 1 dose. Take one time 10mg dose this evening (), then starting  dose will decrease to 5mg BID indefinitely    Associated Diagnoses: Acute septic pulmonary embolism with acute cor pulmonale (H);  Acute bilateral deep vein thrombosis (DVT) of femoral veins (H); Paroxysmal atrial fibrillation (H)      !! apixaban ANTICOAGULANT (ELIQUIS) 5 MG tablet Take 1 tablet (5 mg) by mouth 2 times daily.    Associated Diagnoses: Acute septic pulmonary embolism with acute cor pulmonale (H); Acute bilateral deep vein thrombosis (DVT) of femoral veins (H); Paroxysmal atrial fibrillation (H)      budesonide (PULMICORT) 0.5 MG/2ML neb solution Take 2 mLs (0.5 mg) by nebulization 2 times daily.    Associated Diagnoses: Pulmonary emphysema, unspecified emphysema type (H)      carboxymethylcellulose PF (REFRESH PLUS) 0.5 % ophthalmic solution Place 1 drop into both eyes every hour as needed for dry eyes.    Associated Diagnoses: Dry eyes      formoterol (PERFOROMIST) 20 MCG/2ML neb solution Take 2 mLs (20 mcg) by nebulization every 12 hours.    Associated Diagnoses: Pulmonary emphysema, unspecified emphysema type (H)      ipratropium - albuterol 0.5 mg/2.5 mg/3 mL (DUONEB) 0.5-2.5 (3) MG/3ML neb solution Take 1 vial (3 mLs) by nebulization every 4 hours as needed for wheezing.    Associated Diagnoses: Pulmonary emphysema, unspecified emphysema type (H)      metFORMIN (GLUCOPHAGE XR) 500 MG 24 hr tablet Take 1 tablet (500 mg) by mouth daily (with dinner).    Associated Diagnoses: Type 2 diabetes mellitus without complication, without long-term current use of insulin (H)      metoprolol tartrate (LOPRESSOR) 25 MG tablet Take 1 tablet (25 mg) by mouth 2 times daily.    Associated Diagnoses: Paroxysmal atrial fibrillation (H)      pantoprazole (PROTONIX) 40 MG EC tablet Take 1 tablet (40 mg) by mouth every morning (before breakfast).    Associated Diagnoses: Gastroesophageal reflux disease with esophagitis without hemorrhage      predniSONE (DELTASONE) 10 MG tablet Take 3 tablets (30 mg) by mouth daily for 4 days, THEN 2 tablets (20 mg) daily for 5 days, THEN 1 tablet (10 mg) daily for 5 days.  Qty: 27 tablet, Refills: 0     Associated Diagnoses: Pulmonary emphysema, unspecified emphysema type (H)      umeclidinium (INCRUSE ELLIPTA) 62.5 MCG/ACT inhaler Inhale 1 puff into the lungs daily.    Associated Diagnoses: Pulmonary emphysema, unspecified emphysema type (H)       !! - Potential duplicate medications found. Please discuss with provider.        CONTINUE these medications which have CHANGED    Details   alendronate (FOSAMAX) 70 MG tablet Take 1 tablet (70 mg) by mouth every 7 days. Sundays    Associated Diagnoses: Age-related osteoporosis without current pathological fracture      calcium carbonate-vitamin D (OSCAL) 500-5 MG-MCG tablet Take 1 tablet by mouth 2 times daily.    Associated Diagnoses: Age-related osteoporosis without current pathological fracture      cyclobenzaprine (FLEXERIL) 5 MG tablet Take 1 tablet (5 mg) by mouth 2 times daily as needed for muscle spasms.    Associated Diagnoses: Lumbar pain; Scoliosis of thoracolumbar spine, unspecified scoliosis type      ferrous sulfate (FEROSUL) 325 (65 Fe) MG tablet Take 1 tablet (325 mg) by mouth every 7 days. Saturdays    Associated Diagnoses: Low iron stores      furosemide (LASIX) 20 MG tablet Take 1 tablet (20 mg) by mouth daily.    Comments: weigh daily, call  [phone] with wt gain of 3# overnight or 5# in 1 week, or worsening peripheral edema.  Associated Diagnoses: Pulmonary hypertension (H)           STOP taking these medications       COMBIVENT RESPIMAT  MCG/ACT inhaler Comments:   Reason for Stopping:         fluticasone-salmeterol (ADVAIR) 500-50 MCG/ACT inhaler Comments:   Reason for Stopping:             Allergies   Allergies   Allergen Reactions    No Known Drug Allergy

## 2025-01-24 NOTE — PLAN OF CARE
A&O x 4. Bradycardic at times, otherwise VSS on 2LNC. Tele: SR/SB. Up Ax1, GBW. Regular diet, tolerating well. PIV SL. Lungs coarse. BLE edema, compression stockings in place. Pt denies pain. Voiding in external catheter overnight, up to toilet while awake.  +BM, AUO. Continue plan of care.

## 2025-01-24 NOTE — PLAN OF CARE
Discharge Note    Patient discharged to TCU via [facility] accompanied by no family/friend. IV: Discontinued  Prescriptions fax sent to TCU.   Belongings reviewed and sent with patient.   Equipment sent with: patient, N/A.   patient verbalizes understanding of discharge instructions. AVS given to patient.

## 2025-01-24 NOTE — PROGRESS NOTES
Occupational Therapy Discharge Summary    Reason for therapy discharge:    Discharged to transitional care facility.    Progress towards therapy goal(s). See goals on Care Plan in [facility] electronic health record for goal details.  Goals partially met.  Barriers to achieving goals:   limited tolerance for therapy and discharge from facility.    Therapy recommendation(s):    Continued therapy is recommended.  Rationale/Recommendations:  Recommend continued therapy at TCU to increase endurance and independence in ADLS.

## 2025-01-24 NOTE — PLAN OF CARE
Physical Therapy Discharge Summary    Reason for therapy discharge:    Discharged to transitional care facility.    Progress towards therapy goal(s). See goals on Care Plan in [facility] electronic health record for goal details.  Goals partially met.  Barriers to achieving goals:   discharge from facility.    Therapy recommendation(s):    Continued therapy is recommended.  Rationale/Recommendations:  Pt remains below baseline, but progressing. Will benfit from continued skilled rehab services in a TCU setting to continue to progress independence and safety wtih mobility prior to return home. Also recommend continued lymphedema therapy to reduce BLE pitting edema..

## 2025-01-24 NOTE — PROGRESS NOTES
Care Management Discharge Note    Discharge Date: 01/24/2025       Discharge Disposition: Transitional Care    Discharge Services:      Discharge DME:      Discharge Transportation:     Private pay costs discussed: transportation costs    Does the patient's insurance plan have a 3 day qualifying hospital stay waiver?  No    PAS Confirmation Code: [id number]  Patient/family educated on Medicare website which has current facility and service quality ratings: yes    Education Provided on the Discharge Plan: Yes  Persons Notified of Discharge Plans: Pt, RN, MD, facility  Patient/Family in Agreement with the Plan: yes    Handoff Referral Completed: No, handoff not indicated or clinically appropriate    Additional Information:  Pt will discharge today to [facility] via [facility] between [id number]. Orders faxed and facility updated. Pt updated and in agreement. Questions answered. Nursing aware.     SHELDON Hernadez, LICSW  [phone] Desk phone  [phone] Cell/text (Preferred)  [facility]    PAS-RR    D: Per DHS regulation, SW completed and submitted PAS-RR to [place] [employer] on [employer]ging Direct Connect via the Senior LinkAge Line.  PAS-RR confirmation # is : [id number]      P: Further questions may be directed to [facility] LinkAge Line at #[phone], option #4 for PAS-RR staff.

## 2025-01-25 ENCOUNTER — HEALTH MAINTENANCE LETTER (OUTPATIENT)
Age: 70
End: 2025-01-25

## 2025-01-27 ENCOUNTER — PATIENT OUTREACH (OUTPATIENT)
Dept: CARE COORDINATION | Facility: CLINIC | Age: 70
End: 2025-01-27

## 2025-01-27 ENCOUNTER — TRANSITIONAL CARE UNIT VISIT (OUTPATIENT)
Dept: GERIATRICS | Facility: CLINIC | Age: 70
End: 2025-01-27
Payer: COMMERCIAL

## 2025-01-27 VITALS
RESPIRATION RATE: 18 BRPM | DIASTOLIC BLOOD PRESSURE: 67 MMHG | SYSTOLIC BLOOD PRESSURE: 126 MMHG | BODY MASS INDEX: 19.27 KG/M2 | TEMPERATURE: 97.9 F | WEIGHT: 134.3 LBS | OXYGEN SATURATION: 96 % | HEART RATE: 54 BPM

## 2025-01-27 PROBLEM — M62.838 OTHER MUSCLE SPASM: Status: ACTIVE | Noted: 2025-01-24

## 2025-01-27 PROBLEM — Z85.47 PERSONAL HISTORY OF MALIGNANT NEOPLASM OF TESTIS: Status: ACTIVE | Noted: 2025-01-24

## 2025-01-27 PROBLEM — M81.0 OSTEOPOROSIS: Status: ACTIVE | Noted: 2025-01-27

## 2025-01-27 PROBLEM — E11.9 TYPE 2 DIABETES MELLITUS WITHOUT COMPLICATIONS (H): Status: ACTIVE | Noted: 2025-01-24

## 2025-01-27 PROBLEM — R79.0 ABNORMAL LEVEL OF BLOOD MINERAL: Status: ACTIVE | Noted: 2025-01-24

## 2025-01-27 PROBLEM — I71.43 INFRARENAL ABDOMINAL AORTIC ANEURYSM, WITHOUT RUPTURE: Status: ACTIVE | Noted: 2025-01-24

## 2025-01-27 PROBLEM — H04.129 DRY EYE SYNDROME OF UNSPECIFIED LACRIMAL GLAND: Status: ACTIVE | Noted: 2025-01-24

## 2025-01-27 PROBLEM — F17.210 NICOTINE DEPENDENCE, CIGARETTES, UNCOMPLICATED: Status: ACTIVE | Noted: 2025-01-27

## 2025-01-27 PROBLEM — M41.9 SCOLIOSIS, UNSPECIFIED: Status: ACTIVE | Noted: 2025-01-24

## 2025-01-27 PROBLEM — Z86.718 HISTORY OF THROMBOEMBOLISM OF VEIN: Status: RESOLVED | Noted: 2021-09-17 | Resolved: 2025-01-27

## 2025-01-27 PROBLEM — J44.9 CHRONIC OBSTRUCTIVE PULMONARY DISEASE, UNSPECIFIED (H): Status: ACTIVE | Noted: 2025-01-24

## 2025-01-27 PROBLEM — R97.20 ELEVATED PSA: Status: ACTIVE | Noted: 2025-01-27

## 2025-01-27 PROBLEM — R13.19 ESOPHAGEAL DYSPHAGIA: Status: ACTIVE | Noted: 2025-01-27

## 2025-01-27 PROBLEM — I22.2: Status: ACTIVE | Noted: 2025-01-24

## 2025-01-27 PROBLEM — E87.3 ALKALOSIS: Status: ACTIVE | Noted: 2025-01-24

## 2025-01-27 PROBLEM — I50.20 UNSPECIFIED SYSTOLIC (CONGESTIVE) HEART FAILURE (H): Status: ACTIVE | Noted: 2025-01-24

## 2025-01-27 PROBLEM — J96.02 ACUTE RESPIRATORY FAILURE WITH HYPERCAPNIA (H): Status: ACTIVE | Noted: 2025-01-24

## 2025-01-27 PROBLEM — Z86.718 PERSONAL HISTORY OF OTHER VENOUS THROMBOSIS AND EMBOLISM: Status: ACTIVE | Noted: 2025-01-24

## 2025-01-27 PROBLEM — M54.50 LOW BACK PAIN, UNSPECIFIED: Status: ACTIVE | Noted: 2025-01-24

## 2025-01-27 PROBLEM — E61.1 IRON DEFICIENCY: Status: ACTIVE | Noted: 2025-01-24

## 2025-01-27 PROBLEM — K21.9 GASTROESOPHAGEAL REFLUX DISEASE: Status: ACTIVE | Noted: 2025-01-27

## 2025-01-27 PROBLEM — Z87.81 PERSONAL HISTORY OF (HEALED) TRAUMATIC FRACTURE: Status: ACTIVE | Noted: 2025-01-24

## 2025-01-27 PROBLEM — I27.81 COR PULMONALE (CHRONIC) (H): Status: ACTIVE | Noted: 2025-01-24

## 2025-01-27 PROBLEM — I82.413: Status: ACTIVE | Noted: 2025-01-24

## 2025-01-27 PROBLEM — R06.02 SHORTNESS OF BREATH: Status: ACTIVE | Noted: 2025-01-24

## 2025-01-27 PROBLEM — M40.209 KYPHOSIS: Status: ACTIVE | Noted: 2025-01-27

## 2025-01-27 PROBLEM — I26.01: Status: ACTIVE | Noted: 2025-01-24

## 2025-01-27 PROBLEM — I27.20 PULMONARY HYPERTENSION, UNSPECIFIED (H): Status: ACTIVE | Noted: 2025-01-24

## 2025-01-27 PROBLEM — J43.9 EMPHYSEMA, UNSPECIFIED (H): Status: ACTIVE | Noted: 2025-01-24

## 2025-01-27 PROCEDURE — 36415 COLL VENOUS BLD VENIPUNCTURE: CPT | Performed by: FAMILY MEDICINE

## 2025-01-27 PROCEDURE — P9604 ONE-WAY ALLOW PRORATED TRIP: HCPCS | Performed by: FAMILY MEDICINE

## 2025-01-27 PROCEDURE — 86481 TB AG RESPONSE T-CELL SUSP: CPT | Performed by: FAMILY MEDICINE

## 2025-01-27 ASSESSMENT — ACTIVITIES OF DAILY LIVING (ADL): DEPENDENT_IADLS:: INDEPENDENT

## 2025-01-27 NOTE — PROGRESS NOTES
Clinic Care Coordination Contact  Care Coordination Transition Communication    Clinical Data: Patient was hospitalized at [facility] from 1/11/2025 to 1/24/2025 with diagnosis of Acute hypoxic and hypercapnic respiratory failure, multifactorial.     Assessment: Patient has transitioned to TCU/ARU for short term rehabilitation:    Facility Name: [facility] (Main Phone: [phone] Admissions Phone: [phone] Fax: [phone])    Transition Communication:  Notified facility of Primary Care- Care Coordination support via Epic fax.    Plan: Care Coordinator will await notification from facility staff informing of patient's discharge plans/needs. Care Coordinator will review chart and outreach to facility staff every 4 weeks and as needed.     Aleksandra Starr RN Care Coordination   [facility]  Email: [email or web address]  Phone: [phone]

## 2025-01-27 NOTE — LETTER
[facility]   To:             Please give to facility    From:   Aleksandra Starr  RN  Care Coordinator   [facility]   P: [phone]  [email or web address]   Patient Name:  Keith Gonzalez YOB: 1955   Admit date: 1/24/2025      *Information Needed:  Please contact me when the patient will discharge (or if they will move to long term care)- include the discharge date, disposition, and main diagnosis   If the patient is discharged with home care services, please provide the name of the agency    Also- Please inform me if a care conference is being held.   Phone, Fax or Email with information                        Thank you

## 2025-01-28 ENCOUNTER — DOCUMENTATION ONLY (OUTPATIENT)
Dept: OTHER | Facility: CLINIC | Age: 70
End: 2025-01-28
Payer: COMMERCIAL

## 2025-01-28 ENCOUNTER — TELEPHONE (OUTPATIENT)
Dept: CARDIOLOGY | Facility: CLINIC | Age: 70
End: 2025-01-28
Payer: COMMERCIAL

## 2025-01-28 LAB
GAMMA INTERFERON BACKGROUND BLD IA-ACNC: 0.03 IU/ML
M TB IFN-G BLD-IMP: NEGATIVE
M TB IFN-G CD4+ BCKGRND COR BLD-ACNC: 9.97 IU/ML
MITOGEN IGNF BCKGRD COR BLD-ACNC: -0.01 IU/ML
MITOGEN IGNF BCKGRD COR BLD-ACNC: 0 IU/ML
QUANTIFERON MITOGEN: 10 IU/ML
QUANTIFERON NIL TUBE: 0.03 IU/ML
QUANTIFERON TB1 TUBE: 0.03 IU/ML
QUANTIFERON TB2 TUBE: 0.02

## 2025-01-28 NOTE — TELEPHONE ENCOUNTER
Patient was admitted to [facility] on 1/11/25 with acute shortness of breath with hypercarbic respiratory failure.  D-dimer was elevated and diagnosed with CT PE mainly in the right middle and lower lobes and a small PE in the left side. Extensive lower extremity DVT's. Patient had intermittent mild hypotension at the emergency room in [facility] but also was having new onset rapid atrial flutter and therefore was cardioverted. Then was put on IV Amiodarone and again had recurrence of atrial flutter and again was cardioverted. Now remaining in sinus rhythm. Patient was having worsening breathing and CO2 retention likely from acute on chronic COPD and cor pulmonale, improved on BiPAP.    PMH: emphysema, COPD, history of DVT, GERD, testicular cancer remission.     1/16/25: Echo showed EF of 46%; mild RV dysfunction and dilation with echo done while in A. fib with RVR.    1/21/25: pt converted to sinus rhythm with HR's in the 50's-60's.    1/22/25: Limited TTE showed EF of 55% (improved), mid-distal later hypokinesia, RV decr fxn, RV dilated, pulm HTN, full IVC.    Pt was started on Amiodarone 400 mg po daily x 2 days, and then 200 mg po daily, Eliquis, Metoprolol and Prednisone. PTA Lasix continued at 20 mg po daily at time of discharge.    Pt is scheduled for an OV on 3/10/25 at 0840 with JIMMY Rocío Mcintosh at our [place] Office.    Pt was discharged to [facility] in [place].    Writer called TCU and OV as above was verified with [facility]. No further questions. DEON Salgado RN.

## 2025-02-03 ENCOUNTER — TELEPHONE (OUTPATIENT)
Dept: GERIATRICS | Facility: CLINIC | Age: 70
End: 2025-02-03

## 2025-02-03 ENCOUNTER — TRANSITIONAL CARE UNIT VISIT (OUTPATIENT)
Dept: GERIATRICS | Facility: CLINIC | Age: 70
End: 2025-02-03
Payer: COMMERCIAL

## 2025-02-03 VITALS
SYSTOLIC BLOOD PRESSURE: 92 MMHG | RESPIRATION RATE: 18 BRPM | WEIGHT: 130.8 LBS | HEART RATE: 65 BPM | TEMPERATURE: 98.1 F | OXYGEN SATURATION: 94 % | DIASTOLIC BLOOD PRESSURE: 53 MMHG | BODY MASS INDEX: 18.77 KG/M2

## 2025-02-03 DIAGNOSIS — M41.9 SCOLIOSIS OF THORACOLUMBAR SPINE, UNSPECIFIED SCOLIOSIS TYPE: ICD-10-CM

## 2025-02-03 DIAGNOSIS — M81.0 AGE-RELATED OSTEOPOROSIS WITHOUT CURRENT PATHOLOGICAL FRACTURE: ICD-10-CM

## 2025-02-03 DIAGNOSIS — Z85.47 HISTORY OF TESTICULAR CANCER: ICD-10-CM

## 2025-02-03 DIAGNOSIS — J10.1 INFLUENZA B: Primary | ICD-10-CM

## 2025-02-03 DIAGNOSIS — E11.9 TYPE 2 DIABETES MELLITUS WITHOUT COMPLICATION, WITHOUT LONG-TERM CURRENT USE OF INSULIN (H): ICD-10-CM

## 2025-02-03 DIAGNOSIS — K21.00 GASTROESOPHAGEAL REFLUX DISEASE WITH ESOPHAGITIS, UNSPECIFIED WHETHER HEMORRHAGE: Primary | ICD-10-CM

## 2025-02-03 DIAGNOSIS — I48.0 PAROXYSMAL ATRIAL FIBRILLATION (H): ICD-10-CM

## 2025-02-03 DIAGNOSIS — Z87.81 HISTORY OF COMPRESSION FRACTURE OF SPINE: ICD-10-CM

## 2025-02-03 DIAGNOSIS — K21.00 GASTROESOPHAGEAL REFLUX DISEASE WITH ESOPHAGITIS, UNSPECIFIED WHETHER HEMORRHAGE: ICD-10-CM

## 2025-02-03 DIAGNOSIS — I26.01 ACUTE SEPTIC PULMONARY EMBOLISM WITH ACUTE COR PULMONALE (H): ICD-10-CM

## 2025-02-03 DIAGNOSIS — Z86.79 HISTORY OF ATRIAL FLUTTER: ICD-10-CM

## 2025-02-03 DIAGNOSIS — I50.22 CHRONIC SYSTOLIC CONGESTIVE HEART FAILURE (H): ICD-10-CM

## 2025-02-03 DIAGNOSIS — J96.01 ACUTE RESPIRATORY FAILURE WITH HYPOXIA (H): ICD-10-CM

## 2025-02-03 DIAGNOSIS — J43.2 CENTRILOBULAR EMPHYSEMA (H): ICD-10-CM

## 2025-02-03 PROCEDURE — 99309 SBSQ NF CARE MODERATE MDM 30: CPT

## 2025-02-03 RX ORDER — OMEPRAZOLE 40 MG/1
40 CAPSULE, DELAYED RELEASE ORAL DAILY
COMMUNITY
End: 2025-02-03

## 2025-02-03 RX ORDER — OMEPRAZOLE 40 MG/1
40 CAPSULE, DELAYED RELEASE ORAL DAILY
Qty: 30 CAPSULE | Refills: 0 | Status: SHIPPED | OUTPATIENT
Start: 2025-02-03

## 2025-02-03 NOTE — TELEPHONE ENCOUNTER
[facility]   February 3, 2025     Name: Keith Gonzalez   : 1955     Background:  Per A&E pharmacy, patient needs a PA for protonix 40 mg EC daily in the morning.    Patient has severe reflux currently due to not being on the medication. He has diagnosis of GERD. Please initiate PA for pantoprazole.     Thanks.     Electronically signed by HAILY Chery CNP

## 2025-02-03 NOTE — TELEPHONE ENCOUNTER
[facility]   February 3, 2025     Name: Keith Gonzalez   : 1955     Background:  Pantoprazole not covered by pt's insurance. Will switch to omeprazole.    Orders:  Omeprazole 40 mg DR capsule; give 1 capsule daily in the morning. Dx: GERD    Electronically signed by HAILY Chery CNP

## 2025-02-03 NOTE — TELEPHONE ENCOUNTER
PRIOR AUTHORIZATION DENIED    Medication: Pantoprazole (PROTONIX) 40MG EC Tablet    Denial Date: 2/3/2025    Denial Rational:  Per insurance, the medication is covered for patient's age 12 and under.  Patient's over the age of 13, the medication is considered a plan exclusion and there is not an option to complete a PA.

## 2025-02-03 NOTE — PROGRESS NOTES
[facility]    Chief Complaint   Patient presents with    RECHECK     HPI:  Keith Gonzalez is a 69 year old  (1955), who is being seen today for [facility] [[id number]].  Patient was hospitalized at [facility] from 1/11/25 through 1/24/25.  HPI:    Patient is a 69 year-old male with past medical history significant emphysema, COPD, history of DVT on Eliquis, osteoporosis, GERD, history of testicular cancer in remission, tobacco abuse, and type 2 diabetes. Patient was admitted to the hospital for bilateral PE with acute cor pulmonale, and acute embolism and thromosis of bilateral femoral vein and extensive lower extremity DVT's. Vascular surgery was consulted, patient was placed on heparin drip, and no interventions were advised. Patient also had new onset Atrial flutter remedied with cardioversion. Patient also had prerenal LAI at hospital with creatinine of 2.18 which dropped to 0.67 upon discharge.     Today's concern is: Patient reports that he has had a cough for 3 days. On 2/2/24, staff reported shortness of breath and cough with O2 sats of 87% which improved to 96% on 2 L of O2 as well as PRN duonebs. Patient denies fever or chills, sore throat, or chest pain. Has some shortness of breath with exertion and is on 1 L O2 via NC. He reports a phlegmy cough that keeps him up at night. He also reports sever acid reflux because he has not been able to get daily pantoprazole due to needing a prior authorization. This provider initiated PA and his insurance doesn't cover the medication if over 13 years of age, so switched to omeprazole 40 mg daily in the morning; which patients says he has taken in the past. Re-ordered compression socks as patient doesn't have them on today.     Allergies, and PMH/PSH reviewed in EPIC today.  REVIEW OF SYSTEMS:  4 point ROS including Respiratory, CV, GI and , other than that noted  in the HPI,  is negative    Objective:   BP 92/53   Pulse 65   Temp 98.1  F (36.7  C)   Resp 18   Wt 59.3 kg (130 lb 12.8 oz)   SpO2 94%   BMI 18.77 kg/m    GENERAL APPEARANCE:  Alert, in no distress, thin  ENT:  Mouth and posterior oropharynx normal, moist mucous membranes  EYES:  EOM, conjunctivae, lids, pupils and irises normal  RESP:  respiratory effort and palpation of chest normal, lungs clear to auscultation, productive cough heard, on 1 L O2  CV:  regular rate and rhythm, no murmur, rub, or gallop, peripheral edema 1+ in BLE's  ABDOMEN:  normal bowel sounds, soft, nontender, no hepatosplenomegaly or other masses  M/S:   generalized weakness; ambulatory with walker  SKIN:  Inspection of skin and subcutaneous tissue baseline  NEURO:   Cranial nerves 2-12 are normal tested and grossly at patient's baseline  PSYCH:  oriented X 3, affect and mood normal    Recent Results (from the past 240 hours)   Quantiferon TB Gold Plus Grey Tube    Collection Time: 01/27/25  7:16 AM    Specimen: Arm, Right; Blood   Result Value Ref Range    Quantiferon Nil Tube 0.03 IU/mL   Quantiferon TB Gold Plus Green Tube    Collection Time: 01/27/25  7:16 AM    Specimen: Arm, Right; Blood   Result Value Ref Range    Quantiferon TB1 Tube 0.03 IU/mL   Quantiferon TB Gold Plus Yellow Tube    Collection Time: 01/27/25  7:16 AM    Specimen: Arm, Right; Blood   Result Value Ref Range    Quantiferon TB2 Tube 0.02    Quantiferon TB Gold Plus Purple Tube    Collection Time: 01/27/25  7:16 AM    Specimen: Arm, Right; Blood   Result Value Ref Range    Quantiferon Mitogen 10.00 IU/mL   Quantiferon TB Gold Plus    Collection Time: 01/27/25  7:16 AM    Specimen: Arm, Right; Blood   Result Value Ref Range    Quantiferon-TB Gold Plus Negative Negative    TB1 Ag minus Nil Value 0.00 IU/mL    TB2 Ag minus Nil Value -0.01 IU/mL    Mitogen minus Nil Result 9.97 IU/mL    Nil Result 0.03 IU/mL   Hemoglobin A1c    Collection Time: 01/31/25  7:54 AM    Result Value Ref Range    Estimated Average Glucose 137 (H) <117 mg/dL    Hemoglobin A1C 6.4 (H) <5.7 %   Comprehensive metabolic panel    Collection Time: 01/31/25  7:54 AM   Result Value Ref Range    Sodium 137 135 - 145 mmol/L    Potassium 4.1 3.4 - 5.3 mmol/L    Carbon Dioxide (CO2) 28 22 - 29 mmol/L    Anion Gap 10 7 - 15 mmol/L    Urea Nitrogen 14.0 8.0 - 23.0 mg/dL    Creatinine 0.80 0.67 - 1.17 mg/dL    GFR Estimate >90 >60 mL/min/1.73m2    Calcium 8.6 (L) 8.8 - 10.4 mg/dL    Chloride 99 98 - 107 mmol/L    Glucose 80 70 - 99 mg/dL    Alkaline Phosphatase 85 40 - 150 U/L    AST 30 0 - 45 U/L    ALT 68 0 - 70 U/L    Protein Total 5.4 (L) 6.4 - 8.3 g/dL    Albumin 3.4 (L) 3.5 - 5.2 g/dL    Bilirubin Total 0.4 <=1.2 mg/dL   CBC with platelets    Collection Time: 01/31/25  7:54 AM   Result Value Ref Range    WBC Count 9.9 4.0 - 11.0 10e3/uL    RBC Count 4.59 4.40 - 5.90 10e6/uL    Hemoglobin 12.2 (L) 13.3 - 17.7 g/dL    Hematocrit 38.9 (L) 40.0 - 53.0 %    MCV 85 78 - 100 fL    MCH 26.6 26.5 - 33.0 pg    MCHC 31.4 (L) 31.5 - 36.5 g/dL    RDW 16.2 (H) 10.0 - 15.0 %    Platelet Count 111 (L) 150 - 450 10e3/uL   Influenza A/B, RSV and SARS-CoV2 PCR (COVID-19) Nose    Collection Time: 02/04/25  8:05 AM    Specimen: Nose; Swab   Result Value Ref Range    Influenza A PCR Positive (A) Negative    Influenza B PCR Negative Negative    RSV PCR Negative Negative    SARS CoV2 PCR Negative Negative       Assessment/Plan:    Influenza B  Positive swab on 2/4/24. Patient agreeable to start tamiflu 75 mg BID x 5 days  On droplet precautions for 7 days.   Continue mucinex 400 mg tablet every 6 hours.     Acute septic pulmonary embolism with acute cor pulmonale (H)  Acute respiratory failure with hypoxia (H)  Centrilobular emphysema (H)  Continuous O2 1-2 LPM to maintain O2 above 89%.   Continue budesonide nebulizer BID, formoterol fumarate inhaler every 12 hours, Incruse Ellipta inhaler once daily.   Duonebs every 4 hours as  needed.   Eliquis 5 mg BID.   On prednisone taper 30 mg x 4 days, 20 mg x 5 days and 10 mg x 5 days to end 2/7/25.      Paroxysmal atrial fibrillation (H)  Chronic systolic congestive heart failure (H)   History of atrial flutter  S/p cardioversion  ECHO on 1/22/25 with EF 55-60%, sinus rhythm  Continue amiodarone 200 mg QAM, metoprolol 25 mg BID.   Lasix 20 mg with weight gain of 3 lbs overnight or 5 lbs in one week, or worsening peripheral edema  Daily weights.  Follow up with cardiology on 3/10/25.   Compression socks on BLE's daily.     Scoliosis of thoracolumbar spine, unspecified scoliosis type  Age-related osteoporosis without current pathological fracture  History of compression fracture of spine  Continue alendronate 70 mg weekly on Sundays.  Continue vit D and calcium supplementation  Tylenol and cyclobenzaprine as needed for pain.     History of testicular cancer  Noted in patient's history; in remission     Type 2 diabetes mellitus without complication, without long-term current use of insulin (H)   Continue metformin  mg at dinner.   HgbA1c 6.4 on 1/31/25.   Will discontinue BG checks.     Gastroesophageal reflux disease with esophagitis, unspecified whether hemorrhage   Insurance doesn't cover pantoprazole 40 mg daily if over 13 years of age, so switched to omeprazole 40 mg daily in the morning.       Orders:  Tamiflu 75 mg BID x 5 days for Influenza A  Discontinue pantoprazole and begin omeprazole 40 mg QAM.     Electronically signed by: HAILY Chery CNP

## 2025-02-03 NOTE — LETTER
2/3/2025      Keith Gonzalez  [address]  [facility] [address]        [facility] GERIATRICS    Chief Complaint   Patient presents with     RECHECK     HPI:  Keith Gonzalez is a 69 year old  (1955), who is being seen today for an episodic care visit at: [facility] REHAB [facility] ([facility]) [[id number]].  Patient was hospitalized at [facility] from 1/11/25 through 1/24/25.  HPI:    Patient is a 69 year-old male with past medical history significant emphysema, COPD, history of DVT on Eliquis, osteoporosis, GERD, history of testicular cancer in remission, tobacco abuse, and type 2 diabetes. Patient was admitted to the hospital for bilateral PE with acute cor pulmonale, and acute embolism and thromosis of bilateral femoral vein and extensive lower extremity DVT's. Vascular surgery was consulted, patient was placed on heparin drip, and no interventions were advised. Patient also had new onset Atrial flutter remedied with cardioversion. Patient also had prerenal LAI at hospital with creatinine of 2.18 which dropped to 0.67 upon discharge.     Today's concern is: Patient reports that he has had a cough for 3 days. On 2/2/24, staff reported shortness of breath and cough with O2 sats of 87% which improved to 96% on 2 L of O2 as well as PRN duonebs. Patient denies fever or chills, sore throat, or chest pain. Has some shortness of breath with exertion and is on 1 L O2 via NC. He reports a phlegmy cough that keeps him up at night. He also reports sever acid reflux because he has not been able to get daily pantoprazole due to needing a prior authorization. This provider initiated PA and his insurance doesn't cover the medication if over 13 years of age, so switched to omeprazole 40 mg daily in the morning; which patients says he has taken in the past. Re-ordered compression socks as patient doesn't have them on today.     Allergies, and PMH/PSH reviewed in EPIC today.  REVIEW  OF SYSTEMS:  4 point ROS including Respiratory, CV, GI and , other than that noted in the HPI,  is negative    Objective:   BP 92/53   Pulse 65   Temp 98.1  F (36.7  C)   Resp 18   Wt 59.3 kg (130 lb 12.8 oz)   SpO2 94%   BMI 18.77 kg/m    GENERAL APPEARANCE:  Alert, in no distress, thin  ENT:  Mouth and posterior oropharynx normal, moist mucous membranes  EYES:  EOM, conjunctivae, lids, pupils and irises normal  RESP:  respiratory effort and palpation of chest normal, lungs clear to auscultation, productive cough heard, on 1 L O2  CV:  regular rate and rhythm, no murmur, rub, or gallop, peripheral edema 1+ in BLE's  ABDOMEN:  normal bowel sounds, soft, nontender, no hepatosplenomegaly or other masses  M/S:   generalized weakness; ambulatory with walker  SKIN:  Inspection of skin and subcutaneous tissue baseline  NEURO:   Cranial nerves 2-12 are normal tested and grossly at patient's baseline  PSYCH:  oriented X 3, affect and mood normal    Recent Results (from the past 240 hours)   Quantiferon TB Gold Plus Grey Tube    Collection Time: 01/27/25  7:16 AM    Specimen: Arm, Right; Blood   Result Value Ref Range    Quantiferon Nil Tube 0.03 IU/mL   Quantiferon TB Gold Plus Green Tube    Collection Time: 01/27/25  7:16 AM    Specimen: Arm, Right; Blood   Result Value Ref Range    Quantiferon TB1 Tube 0.03 IU/mL   Quantiferon TB Gold Plus Yellow Tube    Collection Time: 01/27/25  7:16 AM    Specimen: Arm, Right; Blood   Result Value Ref Range    Quantiferon TB2 Tube 0.02    Quantiferon TB Gold Plus Purple Tube    Collection Time: 01/27/25  7:16 AM    Specimen: Arm, Right; Blood   Result Value Ref Range    Quantiferon Mitogen 10.00 IU/mL   Quantiferon TB Gold Plus    Collection Time: 01/27/25  7:16 AM    Specimen: Arm, Right; Blood   Result Value Ref Range    Quantiferon-TB Gold Plus Negative Negative    TB1 Ag minus Nil Value 0.00 IU/mL    TB2 Ag minus Nil Value -0.01 IU/mL    Mitogen minus Nil Result 9.97 IU/mL     Nil Result 0.03 IU/mL   Hemoglobin A1c    Collection Time: 01/31/25  7:54 AM   Result Value Ref Range    Estimated Average Glucose 137 (H) <117 mg/dL    Hemoglobin A1C 6.4 (H) <5.7 %   Comprehensive metabolic panel    Collection Time: 01/31/25  7:54 AM   Result Value Ref Range    Sodium 137 135 - 145 mmol/L    Potassium 4.1 3.4 - 5.3 mmol/L    Carbon Dioxide (CO2) 28 22 - 29 mmol/L    Anion Gap 10 7 - 15 mmol/L    Urea Nitrogen 14.0 8.0 - 23.0 mg/dL    Creatinine 0.80 0.67 - 1.17 mg/dL    GFR Estimate >90 >60 mL/min/1.73m2    Calcium 8.6 (L) 8.8 - 10.4 mg/dL    Chloride 99 98 - 107 mmol/L    Glucose 80 70 - 99 mg/dL    Alkaline Phosphatase 85 40 - 150 U/L    AST 30 0 - 45 U/L    ALT 68 0 - 70 U/L    Protein Total 5.4 (L) 6.4 - 8.3 g/dL    Albumin 3.4 (L) 3.5 - 5.2 g/dL    Bilirubin Total 0.4 <=1.2 mg/dL   CBC with platelets    Collection Time: 01/31/25  7:54 AM   Result Value Ref Range    WBC Count 9.9 4.0 - 11.0 10e3/uL    RBC Count 4.59 4.40 - 5.90 10e6/uL    Hemoglobin 12.2 (L) 13.3 - 17.7 g/dL    Hematocrit 38.9 (L) 40.0 - 53.0 %    MCV 85 78 - 100 fL    MCH 26.6 26.5 - 33.0 pg    MCHC 31.4 (L) 31.5 - 36.5 g/dL    RDW 16.2 (H) 10.0 - 15.0 %    Platelet Count 111 (L) 150 - 450 10e3/uL   Influenza A/B, RSV and SARS-CoV2 PCR (COVID-19) Nose    Collection Time: 02/04/25  8:05 AM    Specimen: Nose; Swab   Result Value Ref Range    Influenza A PCR Positive (A) Negative    Influenza B PCR Negative Negative    RSV PCR Negative Negative    SARS CoV2 PCR Negative Negative       Assessment/Plan:    Influenza B  Positive swab on 2/4/24. Patient agreeable to start tamiflu 75 mg BID x 5 days  On droplet precautions for 7 days.   Continue mucinex 400 mg tablet every 6 hours.     Acute septic pulmonary embolism with acute cor pulmonale (H)  Acute respiratory failure with hypoxia (H)  Centrilobular emphysema (H)  Continuous O2 1-2 LPM to maintain O2 above 89%.   Continue budesonide nebulizer BID, formoterol fumarate inhaler  every 12 hours, Incruse Ellipta inhaler once daily.   Duonebs every 4 hours as needed.   Eliquis 5 mg BID.   On prednisone taper 30 mg x 4 days, 20 mg x 5 days and 10 mg x 5 days to end 2/7/25.      Paroxysmal atrial fibrillation (H)  Chronic systolic congestive heart failure (H)   History of atrial flutter  S/p cardioversion  ECHO on 1/22/25 with EF 55-60%, sinus rhythm  Continue amiodarone 200 mg QAM, metoprolol 25 mg BID.   Lasix 20 mg with weight gain of 3 lbs overnight or 5 lbs in one week, or worsening peripheral edema  Daily weights.  Follow up with cardiology on 3/10/25.   Compression socks on BLE's daily.     Scoliosis of thoracolumbar spine, unspecified scoliosis type  Age-related osteoporosis without current pathological fracture  History of compression fracture of spine  Continue alendronate 70 mg weekly on Sundays.  Continue vit D and calcium supplementation  Tylenol and cyclobenzaprine as needed for pain.     History of testicular cancer  Noted in patient's history; in remission     Type 2 diabetes mellitus without complication, without long-term current use of insulin (H)   Continue metformin  mg at dinner.   HgbA1c 6.4 on 1/31/25.   Will discontinue BG checks.     Gastroesophageal reflux disease with esophagitis, unspecified whether hemorrhage   Insurance doesn't cover pantoprazole 40 mg daily if over 13 years of age, so switched to omeprazole 40 mg daily in the morning.       Orders:  Tamiflu 75 mg BID x 5 days for Influenza A  Discontinue pantoprazole and begin omeprazole 40 mg QAM.     Electronically signed by: HAILY Chery CNP       Sincerely,        HAILY Chery CNP    Electronically signed

## 2025-02-03 NOTE — TELEPHONE ENCOUNTER
Prior Authorization Retail Medication Request    Medication/Dose: Pantoprazole (PROTONIX) 40MG EC Tablet  ICD code (if different than what is on RX):  K21.00  Previously Tried and Failed:  Pantoprazole 20mg EC tablet  Rationale:  Take 1 tablet (40mg) by mouth daily in the morning    Insurance Name:  MEDICA - MEDICA CHOICE  Insurance ID:  [id number]      Pharmacy Information (if different than what is on RX)  Name:  [facility] [address], [facility], [address]    Phone: [phone]

## 2025-02-04 ENCOUNTER — LAB REQUISITION (OUTPATIENT)
Dept: LAB | Facility: CLINIC | Age: 70
End: 2025-02-04

## 2025-02-04 ENCOUNTER — TELEPHONE (OUTPATIENT)
Dept: GERIATRICS | Facility: CLINIC | Age: 70
End: 2025-02-04

## 2025-02-04 DIAGNOSIS — K21.00 GASTROESOPHAGEAL REFLUX DISEASE WITH ESOPHAGITIS, UNSPECIFIED WHETHER HEMORRHAGE: Primary | ICD-10-CM

## 2025-02-04 DIAGNOSIS — R05.9 COUGH, UNSPECIFIED: ICD-10-CM

## 2025-02-04 LAB
FLUAV RNA SPEC QL NAA+PROBE: POSITIVE
FLUBV RNA RESP QL NAA+PROBE: NEGATIVE
RSV RNA SPEC NAA+PROBE: NEGATIVE
SARS-COV-2 RNA RESP QL NAA+PROBE: NEGATIVE

## 2025-02-04 PROCEDURE — 87637 SARSCOV2&INF A&B&RSV AMP PRB: CPT | Performed by: FAMILY MEDICINE

## 2025-02-04 NOTE — TELEPHONE ENCOUNTER
Prior Authorization Retail Medication Request    Medication/Dose: omeprazole (PRILOSEC) 40 MG DR capsule   Take 1 capsule (40 mg) by mouth daily.      Diagnosis and ICD code (if different than what is on RX):    New/renewal/insurance change PA/secondary ins. PA:  Previously Tried and Failed:    Rationale:      Insurance   Primary: MEDICA CHOICE   Insurance ID:  [id number]     Secondary (if applicable):  Insurance ID:      Pharmacy Information (if different than what is on RX)  Name:  [facility]  Phone:  [phone]  Fax: [phone]    Clinic Information  Preferred routing pool for dept communication:

## 2025-02-07 RX ORDER — FAMOTIDINE 10 MG
10 TABLET ORAL 2 TIMES DAILY PRN
Qty: 28 TABLET | Refills: 0 | Status: SHIPPED | OUTPATIENT
Start: 2025-02-07 | End: 2025-02-12

## 2025-02-07 NOTE — TELEPHONE ENCOUNTER
[facility]   2025     Name: Keith Gonzalez   : 1955     Background:  GERD    Orders:  Orders Placed This Encounter   Medications    famotidine (PEPCID) 10 MG tablet     Sig: Take 1 tablet (10 mg) by mouth 2 times daily as needed (for heartburn or prior to meals.).     Dispense:  28 tablet     Refill:  0     Thanks,    Electronically signed by HAILY Chery CNP

## 2025-02-07 NOTE — TELEPHONE ENCOUNTER
PRIOR AUTHORIZATION DENIED    Medication: omeprazole (PRILOSEC) 40 MG DR capsule   Take 1 capsule (40 mg) by mouth daily.        Denial Date: 2/7/2025    Denial Rational:  Per insurance, the medication is covered for patient's age 12 and under.  Patient's over the age of 13, the medication is considered a plan exclusion and there is not an option to complete a PA.

## 2025-02-10 NOTE — PROGRESS NOTES
"[facility] GERIATRICS    PRIMARY CARE PROVIDER AND CLINIC:  Chase East MD, [address]  [facility] [place] [address]  Chief Complaint   Patient presents with    Hospital F/U      [facility] Medical Record Number:  [id number]  Place of Service where encounter took place:  [facility] REHAB [facility] ([facility]) [[id number]]    Keith Gonzalez  is a 69 year old  (1955), admitted to the above facility from  [facility]. Hospital stay 1/11/25 through 1/24/25..   HPI:    As per DNP's note:\"Patient is a 69 year-old male with past medical history significant emphysema, COPD, history of DVT on Eliquis, osteoporosis, GERD, history of testicular cancer in remission, tobacco abuse, and type 2 diabetes. Patient was admitted to the hospital for bilateral PE with acute cor pulmonale, and acute embolism and thrombosis of bilateral femoral vein and extensive lower extremity DVT's. Vascular surgery was consulted, patient was placed on heparin drip, and no interventions were advised. Patient also had new onset Atrial flutter remedied with cardioversion. Patient also had prerenal LAI at hospital with creatinine of 2.18 which dropped to 0.67 upon discharge.       TODAY:  -B/L PE / BLE DVT:  -Emphysema  -CHF/A-fib  -Influenza A (2/4/25):started on Tamiflu x 5 days  -completed prednisone taper (2/7)   Pt reports was not on O2 at home.  On 1.5 LPM O2 continues.endorses slight wheezing, has lingering cough  mixed dry and wet. Denies fever or chills.  Gets SOB, little bit with therpay  Reprots rehab is going very good did try steps today, needs to climb 20 steps to enter his apartment.   Denies CP or chest pressure, or quivering.     =====================================================================    CODE STATUS/ADVANCE DIRECTIVES DISCUSSION:  No CPR- Do NOT Intubate    ALLERGIES:   Allergies   Allergen Reactions    No Known Drug Allergy       PAST MEDICAL HISTORY:   Past Medical History: "   Diagnosis Date    Acute septic pulmonary embolism with acute cor pulmonale (H) 01/11/2025    Cancer (H)     Continuous tobacco abuse 01/21/2025 1/2025 pt contemplative to quit smoking,strongly counseled to stop      COPD (chronic obstructive pulmonary disease) (H)     Type 2 diabetes mellitus without complications (H) 1/24/2025    Unspecified systolic (congestive) heart failure (H) 1/24/2025      PAST SURGICAL HISTORY:   has a past surgical history that includes Davinci Herniorrhaphy Inguinal (Left, 10/28/2021); Laparoscopic lysis adhesions (N/A, 10/28/2021); and Esophagoscopy, gastroscopy, duodenoscopy (EGD), combined (N/A, 9/13/2022).  FAMILY HISTORY: family history is not on file.  SOCIAL HISTORY:   reports that he has been smoking cigarettes. He has a 41.3 pack-year smoking history. He has never used smokeless tobacco. He reports that he does not drink alcohol and does not use drugs.  Patient's living condition: lives alone    Post Discharge Medication Reconciliation Status:   MED REC REQUIRED  Post Medication Reconciliation Status: medication reconcilation previously completed during another office visit       Current Outpatient Medications   Medication Sig Dispense Refill    acetaminophen (TYLENOL) 325 MG tablet Take 2 tablets (650 mg) by mouth every 4 hours as needed for mild pain.      alendronate (FOSAMAX) 70 MG tablet Take 1 tablet (70 mg) by mouth every 7 days. Sundays      amiodarone (PACERONE) 200 MG tablet Take 1 tablet (200 mg) by mouth daily.      amiodarone (PACERONE) 400 MG tablet Take 1 tablet (400 mg) by mouth daily for 2 days.      apixaban ANTICOAGULANT (ELIQUIS) 5 MG tablet Take 1 tablet (5 mg) by mouth 2 times daily.      budesonide (PULMICORT) 0.5 MG/2ML neb solution Take 2 mLs (0.5 mg) by nebulization 2 times daily.      calcium carbonate-vitamin D (OSCAL) 500-5 MG-MCG tablet Take 1 tablet by mouth 2 times daily.      carboxymethylcellulose PF (REFRESH PLUS) 0.5 % ophthalmic solution  "Place 1 drop into both eyes every hour as needed for dry eyes.      cyclobenzaprine (FLEXERIL) 5 MG tablet Take 1 tablet (5 mg) by mouth 2 times daily as needed for muscle spasms.      famotidine (PEPCID) 10 MG tablet Take 1 tablet (10 mg) by mouth 2 times daily as needed (for heartburn or prior to meals.). 28 tablet 0    ferrous sulfate (FEROSUL) 325 (65 Fe) MG tablet Take 1 tablet (325 mg) by mouth every 7 days. Saturdays      formoterol (PERFOROMIST) 20 MCG/2ML neb solution Take 2 mLs (20 mcg) by nebulization every 12 hours.      furosemide (LASIX) 20 MG tablet Take 1 tablet (20 mg) by mouth daily.      ipratropium - albuterol 0.5 mg/2.5 mg/3 mL (DUONEB) 0.5-2.5 (3) MG/3ML neb solution Take 1 vial (3 mLs) by nebulization every 4 hours as needed for wheezing.      metFORMIN (GLUCOPHAGE XR) 500 MG 24 hr tablet Take 1 tablet (500 mg) by mouth daily (with dinner).      metoprolol tartrate (LOPRESSOR) 25 MG tablet Take 1 tablet (25 mg) by mouth 2 times daily.      umeclidinium (INCRUSE ELLIPTA) 62.5 MCG/ACT inhaler Inhale 1 puff into the lungs daily.       No current facility-administered medications for this visit.       ROS:  10 point ROS of systems including Constitutional, Eyes, Respiratory, Cardiovascular, Gastroenterology, Genitourinary, Integumentary, Musculoskeletal, Psychiatric were all negative except for pertinent positives noted in my HPI.    Vitals:  /67   Pulse 64   Temp 97.7  F (36.5  C)   Resp 16   Ht 1.778 m (5' 10\")   Wt 60.9 kg (134 lb 3.2 oz)   SpO2 95%   BMI 19.26 kg/m    Exam:  GENERAL APPEARANCE:  in no distress,   RESP:  Unlabored breathing. Coarse sound over bases. Nc in place  CV:  S1S2 audible, regular HR, no murmur appreciated.   ABDOMEN:  soft, NT/ND, BS audible.   M/S:   pitting edema extends to thighs  SKIN:  No rash noted on observation  NEURO:   No NFD appreciated on observation.   PSYCH:  affect and mood normal    Lab/Diagnostic data: Reviewed in the chart and EHR.  "     ASSESSMENT/PLAN:  ------------------------------  Centrilobular emphysema (H)  History of pulmonary embolism  Acute respiratory failure with hypoxia (H)  Personal history of DVT (deep vein thrombosis)  Chronic systolic congestive heart failure (H)  Hx of atrial fibrillation s/p cardioversion  Influenza B  - completed Tamiflu and prednisone taper   - cardiopulmonary wise appears, stable. Still on O2 now 1.5 LPM. Continue to wean.   - Consultant (s) routine follow up on outpatient basis        Type 2 diabetes mellitus without complication, without long-term current use of insulin (H)   A1C 6.4 01/31/2025    A1C 6.4 01/12/2025   - controlled. On metformin. Goal less than 7.5%      Gastroesophageal reflux disease with esophagitis, unspecified whether hemorrhage  . On omeprazole (in house stock), takes pantoprazole at home.     Physical debility  Scoliosis of thoracolumbar spine, unspecified scoliosis type  History of compression fracture of spine  Age-related osteoporosis without current pathological fracture  -started rehab program, making a progress, continue until desired goal is achieved.   -Analgesia optimal with the current regimen. Continue.      Orders:  NNO    Electronically signed by:  Boubacar Duque MD

## 2025-02-11 ENCOUNTER — TRANSITIONAL CARE UNIT VISIT (OUTPATIENT)
Dept: GERIATRICS | Facility: CLINIC | Age: 70
End: 2025-02-11
Payer: COMMERCIAL

## 2025-02-11 VITALS
WEIGHT: 134.2 LBS | OXYGEN SATURATION: 95 % | DIASTOLIC BLOOD PRESSURE: 67 MMHG | BODY MASS INDEX: 19.21 KG/M2 | HEIGHT: 70 IN | RESPIRATION RATE: 16 BRPM | SYSTOLIC BLOOD PRESSURE: 125 MMHG | HEART RATE: 64 BPM | TEMPERATURE: 97.7 F

## 2025-02-11 VITALS
HEIGHT: 70 IN | HEART RATE: 64 BPM | TEMPERATURE: 97.7 F | OXYGEN SATURATION: 95 % | DIASTOLIC BLOOD PRESSURE: 67 MMHG | RESPIRATION RATE: 16 BRPM | SYSTOLIC BLOOD PRESSURE: 125 MMHG | BODY MASS INDEX: 19.23 KG/M2 | WEIGHT: 134.32 LBS

## 2025-02-11 DIAGNOSIS — Z87.81 HISTORY OF COMPRESSION FRACTURE OF SPINE: ICD-10-CM

## 2025-02-11 DIAGNOSIS — J96.01 ACUTE RESPIRATORY FAILURE WITH HYPOXIA (H): ICD-10-CM

## 2025-02-11 DIAGNOSIS — Z86.79 HISTORY OF ATRIAL FLUTTER: ICD-10-CM

## 2025-02-11 DIAGNOSIS — E11.9 TYPE 2 DIABETES MELLITUS WITHOUT COMPLICATION, WITHOUT LONG-TERM CURRENT USE OF INSULIN (H): ICD-10-CM

## 2025-02-11 DIAGNOSIS — K21.00 GASTROESOPHAGEAL REFLUX DISEASE WITH ESOPHAGITIS, UNSPECIFIED WHETHER HEMORRHAGE: ICD-10-CM

## 2025-02-11 DIAGNOSIS — M41.9 SCOLIOSIS OF THORACOLUMBAR SPINE, UNSPECIFIED SCOLIOSIS TYPE: ICD-10-CM

## 2025-02-11 DIAGNOSIS — Z85.47 HISTORY OF TESTICULAR CANCER: ICD-10-CM

## 2025-02-11 DIAGNOSIS — I26.01 ACUTE SEPTIC PULMONARY EMBOLISM WITH ACUTE COR PULMONALE (H): Primary | ICD-10-CM

## 2025-02-11 DIAGNOSIS — J10.1 INFLUENZA B: ICD-10-CM

## 2025-02-11 DIAGNOSIS — J43.2 CENTRILOBULAR EMPHYSEMA (H): ICD-10-CM

## 2025-02-11 DIAGNOSIS — M81.0 AGE-RELATED OSTEOPOROSIS WITHOUT CURRENT PATHOLOGICAL FRACTURE: ICD-10-CM

## 2025-02-11 DIAGNOSIS — I48.0 PAROXYSMAL ATRIAL FIBRILLATION (H): ICD-10-CM

## 2025-02-11 PROCEDURE — 99309 SBSQ NF CARE MODERATE MDM 30: CPT

## 2025-02-11 NOTE — LETTER
2/11/2025      Keith Gonzalez  [address]  [facility] [address]        [facility] GERIATRICS    Chief Complaint   Patient presents with     RECHECK     HPI:  Keith Gonzalez is a 69 year old  (1955), who is being seen today for an episodic care visit at: [facility] REHAB [facility] ([facility]) [[id number]].  Patient was hospitalized at [facility] from 1/11/25 through 1/24/25.    Patient is a 69 year-old male with past medical history significant emphysema, COPD, history of DVT on Eliquis, osteoporosis, GERD, history of testicular cancer in remission, tobacco abuse, and type 2 diabetes. Patient was admitted to the hospital for bilateral PE with acute cor pulmonale, and acute embolism and thromosis of bilateral femoral vein and extensive lower extremity DVT's. Vascular surgery was consulted, patient was placed on heparin drip, and no interventions were advised. Patient also had new onset Atrial flutter remedied with cardioversion. Patient also had prerenal LAI at hospital with creatinine of 2.18 which dropped to 0.67 upon discharge.     Today's concern is: Patient is concerned about the hospital bills he is starting to receive. This provider talked with the  and she is going to set him up with the [employer] line for resources. Patient showed me a combivant inhaler that he has from home as a rescue inhaler and would like to have that here. Advised patient that that can't be combined with formoterol which he nebulizes twice daily. He still occasionally needs oxygen for shortness of breath with exertion. His cough from influenza is improved. He says that he didn't realize how sick he was because he ignored his health for so long. Denies lightheadedness or chest pain. Moving bowels and bladder well.       Allergies, and PMH/PSH reviewed in EPIC today.  REVIEW OF SYSTEMS:  4 point ROS including Respiratory, CV, GI and , other than that noted in the HPI,   "is negative    Objective:   /67   Pulse 64   Temp 97.7  F (36.5  C)   Resp 16   Ht 1.778 m (5' 10\")   Wt 60.9 kg (134 lb 5.1 oz)   SpO2 95%   BMI 19.27 kg/m    GENERAL APPEARANCE:  Alert, in no distress, thin  ENT:  Mouth and posterior oropharynx normal, moist mucous membranes  EYES:  EOM, conjunctivae, lids, pupils and irises normal  RESP:  respiratory effort and palpation of chest normal, lungs clear to auscultation, on 1 L O2  CV:  regular rate and rhythm, no murmur, rub, or gallop, peripheral edema 1+ in BLE's  ABDOMEN:  normal bowel sounds, soft, nontender, no hepatosplenomegaly or other masses  M/S:   generalized weakness; ambulatory with walker  SKIN:  Inspection of skin and subcutaneous tissue baseline  NEURO:   Cranial nerves 2-12 are normal tested and grossly at patient's baseline  PSYCH:  oriented X 3, affect and mood normal    [facility]. Please refer to them using [facility]Care Everywhere.    Assessment/Plan:    Influenza B- resolved  Positive swab on 2/4/24. Completed tamiflu 75 mg BID x 5 days  Was on droplet precautions for 7 days  Continue mucinex 400 mg tablet every 6 hours.      Acute septic pulmonary embolism with acute cor pulmonale (H)  Acute respiratory failure with hypoxia (H)  Centrilobular emphysema (H)  Continuous O2 1-2 LPM to maintain O2 above 89%.   Continue budesonide nebulizer BID, formoterol fumarate inhaler every 12 hours, Incruse Ellipta inhaler once daily.   Duonebs every 4 hours as needed.   Eliquis 5 mg BID.   On prednisone taper 30 mg x 4 days, 20 mg x 5 days and 10 mg x 5 days to end 2/7/25.      Paroxysmal atrial fibrillation (H)  Chronic systolic congestive heart failure (H)   History of atrial flutter  S/p cardioversion  ECHO on 1/22/25 with EF 55-60%, sinus rhythm  Continue amiodarone 200 mg QAM, metoprolol 25 mg BID.   Lasix 20 mg with weight gain of 3 lbs overnight or 5 lbs in one week, or worsening peripheral edema  Daily " weights.  Follow up with cardiology on 3/10/25.   Compression socks on BLE's daily.      Scoliosis of thoracolumbar spine, unspecified scoliosis type  Age-related osteoporosis without current pathological fracture  History of compression fracture of spine  Continue alendronate 70 mg weekly on Sundays.  Continue vit D and calcium supplementation  Tylenol and cyclobenzaprine as needed for pain.     History of testicular cancer  Noted in patient's history; in remission     Type 2 diabetes mellitus without complication, without long-term current use of insulin (H)   Continue metformin  mg at dinner.   HgbA1c 6.4 on 1/31/25.   Will discontinue BG checks.      Gastroesophageal reflux disease with esophagitis, unspecified whether hemorrhage   Insurance doesn't cover pantoprazole 40 mg daily if over 13 years of age, so switched to omeprazole 40 mg daily in the morning. Omeprazole was not covered, switched to famotidine 10 mg BID.        Orders:  No changes to treatment plan.      Electronically signed by: HAILY Chery CNP         Sincerely,        HAILY Chery CNP    Electronically signed

## 2025-02-11 NOTE — PROGRESS NOTES
[facility] GERIATRICS    Chief Complaint   Patient presents with    RECHECK     HPI:  Keith Gonzalez is a 69 year old  (1955), who is being seen today for an episodic care visit at: [facility] ([facility]) [[id number]].  Patient was hospitalized at [facility] from 1/11/25 through 1/24/25.    Patient is a 69 year-old male with past medical history significant emphysema, COPD, history of DVT on Eliquis, osteoporosis, GERD, history of testicular cancer in remission, tobacco abuse, and type 2 diabetes. Patient was admitted to the hospital for bilateral PE with acute cor pulmonale, and acute embolism and thromosis of bilateral femoral vein and extensive lower extremity DVT's. Vascular surgery was consulted, patient was placed on heparin drip, and no interventions were advised. Patient also had new onset Atrial flutter remedied with cardioversion. Patient also had prerenal LAI at hospital with creatinine of 2.18 which dropped to 0.67 upon discharge.     Today's concern is: Patient is concerned about the hospital bills he is starting to receive. This provider talked with the  and she is going to set him up with the Senior Linkage line for resources. Patient showed me a combivant inhaler that he has from home as a rescue inhaler and would like to have that here. Advised patient that that can't be combined with formoterol which he nebulizes twice daily. He still occasionally needs oxygen for shortness of breath with exertion. His cough from influenza is improved. He says that he didn't realize how sick he was because he ignored his health for so long. Denies lightheadedness or chest pain. Moving bowels and bladder well.       Allergies, and PMH/PSH reviewed in EPIC today.  REVIEW OF SYSTEMS:  4 point ROS including Respiratory, CV, GI and , other than that noted in the HPI,  is negative    Objective:   /67   Pulse 64   Temp 97.7  F (36.5  C)   Resp  "16   Ht 1.778 m (5' 10\")   Wt 60.9 kg (134 lb 5.1 oz)   SpO2 95%   BMI 19.27 kg/m    GENERAL APPEARANCE:  Alert, in no distress, thin  ENT:  Mouth and posterior oropharynx normal, moist mucous membranes  EYES:  EOM, conjunctivae, lids, pupils and irises normal  RESP:  respiratory effort and palpation of chest normal, lungs clear to auscultation, on 1 L O2  CV:  regular rate and rhythm, no murmur, rub, or gallop, peripheral edema 1+ in BLE's  ABDOMEN:  normal bowel sounds, soft, nontender, no hepatosplenomegaly or other masses  M/S:   generalized weakness; ambulatory with walker  SKIN:  Inspection of skin and subcutaneous tissue baseline  NEURO:   Cranial nerves 2-12 are normal tested and grossly at patient's baseline  PSYCH:  oriented X 3, affect and mood normal    Labs done in SNF are in [place] EPIC. Please refer to them using EPIC/Care Everywhere.    Assessment/Plan:    Influenza B- resolved  Positive swab on 2/4/24. Completed tamiflu 75 mg BID x 5 days  Was on droplet precautions for 7 days  Continue mucinex 400 mg tablet every 6 hours.      Acute septic pulmonary embolism with acute cor pulmonale (H)  Acute respiratory failure with hypoxia (H)  Centrilobular emphysema (H)  Continuous O2 1-2 LPM to maintain O2 above 89%.   Continue budesonide nebulizer BID, formoterol fumarate inhaler every 12 hours, Incruse Ellipta inhaler once daily.   Duonebs every 4 hours as needed.   Eliquis 5 mg BID.   On prednisone taper 30 mg x 4 days, 20 mg x 5 days and 10 mg x 5 days to end 2/7/25.      Paroxysmal atrial fibrillation (H)  Chronic systolic congestive heart failure (H)   History of atrial flutter  S/p cardioversion  ECHO on 1/22/25 with EF 55-60%, sinus rhythm  Continue amiodarone 200 mg QAM, metoprolol 25 mg BID.   Lasix 20 mg with weight gain of 3 lbs overnight or 5 lbs in one week, or worsening peripheral edema  Daily weights.  Follow up with cardiology on 3/10/25.   Compression socks on BLE's daily.    "   Scoliosis of thoracolumbar spine, unspecified scoliosis type  Age-related osteoporosis without current pathological fracture  History of compression fracture of spine  Continue alendronate 70 mg weekly on Sundays.  Continue vit D and calcium supplementation  Tylenol and cyclobenzaprine as needed for pain.     History of testicular cancer  Noted in patient's history; in remission     Type 2 diabetes mellitus without complication, without long-term current use of insulin (H)   Continue metformin  mg at dinner.   HgbA1c 6.4 on 1/31/25.   Will discontinue BG checks.      Gastroesophageal reflux disease with esophagitis, unspecified whether hemorrhage   Insurance doesn't cover pantoprazole 40 mg daily if over 13 years of age, so switched to omeprazole 40 mg daily in the morning. Omeprazole was not covered, switched to famotidine 10 mg BID.        Orders:  No changes to treatment plan.      Electronically signed by: HAILY Chery CNP

## 2025-02-12 ENCOUNTER — TRANSITIONAL CARE UNIT VISIT (OUTPATIENT)
Dept: GERIATRICS | Facility: CLINIC | Age: 70
End: 2025-02-12
Payer: COMMERCIAL

## 2025-02-12 DIAGNOSIS — E11.9 TYPE 2 DIABETES MELLITUS WITHOUT COMPLICATION, WITHOUT LONG-TERM CURRENT USE OF INSULIN (H): ICD-10-CM

## 2025-02-12 DIAGNOSIS — R53.81 PHYSICAL DEBILITY: ICD-10-CM

## 2025-02-12 DIAGNOSIS — J43.2 CENTRILOBULAR EMPHYSEMA (H): Primary | ICD-10-CM

## 2025-02-12 DIAGNOSIS — Z86.718 PERSONAL HISTORY OF DVT (DEEP VEIN THROMBOSIS): ICD-10-CM

## 2025-02-12 DIAGNOSIS — J96.01 ACUTE RESPIRATORY FAILURE WITH HYPOXIA (H): ICD-10-CM

## 2025-02-12 DIAGNOSIS — Z87.81 HISTORY OF COMPRESSION FRACTURE OF SPINE: ICD-10-CM

## 2025-02-12 DIAGNOSIS — J10.1 INFLUENZA B: ICD-10-CM

## 2025-02-12 DIAGNOSIS — M41.9 SCOLIOSIS OF THORACOLUMBAR SPINE, UNSPECIFIED SCOLIOSIS TYPE: ICD-10-CM

## 2025-02-12 DIAGNOSIS — K21.00 GASTROESOPHAGEAL REFLUX DISEASE WITH ESOPHAGITIS, UNSPECIFIED WHETHER HEMORRHAGE: ICD-10-CM

## 2025-02-12 DIAGNOSIS — I50.22 CHRONIC SYSTOLIC CONGESTIVE HEART FAILURE (H): ICD-10-CM

## 2025-02-12 DIAGNOSIS — Z86.711 HISTORY OF PULMONARY EMBOLISM: ICD-10-CM

## 2025-02-12 DIAGNOSIS — M81.0 AGE-RELATED OSTEOPOROSIS WITHOUT CURRENT PATHOLOGICAL FRACTURE: ICD-10-CM

## 2025-02-12 PROCEDURE — 99305 1ST NF CARE MODERATE MDM 35: CPT | Performed by: FAMILY MEDICINE

## 2025-02-12 RX ORDER — FAMOTIDINE 10 MG
10 TABLET ORAL 2 TIMES DAILY
COMMUNITY
Start: 2025-02-12

## 2025-02-12 NOTE — LETTER
" 2/12/2025      Keith Gonzalez  [address]  [place] [address]        [facility] GERIATRICS    PRIMARY CARE PROVIDER AND CLINIC:  Chase East MD, [address] [facility]  / [place] [address]  Chief Complaint   Patient presents with     Hospital F/U      [facility] Medical Record Number:  [id number]  Place of Service where encounter took place:  [facility] ([facility]) [[id number]]    Keith Gonzalez  is a 69 year old  (1955), admitted to the above facility from  [facility]. Hospital stay 1/11/25 through 1/24/25..   HPI:    As per DNP's note:\"Patient is a 69 year-old male with past medical history significant emphysema, COPD, history of DVT on Eliquis, osteoporosis, GERD, history of testicular cancer in remission, tobacco abuse, and type 2 diabetes. Patient was admitted to the hospital for bilateral PE with acute cor pulmonale, and acute embolism and thrombosis of bilateral femoral vein and extensive lower extremity DVT's. Vascular surgery was consulted, patient was placed on heparin drip, and no interventions were advised. Patient also had new onset Atrial flutter remedied with cardioversion. Patient also had prerenal LAI at hospital with creatinine of 2.18 which dropped to 0.67 upon discharge.       TODAY:  -B/L PE / BLE DVT:  -Emphysema  -CHF/A-fib  -Influenza A (2/4/25):started on Tamiflu x 5 days  -completed prednisone taper (2/7)   Pt reports was not on O2 at home.  On 1.5 LPM O2 continues.endorses slight wheezing, has lingering cough  mixed dry and wet. Denies fever or chills.  Gets SOB, little bit with therpay  Reprots rehab is going very good did try steps today, needs to climb 20 steps to enter his apartment.   Denies CP or chest pressure, or quivering.     =====================================================================    CODE STATUS/ADVANCE DIRECTIVES DISCUSSION:  No CPR- Do NOT Intubate    ALLERGIES:   Allergies   Allergen Reactions "     No Known Drug Allergy       PAST MEDICAL HISTORY:   Past Medical History:   Diagnosis Date     Acute septic pulmonary embolism with acute cor pulmonale (H) 01/11/2025     Cancer (H)      Continuous tobacco abuse 01/21/2025 1/2025 pt contemplative to quit smoking,strongly counseled to stop       COPD (chronic obstructive pulmonary disease) (H)      Type 2 diabetes mellitus without complications (H) 1/24/2025     Unspecified systolic (congestive) heart failure (H) 1/24/2025      PAST SURGICAL HISTORY:   has a past surgical history that includes Davinci Herniorrhaphy Inguinal (Left, 10/28/2021); Laparoscopic lysis adhesions (N/A, 10/28/2021); and Esophagoscopy, gastroscopy, duodenoscopy (EGD), combined (N/A, 9/13/2022).  FAMILY HISTORY: family history is not on file.  SOCIAL HISTORY:   reports that he has been smoking cigarettes. He has a 41.3 pack-year smoking history. He has never used smokeless tobacco. He reports that he does not drink alcohol and does not use drugs.  Patient's living condition: lives alone    Post Discharge Medication Reconciliation Status:   MED REC REQUIRED  Post Medication Reconciliation Status: medication reconcilation previously completed during another office visit       Current Outpatient Medications   Medication Sig Dispense Refill     acetaminophen (TYLENOL) 325 MG tablet Take 2 tablets (650 mg) by mouth every 4 hours as needed for mild pain.       alendronate (FOSAMAX) 70 MG tablet Take 1 tablet (70 mg) by mouth every 7 days. Sundays       amiodarone (PACERONE) 200 MG tablet Take 1 tablet (200 mg) by mouth daily.       amiodarone (PACERONE) 400 MG tablet Take 1 tablet (400 mg) by mouth daily for 2 days.       apixaban ANTICOAGULANT (ELIQUIS) 5 MG tablet Take 1 tablet (5 mg) by mouth 2 times daily.       budesonide (PULMICORT) 0.5 MG/2ML neb solution Take 2 mLs (0.5 mg) by nebulization 2 times daily.       calcium carbonate-vitamin D (OSCAL) 500-5 MG-MCG tablet Take 1 tablet by  "mouth 2 times daily.       carboxymethylcellulose PF (REFRESH PLUS) 0.5 % ophthalmic solution Place 1 drop into both eyes every hour as needed for dry eyes.       cyclobenzaprine (FLEXERIL) 5 MG tablet Take 1 tablet (5 mg) by mouth 2 times daily as needed for muscle spasms.       famotidine (PEPCID) 10 MG tablet Take 1 tablet (10 mg) by mouth 2 times daily as needed (for heartburn or prior to meals.). 28 tablet 0     ferrous sulfate (FEROSUL) 325 (65 Fe) MG tablet Take 1 tablet (325 mg) by mouth every 7 days. Saturdays       formoterol (PERFOROMIST) 20 MCG/2ML neb solution Take 2 mLs (20 mcg) by nebulization every 12 hours.       furosemide (LASIX) 20 MG tablet Take 1 tablet (20 mg) by mouth daily.       ipratropium - albuterol 0.5 mg/2.5 mg/3 mL (DUONEB) 0.5-2.5 (3) MG/3ML neb solution Take 1 vial (3 mLs) by nebulization every 4 hours as needed for wheezing.       metFORMIN (GLUCOPHAGE XR) 500 MG 24 hr tablet Take 1 tablet (500 mg) by mouth daily (with dinner).       metoprolol tartrate (LOPRESSOR) 25 MG tablet Take 1 tablet (25 mg) by mouth 2 times daily.       umeclidinium (INCRUSE ELLIPTA) 62.5 MCG/ACT inhaler Inhale 1 puff into the lungs daily.       No current facility-administered medications for this visit.       ROS:  10 point ROS of systems including Constitutional, Eyes, Respiratory, Cardiovascular, Gastroenterology, Genitourinary, Integumentary, Musculoskeletal, Psychiatric were all negative except for pertinent positives noted in my HPI.    Vitals:  /67   Pulse 64   Temp 97.7  F (36.5  C)   Resp 16   Ht 1.778 m (5' 10\")   Wt 60.9 kg (134 lb 3.2 oz)   SpO2 95%   BMI 19.26 kg/m    Exam:  GENERAL APPEARANCE:  in no distress,   RESP:  Unlabored breathing. Coarse sound over bases. Nc in place  CV:  S1S2 audible, regular HR, no murmur appreciated.   ABDOMEN:  soft, NT/ND, BS audible.   M/S:   pitting edema extends to thighs  SKIN:  No rash noted on observation  NEURO:   No NFD appreciated on " observation.   PSYCH:  affect and mood normal    Lab/Diagnostic data: Reviewed in the chart and EHR.      ASSESSMENT/PLAN:  ------------------------------  Centrilobular emphysema (H)  History of pulmonary embolism  Acute respiratory failure with hypoxia (H)  Personal history of DVT (deep vein thrombosis)  Chronic systolic congestive heart failure (H)  Hx of atrial fibrillation s/p cardioversion  Influenza B  - completed Tamiflu and prednisone taper   - cardiopulmonary wise appears, stable. Still on O2 now 1.5 LPM. Continue to wean.   - Consultant (s) routine follow up on outpatient basis        Type 2 diabetes mellitus without complication, without long-term current use of insulin (H)   A1C 6.4 01/31/2025    A1C 6.4 01/12/2025   - controlled. On metformin. Goal less than 7.5%      Gastroesophageal reflux disease with esophagitis, unspecified whether hemorrhage  . On omeprazole (in house stock), takes pantoprazole at home.     Physical debility  Scoliosis of thoracolumbar spine, unspecified scoliosis type  History of compression fracture of spine  Age-related osteoporosis without current pathological fracture  -started rehab program, making a progress, continue until desired goal is achieved.   -Analgesia optimal with the current regimen. Continue.      Orders:  NNO    Electronically signed by:  Boubacar Duque MD                     Sincerely,        Boubacar Duque MD    Electronically signed

## 2025-02-20 ENCOUNTER — DISCHARGE SUMMARY NURSING HOME (OUTPATIENT)
Dept: GERIATRICS | Facility: CLINIC | Age: 70
End: 2025-02-20
Payer: COMMERCIAL

## 2025-02-20 VITALS
SYSTOLIC BLOOD PRESSURE: 117 MMHG | BODY MASS INDEX: 18.75 KG/M2 | DIASTOLIC BLOOD PRESSURE: 62 MMHG | WEIGHT: 131 LBS | HEART RATE: 77 BPM | TEMPERATURE: 97.5 F | RESPIRATION RATE: 16 BRPM | OXYGEN SATURATION: 95 % | HEIGHT: 70 IN

## 2025-02-20 DIAGNOSIS — R53.81 PHYSICAL DEBILITY: ICD-10-CM

## 2025-02-20 DIAGNOSIS — I50.22 CHRONIC SYSTOLIC CONGESTIVE HEART FAILURE (H): ICD-10-CM

## 2025-02-20 DIAGNOSIS — J96.01 ACUTE RESPIRATORY FAILURE WITH HYPOXIA (H): ICD-10-CM

## 2025-02-20 DIAGNOSIS — Z86.79 HISTORY OF ATRIAL FLUTTER: ICD-10-CM

## 2025-02-20 DIAGNOSIS — J43.2 CENTRILOBULAR EMPHYSEMA (H): Primary | ICD-10-CM

## 2025-02-20 DIAGNOSIS — Z85.47 HISTORY OF TESTICULAR CANCER: ICD-10-CM

## 2025-02-20 DIAGNOSIS — Z87.81 HISTORY OF COMPRESSION FRACTURE OF SPINE: ICD-10-CM

## 2025-02-20 DIAGNOSIS — M81.0 AGE-RELATED OSTEOPOROSIS WITHOUT CURRENT PATHOLOGICAL FRACTURE: ICD-10-CM

## 2025-02-20 DIAGNOSIS — E11.9 TYPE 2 DIABETES MELLITUS WITHOUT COMPLICATION, WITHOUT LONG-TERM CURRENT USE OF INSULIN (H): ICD-10-CM

## 2025-02-20 DIAGNOSIS — Z86.718 PERSONAL HISTORY OF DVT (DEEP VEIN THROMBOSIS): ICD-10-CM

## 2025-02-20 DIAGNOSIS — K21.00 GASTROESOPHAGEAL REFLUX DISEASE WITH ESOPHAGITIS, UNSPECIFIED WHETHER HEMORRHAGE: ICD-10-CM

## 2025-02-20 DIAGNOSIS — I48.0 PAROXYSMAL ATRIAL FIBRILLATION (H): ICD-10-CM

## 2025-02-20 DIAGNOSIS — Z86.711 HISTORY OF PULMONARY EMBOLISM: ICD-10-CM

## 2025-02-20 RX ORDER — ALBUTEROL SULFATE 90 UG/1
2 INHALANT RESPIRATORY (INHALATION) EVERY 4 HOURS PRN
COMMUNITY

## 2025-02-20 NOTE — LETTER
2/20/2025      Keith Gonzalez  [address]  [place] [address]        [facility] GERIATRICS DISCHARGE SUMMARY  PATIENT'S NAME: Keith Gonzalez  YOB: 1955  MEDICAL RECORD NUMBER:  [id number]  Place of Service where encounter took place:  [facility] [[id number]]    PRIMARY CARE PROVIDER AND CLINIC RESPONSIBLE AFTER TRANSFER:   Chase East MD, [address] [facility] / [facility] [address]    [facility]     Transferring providers: HAILY Chery CNP, Boubacar Duque MD  Recent Hospitalization/ED:  [facility] Hospital stay 1/11/25 to 1/24/25.  Date of SNF Admission:  1/24/25  Date of SNF (anticipated) Discharge:  1/21/25  Discharged to: previous independent home  Cognitive Scores: BIMS: 132/15  Physical Function: Ambulating 700 ft with walker  DME: Home Nebulizer    CODE STATUS/ADVANCE DIRECTIVES DISCUSSION:  No CPR- Do NOT Intubate   ALLERGIES: No known drug allergy    NURSING FACILITY COURSE   Medication Changes/Rationale:   Patient was treated for influenza A beginning on 2/4/25 with Tamiflu and symptoms have resolved.     Summary of nursing facility stay:     History of acute septic pulmonary embolism with acute cor pulmonale (H)  Acute respiratory failure with hypoxia (H)  Centrilobular emphysema (H)  Patient no longer needing supplemental oxygen to maintain O2 sats above 90%.  Continue budesonide nebulizer BID, formoterol fumarate inhaler every 12 hours, Incruse Ellipta inhaler once daily.   Duonebs every 4 hours as needed. Order for nebulizer to have at home.  Eliquis 5 mg BID.   On prednisone taper 30 mg x 4 days, 20 mg x 5 days and 10 mg x 5 days to end 2/7/25.      Paroxysmal atrial fibrillation (H)  Chronic systolic congestive heart failure (H)   History of atrial flutter  S/p cardioversion  ECHO on 1/22/25 with EF 55-60%, sinus rhythm  Continue amiodarone 200 mg QAM, metoprolol 25 mg BID.   Lasix 20 mg with weight  gain of 3 lbs overnight or 5 lbs in one week, or worsening peripheral edema  Daily weights.  Follow up with cardiology on 3/10/25.   Compression socks on BLE's daily.      Scoliosis of thoracolumbar spine, unspecified scoliosis type  Age-related osteoporosis without current pathological fracture  History of compression fracture of spine  Continue alendronate 70 mg weekly on Sundays.  Continue vit D and calcium supplementation  Tylenol and cyclobenzaprine as needed for pain.     History of testicular cancer  Noted in patient's history; in remission     Type 2 diabetes mellitus without complication, without long-term current use of insulin (H)   Continue metformin  mg at dinner.   HgbA1c 6.4 on 1/31/25.   Will discontinue BG checks.      Gastroesophageal reflux disease with esophagitis, unspecified whether hemorrhage   Insurance doesn't cover pantoprazole 40 mg daily if over 13 years of age, so switched to omeprazole 40 mg daily in the morning. Omeprazole was not covered, switched to famotidine 10 mg BID.   Reported a couple episodes of emesis on 2/19/25; changed administration time of famotidine as listed at facility from early AM  and after dinner, to twice daily with breakfast and with dinner.        Discharge Medications:  MED REC REQUIRED  Post Medication Reconciliation Status:  Discharge medications reconciled, continue medications without change       Current Outpatient Medications   Medication Sig Dispense Refill     albuterol (PROAIR HFA/PROVENTIL HFA/VENTOLIN HFA) 108 (90 Base) MCG/ACT inhaler Inhale 2 puffs into the lungs every 4 hours as needed for shortness of breath, wheezing or cough.       acetaminophen (TYLENOL) 325 MG tablet Take 2 tablets (650 mg) by mouth every 4 hours as needed for mild pain.       alendronate (FOSAMAX) 70 MG tablet Take 1 tablet (70 mg) by mouth every 7 days. Sundays       amiodarone (PACERONE) 200 MG tablet Take 1 tablet (200 mg) by mouth daily.       amiodarone (PACERONE)  400 MG tablet Take 1 tablet (400 mg) by mouth daily for 2 days.       apixaban ANTICOAGULANT (ELIQUIS) 5 MG tablet Take 1 tablet (5 mg) by mouth 2 times daily.       budesonide (PULMICORT) 0.5 MG/2ML neb solution Take 2 mLs (0.5 mg) by nebulization 2 times daily.       calcium carbonate-vitamin D (OSCAL) 500-5 MG-MCG tablet Take 1 tablet by mouth 2 times daily.       carboxymethylcellulose PF (REFRESH PLUS) 0.5 % ophthalmic solution Place 1 drop into both eyes every hour as needed for dry eyes.       cyclobenzaprine (FLEXERIL) 5 MG tablet Take 1 tablet (5 mg) by mouth 2 times daily as needed for muscle spasms.       famotidine (PEPCID) 10 MG tablet Take 1 tablet (10 mg) by mouth 2 times daily.       ferrous sulfate (FEROSUL) 325 (65 Fe) MG tablet Take 1 tablet (325 mg) by mouth every 7 days. Saturdays       formoterol (PERFOROMIST) 20 MCG/2ML neb solution Take 2 mLs (20 mcg) by nebulization every 12 hours.       furosemide (LASIX) 20 MG tablet Take 1 tablet (20 mg) by mouth daily.       ipratropium - albuterol 0.5 mg/2.5 mg/3 mL (DUONEB) 0.5-2.5 (3) MG/3ML neb solution Take 1 vial (3 mLs) by nebulization every 4 hours as needed for wheezing.       metFORMIN (GLUCOPHAGE XR) 500 MG 24 hr tablet Take 1 tablet (500 mg) by mouth daily (with dinner).       metoprolol tartrate (LOPRESSOR) 25 MG tablet Take 1 tablet (25 mg) by mouth 2 times daily.       umeclidinium (INCRUSE ELLIPTA) 62.5 MCG/ACT inhaler Inhale 1 puff into the lungs daily.          Controlled medications:   not applicable/none     Past Medical History:   Past Medical History:   Diagnosis Date     Acute septic pulmonary embolism with acute cor pulmonale (H) 01/11/2025     Cancer (H)      Continuous tobacco abuse 01/21/2025 1/2025 pt contemplative to quit smoking,strongly counseled to stop       COPD (chronic obstructive pulmonary disease) (H)      Type 2 diabetes mellitus without complications (H) 1/24/2025     Unspecified systolic (congestive) heart  "failure (H) 1/24/2025     Physical Exam:   Vitals: /62   Pulse 77   Temp 97.5  F (36.4  C)   Resp 16   Ht 1.778 m (5' 10\")   Wt 59.4 kg (131 lb)   SpO2 95%   BMI 18.80 kg/m    BMI: Body mass index is 18.8 kg/m .  GENERAL APPEARANCE:  Alert, in no distress, thin  ENT:  Mouth and posterior oropharynx normal, moist mucous membranes  EYES:  EOM, conjunctivae, lids, pupils and irises normal  RESP:  respiratory effort and palpation of chest normal, lungs clear to auscultation  CV:  regular rate and rhythm, no murmur, rub, or gallop, no edema  ABDOMEN:  normal bowel sounds, soft, nontender, no hepatosplenomegaly or other masses  M/S:   generalized weakness; ambulatory with walker; chronic scoliosis  SKIN:  Inspection of skin and subcutaneous tissue baseline  NEURO:   Cranial nerves 2-12 are normal tested and grossly at patient's baseline  PSYCH:  oriented X 3, affect and mood normal     SNF labs: Labs done in SNF are in [facility] EPIC. Please refer to them using [facility]/Care Everywhere.    DISCHARGE PLAN:  Follow up labs: No labs orders/due  Medical Follow Up:      Follow up with primary care provider in 1-2 weeks  Current [facility] scheduled appointments:  Appointments in Next Year      Feb 20, 2025 8:30 AM  Discharge Summary with HAILY Chery CNP  [facility] ([facility] ) [phone]     Mar 05, 2025 9:40 AM  (Arrive by 9:25 AM)  ED/Hospital Follow Up with Brandon Soto DO  [facility] ([facility] ) [phone]     Mar 10, 2025 8:55 AM  (Arrive by 8:40 AM)  Return Cardiology with HAILY Russell CNP  [facility] ([facility] ) [phone]     Mar 26, 2025 10:00 AM  (Arrive by 9:40 AM)  Provider Visit with Chase East MD  [facility] ([facility] ) [phone]         "   Discharge Services: Home Care:  Occupational Therapy, Physical Therapy, and Registered Nurse  Discharge Instructions Verbalized to Patient at Discharge:   None    TOTAL DISCHARGE TIME:   Greater than 30 minutes  Electronically signed by:  HAILY Chery CNP    Documentation of Face to Face and Certification for Home Health Services    I certify that patient: Keith Gonzalez is under my care and that I, or a nurse practitioner or physician's assistant working with me, had a face-to-face encounter that meets the physician face-to-face encounter requirements with this patient on: 2/20/2025.    This encounter with the patient was in whole, or in part, for the following medical condition, which is the primary reason for home health care: History of pulmonary embolism, Acute respiratory failure with hypoxia (H), Centrilobular emphysema (H), M62.81 Muscle weakness (generalized).      I certify that, based on my findings, the following services are medically necessary home health services: Nursing, Occupational Therapy, and Physical Therapy.    My clinical findings support the need for the above services because: Nurse is needed: To assess emphysema and lung sounds after changes in medications or other medical regimen.., Occupational Therapy Services are needed to assess and treat cognitive ability and address ADL safety due to impairment in mobility., and Physical Therapy Services are needed to assess and treat the following functional impairments: physical deconditioning and loss of endurance.    Further, I certify that my clinical findings support that this patient is homebound (i.e. absences from home require considerable and taxing effort and are for medical reasons or [employer] services or infrequently or of short duration when for other reasons) because: Requires assistance of another person or specialized equipment to access medical services because patient: Requires supervision of another for safe  transfer...    Based on the above findings. I certify that this patient is confined to the home and needs intermittent skilled nursing care, physical therapy and/or speech therapy.  The patient is under my care, and I have initiated the establishment of the plan of care.  This patient will be followed by a physician who will periodically review the plan of care.  Physician/Provider to provide follow up care: Chase East    Attending hospital physician (the Medicare certified [facility] provider): HAILY Chery CNP  Physician Signature: See electronic signature associated with these discharge orders.  Date: 2/20/2025      Sincerely,        HAILY Chery CNP    Electronically signed

## 2025-02-20 NOTE — PROGRESS NOTES
[facility] GERIATRICS DISCHARGE SUMMARY  PATIENT'S NAME: Keith Gonzalez  YOB: 1955  MEDICAL RECORD NUMBER:  [id number]  Place of Service where encounter took place:  [facility] ([facility]) [[id number]]    PRIMARY CARE PROVIDER AND CLINIC RESPONSIBLE AFTER TRANSFER:   Chase East MD, [address] [facility] / [place] [address]    [facility]     Transferring providers: HAILY Chery CNP, Boubacar Duque MD  Recent Hospitalization/ED:  [facility] stay 1/11/25 to 1/24/25.  Date of SNF Admission:  1/24/25  Date of SNF (anticipated) Discharge:  1/21/25  Discharged to: previous independent home  Cognitive Scores: BIMS: 132/15  Physical Function: Ambulating 700 ft with walker  DME: Home Nebulizer    CODE STATUS/ADVANCE DIRECTIVES DISCUSSION:  No CPR- Do NOT Intubate   ALLERGIES: No known drug allergy    NURSING FACILITY COURSE   Medication Changes/Rationale:   Patient was treated for influenza A beginning on 2/4/25 with Tamiflu and symptoms have resolved.     Summary of nursing facility stay:     History of acute septic pulmonary embolism with acute cor pulmonale (H)  Acute respiratory failure with hypoxia (H)  Centrilobular emphysema (H)  Patient no longer needing supplemental oxygen to maintain O2 sats above 90%.  Continue budesonide nebulizer BID, formoterol fumarate inhaler every 12 hours, Incruse Ellipta inhaler once daily.   Duonebs every 4 hours as needed. Order for nebulizer to have at home.  Eliquis 5 mg BID.   On prednisone taper 30 mg x 4 days, 20 mg x 5 days and 10 mg x 5 days to end 2/7/25.      Paroxysmal atrial fibrillation (H)  Chronic systolic congestive heart failure (H)   History of atrial flutter  S/p cardioversion  ECHO on 1/22/25 with EF 55-60%, sinus rhythm  Continue amiodarone 200 mg QAM, metoprolol 25 mg BID.   Lasix 20 mg with weight gain of 3 lbs overnight or 5 lbs in one week, or worsening peripheral edema  Daily  weights.  Follow up with cardiology on 3/10/25.   Compression socks on BLE's daily.      Scoliosis of thoracolumbar spine, unspecified scoliosis type  Age-related osteoporosis without current pathological fracture  History of compression fracture of spine  Continue alendronate 70 mg weekly on Sundays.  Continue vit D and calcium supplementation  Tylenol and cyclobenzaprine as needed for pain.     History of testicular cancer  Noted in patient's history; in remission     Type 2 diabetes mellitus without complication, without long-term current use of insulin (H)   Continue metformin  mg at dinner.   HgbA1c 6.4 on 1/31/25.   Will discontinue BG checks.      Gastroesophageal reflux disease with esophagitis, unspecified whether hemorrhage   Insurance doesn't cover pantoprazole 40 mg daily if over 13 years of age, so switched to omeprazole 40 mg daily in the morning. Omeprazole was not covered, switched to famotidine 10 mg BID.   Reported a couple episodes of emesis on 2/19/25; changed administration time of famotidine as listed at facility from early AM  and after dinner, to twice daily with breakfast and with dinner.        Discharge Medications:  MED REC REQUIRED  Post Medication Reconciliation Status:  Discharge medications reconciled, continue medications without change       Current Outpatient Medications   Medication Sig Dispense Refill    albuterol (PROAIR HFA/PROVENTIL HFA/VENTOLIN HFA) 108 (90 Base) MCG/ACT inhaler Inhale 2 puffs into the lungs every 4 hours as needed for shortness of breath, wheezing or cough.      acetaminophen (TYLENOL) 325 MG tablet Take 2 tablets (650 mg) by mouth every 4 hours as needed for mild pain.      alendronate (FOSAMAX) 70 MG tablet Take 1 tablet (70 mg) by mouth every 7 days. Sundays      amiodarone (PACERONE) 200 MG tablet Take 1 tablet (200 mg) by mouth daily.      amiodarone (PACERONE) 400 MG tablet Take 1 tablet (400 mg) by mouth daily for 2 days.      apixaban  ANTICOAGULANT (ELIQUIS) 5 MG tablet Take 1 tablet (5 mg) by mouth 2 times daily.      budesonide (PULMICORT) 0.5 MG/2ML neb solution Take 2 mLs (0.5 mg) by nebulization 2 times daily.      calcium carbonate-vitamin D (OSCAL) 500-5 MG-MCG tablet Take 1 tablet by mouth 2 times daily.      carboxymethylcellulose PF (REFRESH PLUS) 0.5 % ophthalmic solution Place 1 drop into both eyes every hour as needed for dry eyes.      cyclobenzaprine (FLEXERIL) 5 MG tablet Take 1 tablet (5 mg) by mouth 2 times daily as needed for muscle spasms.      famotidine (PEPCID) 10 MG tablet Take 1 tablet (10 mg) by mouth 2 times daily.      ferrous sulfate (FEROSUL) 325 (65 Fe) MG tablet Take 1 tablet (325 mg) by mouth every 7 days. Saturdays      formoterol (PERFOROMIST) 20 MCG/2ML neb solution Take 2 mLs (20 mcg) by nebulization every 12 hours.      furosemide (LASIX) 20 MG tablet Take 1 tablet (20 mg) by mouth daily.      ipratropium - albuterol 0.5 mg/2.5 mg/3 mL (DUONEB) 0.5-2.5 (3) MG/3ML neb solution Take 1 vial (3 mLs) by nebulization every 4 hours as needed for wheezing.      metFORMIN (GLUCOPHAGE XR) 500 MG 24 hr tablet Take 1 tablet (500 mg) by mouth daily (with dinner).      metoprolol tartrate (LOPRESSOR) 25 MG tablet Take 1 tablet (25 mg) by mouth 2 times daily.      umeclidinium (INCRUSE ELLIPTA) 62.5 MCG/ACT inhaler Inhale 1 puff into the lungs daily.          Controlled medications:   not applicable/none     Past Medical History:   Past Medical History:   Diagnosis Date    Acute septic pulmonary embolism with acute cor pulmonale (H) 01/11/2025    Cancer (H)     Continuous tobacco abuse 01/21/2025 1/2025 pt contemplative to quit smoking,strongly counseled to stop      COPD (chronic obstructive pulmonary disease) (H)     Type 2 diabetes mellitus without complications (H) 1/24/2025    Unspecified systolic (congestive) heart failure (H) 1/24/2025     Physical Exam:   Vitals: /62   Pulse 77   Temp 97.5  F (36.4  C)    "Resp 16   Ht 1.778 m (5' 10\")   Wt 59.4 kg (131 lb)   SpO2 95%   BMI 18.80 kg/m    BMI: Body mass index is 18.8 kg/m .  GENERAL APPEARANCE:  Alert, in no distress, thin  ENT:  Mouth and posterior oropharynx normal, moist mucous membranes  EYES:  EOM, conjunctivae, lids, pupils and irises normal  RESP:  respiratory effort and palpation of chest normal, lungs clear to auscultation  CV:  regular rate and rhythm, no murmur, rub, or gallop, no edema  ABDOMEN:  normal bowel sounds, soft, nontender, no hepatosplenomegaly or other masses  M/S:   generalized weakness; ambulatory with walker; chronic scoliosis  SKIN:  Inspection of skin and subcutaneous tissue baseline  NEURO:   Cranial nerves 2-12 are normal tested and grossly at patient's baseline  PSYCH:  oriented X 3, affect and mood normal     SNF labs: Labs done in SNF are in [facility] EPIC. Please refer to them using EPIC/Care Everywhere.    DISCHARGE PLAN:  Follow up labs: No labs orders/due  Medical Follow Up:      Follow up with primary care provider in 1-2 weeks  Current [facility] scheduled appointments:  Appointments in Next Year      Feb 20, 2025 8:30 AM  Discharge Summary with HAILY Chery CNP  [facility] ([facility] ) [phone]     Mar 05, 2025 9:40 AM  (Arrive by 9:25 AM)  ED/Hospital Follow Up with Brandon Soto DO  [facility] ([facility] ) [phone]     Mar 10, 2025 8:55 AM  (Arrive by 8:40 AM)  Return Cardiology with HAILY Russell CNP  [facility] ([facility] ) [phone]     Mar 26, 2025 10:00 AM  (Arrive by 9:40 AM)  Provider Visit with Chase East MD  [facility] ([facility] ) [phone]           Discharge Services: Home Care:  Occupational Therapy, Physical Therapy, and Registered Nurse  Discharge " Instructions Verbalized to Patient at Discharge:   None    TOTAL DISCHARGE TIME:   Greater than 30 minutes  Electronically signed by:  HAILY Chery CNP    Documentation of Face to Face and Certification for Home Health Services    I certify that patient: Keith Gonzalez is under my care and that I, or a nurse practitioner or physician's assistant working with me, had a face-to-face encounter that meets the physician face-to-face encounter requirements with this patient on: 2/20/2025.    This encounter with the patient was in whole, or in part, for the following medical condition, which is the primary reason for home health care: History of pulmonary embolism, Acute respiratory failure with hypoxia (H), Centrilobular emphysema (H), M62.81 Muscle weakness (generalized).      I certify that, based on my findings, the following services are medically necessary home health services: Nursing, Occupational Therapy, and Physical Therapy.    My clinical findings support the need for the above services because: Nurse is needed: To assess emphysema and lung sounds after changes in medications or other medical regimen.., Occupational Therapy Services are needed to assess and treat cognitive ability and address ADL safety due to impairment in mobility., and Physical Therapy Services are needed to assess and treat the following functional impairments: physical deconditioning and loss of endurance.    Further, I certify that my clinical findings support that this patient is homebound (i.e. absences from home require considerable and taxing effort and are for medical reasons or [facility] services or infrequently or of short duration when for other reasons) because: Requires assistance of another person or specialized equipment to access medical services because patient: Requires supervision of another for safe transfer...    Based on the above findings. I certify that this patient is confined to the home and needs intermittent  skilled nursing care, physical therapy and/or speech therapy.  The patient is under my care, and I have initiated the establishment of the plan of care.  This patient will be followed by a physician who will periodically review the plan of care.  Physician/Provider to provide follow up care: Chase East    Attending hospital physician (the Medicare certified [facility] provider): HAILY Chery CNP  Physician Signature: See electronic signature associated with these discharge orders.  Date: 2/20/2025

## 2025-02-22 ENCOUNTER — NURSE TRIAGE (OUTPATIENT)
Dept: NURSING | Facility: CLINIC | Age: 70
End: 2025-02-22
Payer: COMMERCIAL

## 2025-02-22 DIAGNOSIS — K21.00 GASTROESOPHAGEAL REFLUX DISEASE WITH ESOPHAGITIS, UNSPECIFIED WHETHER HEMORRHAGE: ICD-10-CM

## 2025-02-22 DIAGNOSIS — I82.413 ACUTE BILATERAL DEEP VEIN THROMBOSIS (DVT) OF FEMORAL VEINS (H): ICD-10-CM

## 2025-02-22 DIAGNOSIS — I26.01 ACUTE SEPTIC PULMONARY EMBOLISM WITH ACUTE COR PULMONALE (H): ICD-10-CM

## 2025-02-22 DIAGNOSIS — I27.20 PULMONARY HYPERTENSION (H): ICD-10-CM

## 2025-02-22 DIAGNOSIS — I48.0 PAROXYSMAL ATRIAL FIBRILLATION (H): ICD-10-CM

## 2025-02-22 DIAGNOSIS — E11.9 TYPE 2 DIABETES MELLITUS WITHOUT COMPLICATION, WITHOUT LONG-TERM CURRENT USE OF INSULIN (H): ICD-10-CM

## 2025-02-22 NOTE — TELEPHONE ENCOUNTER
Home care nurse call on behalf of Pt. While setting up Pts medications, he was found to be almost out of many medications that will need refilling. On discussion, found that he is covered through the weekend and into Monday morning for ALL medicaitons, but Monday evening will be out of the following     Metoprolol 25mg BID  Apixiban 5mg BID  Amiodarone 200mg Once Daily  Metformin 500mg Once in evening   Famotidine 10mg BID  Furosemide 20mg once daily.    Pt not currently out of any medications, will route to PCP clinic (with high priority due to these meds being needed Monday evening). Home care RN reports that Pt is able to receive phone calls and would appreciate callback when Rxs are reviewed. Home care RN reports she will call clinic Monday as well if they have not heard back.     Reason for Disposition   [1] Prescription refill request for NON-ESSENTIAL medicine (i.e., no harm to patient if med not taken) AND [2] triager unable to refill per department policy   Caller requesting a CONTROLLED substance prescription refill (e.g., narcotics, ADHD medicines)    Protocols used: Medication Refill and Renewal Call-A-

## 2025-02-24 ENCOUNTER — PATIENT OUTREACH (OUTPATIENT)
Dept: CARE COORDINATION | Facility: CLINIC | Age: 70
End: 2025-02-24
Payer: COMMERCIAL

## 2025-02-24 ENCOUNTER — TELEPHONE (OUTPATIENT)
Dept: FAMILY MEDICINE | Facility: CLINIC | Age: 70
End: 2025-02-24
Payer: COMMERCIAL

## 2025-02-24 RX ORDER — METOPROLOL TARTRATE 25 MG/1
25 TABLET, FILM COATED ORAL 2 TIMES DAILY
Qty: 30 TABLET | Refills: 0 | Status: SHIPPED | OUTPATIENT
Start: 2025-02-24

## 2025-02-24 RX ORDER — AMIODARONE HYDROCHLORIDE 200 MG/1
200 TABLET ORAL DAILY
Qty: 30 TABLET | Refills: 0 | Status: SHIPPED | OUTPATIENT
Start: 2025-02-24

## 2025-02-24 RX ORDER — FUROSEMIDE 20 MG/1
20 TABLET ORAL DAILY
Qty: 30 TABLET | Refills: 0 | Status: SHIPPED | OUTPATIENT
Start: 2025-02-24

## 2025-02-24 RX ORDER — METFORMIN HYDROCHLORIDE 500 MG/1
500 TABLET, EXTENDED RELEASE ORAL
Qty: 60 TABLET | Refills: 0 | Status: SHIPPED | OUTPATIENT
Start: 2025-02-24

## 2025-02-24 RX ORDER — FAMOTIDINE 10 MG
10 TABLET ORAL 2 TIMES DAILY
Qty: 60 TABLET | Refills: 0 | Status: SHIPPED | OUTPATIENT
Start: 2025-02-24

## 2025-02-24 NOTE — TELEPHONE ENCOUNTER
RN spoke with patient and home care, they will  medications and follow-up on 03/05.     José Miller RN on 2/24/2025 at 11:12 AM

## 2025-02-24 NOTE — TELEPHONE ENCOUNTER
Home Care is calling regarding an established patient with [facility].  Requesting orders from: Chase East  RN APPROVED: RN able to provide verbal orders.  Home Care will send orders for signature.  RN will close encounter.  Is this a request for a temporary pause in the home care episode?  No    Orders Requested    Skilled Nursing  Request for initial certification (first set of orders)   Frequency: 2x week for 3 weeks, then 1 x week for 3 weeks, then every other week for 2 weeks and 2 PRN visits throughout.   RN gave verbal order: Yes        Caller: [facility] Care Agency: [facility]  Phone number Home Care can be reached at: [phone]   Okay to leave a detailed message?: Yes    José Miller RN

## 2025-02-24 NOTE — TELEPHONE ENCOUNTER
Medications pended, was modified to historical when patient was discharged from inpatient. Hospital F/U appt scheduled for 03/05/2025 with Dr. Soto.     José Miller RN on 2/24/2025 at 9:17 AM

## 2025-02-25 DIAGNOSIS — Z09 HOSPITAL DISCHARGE FOLLOW-UP: ICD-10-CM

## 2025-02-25 ASSESSMENT — ACTIVITIES OF DAILY LIVING (ADL): DEPENDENT_IADLS:: INDEPENDENT

## 2025-02-25 NOTE — PROGRESS NOTES
Clinic Care Coordination Contact  Transitions of Care Outreach  Chief Complaint   Patient presents with    Clinic Care Coordination - Homecare/TCU     RNCC post TCU follow up       Most Recent Admission Date: 1/24/2025  Most Recent Admission Diagnosis: History of acute septic pulmonary embolism with acute cor pulmonale     Most Recent Discharge Date: 2/20/2025  Most Recent Discharge Diagnosis: History of acute septic pulmonary embolism with acute cor pulmonale     Transitions of Care Assessment    Discharge Assessment  How are you doing now that you are home?: per patient report, he is doing well at home. has all medications. has follow up in clinic on 3/5/2025  How are your symptoms? (Red Flag symptoms escalate to triage hotline per guidelines): Improved  Do you know how to contact your clinic care team if you have future questions or changes to your health status? : Yes  Does the patient have their discharge instructions? : Yes  Does the patient have questions regarding their discharge instructions? : No  Were you started on any new medications or were there changes to any of your previous medications? : No  Does the patient have all of their medications?: Yes  Do you have questions regarding any of your medications? : No  Do you have all of your needed medical supplies or equipment (DME)?  (i.e. oxygen tank, CPAP, cane, etc.): Yes         Post-op (Clinicians Only)  Did the patient have surgery or a procedure: No  Fever: No  Chills: No  Eating & Drinking: eating and drinking without complaints/concerns  Bowel Function: normal  Urinary Status: voiding without complaint/concerns      Follow up Plan     Discharge Follow-Up  Discharge follow up appointment scheduled in alignment with recommended follow up timeframe or Transitions of Risk Category? (Low = within 30 days; Moderate= within 14 days; High= within 7 days): Yes  Discharge Follow Up Appointment Date: 03/05/25  Discharge Follow Up Appointment Scheduled with?:  Primary Care Provider    Future Appointments   Date Time Provider Department Center   3/5/2025  9:40 AM Brandon Soto DO [facility]   3/10/2025  8:55 AM Rocío Mcintosh APRN CNP [facility]   3/26/2025 10:00 AM Chase East MD [facility]       Outpatient Plan as outlined on AVS reviewed with patient.    For any urgent concerns, please contact our 24 hour nurse triage line: [phone] ([phone])       Patient declines care coordination at this time.    Aleksandra Starr RN

## 2025-02-26 ENCOUNTER — TELEPHONE (OUTPATIENT)
Dept: FAMILY MEDICINE | Facility: CLINIC | Age: 70
End: 2025-02-26
Payer: COMMERCIAL

## 2025-02-26 NOTE — TELEPHONE ENCOUNTER
MTM referral from: Transitions of Care (recent hospital discharge, TCU discharge, or ED visit)    MTM referral outreach attempt #1 on February 26, 2025 at 2:45 PM      Outcome: Patient is not interested at this time because he states he as a lot of people helping him out at this time    Use vbc  for the carrier/Plan on the flowsheet      Danna Hines CMA  MT

## 2025-03-03 ENCOUNTER — TELEPHONE (OUTPATIENT)
Dept: FAMILY MEDICINE | Facility: CLINIC | Age: 70
End: 2025-03-03
Payer: COMMERCIAL

## 2025-03-03 DIAGNOSIS — I48.0 PAROXYSMAL ATRIAL FIBRILLATION (H): ICD-10-CM

## 2025-03-03 RX ORDER — METOPROLOL TARTRATE 25 MG/1
25 TABLET, FILM COATED ORAL 2 TIMES DAILY
Qty: 30 TABLET | Refills: 0 | Status: SHIPPED | OUTPATIENT
Start: 2025-03-03

## 2025-03-03 NOTE — TELEPHONE ENCOUNTER
Reason for Call:  Form, our goal is to have forms completed with 72 hours, however, some forms may require a visit or additional information.    Type of letter, form or note:  Home Health Certification    Who is the form from?: Home care, [facility]    Where did the form come from: form was faxed in    What clinic location was the form placed at?: [facility]     Where the form was placed: Given to physician, Dr. East    What number is listed as a contact on the form?:   Fax # [phone]  Phone# [phone]       Additional comments: put in Dr. East's basket    Call taken on 3/3/2025 at 10:18 AM by Ynes Villar

## 2025-03-07 ENCOUNTER — TELEPHONE (OUTPATIENT)
Dept: FAMILY MEDICINE | Facility: CLINIC | Age: 70
End: 2025-03-07
Payer: COMMERCIAL

## 2025-03-07 NOTE — PROGRESS NOTES
~Cardiology Clinic Visit~    Primary Cardiologist: enmanuel [place] MD  Reason for visit: hospital follow up    History of Present Illness    Keith Gonzalez is a very pleasant 69 year old male with a past medical history notable for emphysema, remote history of DVT, osteoporosis, GERD, testicular cancer in remission, new atrial flutter.    Patient is new to our cardiology clinic and establishing care.  In review, patient presented to [place] emergency department for evaluation of acute hypercapnic respiratory failure and COPD exacerbation.  He was found to have extensive bilateral lower extremity DVTs and acute cor pulmonale associated with pulmonary embolism, evidence of some right heart strain on echocardiogram.  Patient was transferred to [facility].  Of note, while interventional radiology consulted and given there were no reasonable targets for catheter-based therapy and his PE was considered submassive, no systemic lytics or thrombectomy performed.    In the [facility] ER, patient was having rapid atrial flutter and was cardioverted.  He was started on IV amiodarone with recurrence of the arrhythmia and subsequently cardioverted a second time.  He is blood pressure improved with restoration of sinus rhythm.  It was felt that the arrhythmia was triggered by worsening of his COPD and concurrent PE.    EKG today in clinic shows SR.  Patient says he is feeling well, continues on Amiodarone.  Has not had any issues with palpitations or irregular heart beat at home to his recall.  No issues with bleeding on OAC.  This will be lifelong therapy, discussed today.  He continues on metoprolol.  __________________________________________________________________         Assessment and Impression:     Paroxysmal atrial fibrillation/flutter, no recurrence.  Submassive PE with bilateral DVT, on lifelong OAC  COPD  Active tobacco dependence  Hx testicular cancer         Recommendations and Plan:      Discontinue amiodarone.  Continue lifelong Eliquis for PE.  For now, continue Metoprolol > can consider discontinuation at follow up if there are no issues with arrhythmia recurrence.  Consider heart monitor to quantify if pt unsure.  Today, he notes that he feels well and has not noticed any issues with irregular HR or palpitations.  JIMMY follow up visit in 6-months.  __________________________________________________________________    Thank you for the opportunity to participate in this pleasant patient's care.    We would be happy to see this patient sooner for any concerns in the meantime.    HAILY Russell, CNP   Nurse Practitioner  [facility]    Today's clinic visit entailed:  The following tests were independently interpreted by me as noted in my documentation: EKG, labs, echo  Prescription drug management  The level of medical decision making during this visit was of moderate complexity.    Review of the result(s) of each unique test - cardiac testing, cardiac imaging, labs  Care everywhere reviewed for additional records to facilitate comprehensive patient care.    Orders this Visit:  Orders Placed This Encounter   Procedures    Follow-Up with Cardiology- JIMMY    EKG 12-lead complete w/read - Clinics (performed today)     Orders Placed This Encounter   Medications    metoprolol tartrate (LOPRESSOR) 25 MG tablet     Sig: Take 1 tablet (25 mg) by mouth 2 times daily.     Dispense:  180 tablet     Refill:  2    apixaban ANTICOAGULANT (ELIQUIS) 5 MG tablet     Sig: Take 1 tablet (5 mg) by mouth 2 times daily.     Dispense:  180 tablet     Refill:  4     Medications Discontinued During This Encounter   Medication Reason    amiodarone (PACERONE) 400 MG tablet     carboxymethylcellulose PF (REFRESH PLUS) 0.5 % ophthalmic solution     amiodarone (PACERONE) 200 MG tablet     apixaban ANTICOAGULANT (ELIQUIS) 5 MG tablet Reorder (No AVS)    metoprolol tartrate (LOPRESSOR) 25 MG tablet  Reorder (No AVS)     Encounter Diagnoses   Name Primary?    Acute respiratory failure with hypoxia (H)     Paroxysmal atrial fibrillation (H)     Pulmonary hypertension (H)     Pulmonary Embolism     Acute bilateral deep vein thrombosis (DVT) of femoral veins (H)      CURRENT MEDICATIONS:  Current Outpatient Medications   Medication Sig Dispense Refill    acetaminophen (TYLENOL) 325 MG tablet Take 2 tablets (650 mg) by mouth every 4 hours as needed for mild pain.      albuterol (PROAIR HFA/PROVENTIL HFA/VENTOLIN HFA) 108 (90 Base) MCG/ACT inhaler Inhale 2 puffs into the lungs every 4 hours as needed for shortness of breath, wheezing or cough.      alendronate (FOSAMAX) 70 MG tablet Take 1 tablet (70 mg) by mouth every 7 days. Sundays      apixaban ANTICOAGULANT (ELIQUIS) 5 MG tablet Take 1 tablet (5 mg) by mouth 2 times daily. 180 tablet 4    budesonide (PULMICORT) 0.5 MG/2ML neb solution Take 2 mLs (0.5 mg) by nebulization 2 times daily.      calcium carbonate-vitamin D (OSCAL) 500-5 MG-MCG tablet Take 1 tablet by mouth 2 times daily.      cyclobenzaprine (FLEXERIL) 5 MG tablet Take 1 tablet (5 mg) by mouth 2 times daily as needed for muscle spasms.      famotidine (PEPCID) 10 MG tablet Take 1 tablet (10 mg) by mouth 2 times daily. 60 tablet 0    ferrous sulfate (FEROSUL) 325 (65 Fe) MG tablet Take 1 tablet (325 mg) by mouth every 7 days. Saturdays      formoterol (PERFOROMIST) 20 MCG/2ML neb solution Take 2 mLs (20 mcg) by nebulization every 12 hours. 48 mL 0    furosemide (LASIX) 20 MG tablet Take 1 tablet (20 mg) by mouth daily. 30 tablet 0    ipratropium - albuterol 0.5 mg/2.5 mg/3 mL (DUONEB) 0.5-2.5 (3) MG/3ML neb solution Take 1 vial (3 mLs) by nebulization every 4 hours as needed for wheezing. 90 mL 0    metoprolol tartrate (LOPRESSOR) 25 MG tablet Take 1 tablet (25 mg) by mouth 2 times daily. 180 tablet 2    umeclidinium (INCRUSE ELLIPTA) 62.5 MCG/ACT inhaler Inhale 1 puff into the lungs daily.       "metFORMIN (GLUCOPHAGE XR) 500 MG 24 hr tablet Take 1 tablet (500 mg) by mouth daily (with dinner). (Patient not taking: Reported on 3/10/2025) 60 tablet 0     ALLERGIES     Allergies   Allergen Reactions    No Known Drug Allergy      PAST MEDICAL, SURGICAL, FAMILY, SOCIAL HISTORY:  History was reviewed and updated as needed, see medical record.    Review of Systems:  A 10-point Review Of Systems is otherwise normal except for that which is noted in the HPI and interval summary.    Physical Exam:    Vitals: /70 (BP Location: Right arm, Patient Position: Sitting, Cuff Size: Adult Regular)   Pulse 73   Ht 1.778 m (5' 10\")   Wt 61.7 kg (136 lb)   SpO2 95%   BMI 19.51 kg/m    Constitutional: Appears stated age, well nourished, NAD.  Neck: Supple. Carotid pulses full and equal.   Respiratory: Non-labored. Lungs dim  Cardiovascular: RRR, normal S1 and S2. No M/G/R.  No edema.  GI: Soft, non-distended, non-tender.  Skin: Warm and dry.   Musculoskeletal/Extremities: Kyphosis, uses cane.  Neurologic: No gross focal deficits. Alert, awake.  Psychiatric: Affect appropriate. Mentation normal.    Recent Lab Results:  LIPID RESULTS:  Lab Results   Component Value Date    CHOL 195 08/06/2018    HDL 69 08/06/2018     (H) 08/06/2018    TRIG 57 08/06/2018     LIVER ENZYME RESULTS:  Lab Results   Component Value Date    AST 30 01/31/2025    ALT 68 01/31/2025     CBC RESULTS:  Lab Results   Component Value Date    WBC 9.9 01/31/2025    RBC 4.59 01/31/2025    HGB 12.2 (L) 01/31/2025    HCT 38.9 (L) 01/31/2025    MCV 85 01/31/2025    MCH 26.6 01/31/2025    MCHC 31.4 (L) 01/31/2025    RDW 16.2 (H) 01/31/2025     (L) 01/31/2025     BMP RESULTS:  Lab Results   Component Value Date     01/31/2025    POTASSIUM 4.1 01/31/2025    POTASSIUM 4.1 12/09/2021    CHLORIDE 99 01/31/2025    CHLORIDE 106 12/09/2021    CO2 28 01/31/2025    CO2 27 12/09/2021    ANIONGAP 10 01/31/2025    ANIONGAP 4 12/09/2021    GLC 80 " 01/31/2025     (H) 01/24/2025    GLC 99 12/09/2021    BUN 14.0 01/31/2025    BUN 19 12/09/2021    CR 0.80 01/31/2025    GFRESTIMATED >90 01/31/2025    DAT 8.6 (L) 01/31/2025      A1C RESULTS:  Lab Results   Component Value Date    A1C 6.4 (H) 01/31/2025     INR RESULTS:  Lab Results   Component Value Date    INR 1.61 (H) 01/19/2025    INR 1.25 (H) 01/18/2025    INR 4.6 05/07/2010    INR 3 05/13/2009    INR 3.1 04/08/2009    INR 2.37 (H) 01/16/2006

## 2025-03-07 NOTE — TELEPHONE ENCOUNTER
Call from Dash with [facility] calling to provide update on weight gain.     2/28 131 lb  3/3 133 lb  Today 138 lb    Dash reports trace edema to BLE, lung sounds clear, no SOB or dyspnea on exertion.   Vital signs today HR 69, Resp 16, /87, O2 94% RA    Routing update to provider.     Maggy EVANSN, RN

## 2025-03-10 ENCOUNTER — OFFICE VISIT (OUTPATIENT)
Dept: CARDIOLOGY | Facility: CLINIC | Age: 70
End: 2025-03-10
Payer: COMMERCIAL

## 2025-03-10 VITALS
OXYGEN SATURATION: 95 % | WEIGHT: 136 LBS | DIASTOLIC BLOOD PRESSURE: 70 MMHG | SYSTOLIC BLOOD PRESSURE: 122 MMHG | HEART RATE: 73 BPM | BODY MASS INDEX: 19.47 KG/M2 | HEIGHT: 70 IN

## 2025-03-10 DIAGNOSIS — J96.01 ACUTE RESPIRATORY FAILURE WITH HYPOXIA (H): ICD-10-CM

## 2025-03-10 DIAGNOSIS — I82.413 ACUTE BILATERAL DEEP VEIN THROMBOSIS (DVT) OF FEMORAL VEINS (H): ICD-10-CM

## 2025-03-10 DIAGNOSIS — I48.0 PAROXYSMAL ATRIAL FIBRILLATION (H): Primary | ICD-10-CM

## 2025-03-10 DIAGNOSIS — I26.01 ACUTE SEPTIC PULMONARY EMBOLISM WITH ACUTE COR PULMONALE (H): ICD-10-CM

## 2025-03-10 DIAGNOSIS — I27.20 PULMONARY HYPERTENSION (H): ICD-10-CM

## 2025-03-10 PROCEDURE — 3074F SYST BP LT 130 MM HG: CPT | Performed by: NURSE PRACTITIONER

## 2025-03-10 PROCEDURE — 99214 OFFICE O/P EST MOD 30 MIN: CPT | Performed by: NURSE PRACTITIONER

## 2025-03-10 PROCEDURE — 1126F AMNT PAIN NOTED NONE PRSNT: CPT | Performed by: NURSE PRACTITIONER

## 2025-03-10 PROCEDURE — 93000 ELECTROCARDIOGRAM COMPLETE: CPT | Performed by: NURSE PRACTITIONER

## 2025-03-10 PROCEDURE — 3078F DIAST BP <80 MM HG: CPT | Performed by: NURSE PRACTITIONER

## 2025-03-10 PROCEDURE — G2211 COMPLEX E/M VISIT ADD ON: HCPCS | Performed by: NURSE PRACTITIONER

## 2025-03-10 RX ORDER — METOPROLOL TARTRATE 25 MG/1
25 TABLET, FILM COATED ORAL 2 TIMES DAILY
Qty: 180 TABLET | Refills: 2 | Status: SHIPPED | OUTPATIENT
Start: 2025-03-10

## 2025-03-10 ASSESSMENT — PAIN SCALES - GENERAL: PAINLEVEL_OUTOF10: NO PAIN (0)

## 2025-03-10 NOTE — PATIENT INSTRUCTIONS
Thank you for your visit with the [facility].    Today's Summary:    Stop Amiodarone.  Continue your blood thinner, Eliquis.  This will be a lifelong medication.  Continue to monitor for signs of atrial fibrillation, such as palpitations, heart racing, heart fluttering, and so forth.  If you notice this, please call us at the clinic.    Follow-up:  Cardiology follow up at [facility]: 6-months.     It was a pleasure seeing you today.     HAILY Russell, CNP  Certified Nurse Practitioner  [facility]  March 10, 2025  ________________________________________________________

## 2025-03-10 NOTE — LETTER
3/10/2025    Chase East MD  [address] [place] [address]    RE: Keith Gonzalez       Dear Colleague,     I had the pleasure of seeing Keith Gonzalez in the [facility].              ~Cardiology Clinic Visit~    Primary Cardiologist: enmnauel [place] MD  Reason for visit: hospital follow up    History of Present Illness    Keith Gonzalez is a very pleasant 69 year old male with a past medical history notable for emphysema, remote history of DVT, osteoporosis, GERD, testicular cancer in remission, new atrial flutter.    Patient is new to our cardiology clinic and establishing care.  In review, patient presented to [place] emergency department for evaluation of acute hypercapnic respiratory failure and COPD exacerbation.  He was found to have extensive bilateral lower extremity DVTs and acute cor pulmonale associated with pulmonary embolism, evidence of some right heart strain on echocardiogram.  Patient was transferred to [facility].  Of note, while interventional radiology consulted and given there were no reasonable targets for catheter-based therapy and his PE was considered submassive, no systemic lytics or thrombectomy performed.    In the [facility] ER, patient was having rapid atrial flutter and was cardioverted.  He was started on IV amiodarone with recurrence of the arrhythmia and subsequently cardioverted a second time.  He is blood pressure improved with restoration of sinus rhythm.  It was felt that the arrhythmia was triggered by worsening of his COPD and concurrent PE.    EKG today in clinic shows SR.  Patient says he is feeling well, continues on Amiodarone.  Has not had any issues with palpitations or irregular heart beat at home to his recall.  No issues with bleeding on OAC.  This will be lifelong therapy, discussed today.  He continues on metoprolol.  __________________________________________________________________         Assessment and  Impression:     Paroxysmal atrial fibrillation/flutter, no recurrence.  Submassive PE with bilateral DVT, on lifelong OAC  COPD  Active tobacco dependence  Hx testicular cancer         Recommendations and Plan:     Discontinue amiodarone.  Continue lifelong Eliquis for PE.  For now, continue Metoprolol > can consider discontinuation at follow up if there are no issues with arrhythmia recurrence.  Consider heart monitor to quantify if pt unsure.  Today, he notes that he feels well and has not noticed any issues with irregular HR or palpitations.  JIMMY follow up visit in 6-months.  __________________________________________________________________    Thank you for the opportunity to participate in this pleasant patient's care.    We would be happy to see this patient sooner for any concerns in the meantime.    HAILY Russell, CNP   Nurse Practitioner  [facility]    Today's clinic visit entailed:  The following tests were independently interpreted by me as noted in my documentation: EKG, labs, echo  Prescription drug management  The level of medical decision making during this visit was of moderate complexity.    Review of the result(s) of each unique test - cardiac testing, cardiac imaging, labs  Care everywhere reviewed for additional records to facilitate comprehensive patient care.    Orders this Visit:  Orders Placed This Encounter   Procedures     Follow-Up with Cardiology- JIMMY     EKG 12-lead complete w/read - Clinics (performed today)     Orders Placed This Encounter   Medications     metoprolol tartrate (LOPRESSOR) 25 MG tablet     Sig: Take 1 tablet (25 mg) by mouth 2 times daily.     Dispense:  180 tablet     Refill:  2     apixaban ANTICOAGULANT (ELIQUIS) 5 MG tablet     Sig: Take 1 tablet (5 mg) by mouth 2 times daily.     Dispense:  180 tablet     Refill:  4     Medications Discontinued During This Encounter   Medication Reason     amiodarone (PACERONE) 400 MG tablet       carboxymethylcellulose PF (REFRESH PLUS) 0.5 % ophthalmic solution      amiodarone (PACERONE) 200 MG tablet      apixaban ANTICOAGULANT (ELIQUIS) 5 MG tablet Reorder (No AVS)     metoprolol tartrate (LOPRESSOR) 25 MG tablet Reorder (No AVS)     Encounter Diagnoses   Name Primary?     Acute respiratory failure with hypoxia (H)      Paroxysmal atrial fibrillation (H)      Pulmonary hypertension (H)      Pulmonary Embolism      Acute bilateral deep vein thrombosis (DVT) of femoral veins (H)      CURRENT MEDICATIONS:  Current Outpatient Medications   Medication Sig Dispense Refill     acetaminophen (TYLENOL) 325 MG tablet Take 2 tablets (650 mg) by mouth every 4 hours as needed for mild pain.       albuterol (PROAIR HFA/PROVENTIL HFA/VENTOLIN HFA) 108 (90 Base) MCG/ACT inhaler Inhale 2 puffs into the lungs every 4 hours as needed for shortness of breath, wheezing or cough.       alendronate (FOSAMAX) 70 MG tablet Take 1 tablet (70 mg) by mouth every 7 days. Sundays       apixaban ANTICOAGULANT (ELIQUIS) 5 MG tablet Take 1 tablet (5 mg) by mouth 2 times daily. 180 tablet 4     budesonide (PULMICORT) 0.5 MG/2ML neb solution Take 2 mLs (0.5 mg) by nebulization 2 times daily.       calcium carbonate-vitamin D (OSCAL) 500-5 MG-MCG tablet Take 1 tablet by mouth 2 times daily.       cyclobenzaprine (FLEXERIL) 5 MG tablet Take 1 tablet (5 mg) by mouth 2 times daily as needed for muscle spasms.       famotidine (PEPCID) 10 MG tablet Take 1 tablet (10 mg) by mouth 2 times daily. 60 tablet 0     ferrous sulfate (FEROSUL) 325 (65 Fe) MG tablet Take 1 tablet (325 mg) by mouth every 7 days. Saturdays       formoterol (PERFOROMIST) 20 MCG/2ML neb solution Take 2 mLs (20 mcg) by nebulization every 12 hours. 48 mL 0     furosemide (LASIX) 20 MG tablet Take 1 tablet (20 mg) by mouth daily. 30 tablet 0     ipratropium - albuterol 0.5 mg/2.5 mg/3 mL (DUONEB) 0.5-2.5 (3) MG/3ML neb solution Take 1 vial (3 mLs) by nebulization every 4 hours  "as needed for wheezing. 90 mL 0     metoprolol tartrate (LOPRESSOR) 25 MG tablet Take 1 tablet (25 mg) by mouth 2 times daily. 180 tablet 2     umeclidinium (INCRUSE ELLIPTA) 62.5 MCG/ACT inhaler Inhale 1 puff into the lungs daily.       metFORMIN (GLUCOPHAGE XR) 500 MG 24 hr tablet Take 1 tablet (500 mg) by mouth daily (with dinner). (Patient not taking: Reported on 3/10/2025) 60 tablet 0     ALLERGIES     Allergies   Allergen Reactions     No Known Drug Allergy      PAST MEDICAL, SURGICAL, FAMILY, SOCIAL HISTORY:  History was reviewed and updated as needed, see medical record.    Review of Systems:  A 10-point Review Of Systems is otherwise normal except for that which is noted in the HPI and interval summary.    Physical Exam:    Vitals: /70 (BP Location: Right arm, Patient Position: Sitting, Cuff Size: Adult Regular)   Pulse 73   Ht 1.778 m (5' 10\")   Wt 61.7 kg (136 lb)   SpO2 95%   BMI 19.51 kg/m    Constitutional: Appears stated age, well nourished, NAD.  Neck: Supple. Carotid pulses full and equal.   Respiratory: Non-labored. Lungs dim  Cardiovascular: RRR, normal S1 and S2. No M/G/R.  No edema.  GI: Soft, non-distended, non-tender.  Skin: Warm and dry.   Musculoskeletal/Extremities: Kyphosis, uses cane.  Neurologic: No gross focal deficits. Alert, awake.  Psychiatric: Affect appropriate. Mentation normal.    Recent Lab Results:  LIPID RESULTS:  Lab Results   Component Value Date    CHOL 195 08/06/2018    HDL 69 08/06/2018     (H) 08/06/2018    TRIG 57 08/06/2018     LIVER ENZYME RESULTS:  Lab Results   Component Value Date    AST 30 01/31/2025    ALT 68 01/31/2025     CBC RESULTS:  Lab Results   Component Value Date    WBC 9.9 01/31/2025    RBC 4.59 01/31/2025    HGB 12.2 (L) 01/31/2025    HCT 38.9 (L) 01/31/2025    MCV 85 01/31/2025    MCH 26.6 01/31/2025    MCHC 31.4 (L) 01/31/2025    RDW 16.2 (H) 01/31/2025     (L) 01/31/2025     BMP RESULTS:  Lab Results   Component Value Date "     01/31/2025    POTASSIUM 4.1 01/31/2025    POTASSIUM 4.1 12/09/2021    CHLORIDE 99 01/31/2025    CHLORIDE 106 12/09/2021    CO2 28 01/31/2025    CO2 27 12/09/2021    ANIONGAP 10 01/31/2025    ANIONGAP 4 12/09/2021    GLC 80 01/31/2025     (H) 01/24/2025    GLC 99 12/09/2021    BUN 14.0 01/31/2025    BUN 19 12/09/2021    CR 0.80 01/31/2025    GFRESTIMATED >90 01/31/2025    DAT 8.6 (L) 01/31/2025      A1C RESULTS:  Lab Results   Component Value Date    A1C 6.4 (H) 01/31/2025     INR RESULTS:  Lab Results   Component Value Date    INR 1.61 (H) 01/19/2025    INR 1.25 (H) 01/18/2025    INR 4.6 05/07/2010    INR 3 05/13/2009    INR 3.1 04/08/2009    INR 2.37 (H) 01/16/2006                     Thank you for allowing me to participate in the care of your patient.      Sincerely,     HAILY Russell [facility]  cc:   Brenna Dow PA-C  [address]  [place] [id number]

## 2025-03-17 DIAGNOSIS — K21.00 GASTROESOPHAGEAL REFLUX DISEASE WITH ESOPHAGITIS, UNSPECIFIED WHETHER HEMORRHAGE: ICD-10-CM

## 2025-03-17 DIAGNOSIS — I27.20 PULMONARY HYPERTENSION (H): ICD-10-CM

## 2025-03-17 RX ORDER — FAMOTIDINE 10 MG/1
10 TABLET, FILM COATED ORAL 2 TIMES DAILY
Qty: 60 TABLET | Refills: 0 | Status: SHIPPED | OUTPATIENT
Start: 2025-03-17

## 2025-03-17 RX ORDER — FUROSEMIDE 20 MG/1
20 TABLET ORAL DAILY
Qty: 30 TABLET | Refills: 0 | Status: SHIPPED | OUTPATIENT
Start: 2025-03-17

## 2025-03-22 ENCOUNTER — HEALTH MAINTENANCE LETTER (OUTPATIENT)
Age: 70
End: 2025-03-22

## 2025-03-23 SDOH — HEALTH STABILITY: PHYSICAL HEALTH: ON AVERAGE, HOW MANY DAYS PER WEEK DO YOU ENGAGE IN MODERATE TO STRENUOUS EXERCISE (LIKE A BRISK WALK)?: 3 DAYS

## 2025-03-23 SDOH — HEALTH STABILITY: PHYSICAL HEALTH: ON AVERAGE, HOW MANY MINUTES DO YOU ENGAGE IN EXERCISE AT THIS LEVEL?: 20 MIN

## 2025-03-26 ENCOUNTER — OFFICE VISIT (OUTPATIENT)
Dept: FAMILY MEDICINE | Facility: CLINIC | Age: 70
End: 2025-03-26
Payer: COMMERCIAL

## 2025-03-26 VITALS
BODY MASS INDEX: 20.24 KG/M2 | OXYGEN SATURATION: 95 % | TEMPERATURE: 97.5 F | HEART RATE: 71 BPM | HEIGHT: 70 IN | SYSTOLIC BLOOD PRESSURE: 116 MMHG | WEIGHT: 141.4 LBS | DIASTOLIC BLOOD PRESSURE: 84 MMHG | RESPIRATION RATE: 20 BRPM

## 2025-03-26 DIAGNOSIS — I27.20 PULMONARY HYPERTENSION (H): ICD-10-CM

## 2025-03-26 DIAGNOSIS — J43.9 PULMONARY EMPHYSEMA, UNSPECIFIED EMPHYSEMA TYPE (H): ICD-10-CM

## 2025-03-26 DIAGNOSIS — R73.03 PREDIABETES: Primary | ICD-10-CM

## 2025-03-26 DIAGNOSIS — Z11.59 NEED FOR HEPATITIS C SCREENING TEST: ICD-10-CM

## 2025-03-26 PROBLEM — E87.3 ALKALOSIS: Status: RESOLVED | Noted: 2025-01-24 | Resolved: 2025-03-26

## 2025-03-26 PROBLEM — I70.0 AORTIC ATHEROSCLEROSIS: Status: RESOLVED | Noted: 2024-06-11 | Resolved: 2025-03-26

## 2025-03-26 PROBLEM — M62.838 OTHER MUSCLE SPASM: Status: RESOLVED | Noted: 2025-01-24 | Resolved: 2025-03-26

## 2025-03-26 PROBLEM — M54.50 LUMBAR PAIN: Status: RESOLVED | Noted: 2021-09-27 | Resolved: 2025-03-26

## 2025-03-26 PROBLEM — M81.0 OSTEOPOROSIS: Status: RESOLVED | Noted: 2025-01-27 | Resolved: 2025-03-26

## 2025-03-26 PROBLEM — R13.19 ESOPHAGEAL DYSPHAGIA: Status: RESOLVED | Noted: 2025-01-27 | Resolved: 2025-03-26

## 2025-03-26 PROBLEM — I82.413 ACUTE BILATERAL DEEP VEIN THROMBOSIS (DVT) OF FEMORAL VEINS (H): Status: RESOLVED | Noted: 2025-01-14 | Resolved: 2025-03-26

## 2025-03-26 PROBLEM — M54.50 LOW BACK PAIN, UNSPECIFIED: Status: RESOLVED | Noted: 2025-01-24 | Resolved: 2025-03-26

## 2025-03-26 PROBLEM — M53.3 SI (SACROILIAC) JOINT DYSFUNCTION: Status: RESOLVED | Noted: 2021-09-27 | Resolved: 2025-03-26

## 2025-03-26 PROBLEM — K21.9 GASTROESOPHAGEAL REFLUX DISEASE: Status: RESOLVED | Noted: 2025-01-27 | Resolved: 2025-03-26

## 2025-03-26 PROBLEM — I27.81 COR PULMONALE (H): Status: RESOLVED | Noted: 2025-01-11 | Resolved: 2025-03-26

## 2025-03-26 PROBLEM — J44.9 CHRONIC OBSTRUCTIVE PULMONARY DISEASE (H): Status: RESOLVED | Noted: 2018-06-20 | Resolved: 2025-03-26

## 2025-03-26 PROBLEM — J96.01 ACUTE RESPIRATORY FAILURE WITH HYPOXIA (H): Status: RESOLVED | Noted: 2025-01-14 | Resolved: 2025-03-26

## 2025-03-26 PROBLEM — H04.129 DRY EYE SYNDROME OF UNSPECIFIED LACRIMAL GLAND: Status: RESOLVED | Noted: 2025-01-24 | Resolved: 2025-03-26

## 2025-03-26 PROBLEM — F17.210 NICOTINE DEPENDENCE, CIGARETTES, UNCOMPLICATED: Status: RESOLVED | Noted: 2025-01-27 | Resolved: 2025-03-26

## 2025-03-26 PROBLEM — Z72.0 CONTINUOUS TOBACCO ABUSE: Status: RESOLVED | Noted: 2025-01-21 | Resolved: 2025-03-26

## 2025-03-26 PROBLEM — J96.02 ACUTE RESPIRATORY FAILURE WITH HYPERCAPNIA (H): Status: RESOLVED | Noted: 2025-01-24 | Resolved: 2025-03-26

## 2025-03-26 PROBLEM — E61.1 IRON DEFICIENCY: Status: RESOLVED | Noted: 2025-01-24 | Resolved: 2025-03-26

## 2025-03-26 PROBLEM — R06.02 SHORTNESS OF BREATH: Status: RESOLVED | Noted: 2025-01-24 | Resolved: 2025-03-26

## 2025-03-26 PROBLEM — M40.209 KYPHOSIS: Status: RESOLVED | Noted: 2025-01-27 | Resolved: 2025-03-26

## 2025-03-26 PROBLEM — M41.9 SCOLIOSIS, UNSPECIFIED: Status: RESOLVED | Noted: 2025-01-24 | Resolved: 2025-03-26

## 2025-03-26 PROCEDURE — 1126F AMNT PAIN NOTED NONE PRSNT: CPT | Performed by: FAMILY MEDICINE

## 2025-03-26 PROCEDURE — 99214 OFFICE O/P EST MOD 30 MIN: CPT | Mod: 25 | Performed by: FAMILY MEDICINE

## 2025-03-26 PROCEDURE — 3074F SYST BP LT 130 MM HG: CPT | Performed by: FAMILY MEDICINE

## 2025-03-26 PROCEDURE — 3079F DIAST BP 80-89 MM HG: CPT | Performed by: FAMILY MEDICINE

## 2025-03-26 PROCEDURE — G2211 COMPLEX E/M VISIT ADD ON: HCPCS | Performed by: FAMILY MEDICINE

## 2025-03-26 RX ORDER — FORMOTEROL FUMARATE 20 UG/2ML
20 SOLUTION RESPIRATORY (INHALATION) EVERY 12 HOURS
Qty: 48 ML | Refills: 0 | Status: SHIPPED | OUTPATIENT
Start: 2025-03-26

## 2025-03-26 RX ORDER — IPRATROPIUM BROMIDE AND ALBUTEROL SULFATE 2.5; .5 MG/3ML; MG/3ML
3 SOLUTION RESPIRATORY (INHALATION) EVERY 4 HOURS PRN
Qty: 90 ML | Refills: 0 | Status: SHIPPED | OUTPATIENT
Start: 2025-03-26

## 2025-03-26 RX ORDER — FUROSEMIDE 20 MG/1
20 TABLET ORAL EVERY OTHER DAY
Qty: 30 TABLET | Refills: 0 | Status: SHIPPED | OUTPATIENT
Start: 2025-03-26

## 2025-03-26 ASSESSMENT — PAIN SCALES - GENERAL: PAINLEVEL_OUTOF10: NO PAIN (0)

## 2025-03-26 NOTE — LETTER
March 26, 2025      Keith Gonzalez  [address]  [place] [address]        To Whom It May Concern,     I would support the use of Jaison's dog as an emotional support animal.           Sincerely,        Chase East MD    Electronically signed

## 2025-03-26 NOTE — PROGRESS NOTES
Assessment & Plan       ICD-10-CM    1. Prediabetes  R73.03       2. Need for hepatitis C screening test  Z11.59       3. Pulmonary hypertension (H)  I27.20 furosemide (LASIX) 20 MG tablet      4. Pulmonary emphysema, unspecified emphysema type (H)  J43.9 umeclidinium (INCRUSE ELLIPTA) 62.5 MCG/ACT inhaler     formoterol (PERFOROMIST) 20 MCG/2ML neb solution     ipratropium - albuterol 0.5 mg/2.5 mg/3 mL (DUONEB) 0.5-2.5 (3) MG/3ML neb solution         PE with cor pulmonale. Impvoced. Lifelong DOAC  Pulm HTN, CHF. Euvolemic today. Will attempt wean on lasix to every other day  Prediabetes. Declines metformin for now.   COPD. Back nearly to baseline. Off O2. Cont this regimen of ICS/LABA/LAMA, hopefully find an inhaler that's cost effective. Finances always an issue  Letter provided for support animal. Plans to retire.      The longitudinal plan of care for the diagnosis(es)/condition(s) as documented were addressed during this visit. Due to the added complexity in care, I will continue to support Jaison in the subsequent management and with ongoing continuity of care.     Return in about 2 months (around 5/26/2025).    Chase East MD  [facility]   Jaison is a 69 year old, presenting for the following health issues:  Recheck Medication        3/26/2025     9:38 AM   Additional Questions   Roomed by Lydia FARIAS LPN     History of Present Illness       COPD:  He presents for follow up of COPD.  Overall, COPD symptoms are stable since last visit.  He has same as usual fatigue or shortness of breath with exertion and same as usual shortness of breath at rest.  He sometimes coughs and does not have change in sputum. No recent fever. He can walk the length of 1-2 rooms without stopping to rest. He can walk 2 flights of stairs without resting. The patient has had no ED, urgent care, or hospital admissions because of COPD since the last visit. He exercises with enough effort to  "increase his heart rate 10 to 19 minutes per day.  He exercises with enough effort to increase his heart rate 3 or less days per week.   He is taking medications regularly.      Off O2  Compliant with inhalers, almost back to baseline  On ICS/LABA/LACA      Review of Systems  Constitutional, HEENT, cardiovascular, pulmonary, gi and gu systems are negative, except as otherwise noted.      Objective    /84   Pulse 71   Temp 97.5  F (36.4  C) (Temporal)   Resp 20   Ht 1.778 m (5' 10\")   Wt 64.1 kg (141 lb 6.4 oz)   SpO2 95%   BMI 20.29 kg/m    Body mass index is 20.29 kg/m .  Physical Exam   GENERAL: alert and no distress  NECK: no adenopathy, no asymmetry, masses, or scars  RESP: lungs clear to auscultation DISTANT - no rales, rhonchi or wheezes  CV: regular rate and rhythm, normal S1 S2, no S3 or S4, no murmur, click or rub, no peripheral edema  ABDOMEN: soft, nontender, no hepatosplenomegaly, no masses and bowel sounds normal  MS: no gross musculoskeletal defects noted, no edema            Signed Electronically by: Chase East MD    "

## 2025-03-27 DIAGNOSIS — J43.9 PULMONARY EMPHYSEMA, UNSPECIFIED EMPHYSEMA TYPE (H): Primary | ICD-10-CM

## 2025-03-27 RX ORDER — ALBUTEROL SULFATE 90 UG/1
INHALANT RESPIRATORY (INHALATION)
Qty: 8.5 G | Refills: 0 | Status: SHIPPED | OUTPATIENT
Start: 2025-03-27

## 2025-03-27 NOTE — TELEPHONE ENCOUNTER
Called patient to gather more information on this request. He states he uses the albuterol inhaler PRN for COPD symptoms. States he discussed this with PCP yesterday, but when he called the pharmacy today he found out PCP did not send the albuterol. Please review and approve or deny patient request.     Chelo Finch, NATHANN, RN

## 2025-04-03 ENCOUNTER — NURSE TRIAGE (OUTPATIENT)
Dept: FAMILY MEDICINE | Facility: CLINIC | Age: 70
End: 2025-04-03
Payer: COMMERCIAL

## 2025-04-03 DIAGNOSIS — K21.00 GASTROESOPHAGEAL REFLUX DISEASE WITH ESOPHAGITIS WITHOUT HEMORRHAGE: Primary | ICD-10-CM

## 2025-04-03 RX ORDER — OMEPRAZOLE 20 MG/1
20 CAPSULE, DELAYED RELEASE ORAL DAILY
Qty: 90 CAPSULE | Refills: 3 | Status: SHIPPED | OUTPATIENT
Start: 2025-04-03

## 2025-04-03 NOTE — TELEPHONE ENCOUNTER
Nurse Triage SBAR    Is this a 2nd Level Triage? NO    Situation: patient is calling in asking if he can go back to Prilosec instead of Famotidine.  He said he used to be on prilosec before he was in the hospital.  When he was in the [place] home he was switched over the Famotidine. He reports it does not work as well. He said he started having increased heartburn this weekend when he had a greasy cheeseburger.  He said now it seems that no matter what he eats he has heartburn. He takes Tums but they don't seem to  really help. He eats saltine crackers and that seems to absorb the acid some.   Patient denies chest pain or shortness of breath. States it feels like the same heartburn he used to have long ago before he started the Prilosec. He is sometimes burping up/vomiting after he eats.      Background: increased heartburn since Saturday.     Assessment: advised to be seen in next couple of weekend.     Protocol Recommended Disposition:   See in Office Within 2 Weeks    Recommendation: patient verbalized understanding. Declines making an appointment at this time, wants message sent to provider asking about medication change.  Will await a call back.      Routed to provider    Does the patient meet one of the following criteria for ADS visit consideration? 16+ years old, with an [facility] PCP     TIP  Providers, please consider if this condition is appropriate for management at one of our Acute and Diagnostic Services sites.     If patient is a good candidate, please use dotphrase <dot>triageresponse and select Refer to ADS to document.      Reason for Disposition   Intermittent pains shoot into chest, with sour taste in mouth (Exception: Symptoms same as previously diagnosed reflux and not tried antacids.)    Additional Information   Negative: SEVERE abdominal pain (e.g., excruciating)   Negative: Pain lasting > 10 minutes and over 50 years old   Negative: Pain lasting > 10 minutes and over 40 years old and associated  chest, arm, neck, upper back, or jaw pain   Negative: Pain lasting > 10 minutes and over 35 years old and at least one cardiac risk factor (e.g., diabetes, high cholesterol, hypertension, obesity, smoker or strong family history of heart disease)   Negative: Pain lasting > 10 minutes and history of heart disease (i.e., heart attack, bypass surgery, angina, angioplasty, CHF)   Negative: Pain lasts > 10 minutes and difficulty breathing   Negative: Vomiting red blood or black (coffee ground) material  (Exception: Few streaks and only occurred once.)   Negative: Blood in bowel movements  (Exception: Blood on surface of BM with constipation.)   Negative: Black or tarry bowel movements  (Exception: Chronic-unchanged black-grey BMs AND is taking iron pills or Pepto-Bismol.)   Negative: Followed an abdomen (stomach) injury   Negative: Chest pain   Negative: Abdominal pain and pregnant < 20 weeks   Negative: Abdominal pain and pregnant 20 or more weeks   Negative: Abdomen bloating or swelling are main symptoms   Negative: MILD TO MODERATE constant pain lasting > 2 hours   Negative: MILD TO MODERATE pain and not relieved by antacid medicine   Negative: Vomiting bile (green color)   Negative: Patient sounds very sick or weak to the triager   Negative: Pregnant 20 weeks or more and new hand or face swelling   Negative: Vomiting and abdomen looks much more swollen than usual   Negative: White of the eyes have turned yellow (i.e., jaundice)   Negative: Fever > 103 F (39.4 C)   Negative: Fever > 101 F (38.3 C) and over 60 years of age   Negative: Fever > 100 F (37.8 C) and has diabetes mellitus or a weak immune system (e.g., HIV positive, cancer chemotherapy, organ transplant, splenectomy, chronic steroids)   Negative: Fever > 100 F (37.8 C) and bedridden (e.g., CVA, chronic illness, recovering from surgery)   Negative: Patient wants to be seen   Negative: MODERATE pain that comes and goes (cramps) lasts > 24 hours   Negative:  MILD pain that comes and goes (cramps) > 72 hours  (Exception: This same abdominal pain is a chronic symptom recurrent or ongoing AND present > 4 weeks.)   Negative: Unhealthy alcohol use, known or suspected   Negative: Abdominal pain is a chronic symptom (recurrent or ongoing AND lasting > 4 weeks)    Protocols used: Abdominal Pain - Upper-A-[place]

## 2025-04-27 ENCOUNTER — TELEPHONE (OUTPATIENT)
Dept: FAMILY MEDICINE | Facility: CLINIC | Age: 70
End: 2025-04-27
Payer: COMMERCIAL

## 2025-04-27 DIAGNOSIS — J43.9 PULMONARY EMPHYSEMA, UNSPECIFIED EMPHYSEMA TYPE (H): Primary | ICD-10-CM

## 2025-04-28 ENCOUNTER — MYC MEDICAL ADVICE (OUTPATIENT)
Dept: FAMILY MEDICINE | Facility: CLINIC | Age: 70
End: 2025-04-28
Payer: COMMERCIAL

## 2025-04-28 RX ORDER — GUAIFENESIN 200 MG/1
400 TABLET ORAL 2 TIMES DAILY PRN
Qty: 120 TABLET | Refills: 5 | Status: SHIPPED | OUTPATIENT
Start: 2025-04-28

## 2025-04-28 NOTE — TELEPHONE ENCOUNTER
Patient called back. States that he was prescribed guaifenesin 400mg twice/day while he was hospitalized and discharged from [place] and instructed to stay on this for chronic/ongoing use. Was last prescribed by home health provider. Was advised to take this twice/day. Out of medication after today and needing refilled ASAP.     Preferred pharmacy [facility]    Alea Mims, AMMY, BSN

## 2025-04-28 NOTE — TELEPHONE ENCOUNTER
Patient called in requesting this refill be processed today. States he is out of medication now.     Patient states he takes guaifenesin 1 tablet (400 mg) two times daily.     Patient states he got this prescribed to him at the beginning of the year when he was in the hospital. States he was advised to take this daily, and states he will continue to take it daily until told otherwise. He was advised that since Dr. East has not prescribed this to him, this would typically require an appointment to discuss. He stated again that he needs this filled today because he is out of medication after today. Please review.     Chelo Finch, NATHANN, RN

## 2025-04-28 NOTE — TELEPHONE ENCOUNTER
Patient Contact    Attempt # 1    Was call answered?  No.  Left message on voicemail with information to call me back. and sent MCM    Sravani Rizzo RN on 4/28/2025 at 8:01 AM

## 2025-04-28 NOTE — TELEPHONE ENCOUNTER
Medication Question or Refill        What medication are you calling about (include dose and sig)?: guaifenesin 400 mg    Preferred Pharmacy:       [facility] [place] - [address] [place] [address]  Phone: [phone] Fax: [phone]      Controlled Substance Agreement on file:   CSA -- Patient Level:    CSA: None found at the patient level.       Who prescribed the medication?: Dr Holcomb [facility]     Do you need a refill? Yes    When did you use the medication last? 4/27 and now he is out.  He thought that he had another bottle but now found that he is out fully.    Patient offered an appointment? Yes: 5/30 with Dr East    Do you have any questions or concerns?  No  He will just be in need of getting refill asap because he is out.    Could we send this information to you in [place] or would you prefer to receive a phone call?:   Patient would like to be contacted via [place]

## 2025-04-30 NOTE — TELEPHONE ENCOUNTER
I would disagree. Doesn't necessaritly need to stay on it, only if he feels less coughing, less short of breath?

## 2025-04-30 NOTE — TELEPHONE ENCOUNTER
Dr. East,     Dr. Bergman signed prescription on 04/28 with a 6 month supply. Patient has appointment with you on 05/30.     Would you like RN staff to contact patient before appointment, or ok to take until you can discuss with patient directly during appointment?     José Miller RN on 4/30/2025 at 9:14 AM

## 2025-05-03 ENCOUNTER — HEALTH MAINTENANCE LETTER (OUTPATIENT)
Age: 70
End: 2025-05-03

## 2025-05-05 DIAGNOSIS — J43.9 PULMONARY EMPHYSEMA, UNSPECIFIED EMPHYSEMA TYPE (H): ICD-10-CM

## 2025-05-06 ENCOUNTER — MEDICAL CORRESPONDENCE (OUTPATIENT)
Dept: HEALTH INFORMATION MANAGEMENT | Facility: CLINIC | Age: 70
End: 2025-05-06
Payer: COMMERCIAL

## 2025-05-06 RX ORDER — ALBUTEROL SULFATE 90 UG/1
INHALANT RESPIRATORY (INHALATION)
Qty: 8.5 G | Refills: 3 | Status: SHIPPED | OUTPATIENT
Start: 2025-05-06

## 2025-05-13 DIAGNOSIS — K21.00 GASTROESOPHAGEAL REFLUX DISEASE WITH ESOPHAGITIS WITHOUT HEMORRHAGE: ICD-10-CM

## 2025-05-13 DIAGNOSIS — M81.0 AGE-RELATED OSTEOPOROSIS WITHOUT CURRENT PATHOLOGICAL FRACTURE: ICD-10-CM

## 2025-05-20 RX ORDER — OMEPRAZOLE 20 MG/1
20 CAPSULE, DELAYED RELEASE ORAL 2 TIMES DAILY
Qty: 180 CAPSULE | Refills: 3 | Status: SHIPPED | OUTPATIENT
Start: 2025-05-20

## 2025-06-10 ENCOUNTER — TELEPHONE (OUTPATIENT)
Dept: FAMILY MEDICINE | Facility: CLINIC | Age: 70
End: 2025-06-10
Payer: MEDICARE

## 2025-06-10 NOTE — TELEPHONE ENCOUNTER
Patient Quality Outreach    Patient is due for the following:   Diabetes -  A1C, LDL (Fasting), Microalbumin, and Foot Exam  Physical Annual Wellness Visit      Topic Date Due    Zoster (Shingles) Vaccine (1 of 2) Never done    Diptheria Tetanus Pertussis (DTAP/TDAP/TD) Vaccine (2 - Td or Tdap) 12/30/2024    COVID-19 Vaccine (7 - 2024-25 season) 05/13/2025       Action(s) Taken:   Patient has upcoming appointment, these items will be addressed at that time.    Type of outreach:    Chart review performed, no outreach needed.    Questions for provider review:    None         Lydia Moyer MA  Chart routed to None.

## 2025-06-16 DIAGNOSIS — I27.20 PULMONARY HYPERTENSION (H): ICD-10-CM

## 2025-06-17 RX ORDER — FUROSEMIDE 20 MG/1
20 TABLET ORAL EVERY OTHER DAY
Qty: 60 TABLET | Refills: 3 | Status: SHIPPED | OUTPATIENT
Start: 2025-06-17

## 2025-07-01 ENCOUNTER — TELEPHONE (OUTPATIENT)
Dept: FAMILY MEDICINE | Facility: CLINIC | Age: 70
End: 2025-07-01
Payer: COMMERCIAL

## 2025-07-01 DIAGNOSIS — J43.9 PULMONARY EMPHYSEMA, UNSPECIFIED EMPHYSEMA TYPE (H): ICD-10-CM

## 2025-07-01 NOTE — TELEPHONE ENCOUNTER
Prior Authorization Retail Medication Request    Medication/Dose: umeclidinium (INCRUSE ELLIPTA) 62.5 MCG/ACT inhaler  Diagnosis and ICD code (if different than what is on RX):    Pulmonary emphysema, unspecified emphysema type (H) [J43.9]        New/renewal/insurance change PA/secondary ins. PA:  Previously Tried and Failed:    Rationale:      Insurance   Primary: [employer]   Insurance ID:      [id number]       Secondary (if applicable):    Medicare     Insurance ID:      [id number]       Pharmacy Information (if different than what is on RX)  Name:  [facility]  Phone:  [phone]   Fax:[phone]     Clinic Information  Preferred routing pool for dept communication: [facility] Pool

## 2025-07-02 RX ORDER — FORMOTEROL FUMARATE 20 UG/2ML
SOLUTION RESPIRATORY (INHALATION)
Qty: 60 ML | Refills: 0 | Status: SHIPPED | OUTPATIENT
Start: 2025-07-02

## 2025-07-02 NOTE — TELEPHONE ENCOUNTER
Retail Pharmacy Prior Authorization Team   Phone: [phone]    PA Initiation    Medication: INCRUSE ELLIPTA 62.5 MCG/ACT IN AEPB  Insurance Company: [employer] ([employer]) - Phone [phone] Fax [phone]  Pharmacy Filling the Rx: [facility] [address]   Filling Pharmacy Phone: [phone]  Filling Pharmacy Fax:    Start Date: 7/2/2025

## 2025-07-02 NOTE — TELEPHONE ENCOUNTER
Prior Authorization Approval    Authorization Effective Date: 6/2/2025  Authorization Expiration Date: 7/2/2026  Medication: umeclidinium (INCRUSE ELLIPTA) 62.5 MCG/ACT inhaler-APPROVED  Reference #:     Insurance Company: [employer] ([employer]) - Phone [phone] Fax [phone]  Which Pharmacy is filling the prescription (Not needed for infusion/clinic administered): [facility] [address]   Pharmacy Notified: Yes  Patient Notified: Instructed pharmacy to notify patient when script is ready to /ship.

## 2025-07-16 DIAGNOSIS — J43.9 PULMONARY EMPHYSEMA, UNSPECIFIED EMPHYSEMA TYPE (H): ICD-10-CM

## 2025-07-16 RX ORDER — ALBUTEROL SULFATE 90 UG/1
INHALANT RESPIRATORY (INHALATION)
Qty: 8.5 G | Refills: 3 | OUTPATIENT
Start: 2025-07-16

## 2025-07-16 NOTE — TELEPHONE ENCOUNTER
Pharmacy requested refills that are already active on file. Refused request to pharmacy.   Please authorize

## 2025-07-17 ENCOUNTER — TELEPHONE (OUTPATIENT)
Dept: FAMILY MEDICINE | Facility: CLINIC | Age: 70
End: 2025-07-17
Payer: COMMERCIAL

## 2025-07-17 DIAGNOSIS — J43.9 PULMONARY EMPHYSEMA, UNSPECIFIED EMPHYSEMA TYPE (H): ICD-10-CM

## 2025-07-17 RX ORDER — ALBUTEROL SULFATE 90 UG/1
1-2 INHALANT RESPIRATORY (INHALATION) EVERY 4 HOURS PRN
Qty: 8.5 G | Refills: 11 | Status: SHIPPED | OUTPATIENT
Start: 2025-07-17

## 2025-07-17 NOTE — TELEPHONE ENCOUNTER
Huddled with Dr. East. He states he will review this request.    Attempted to call patient with update. No answer, left message for return call.    Chelo Finch, NATHANN, RN

## 2025-07-17 NOTE — TELEPHONE ENCOUNTER
Patient Quality Outreach    Patient is due for the following:   Diabetes -  A1C, LDL (Fasting), Eye Exam, Microalbumin, and Foot Exam  Physical Annual Wellness Visit    Action(s) Taken:   Patient has upcoming appointment, these items will be addressed at that time.    Type of outreach:    Chart review performed, no outreach needed.    Questions for provider review:    None         Jack Chapman MA

## 2025-07-17 NOTE — TELEPHONE ENCOUNTER
Patient is looking for rescue inhaler. 1 inhaler will last him 16 days. Patient is upset and states that he is needing this ASAP.     Thank you

## 2025-07-17 NOTE — TELEPHONE ENCOUNTER
Patient called stating he requested albuterol inhaler refill yesterday and the pharmacy told him it was denied. Writer notes refill encounter from yesterday was denied by refill RN as refills on file. Notified patient I will contact pharmacy to see if refills are on file, and send to the provider if needed for further refills.     Called [facility]. They state patient has used the 3 refills that were sent with the last prescription on 5/6/25.    NATHAN ToroN, RN

## 2025-07-21 DIAGNOSIS — M81.0 AGE-RELATED OSTEOPOROSIS WITHOUT CURRENT PATHOLOGICAL FRACTURE: ICD-10-CM

## 2025-07-21 DIAGNOSIS — J43.9 PULMONARY EMPHYSEMA, UNSPECIFIED EMPHYSEMA TYPE (H): ICD-10-CM

## 2025-07-21 RX ORDER — IPRATROPIUM BROMIDE AND ALBUTEROL SULFATE 2.5; .5 MG/3ML; MG/3ML
SOLUTION RESPIRATORY (INHALATION)
Qty: 90 ML | Refills: 0 | Status: SHIPPED | OUTPATIENT
Start: 2025-07-21

## 2025-07-21 NOTE — TELEPHONE ENCOUNTER
Clinic RN: Please investigate patient's chart or contact patient if the information cannot be found because the medication is listed as historical or discontinued. Confirm patient is taking this medication. Document findings and route refill encounter to provider for approval or denial. Listed as transitional.    Gerry Parker RN on 7/21/2025 at 2:31 PM

## 2025-07-23 RX ORDER — BUDESONIDE 0.5 MG/2ML
0.5 INHALANT ORAL 2 TIMES DAILY
Qty: 100 ML | Refills: 3 | Status: SHIPPED | OUTPATIENT
Start: 2025-07-23

## 2025-07-23 RX ORDER — ALENDRONATE SODIUM 70 MG/1
70 TABLET ORAL
Qty: 12 TABLET | Refills: 0 | Status: SHIPPED | OUTPATIENT
Start: 2025-07-23

## 2025-08-04 DIAGNOSIS — J43.9 PULMONARY EMPHYSEMA, UNSPECIFIED EMPHYSEMA TYPE (H): ICD-10-CM

## 2025-08-04 RX ORDER — FORMOTEROL FUMARATE 20 UG/2ML
SOLUTION RESPIRATORY (INHALATION)
Qty: 60 ML | Refills: 0 | Status: SHIPPED | OUTPATIENT
Start: 2025-08-04

## 2025-08-14 SDOH — HEALTH STABILITY: PHYSICAL HEALTH: ON AVERAGE, HOW MANY DAYS PER WEEK DO YOU ENGAGE IN MODERATE TO STRENUOUS EXERCISE (LIKE A BRISK WALK)?: 7 DAYS

## 2025-08-14 SDOH — HEALTH STABILITY: PHYSICAL HEALTH: ON AVERAGE, HOW MANY MINUTES DO YOU ENGAGE IN EXERCISE AT THIS LEVEL?: 20 MIN

## 2025-08-14 ASSESSMENT — SOCIAL DETERMINANTS OF HEALTH (SDOH): HOW OFTEN DO YOU GET TOGETHER WITH FRIENDS OR RELATIVES?: NEVER

## 2025-08-16 ENCOUNTER — HEALTH MAINTENANCE LETTER (OUTPATIENT)
Age: 70
End: 2025-08-16

## 2025-08-19 ENCOUNTER — OFFICE VISIT (OUTPATIENT)
Dept: FAMILY MEDICINE | Facility: CLINIC | Age: 70
End: 2025-08-19
Payer: COMMERCIAL

## 2025-08-19 VITALS
TEMPERATURE: 98.4 F | OXYGEN SATURATION: 94 % | RESPIRATION RATE: 12 BRPM | WEIGHT: 153.6 LBS | SYSTOLIC BLOOD PRESSURE: 130 MMHG | DIASTOLIC BLOOD PRESSURE: 80 MMHG | BODY MASS INDEX: 21.99 KG/M2 | HEIGHT: 70 IN | HEART RATE: 69 BPM

## 2025-08-19 DIAGNOSIS — J43.9 PULMONARY EMPHYSEMA, UNSPECIFIED EMPHYSEMA TYPE (H): ICD-10-CM

## 2025-08-19 DIAGNOSIS — E11.9 TYPE 2 DIABETES MELLITUS WITHOUT COMPLICATION, WITHOUT LONG-TERM CURRENT USE OF INSULIN (H): ICD-10-CM

## 2025-08-19 DIAGNOSIS — Z00.00 ANNUAL PHYSICAL EXAM: Primary | ICD-10-CM

## 2025-08-19 DIAGNOSIS — Z11.59 NEED FOR HEPATITIS C SCREENING TEST: ICD-10-CM

## 2025-08-19 DIAGNOSIS — I82.413: ICD-10-CM

## 2025-08-19 DIAGNOSIS — R73.03 PREDIABETES: ICD-10-CM

## 2025-08-19 DIAGNOSIS — I50.22 CHRONIC SYSTOLIC CONGESTIVE HEART FAILURE (H): ICD-10-CM

## 2025-08-19 DIAGNOSIS — I27.20 PULMONARY HYPERTENSION (H): ICD-10-CM

## 2025-08-19 DIAGNOSIS — I48.0 PAROXYSMAL ATRIAL FIBRILLATION (H): ICD-10-CM

## 2025-08-19 LAB
ANION GAP SERPL CALCULATED.3IONS-SCNC: 10 MMOL/L (ref 7–15)
BUN SERPL-MCNC: 21 MG/DL (ref 8–23)
CALCIUM SERPL-MCNC: 9.7 MG/DL (ref 8.8–10.4)
CHLORIDE SERPL-SCNC: 94 MMOL/L (ref 98–107)
CHOLEST SERPL-MCNC: 197 MG/DL
CREAT SERPL-MCNC: 0.96 MG/DL (ref 0.67–1.17)
CREAT UR-MCNC: 49.1 MG/DL
EGFRCR SERPLBLD CKD-EPI 2021: 86 ML/MIN/1.73M2
EST. AVERAGE GLUCOSE BLD GHB EST-MCNC: 128 MG/DL
FASTING STATUS PATIENT QL REPORTED: NO
FASTING STATUS PATIENT QL REPORTED: NO
GLUCOSE SERPL-MCNC: 93 MG/DL (ref 70–99)
HBA1C MFR BLD: 6.1 %
HCO3 SERPL-SCNC: 30 MMOL/L (ref 22–29)
HDLC SERPL-MCNC: 59 MG/DL
LDLC SERPL CALC-MCNC: 125 MG/DL
MICROALBUMIN UR-MCNC: 17.8 MG/L
MICROALBUMIN/CREAT UR: 36.25 MG/G CR (ref 0–17)
NONHDLC SERPL-MCNC: 138 MG/DL
NT-PROBNP SERPL-MCNC: 1144 PG/ML (ref 0–229)
POTASSIUM SERPL-SCNC: 4.2 MMOL/L (ref 3.4–5.3)
SODIUM SERPL-SCNC: 134 MMOL/L (ref 135–145)
TRIGL SERPL-MCNC: 67 MG/DL

## 2025-08-19 PROCEDURE — 82570 ASSAY OF URINE CREATININE: CPT | Performed by: FAMILY MEDICINE

## 2025-08-19 PROCEDURE — 83880 ASSAY OF NATRIURETIC PEPTIDE: CPT | Performed by: FAMILY MEDICINE

## 2025-08-19 PROCEDURE — 80048 BASIC METABOLIC PNL TOTAL CA: CPT | Performed by: FAMILY MEDICINE

## 2025-08-19 PROCEDURE — 83036 HEMOGLOBIN GLYCOSYLATED A1C: CPT | Performed by: FAMILY MEDICINE

## 2025-08-19 PROCEDURE — 80061 LIPID PANEL: CPT | Performed by: FAMILY MEDICINE

## 2025-08-19 PROCEDURE — 82043 UR ALBUMIN QUANTITATIVE: CPT | Performed by: FAMILY MEDICINE

## 2025-08-19 PROCEDURE — 36415 COLL VENOUS BLD VENIPUNCTURE: CPT | Performed by: FAMILY MEDICINE

## 2025-08-19 RX ORDER — FUROSEMIDE 20 MG/1
20 TABLET ORAL DAILY
Qty: 90 TABLET | Refills: 3 | Status: SHIPPED | OUTPATIENT
Start: 2025-08-19

## 2025-08-19 RX ORDER — FORMOTEROL FUMARATE 20 UG/2ML
20 SOLUTION RESPIRATORY (INHALATION) EVERY 12 HOURS
Qty: 60 ML | Refills: 3 | Status: SHIPPED | OUTPATIENT
Start: 2025-08-19

## (undated) DEVICE — SYSTEM CLEARIFY VISUALIZATION 21-345

## (undated) DEVICE — KIT PATIENT POSITIONING PIGAZZI LATEX FREE 40580

## (undated) DEVICE — SU VICRYL 2-0 SH 27" J317H

## (undated) DEVICE — TUBING SUCTION 6"X3/16" N56A

## (undated) DEVICE — PACK GENERAL LAPAOSCOPY

## (undated) DEVICE — DAVINCI HOT SHEARS TIP COVER  400180

## (undated) DEVICE — DAVINCI OBTURATOR 8MM BLADELESS 420023

## (undated) DEVICE — DAVINCI S CANNULA SEAL 8.5-13MM 420206

## (undated) DEVICE — SU STRATAFIX PDS PLUS 2-0 SPIRAL SH 23CM SXPP1B433

## (undated) DEVICE — SOL WATER IRRIG 1000ML BOTTLE 2F7114

## (undated) DEVICE — ESU GROUND PAD UNIVERSAL W/O CORD

## (undated) DEVICE — DAVINCI SI DRAPE ACCESSORY KIT 3-ARM 420290

## (undated) DEVICE — KIT ENDO TURNOVER/PROCEDURE CARRY-ON 101822

## (undated) DEVICE — GLOVE ESTEEM BLUE W/NEU-THERA 6.0  2D73PB60

## (undated) DEVICE — SU MONOCRYL 4-0 PS-2 18" UND Y496G

## (undated) DEVICE — CATH TRAY FOLEY SURESTEP 16FR DRAIN BAG STATOCK A899916

## (undated) DEVICE — PEN MARKING SKIN

## (undated) DEVICE — GOWN XLG DISP 9545

## (undated) DEVICE — SUCTION MANIFOLD NEPTUNE 2 SYS 4 PORT 0702-020-000

## (undated) DEVICE — DRSG KERLIX 4 1/2"X4YDS ROLL 6730

## (undated) DEVICE — ENDO TROCAR FIRST ENTRY KII FIOS Z-THRD 05X100MM CTF03

## (undated) DEVICE — GLOVE PROTEXIS W/NEU-THERA 5.5  2D73TE55

## (undated) DEVICE — TUBING SMOKE EVAC PLUME-AWAY 6' 620-030-606

## (undated) RX ORDER — BUPIVACAINE HYDROCHLORIDE AND EPINEPHRINE 2.5; 5 MG/ML; UG/ML
INJECTION, SOLUTION EPIDURAL; INFILTRATION; INTRACAUDAL; PERINEURAL
Status: DISPENSED
Start: 2021-10-28

## (undated) RX ORDER — FENTANYL CITRATE 50 UG/ML
INJECTION, SOLUTION INTRAMUSCULAR; INTRAVENOUS
Status: DISPENSED
Start: 2021-10-28

## (undated) RX ORDER — DEXMEDETOMIDINE HYDROCHLORIDE 100 UG/ML
INJECTION, SOLUTION INTRAVENOUS
Status: DISPENSED
Start: 2021-10-28